# Patient Record
Sex: MALE | Race: WHITE | NOT HISPANIC OR LATINO | Employment: OTHER | URBAN - METROPOLITAN AREA
[De-identification: names, ages, dates, MRNs, and addresses within clinical notes are randomized per-mention and may not be internally consistent; named-entity substitution may affect disease eponyms.]

---

## 2017-01-04 ENCOUNTER — TELEPHONE (OUTPATIENT)
Dept: SLEEP MEDICINE | Facility: HOSPITAL | Age: 77
End: 2017-01-04

## 2017-01-25 ENCOUNTER — LAB VISIT (OUTPATIENT)
Dept: LAB | Facility: HOSPITAL | Age: 77
End: 2017-01-25
Attending: FAMILY MEDICINE
Payer: MEDICARE

## 2017-01-25 DIAGNOSIS — E03.9 HYPOTHYROIDISM, UNSPECIFIED TYPE: ICD-10-CM

## 2017-01-25 LAB
T4 FREE SERPL-MCNC: 1.3 NG/DL
TSH SERPL DL<=0.005 MIU/L-ACNC: 0.25 UIU/ML

## 2017-01-25 PROCEDURE — 84443 ASSAY THYROID STIM HORMONE: CPT

## 2017-01-25 PROCEDURE — 84439 ASSAY OF FREE THYROXINE: CPT

## 2017-01-25 PROCEDURE — 36415 COLL VENOUS BLD VENIPUNCTURE: CPT | Mod: PO

## 2017-01-27 ENCOUNTER — TELEPHONE (OUTPATIENT)
Dept: FAMILY MEDICINE | Facility: CLINIC | Age: 77
End: 2017-01-27

## 2017-01-27 DIAGNOSIS — E03.9 HYPOTHYROIDISM, UNSPECIFIED TYPE: Primary | ICD-10-CM

## 2017-01-27 RX ORDER — LEVOTHYROXINE SODIUM 100 UG/1
100 TABLET ORAL DAILY
Qty: 90 TABLET | Refills: 3 | Status: SHIPPED | OUTPATIENT
Start: 2017-01-27 | End: 2017-09-19 | Stop reason: SDUPTHER

## 2017-01-27 NOTE — TELEPHONE ENCOUNTER
Book with an eye doc if not done yet.     I refilled his meds.    Orders Placed This Encounter   Procedures    TSH     Standing Status:   Future     Standing Expiration Date:   3/28/2018

## 2017-02-08 ENCOUNTER — PATIENT OUTREACH (OUTPATIENT)
Dept: ADMINISTRATIVE | Facility: HOSPITAL | Age: 77
End: 2017-02-08

## 2017-02-08 NOTE — LETTER
February 8, 2017        We are seeing Bebeto Santiago, 1940, at [facility]. Darin Krueger MD is their primary care physician. To help with our [place] maintenance records could you please send the following:     Last Eye Exam     Please fax to [facility] at [phone], attention Alisha Lawson LPN.    Thank-you in advance for your assistance. If you have any questions or concerns please contact me at [phone].     Alisha Lawson LPN  Care Coordination Department  [facility]

## 2017-02-08 NOTE — LETTER
February 8, 2017    Bebeto Santiago  [address]   [place] [address]           [facility]  [address]  [facility] [address]  Phone: [phone] Dear Mr. Santiago:    [facility] is committed to your overall health.  To help you get the most out of each of your visits, we will review your information to make sure you are up to date on all of your recommended tests and/or procedures.      Darin Krueger MD has found that you may be due for   Health Maintenance Due   Topic    Eye Exam         If you have had any of the above done at another facility, please bring the records or information with you so that your record at [facility] will be complete.    If you are currently taking medication, please bring it with you to your appointment for review.    We will be happy to assist you with scheduling any necessary appointments or you may contact the [facility] appointment desk at [phone] to schedule at your convenience.     Thank you for choosing [facility] for your healthcare needs,        If you have any questions or concerns, please don't hesitate to call.    Sincerely,    Alisha DUEÑAS LPN  Care Coordination Department  [facility]

## 2017-02-17 ENCOUNTER — OFFICE VISIT (OUTPATIENT)
Dept: FAMILY MEDICINE | Facility: CLINIC | Age: 77
End: 2017-02-17
Payer: MEDICARE

## 2017-02-17 VITALS
HEIGHT: 72 IN | WEIGHT: 165 LBS | BODY MASS INDEX: 22.35 KG/M2 | HEART RATE: 65 BPM | TEMPERATURE: 98 F | DIASTOLIC BLOOD PRESSURE: 64 MMHG | SYSTOLIC BLOOD PRESSURE: 114 MMHG

## 2017-02-17 DIAGNOSIS — E11.59 HYPERTENSION ASSOCIATED WITH DIABETES: Primary | ICD-10-CM

## 2017-02-17 DIAGNOSIS — I15.2 HYPERTENSION ASSOCIATED WITH DIABETES: Primary | ICD-10-CM

## 2017-02-17 PROCEDURE — 99213 OFFICE O/P EST LOW 20 MIN: CPT | Mod: S$PBB,,, | Performed by: FAMILY MEDICINE

## 2017-02-17 PROCEDURE — 99214 OFFICE O/P EST MOD 30 MIN: CPT | Mod: PBBFAC,PO | Performed by: FAMILY MEDICINE

## 2017-02-17 PROCEDURE — 99999 PR PBB SHADOW E&M-EST. PATIENT-LVL IV: CPT | Mod: PBBFAC,,, | Performed by: FAMILY MEDICINE

## 2017-02-17 RX ORDER — CODEINE PHOSPHATE AND GUAIFENESIN 10; 100 MG/5ML; MG/5ML
10 SOLUTION ORAL 4 TIMES DAILY PRN
Qty: 480 ML | Refills: 0 | Status: SHIPPED | OUTPATIENT
Start: 2017-02-17 | End: 2017-02-27

## 2017-02-17 NOTE — MR AVS SNAPSHOT
[facility]  [address]  [place] [address]  Phone: [phone]  Fax: [phone]                  Bebeto Santiago   2017 9:20 AM   Office Visit    Description:  Male : 1940   Provider:  Darin Krueger MD   Department:  [facility]           Reason for Visit     Follow-up           Diagnoses this Visit        Comments    Hypertension associated with diabetes    -  Primary     Uncontrolled type 2 diabetes mellitus with stage 3 chronic kidney disease, with long-term current use of insulin                To Do List           Future Appointments        Provider Department Dept Phone    3/28/2017 8:25 AM LABORATORY, [place] [facility] [phone]    2017 8:40 AM LABORATORY, [place] [facility] [phone]      Goals (5 Years of Data)     None      Follow-Up and Disposition     Return in about 6 months (around 2017).    Follow-up and Disposition History       These Medications        Disp Refills Start End    guaifenesin-codeine 100-10 mg/5 ml (TUSSI-ORGANIDIN NR)  mg/5 mL syrup 480 mL 0 2017    Take 10 mLs by mouth 4 (four) times daily as needed for Cough. - Oral    Pharmacy: [id number] - [place] - [address] #: [phone]         [facility]     [facility] On [facility] Nurse Care Line -  Assistance  Registered nurses in the [facility] provide clinical advisement, health education, appointment booking, and other advisory services.  Call for this free service at [phone].             Medications           Message regarding Medications     Verify the changes and/or additions to your medication regime listed below are the same as discussed with your clinician today.  If any of these changes or additions are incorrect, please notify your healthcare provider.             Verify that the below list of  "medications is an accurate representation of the medications you are currently taking.  If none reported, the list may be blank. If incorrect, please contact your healthcare provider. Carry this list with you in case of emergency.           Current Medications     aspirin (ECOTRIN) 81 MG EC tablet Take 81 mg by mouth once daily.    BD INSULIN PEN NEEDLE UF MINI 31 gauge x 3/16" Ndle     BD LUER-MANUEL SYRINGE 3 mL 22 x 1 1/2" Syrg     blood sugar diagnostic Strp Use as directed before meals and at bedtime.    blood-glucose meter Misc Use as directed four times daily before meals and at bedtime.    doxazosin (CARDURA) 4 MG tablet Take 1 tablet (4 mg total) by mouth once daily. Every day    doxycycline (VIBRA-TABS) 100 MG tablet Take 100 mg by mouth once daily.    fluticasone (FLONASE) 50 mcg/actuation nasal spray USE 1 SPRAY IN EACH NOSTRIL ONCE DAILY.    insulin aspart (NOVOLOG FLEXPEN) 100 unit/mL InPn pen Check the glucose before every meal.   If the glucose is less than 150, do nothing.   If the glucose is >151-200, give 2 unit SQ   If the glucose is 201-250, give 3 units SQ   If the glucose is 250-300, give 4 units SQ   If the glucose is 301-350, give 5 units SQ   If the glucose is 350-400, give 6 units SQ   If the glucose is >400, give 7 units SQ and recheck in 2 hours and redose according to the scale above    insulin glargine (LANTUS SOLOSTAR) 100 unit/mL (3 mL) InPn pen Inject 15 units daily    lancets (ACCU-CHEK SOFTCLIX LANCETS) Misc Check glucose before meals and at bedtime.    levothyroxine (SYNTHROID) 100 MCG tablet Take 1 tablet (100 mcg total) by mouth once daily.    pravastatin (PRAVACHOL) 40 MG tablet TAKE 1 TABLET (40 MG TOTAL) BY MOUTH ONCE DAILY.    doxycycline (VIBRAMYCIN) 100 MG Cap Take 100 mg by mouth 2 (two) times daily.    DUREZOL 0.05 % Drop ophthalmic solution     dutasteride (AVODART) 0.5 mg capsule     fexofenadine (ALLEGRA ALLERGY) 180 MG tablet Take 1 tablet (180 mg total) by mouth once " "daily.    guaifenesin-codeine 100-10 mg/5 ml (TUSSI-ORGANIDIN NR)  mg/5 mL syrup Take 10 mLs by mouth 4 (four) times daily as needed for Cough.    HYPODERMIC NEEDLES 18 x 1 1/2 " Ndle     prednisoLONE acetate (PRED FORTE) 1 % DrpS PUT 1 DROP IN BOTH EYES 3 X'S A DAY FOR 1 WEEK, 2 TIMES A DAY FOR 1 WEEK, THEN ONCE A DAY FOR 1 WEEK           Clinical Reference Information           Your Vitals Were     BP Pulse Temp Height Weight BMI    114/64 (BP Location: Left arm, Patient Position: Sitting, BP Method: Automatic) 65 98.4 °F (36.9 °C) (Oral) 6' (1.829 m) 74.9 kg (165 lb 0.2 oz) 22.38 kg/m2      Blood Pressure          Most Recent Value    BP  114/64      Allergies as of 2/17/2017     Ace Inhibitors    Metformin      Immunizations Administered on Date of Encounter - 2/17/2017     None      [facility] Sign-Up     Activating your [facility] account is as easy as 1-2-3!     1) Visit [email or web address], select Sign Up Now, enter this activation code and your date of birth, then select Next.  Activation code not generated  Current Patient Portal Status: Account disabled      2) Create a username and password to use when you visit [facility] in the future and select a security question in case you lose your password and select Next.    3) Enter your e-mail address and click Sign Up!    Additional Information  If you have questions, please e-mail [email or web address] or call [phone] to talk to our [facility] staff. Remember, [facility] is NOT to be used for urgent needs. For medical emergencies, dial 911.         Language Assistance Services     ATTENTION: Language assistance services are available, free of charge. Please call [phone].      ATENCIÓN: Si habla español, tiene a cunningham disposición servicios gratuitos de asistencia lingüística. Llame al [phone].     CHÚ Ý: N?u b?n nói Ti?ng Vi?t, có các d?ch v? h? tr? ngôn ng? mi?n phí dành cho b?n. G?i s? [phone].         [facility] complies " with applicable Federal civil rights laws and does not discriminate on the basis of race, color, national origin, age, disability, or sex.

## 2017-02-17 NOTE — PROGRESS NOTES
"Subjective:      Patient ID: Bebeto Santiago is a 76 y.o. male.    Chief Complaint: Follow-up (diabetes, b/p)    HPI  The patient presents with essential hypertension.  The patient is tolerating the medication well and is in excellent compliance.  The patient is experiencing no side effects.  Counseling was offered regarding low salt diets.  The patient has a reduced salt intake.  The patient denies chest pain, palpitations, shortness of breath, dyspnea on exertion, left or murmur neck pain, nausea, vomiting, diaphoresis, paroxysmal nocturnal dyspnea, and orthopnea.     Visit Vitals    /64 (BP Location: Left arm, Patient Position: Sitting, BP Method: Automatic)    Pulse 65    Temp 98.4 °F (36.9 °C) (Oral)    Ht 6' (1.829 m)    Wt 74.9 kg (165 lb 0.2 oz)    BMI 22.38 kg/m2     He also has diabetes and he has not had to use much of the novolog.  He has had glucoses on his log that is in the double digits to the lower 100 range.  He has not had any lows.  I reviewed his last month of meds.   He is avoiding sugar and salt.     There are no preventive care reminders to display for this patient.    Past Medical History:  Past Medical History   Diagnosis Date    Allergy     BPH (benign prostatic hypertrophy)     Diabetes mellitus type II, uncontrolled     GERD (gastroesophageal reflux disease) 2013     grade IV/IV    Hypertension     Hypothyroidism     Male hypogonadism     Non-proliferative diabetic retinopathy     Urinary retention      Past Surgical History   Procedure Laterality Date    Spine surgery       Review of patient's allergies indicates:   Allergen Reactions    Ace inhibitors      Other reaction(s): cough    Metformin      Abdominal pain     Current Outpatient Prescriptions on File Prior to Visit   Medication Sig Dispense Refill    aspirin (ECOTRIN) 81 MG EC tablet Take 81 mg by mouth once daily.      BD INSULIN PEN NEEDLE UF MINI 31 gauge x 3/16" Ndle       BD LUER-MANUEL SYRINGE 3 mL " "22 x 1 1/2" Syrg   3    blood sugar diagnostic Strp Use as directed before meals and at bedtime. 300 each 6    blood-glucose meter Misc Use as directed four times daily before meals and at bedtime. 1 each 0    doxazosin (CARDURA) 4 MG tablet Take 1 tablet (4 mg total) by mouth once daily. Every day 90 tablet 3    doxycycline (VIBRA-TABS) 100 MG tablet Take 100 mg by mouth once daily.  1    fluticasone (FLONASE) 50 mcg/actuation nasal spray USE 1 SPRAY IN EACH NOSTRIL ONCE DAILY. 3 Bottle 3    insulin aspart (NOVOLOG FLEXPEN) 100 unit/mL InPn pen Check the glucose before every meal.   If the glucose is less than 150, do nothing.   If the glucose is >151-200, give 2 unit SQ   If the glucose is 201-250, give 3 units SQ   If the glucose is 250-300, give 4 units SQ   If the glucose is 301-350, give 5 units SQ   If the glucose is 350-400, give 6 units SQ   If the glucose is >400, give 7 units SQ and recheck in 2 hours and redose according to the scale above 3 mL 6    insulin glargine (LANTUS SOLOSTAR) 100 unit/mL (3 mL) InPn pen Inject 15 units daily 15 mL 6    lancets (ACCU-CHEK SOFTCLIX LANCETS) Misc Check glucose before meals and at bedtime. 200 each 6    levothyroxine (SYNTHROID) 100 MCG tablet Take 1 tablet (100 mcg total) by mouth once daily. 90 tablet 3    pravastatin (PRAVACHOL) 40 MG tablet TAKE 1 TABLET (40 MG TOTAL) BY MOUTH ONCE DAILY. 30 tablet 11    doxycycline (VIBRAMYCIN) 100 MG Cap Take 100 mg by mouth 2 (two) times daily.  0    DUREZOL 0.05 % Drop ophthalmic solution       dutasteride (AVODART) 0.5 mg capsule       fexofenadine (ALLEGRA ALLERGY) 180 MG tablet Take 1 tablet (180 mg total) by mouth once daily. 10 tablet 0    HYPODERMIC NEEDLES 18 x 1 1/2 " Ndle   3    prednisoLONE acetate (PRED FORTE) 1 % DrpS PUT 1 DROP IN BOTH EYES 3 X'S A DAY FOR 1 WEEK, 2 TIMES A DAY FOR 1 WEEK, THEN ONCE A DAY FOR 1 WEEK  0     No current facility-administered medications on file prior to visit.  "     Social History     Social History    Marital status:      Spouse name: Babs    Number of children: 2    Years of education: N/A     Occupational History    Retired      Social History Main Topics    Smoking status: Former Smoker     Quit date: 9/14/1986    Smokeless tobacco: Never Used    Alcohol use Yes      Comment: rare    Drug use: No    Sexual activity: Not on file     Other Topics Concern    Not on file     Social History Narrative     Family History   Problem Relation Age of Onset    Heart disease Mother     Stroke Maternal Grandfather     Diabetes Neg Hx     Cancer Neg Hx     Hypertension Neg Hx            Review of Systems    Objective:     Visit Vitals    /64 (BP Location: Left arm, Patient Position: Sitting, BP Method: Automatic)    Pulse 65    Temp 98.4 °F (36.9 °C) (Oral)    Ht 6' (1.829 m)    Wt 74.9 kg (165 lb 0.2 oz)    BMI 22.38 kg/m2       Physical Exam   Constitutional: He appears well-developed and well-nourished.   Cardiovascular: Normal rate, regular rhythm and normal heart sounds.  Exam reveals no gallop and no friction rub.    No murmur heard.  Pulmonary/Chest: Effort normal and breath sounds normal. No respiratory distress. He has no wheezes. He has no rales.   Musculoskeletal: He exhibits no edema.       Assessment:     1. Hypertension associated with diabetes    2. Uncontrolled type 2 diabetes mellitus with stage 3 chronic kidney disease, with long-term current use of insulin        Plan:   Bebeto was seen today for follow-up.    Diagnoses and all orders for this visit:    Hypertension associated with diabetes    Uncontrolled type 2 diabetes mellitus with stage 3 chronic kidney disease, with long-term current use of insulin      Recheck his thyroid in March as we changed him in Jan.   Check his a1c and lipid and cmp in august when he returns from [place] as he is moving there in March.

## 2017-03-09 ENCOUNTER — LAB VISIT (OUTPATIENT)
Dept: LAB | Facility: HOSPITAL | Age: 77
End: 2017-03-09
Attending: FAMILY MEDICINE
Payer: MEDICARE

## 2017-03-09 DIAGNOSIS — E03.9 HYPOTHYROIDISM, UNSPECIFIED TYPE: ICD-10-CM

## 2017-03-09 LAB — TSH SERPL DL<=0.005 MIU/L-ACNC: 0.61 UIU/ML

## 2017-03-09 PROCEDURE — 84443 ASSAY THYROID STIM HORMONE: CPT

## 2017-03-09 PROCEDURE — 36415 COLL VENOUS BLD VENIPUNCTURE: CPT | Mod: PO

## 2017-04-29 RX ORDER — DOXAZOSIN 4 MG/1
TABLET ORAL
Qty: 90 TABLET | Refills: 1 | Status: SHIPPED | OUTPATIENT
Start: 2017-04-29 | End: 2017-06-06

## 2017-06-06 ENCOUNTER — TELEPHONE (OUTPATIENT)
Dept: FAMILY MEDICINE | Facility: CLINIC | Age: 77
End: 2017-06-06

## 2017-06-06 NOTE — TELEPHONE ENCOUNTER
Upon talking to patient again patient clarified that his bp does go up to the 140's-160's/80's  when he is off of the cardura. Do you still want him to track with no bp medication?

## 2017-06-06 NOTE — TELEPHONE ENCOUNTER
Pt states he is in [place]. He says his cardura 4mg is making him dizzy and weak and when he doesn't take it he is fine. He states his blood pressure is fine. Please advise as to what to do.

## 2017-06-06 NOTE — TELEPHONE ENCOUNTER
----- Message from Shyann Lee sent at 6/6/2017  8:21 AM CDT -----  Contact: Pt  Pt is requesting to speak to the nurse regarding changing his medication. Pls call pt back at [phone].

## 2017-06-06 NOTE — TELEPHONE ENCOUNTER
Stay off of the cardura for now.  Track his blood pressure and send me the numbers over time to confirm he is stable.     BP Readings from Last 3 Encounters:   02/17/17 114/64   12/07/16 118/82   11/21/16 126/70

## 2017-06-08 ENCOUNTER — TELEPHONE (OUTPATIENT)
Dept: FAMILY MEDICINE | Facility: CLINIC | Age: 77
End: 2017-06-08

## 2017-06-08 NOTE — TELEPHONE ENCOUNTER
----- Message from Nikky Way sent at 6/8/2017  9:45 AM CDT -----  Brit with [facility] at [phone]//states they had called yesterday and have not gotten a response/they received a script for test strips//pt has Medicare B which requires dr to hand sign the script and include the diagnosis Code//please call//thanks/lh

## 2017-06-09 DIAGNOSIS — E11.9 TYPE 2 DIABETES MELLITUS WITHOUT COMPLICATION: ICD-10-CM

## 2017-06-12 ENCOUNTER — TELEPHONE (OUTPATIENT)
Dept: FAMILY MEDICINE | Facility: CLINIC | Age: 77
End: 2017-06-12

## 2017-06-12 NOTE — TELEPHONE ENCOUNTER
----- Message from Audrey Mariano sent at 6/12/2017 11:47 AM CDT -----  Contact: pt spouse-Babs Brice states she faxed over patient blood pressure reading on last Friday and states need to know if it have been received and what should the patient do. Please adv/call [phone].//thanks. cw

## 2017-06-13 ENCOUNTER — TELEPHONE (OUTPATIENT)
Dept: FAMILY MEDICINE | Facility: CLINIC | Age: 77
End: 2017-06-13

## 2017-06-13 RX ORDER — AMLODIPINE BESYLATE 5 MG/1
5 TABLET ORAL DAILY
Qty: 30 TABLET | Refills: 11 | Status: SHIPPED | OUTPATIENT
Start: 2017-06-13 | End: 2017-09-20

## 2017-06-13 RX ORDER — DUTASTERIDE 0.5 MG/1
0.5 CAPSULE, LIQUID FILLED ORAL DAILY
Qty: 30 CAPSULE | Refills: 11 | Status: SHIPPED | OUTPATIENT
Start: 2017-06-13 | End: 2017-09-19 | Stop reason: SDUPTHER

## 2017-06-13 NOTE — TELEPHONE ENCOUNTER
His bp has been high despite cardura and this makes him feel bad.  I am stoppin gcardura and starting his noraasc and avodart as he was not on avodart now.     He is to get me his bplist in 1 week.      I have signed for the following orders AND/OR meds.  Please call the patient and ask the patient to schedule the testing AND/OR inform about any medications that were sent.      No orders of the defined types were placed in this encounter.        Medications Ordered This Encounter      amlodipine (NORVASC) 5 MG tablet          Sig: Take 1 tablet (5 mg total) by mouth once daily.          Dispense:  30 tablet          Refill:  11      dutasteride (AVODART) 0.5 mg capsule          Sig: Take 1 capsule (0.5 mg total) by mouth once daily.          Dispense:  30 capsule          Refill:  11

## 2017-06-13 NOTE — TELEPHONE ENCOUNTER
----- Message from Yudelka Barrow sent at 6/13/2017  7:56 AM CDT -----  Contact: [phone]  Would like consult on RX medication states BP meds make him weak, Cesar call back at [phone] or [phone]. Thx. LJ

## 2017-06-13 NOTE — TELEPHONE ENCOUNTER
----- Message from Jade Villasenor sent at 6/13/2017  1:59 PM CDT -----  Contact: pt   Pt calling because the office called in the wrong diabetic test strips,,, please call pt back at [phone]

## 2017-09-06 DIAGNOSIS — E11.9 TYPE 2 DIABETES MELLITUS WITHOUT COMPLICATION, WITHOUT LONG-TERM CURRENT USE OF INSULIN: Primary | ICD-10-CM

## 2017-09-06 DIAGNOSIS — E78.5 HYPERLIPIDEMIA, UNSPECIFIED HYPERLIPIDEMIA TYPE: ICD-10-CM

## 2017-09-06 RX ORDER — PEN NEEDLE, DIABETIC 31 GX5/16"
NEEDLE, DISPOSABLE MISCELLANEOUS
Qty: 100 EACH | Refills: 0 | Status: SHIPPED | OUTPATIENT
Start: 2017-09-06 | End: 2017-09-19 | Stop reason: SDUPTHER

## 2017-09-06 RX ORDER — INSULIN GLARGINE 100 [IU]/ML
INJECTION, SOLUTION SUBCUTANEOUS
Qty: 15 SYRINGE | Refills: 0 | Status: SHIPPED | OUTPATIENT
Start: 2017-09-06 | End: 2017-09-19 | Stop reason: SDUPTHER

## 2017-09-07 NOTE — TELEPHONE ENCOUNTER
The patient requested medicine refills and I did refill it once.    Health Maintenance Due   Topic Date Due    Hemoglobin A1c  05/21/2017    Influenza Vaccine  08/01/2017    Foot Exam  08/25/2017    Urine Microalbumin  08/19/2017     BP Readings from Last 3 Encounters:   02/17/17 114/64   12/07/16 118/82   11/21/16 126/70     Check a cmp, lipid, a1c, urine prot/creat ratio

## 2017-09-07 NOTE — TELEPHONE ENCOUNTER
"I have signed for the following orders AND/OR meds.  Please call the patient and ask the patient to schedule the testing AND/OR inform about any medications that were sent.      Orders Placed This Encounter   Procedures    Comprehensive metabolic panel     Standing Status:   Future     Standing Expiration Date:   11/6/2018    Lipid panel     Standing Status:   Future     Standing Expiration Date:   11/6/2018    Hemoglobin A1c     Standing Status:   Future     Standing Expiration Date:   11/6/2018    MICROALBUMIN / CREATININE RATIO URINE     Standing Status:   Future     Standing Expiration Date:   11/6/2018     Order Specific Question:   Specimen Source     Answer:   Urine         Medications Ordered This Encounter      BD INSULIN PEN NEEDLE UF MINI 31 gauge x 3/16" Ndle          Sig: USE DAILY WITH LANTUS AND NOVOLOG          Dispense:  100 each          Refill:  0      LANTUS SOLOSTAR 100 unit/mL (3 mL) InPn pen          Sig: INJECT 15 UNITS DAILY          Dispense:  15 Syringe          Refill:  0  "

## 2017-09-08 ENCOUNTER — LAB VISIT (OUTPATIENT)
Dept: LAB | Facility: HOSPITAL | Age: 77
End: 2017-09-08
Attending: FAMILY MEDICINE
Payer: MEDICARE

## 2017-09-08 DIAGNOSIS — E11.9 TYPE 2 DIABETES MELLITUS WITHOUT COMPLICATION, WITHOUT LONG-TERM CURRENT USE OF INSULIN: ICD-10-CM

## 2017-09-08 LAB
CREAT UR-MCNC: 118 MG/DL
MICROALBUMIN UR DL<=1MG/L-MCNC: 26 UG/ML
MICROALBUMIN/CREATININE RATIO: 22 UG/MG

## 2017-09-08 PROCEDURE — 82570 ASSAY OF URINE CREATININE: CPT

## 2017-09-12 ENCOUNTER — TELEPHONE (OUTPATIENT)
Dept: FAMILY MEDICINE | Facility: CLINIC | Age: 77
End: 2017-09-12

## 2017-09-12 NOTE — TELEPHONE ENCOUNTER
----- Message from Nabila Arrington sent at 9/11/2017  4:54 PM CDT -----  Contact: pt  He's returning a missed call, please advise [phone] (home)

## 2017-09-19 RX ORDER — AMLODIPINE BESYLATE 5 MG/1
5 TABLET ORAL DAILY
Qty: 90 TABLET | Refills: 3 | Status: CANCELLED | OUTPATIENT
Start: 2017-09-19

## 2017-09-19 RX ORDER — SYRINGE WITH NEEDLE, 1 ML 25GX5/8"
SYRINGE, EMPTY DISPOSABLE MISCELLANEOUS
Refills: 3 | Status: CANCELLED | COMMUNITY
Start: 2017-09-19

## 2017-09-20 ENCOUNTER — OFFICE VISIT (OUTPATIENT)
Dept: FAMILY MEDICINE | Facility: CLINIC | Age: 77
End: 2017-09-20
Payer: MEDICARE

## 2017-09-20 ENCOUNTER — LAB VISIT (OUTPATIENT)
Dept: LAB | Facility: HOSPITAL | Age: 77
End: 2017-09-20
Attending: FAMILY MEDICINE
Payer: MEDICARE

## 2017-09-20 VITALS
HEART RATE: 69 BPM | SYSTOLIC BLOOD PRESSURE: 132 MMHG | WEIGHT: 156.75 LBS | TEMPERATURE: 99 F | HEIGHT: 72 IN | BODY MASS INDEX: 21.23 KG/M2 | DIASTOLIC BLOOD PRESSURE: 71 MMHG

## 2017-09-20 DIAGNOSIS — E11.59 HYPERTENSION ASSOCIATED WITH DIABETES: ICD-10-CM

## 2017-09-20 DIAGNOSIS — N40.0 BENIGN PROSTATIC HYPERPLASIA, PRESENCE OF LOWER URINARY TRACT SYMPTOMS UNSPECIFIED: ICD-10-CM

## 2017-09-20 DIAGNOSIS — R97.20 ELEVATED PSA: ICD-10-CM

## 2017-09-20 DIAGNOSIS — I73.9 PAD (PERIPHERAL ARTERY DISEASE): ICD-10-CM

## 2017-09-20 DIAGNOSIS — I15.2 HYPERTENSION ASSOCIATED WITH DIABETES: ICD-10-CM

## 2017-09-20 DIAGNOSIS — R09.89 BILATERAL CAROTID BRUITS: ICD-10-CM

## 2017-09-20 DIAGNOSIS — Z12.5 ENCOUNTER FOR SCREENING FOR MALIGNANT NEOPLASM OF PROSTATE: ICD-10-CM

## 2017-09-20 DIAGNOSIS — E11.22 CKD STAGE 3 SECONDARY TO DIABETES: ICD-10-CM

## 2017-09-20 DIAGNOSIS — E78.2 COMBINED HYPERLIPIDEMIA ASSOCIATED WITH TYPE 2 DIABETES MELLITUS: ICD-10-CM

## 2017-09-20 DIAGNOSIS — E11.3299 NON-PROLIFERATIVE DIABETIC RETINOPATHY: ICD-10-CM

## 2017-09-20 DIAGNOSIS — E11.69 COMBINED HYPERLIPIDEMIA ASSOCIATED WITH TYPE 2 DIABETES MELLITUS: ICD-10-CM

## 2017-09-20 DIAGNOSIS — K21.9 GASTROESOPHAGEAL REFLUX DISEASE, ESOPHAGITIS PRESENCE NOT SPECIFIED: ICD-10-CM

## 2017-09-20 DIAGNOSIS — N18.30 CKD STAGE 3 SECONDARY TO DIABETES: ICD-10-CM

## 2017-09-20 DIAGNOSIS — E03.9 HYPOTHYROIDISM, UNSPECIFIED TYPE: ICD-10-CM

## 2017-09-20 LAB
BILIRUB UR QL STRIP: NEGATIVE
CLARITY UR: CLEAR
COLOR UR: YELLOW
COMPLEXED PSA SERPL-MCNC: 2.1 NG/ML
CREAT UR-MCNC: 124 MG/DL
GLUCOSE UR QL STRIP: ABNORMAL
HGB UR QL STRIP: NEGATIVE
KETONES UR QL STRIP: NEGATIVE
LEUKOCYTE ESTERASE UR QL STRIP: NEGATIVE
MICROALBUMIN UR DL<=1MG/L-MCNC: 33 UG/ML
MICROALBUMIN/CREATININE RATIO: 26.6 UG/MG
NITRITE UR QL STRIP: NEGATIVE
PH UR STRIP: 6 [PH] (ref 5–8)
PROT UR QL STRIP: NEGATIVE
SP GR UR STRIP: 1.01 (ref 1–1.03)
URN SPEC COLLECT METH UR: ABNORMAL

## 2017-09-20 PROCEDURE — 99213 OFFICE O/P EST LOW 20 MIN: CPT | Mod: PBBFAC,PO | Performed by: FAMILY MEDICINE

## 2017-09-20 PROCEDURE — 1159F MED LIST DOCD IN RCRD: CPT | Mod: ,,, | Performed by: FAMILY MEDICINE

## 2017-09-20 PROCEDURE — 3078F DIAST BP <80 MM HG: CPT | Mod: ,,, | Performed by: FAMILY MEDICINE

## 2017-09-20 PROCEDURE — 3075F SYST BP GE 130 - 139MM HG: CPT | Mod: ,,, | Performed by: FAMILY MEDICINE

## 2017-09-20 PROCEDURE — 36415 COLL VENOUS BLD VENIPUNCTURE: CPT | Mod: PO

## 2017-09-20 PROCEDURE — 99215 OFFICE O/P EST HI 40 MIN: CPT | Mod: S$PBB,,, | Performed by: FAMILY MEDICINE

## 2017-09-20 PROCEDURE — 81002 URINALYSIS NONAUTO W/O SCOPE: CPT | Mod: PO

## 2017-09-20 PROCEDURE — 84153 ASSAY OF PSA TOTAL: CPT

## 2017-09-20 PROCEDURE — 82570 ASSAY OF URINE CREATININE: CPT

## 2017-09-20 PROCEDURE — 99999 PR PBB SHADOW E&M-EST. PATIENT-LVL III: CPT | Mod: PBBFAC,,, | Performed by: FAMILY MEDICINE

## 2017-09-20 PROCEDURE — 1126F AMNT PAIN NOTED NONE PRSNT: CPT | Mod: ,,, | Performed by: FAMILY MEDICINE

## 2017-09-20 RX ORDER — INSULIN GLARGINE 100 [IU]/ML
INJECTION, SOLUTION SUBCUTANEOUS
Qty: 15 SYRINGE | Refills: 5 | Status: SHIPPED | OUTPATIENT
Start: 2017-09-20 | End: 2018-02-21 | Stop reason: SDUPTHER

## 2017-09-20 RX ORDER — PRAVASTATIN SODIUM 40 MG/1
40 TABLET ORAL DAILY
Qty: 90 TABLET | Refills: 3 | Status: SHIPPED | OUTPATIENT
Start: 2017-09-20 | End: 2018-02-21 | Stop reason: SDUPTHER

## 2017-09-20 RX ORDER — TAMSULOSIN HYDROCHLORIDE 0.4 MG/1
0.4 CAPSULE ORAL DAILY
Qty: 90 CAPSULE | Refills: 3 | Status: SHIPPED | OUTPATIENT
Start: 2017-09-20 | End: 2019-03-08 | Stop reason: SDUPTHER

## 2017-09-20 RX ORDER — LANCETS
EACH MISCELLANEOUS
Qty: 200 EACH | Refills: 6 | Status: SHIPPED | OUTPATIENT
Start: 2017-09-20 | End: 2019-03-08 | Stop reason: SDUPTHER

## 2017-09-20 RX ORDER — AMLODIPINE BESYLATE 2.5 MG/1
2.5 TABLET ORAL DAILY
Qty: 90 TABLET | Refills: 3 | Status: SHIPPED | OUTPATIENT
Start: 2017-09-20 | End: 2018-02-21 | Stop reason: SDUPTHER

## 2017-09-20 RX ORDER — INSULIN ASPART 100 [IU]/ML
INJECTION, SOLUTION INTRAVENOUS; SUBCUTANEOUS
Qty: 3 ML | Refills: 6 | Status: SHIPPED | OUTPATIENT
Start: 2017-09-20 | End: 2018-02-07 | Stop reason: SDUPTHER

## 2017-09-20 RX ORDER — DUTASTERIDE 0.5 MG/1
0.5 CAPSULE, LIQUID FILLED ORAL DAILY
Qty: 90 CAPSULE | Refills: 3 | Status: SHIPPED | OUTPATIENT
Start: 2017-09-20 | End: 2017-10-03

## 2017-09-20 RX ORDER — LEVOTHYROXINE SODIUM 100 UG/1
100 TABLET ORAL DAILY
Qty: 90 TABLET | Refills: 3 | Status: SHIPPED | OUTPATIENT
Start: 2017-09-20 | End: 2018-02-21 | Stop reason: SDUPTHER

## 2017-09-20 RX ORDER — PEN NEEDLE, DIABETIC 30 GX3/16"
NEEDLE, DISPOSABLE MISCELLANEOUS
Qty: 300 EACH | Refills: 3 | Status: SHIPPED | OUTPATIENT
Start: 2017-09-20 | End: 2019-03-08 | Stop reason: SDUPTHER

## 2017-09-20 NOTE — PROGRESS NOTES
Subjective:      Patient ID: Bebeto Santiago is a 77 y.o. male.    Chief Complaint: Annual Exam    HPI   The patient presents with diabetes.  The patient denies polyuria, polydipsia, polyphagia, hypoglycemia and paresthesias.  The patient's glucose control has been good.  Home glucose averages are routinely checked.  The patient is without retinopathy currently.  The patient has no history of neuropathy.  The patient currently complains of no podiatric problems.  He does have diabetic retinopathy and is seeing an eye doc.  The patient has excellent compliance.  Hemoglobin A1C   Date Value Ref Range Status   09/08/2017 7.6 (H) 4.0 - 5.6 % Final     Comment:     According to ADA guidelines, hemoglobin A1c <7.0% represents  optimal control in non-pregnant diabetic patients. Different  metrics may apply to specific patient populations.   Standards of Medical Care in Diabetes-2016.  For the purpose of screening for the presence of diabetes:  <5.7%     Consistent with the absence of diabetes  5.7-6.4%  Consistent with increasing risk for diabetes   (prediabetes)  >or=6.5%  Consistent with diabetes  Currently, no consensus exists for use of hemoglobin A1c  for diagnosis of diabetes for children.  This Hemoglobin A1c assay has significant interference with fetal   hemoglobin   (HbF). The results are invalid for patients with abnormal amounts of   HbF,   including those with known Hereditary Persistence   of Fetal Hemoglobin. Heterozygous hemoglobin variants (HbAS, HbAC,   HbAD, HbAE, HbA2) do not significantly interfere with this assay;   however, presence of multiple variants in a sample may impact the %   interference.     11/21/2016 7.3 (H) 4.5 - 6.2 % Final     Comment:     According to ADA guidelines, hemoglobin A1C <7.0% represents  optimal control in non-pregnant diabetic patients.  Different  metrics may apply to specific populations.   Standards of Medical Care in Diabetes - 2016.  For the purpose of screening for  the presence of diabetes:  <5.7%     Consistent with the absence of diabetes  5.7-6.4%  Consistent with increasing risk for diabetes   (prediabetes)  >or=6.5%  Consistent with diabetes  Currently no consensus exists for use of hemoglobin A1C  for diagnosis of diabetes for children.     08/19/2016 11.7 (H) 4.5 - 6.2 % Final     Comment:     According to ADA guidelines, hemoglobin A1C <7.0% represents  optimal control in non-pregnant diabetic patients.  Different  metrics may apply to specific populations.   Standards of Medical Care in Diabetes - 2016.  For the purpose of screening for the presence of diabetes:  <5.7%     Consistent with the absence of diabetes  5.7-6.4%  Consistent with increasing risk for diabetes   (prediabetes)  >or=6.5%  Consistent with diabetes  Currently no consensus exists for use of hemoglobin A1C  for diagnosis of diabetes for children.       No results found for: PAULETTE MORE4HUR    The patient presents with hyperlipidemia.  The patient reports tolerating the medication well and is in excellent compliance.  There have been no medication side effects.  The patient denies chest pain, neuropathy, and myalgias.  The patient has reduced fat intake and has been exercising.  Current treatment has included the medications listed in the med card.    Lab Results   Component Value Date    CHOL 166 09/08/2017    CHOL 157 11/09/2016    CHOL 158 08/19/2016       Lab Results   Component Value Date    HDL 58 09/08/2017    HDL 53 11/09/2016    HDL 48 08/19/2016       Lab Results   Component Value Date    LDLCALC 97.4 09/08/2017    LDLCALC 89.2 11/09/2016    LDLCALC 96.8 08/19/2016       Lab Results   Component Value Date    TRIG 53 09/08/2017    TRIG 74 11/09/2016    TRIG 66 08/19/2016       Lab Results   Component Value Date    CHOLHDL 34.9 09/08/2017    CHOLHDL 33.8 11/09/2016    CHOLHDL 30.4 08/19/2016     Lab Results   Component Value Date    ALT 17 09/08/2017    AST 17 09/08/2017    ALKPHOS 96  09/08/2017    BILITOT 0.9 09/08/2017       The patient presents with essential hypertension.  The patient is tolerating the medication well and is in excellent compliance.  The patient is experiencing no side effects.  Counseling was offered regarding low salt diets.  The patient has a reduced salt intake.  The patient denies chest pain, palpitations, shortness of breath, dyspnea on exertion, left or murmur neck pain, nausea, vomiting, diaphoresis, paroxysmal nocturnal dyspnea, and orthopnea.   /71   Pulse 69   Temp 99.1 °F (37.3 °C) (Oral)   Ht 6' (1.829 m)   Wt 71.1 kg (156 lb 12 oz)   BMI 21.26 kg/m²     The patient presents with GERD.  Denies chest pain, nausea & vomiting, belching, cramping, distention, dyspepsia, dysphagia, hematochezia, melena, abdominal pain and weight loss.  Current treatment has included medications that are listed in medications list with significant response.  There has been no medicine intolerance.  The patient cannot identify any exacerbating factors.  Avoidance of NSAID's, ASA, carbonated beverages and spicy food was discussed.    The patient presents with hypothyroidism.  The patient denies agitation, anxiety, blurred vision, chest pain, cold intolerance, constipation, dizziness, dry skin, fatigue, lightheadedness, paresthesias, skin coarsening, tachycardia, tremor, weight gain or weight loss.  The patient's current treatment has included Synthroid with a good response.    Lab Results   Component Value Date    TSH 0.608 03/09/2017     Yonathan had an elevated psa.  He has urgerncy of urination and nocturia and it is worsening.  He was on flomax in the past and he had low bp on this and we stopped it.  He would like to resume it now and at the same time I will decrease his amlodipine.  He has been seeing Dr. Muro for his increased PSA.  Lab Results   Component Value Date    PSA 8.2 (H) 08/19/2016    PSA 4.9 (H) 10/26/2015    PSA 4.70 (H) 03/06/2013     He did have one that  "was 3.1 after all of the above numbers and he was seen by a specialist. It is time to recheck.     He has PAD as he smoked years ago and he had an u/S last year. He never did see the vasc surgeon.  It is time to recheck this.     Health Maintenance Due   Topic Date Due    Foot Exam  08/25/2017       Past Medical History:  Past Medical History:   Diagnosis Date    Allergy     BPH (benign prostatic hypertrophy)     Diabetes mellitus type II, uncontrolled     GERD (gastroesophageal reflux disease) 2013    grade IV/IV    Hypertension     Hypothyroidism     Male hypogonadism     Non-proliferative diabetic retinopathy     Urinary retention      Past Surgical History:   Procedure Laterality Date    SPINE SURGERY       Review of patient's allergies indicates:   Allergen Reactions    Ace inhibitors      Other reaction(s): cough    Metformin      Abdominal pain     Current Outpatient Prescriptions on File Prior to Visit   Medication Sig Dispense Refill    amlodipine (NORVASC) 5 MG tablet Take 1 tablet (5 mg total) by mouth once daily. 30 tablet 11    aspirin (ECOTRIN) 81 MG EC tablet Take 81 mg by mouth once daily.      BD INSULIN PEN NEEDLE UF MINI 31 gauge x 3/16" Ndle USE DAILY WITH LANTUS AND NOVOLOG 100 each 0    BD LUER-MANUEL SYRINGE 3 mL 22 x 1 1/2" Syrg   3    blood sugar diagnostic Strp Use as directed before meals and at bedtime. 300 each 6    blood-glucose meter Misc Use as directed four times daily before meals and at bedtime. 1 each 0    doxycycline (VIBRA-TABS) 100 MG tablet Take 100 mg by mouth once daily.  1    DUREZOL 0.05 % Drop ophthalmic solution       dutasteride (AVODART) 0.5 mg capsule Take 1 capsule (0.5 mg total) by mouth once daily. 30 capsule 11    fexofenadine (ALLEGRA ALLERGY) 180 MG tablet Take 1 tablet (180 mg total) by mouth once daily. 10 tablet 0    fluticasone (FLONASE) 50 mcg/actuation nasal spray USE 1 SPRAY IN EACH NOSTRIL ONCE DAILY. 3 Bottle 3    HYPODERMIC " "NEEDLES 18 x 1 1/2 " Ndle   3    insulin aspart (NOVOLOG FLEXPEN) 100 unit/mL InPn pen Check the glucose before every meal.   If the glucose is less than 150, do nothing.   If the glucose is >151-200, give 2 unit SQ   If the glucose is 201-250, give 3 units SQ   If the glucose is 250-300, give 4 units SQ   If the glucose is 301-350, give 5 units SQ   If the glucose is 350-400, give 6 units SQ   If the glucose is >400, give 7 units SQ and recheck in 2 hours and redose according to the scale above 3 mL 6    lancets (ACCU-CHEK SOFTCLIX LANCETS) Misc Check glucose before meals and at bedtime. 200 each 6    LANTUS SOLOSTAR 100 unit/mL (3 mL) InPn pen INJECT 15 UNITS DAILY 15 Syringe 0    levothyroxine (SYNTHROID) 100 MCG tablet Take 1 tablet (100 mcg total) by mouth once daily. 90 tablet 3    pravastatin (PRAVACHOL) 40 MG tablet TAKE 1 TABLET (40 MG TOTAL) BY MOUTH ONCE DAILY. 30 tablet 11    prednisoLONE acetate (PRED FORTE) 1 % DrpS PUT 1 DROP IN BOTH EYES 3 X'S A DAY FOR 1 WEEK, 2 TIMES A DAY FOR 1 WEEK, THEN ONCE A DAY FOR 1 WEEK  0    [DISCONTINUED] doxycycline (VIBRAMYCIN) 100 MG Cap Take 100 mg by mouth 2 (two) times daily.  0     No current facility-administered medications on file prior to visit.      Social History     Social History    Marital status:      Spouse name: Babs    Number of children: 2    Years of education: N/A     Occupational History    Retired      Social History Main Topics    Smoking status: Former Smoker     Quit date: 9/14/1986    Smokeless tobacco: Never Used    Alcohol use Yes      Comment: rare    Drug use: No    Sexual activity: Not on file     Other Topics Concern    Not on file     Social History Narrative    No narrative on file     Family History   Problem Relation Age of Onset    Heart disease Mother     Stroke Maternal Grandfather     Diabetes Neg Hx     Cancer Neg Hx     Hypertension Neg Hx              Review of Systems   Constitutional: Negative " for chills, fatigue and fever.   Respiratory: Negative for cough, shortness of breath and wheezing.    Cardiovascular: Negative for chest pain and palpitations.   Gastrointestinal: Negative for abdominal pain, blood in stool, nausea and vomiting.   Genitourinary: Positive for difficulty urinating, dysuria, frequency and urgency. Negative for hematuria.       Objective:   /71   Pulse 69   Temp 99.1 °F (37.3 °C) (Oral)   Ht 6' (1.829 m)   Wt 71.1 kg (156 lb 12 oz)   BMI 21.26 kg/m²     Physical Exam   Constitutional: He is oriented to person, place, and time. He appears well-developed and well-nourished.   HENT:   Head: Normocephalic and atraumatic.   Right Ear: External ear normal.   Left Ear: External ear normal.   Nose: Nose normal.   Mouth/Throat: Oropharynx is clear and moist. No oropharyngeal exudate.   Eyes: Conjunctivae and EOM are normal. Pupils are equal, round, and reactive to light. Right eye exhibits no discharge. Left eye exhibits no discharge. No scleral icterus.   Neck: Normal range of motion. Neck supple. No JVD present. Carotid bruit is present. No thyromegaly present.   Cardiovascular: Normal rate, regular rhythm, normal heart sounds and intact distal pulses.  Exam reveals no gallop and no friction rub.    No murmur heard.  Pulses:       Dorsalis pedis pulses are 0 on the right side, and 0 on the left side.        Posterior tibial pulses are 0 on the right side, and 0 on the left side.   Pulmonary/Chest: Effort normal and breath sounds normal. No respiratory distress. He has no wheezes. He has no rales. He exhibits no tenderness.   Abdominal: Soft. Bowel sounds are normal. He exhibits no distension and no mass. There is no tenderness. There is no rebound and no guarding.   Musculoskeletal: Normal range of motion. He exhibits no edema or tenderness.        Right foot: There is normal range of motion and no deformity.        Left foot: There is normal range of motion and no deformity.  "  Feet:   Right Foot:   Protective Sensation: 10 sites tested. 10 sites sensed.   Skin Integrity: Negative for ulcer, blister, skin breakdown, erythema, warmth, callus or dry skin.   Left Foot:   Protective Sensation: 10 sites tested. 10 sites sensed.   Skin Integrity: Negative for ulcer, blister, skin breakdown, erythema, warmth, callus or dry skin.   Lymphadenopathy:     He has no cervical adenopathy.   Neurological: He is alert and oriented to person, place, and time. No cranial nerve deficit. Coordination normal.   Skin: Skin is warm and dry. He is not diaphoretic.   Psychiatric: He has a normal mood and affect.       Assessment:     1. Uncontrolled type 2 diabetes mellitus with stage 3 chronic kidney disease, with long-term current use of insulin    2. Hypothyroidism, unspecified type    3. Hypertension associated with diabetes    4. Gastroesophageal reflux disease, esophagitis presence not specified    5. Elevated PSA    6. Combined hyperlipidemia associated with type 2 diabetes mellitus    7. CKD stage 3 secondary to diabetes    8. Non-proliferative diabetic retinopathy    9. Benign prostatic hyperplasia, presence of lower urinary tract symptoms unspecified    10. Encounter for screening for malignant neoplasm of prostate     11. Bilateral carotid bruits    12. PAD (peripheral artery disease)        Plan:   Bebeto was seen today for annual exam.    Diagnoses and all orders for this visit:    Uncontrolled type 2 diabetes mellitus with stage 3 chronic kidney disease, with long-term current use of insulin  -     Microalbumin/creatinine urine ratio; Future  -     pen needle, diabetic (BD INSULIN PEN NEEDLE UF MINI) 31 gauge x 3/16" Ndle; USE DAILY WITH LANTUS AND NOVOLOG  -     blood sugar diagnostic Strp; Use as directed before meals and at bedtime.  -     insulin aspart (NOVOLOG FLEXPEN) 100 unit/mL InPn pen; Check the glucose before every meal.   If the glucose is less than 150, do nothing.   If the glucose is " ">151-200, give 2 unit SQ   If the glucose is 201-250, give 3 units SQ   If the glucose is 250-300, give 4 units SQ   If the glucose is 301-350, give 5 units SQ   If the glucose is 350-400, give 6 units SQ   If the glucose is >400, give 7 units SQ and recheck in 2 hours and redose according to the scale above  -     lancets (ACCU-CHEK SOFTCLIX LANCETS) Misc; Check glucose before meals and at bedtime.  -     insulin glargine (LANTUS SOLOSTAR) 100 unit/mL (3 mL) InPn pen; INJECT 15 UNITS DAILY    Hypothyroidism, unspecified type  -     levothyroxine (SYNTHROID) 100 MCG tablet; Take 1 tablet (100 mcg total) by mouth once daily.    Hypertension associated with diabetes  -     amlodipine (NORVASC) 2.5 MG tablet; Take 1 tablet (2.5 mg total) by mouth once daily.    Gastroesophageal reflux disease, esophagitis presence not specified    Elevated PSA  -     PSA, Screening; Future    Combined hyperlipidemia associated with type 2 diabetes mellitus  -     pravastatin (PRAVACHOL) 40 MG tablet; Take 1 tablet (40 mg total) by mouth once daily.    CKD stage 3 secondary to diabetes    Non-proliferative diabetic retinopathy    Benign prostatic hyperplasia, presence of lower urinary tract symptoms unspecified  -     dutasteride (AVODART) 0.5 mg capsule; Take 1 capsule (0.5 mg total) by mouth once daily.  -     tamsulosin (FLOMAX) 0.4 mg Cp24; Take 1 capsule (0.4 mg total) by mouth once daily.  -     Urinalysis; Future    Encounter for screening for malignant neoplasm of prostate   -     PSA, Screening; Future    Bilateral carotid bruits  -     US Carotid Bilateral; Future    PAD (peripheral artery disease)  -     US Lower Extremity Arteries Bilateral; Future    Other orders  -     Cancel: amlodipine (NORVASC) 5 MG tablet; Take 1 tablet (5 mg total) by mouth once daily.  -     Cancel: BD LUER-MANUEL SYRINGE 3 mL 22 x 1 1/2" Syrg;       Stop the amlodipine and decrease the dose to 2.5 mg a day and start the flomax for the prostate. F/u " with Dr. Muro. Check his PSA.  Check his kidneys.  Cont f/u with the eye doc.  Check the ultrasound of the neck and legs.

## 2017-09-21 ENCOUNTER — TELEPHONE (OUTPATIENT)
Dept: RADIOLOGY | Facility: HOSPITAL | Age: 77
End: 2017-09-21

## 2017-09-21 NOTE — PROGRESS NOTES
The patient has a normal urine protein creatinine ratio.  Please ask the patient to repeat this annually to ensure that the patient is not progressing on proteinuria which can be a side effect from diabetes and hypertension.  It would also be a sign that the patient is developing diabetic nephropathy if it were positive.    The urinalysis is also normal except for glucose which this was not fasting.   His last a1c was   Lab Results       Component                Value               Date                       HGBA1C                   7.6 (H)             09/08/2017

## 2017-09-22 ENCOUNTER — HOSPITAL ENCOUNTER (OUTPATIENT)
Dept: RADIOLOGY | Facility: HOSPITAL | Age: 77
Discharge: HOME | End: 2017-09-22
Attending: FAMILY MEDICINE
Payer: MEDICARE

## 2017-09-22 ENCOUNTER — TELEPHONE (OUTPATIENT)
Dept: FAMILY MEDICINE | Facility: CLINIC | Age: 77
End: 2017-09-22

## 2017-09-22 DIAGNOSIS — R09.89 BILATERAL CAROTID BRUITS: ICD-10-CM

## 2017-09-22 DIAGNOSIS — I73.9 PAD (PERIPHERAL ARTERY DISEASE): ICD-10-CM

## 2017-09-22 DIAGNOSIS — I65.23 BILATERAL CAROTID ARTERY STENOSIS: ICD-10-CM

## 2017-09-22 PROCEDURE — 93925 LOWER EXTREMITY STUDY: CPT | Mod: TC,PO

## 2017-09-22 PROCEDURE — 93880 EXTRACRANIAL BILAT STUDY: CPT | Mod: 26,,, | Performed by: RADIOLOGY

## 2017-09-22 PROCEDURE — 93880 EXTRACRANIAL BILAT STUDY: CPT | Mod: TC,PO

## 2017-09-22 PROCEDURE — 93925 LOWER EXTREMITY STUDY: CPT | Mod: 26,,, | Performed by: RADIOLOGY

## 2017-09-22 NOTE — PROGRESS NOTES
I have referred him to CV surgery.  I had paged  about this earlier today and have not had a reply yet.  He is booked with them for next week.

## 2017-09-22 NOTE — TELEPHONE ENCOUNTER
He has escobar carotid artery stenosis.   I dicsussed this with he and his wife.    He is to cont ASA.   I have paged  to ask if there is anything else we need to do and he is to see them on WEdnesday.

## 2017-09-27 ENCOUNTER — OFFICE VISIT (OUTPATIENT)
Dept: VASCULAR SURGERY | Facility: CLINIC | Age: 77
End: 2017-09-27
Payer: MEDICARE

## 2017-09-27 VITALS
WEIGHT: 157.81 LBS | HEIGHT: 72 IN | HEART RATE: 77 BPM | BODY MASS INDEX: 21.37 KG/M2 | DIASTOLIC BLOOD PRESSURE: 59 MMHG | SYSTOLIC BLOOD PRESSURE: 101 MMHG

## 2017-09-27 DIAGNOSIS — I65.22 CAROTID STENOSIS, LEFT: Primary | ICD-10-CM

## 2017-09-27 PROCEDURE — 3074F SYST BP LT 130 MM HG: CPT | Mod: ,,, | Performed by: THORACIC SURGERY (CARDIOTHORACIC VASCULAR SURGERY)

## 2017-09-27 PROCEDURE — 99999 PR PBB SHADOW E&M-EST. PATIENT-LVL III: CPT | Mod: PBBFAC,,, | Performed by: THORACIC SURGERY (CARDIOTHORACIC VASCULAR SURGERY)

## 2017-09-27 PROCEDURE — 3078F DIAST BP <80 MM HG: CPT | Mod: ,,, | Performed by: THORACIC SURGERY (CARDIOTHORACIC VASCULAR SURGERY)

## 2017-09-27 PROCEDURE — 99213 OFFICE O/P EST LOW 20 MIN: CPT | Mod: PBBFAC,PO | Performed by: THORACIC SURGERY (CARDIOTHORACIC VASCULAR SURGERY)

## 2017-09-27 PROCEDURE — 1159F MED LIST DOCD IN RCRD: CPT | Mod: ,,, | Performed by: THORACIC SURGERY (CARDIOTHORACIC VASCULAR SURGERY)

## 2017-09-27 PROCEDURE — 1126F AMNT PAIN NOTED NONE PRSNT: CPT | Mod: ,,, | Performed by: THORACIC SURGERY (CARDIOTHORACIC VASCULAR SURGERY)

## 2017-09-27 PROCEDURE — 99213 OFFICE O/P EST LOW 20 MIN: CPT | Mod: S$PBB,,, | Performed by: THORACIC SURGERY (CARDIOTHORACIC VASCULAR SURGERY)

## 2017-09-27 NOTE — PROGRESS NOTES
HISTORY OF PRESENT ILLNESS:  This is a 77-year-old gentleman with carotid   occlusive disease.  He was found to have a right carotid artery occlusion and a   high-grade left carotid stenosis, which is totally asymptomatic.  He has no TIA,   no history of stroke.  He denies coronary disease.  He has diabetes and   hypertension and mild renal insufficiency.  He is not a smoker.    PAST SURGICAL HISTORY:  He has had no other recent surgeries.  He has had back   surgery in the past.    MEDICATIONS:  Are part of the EPIC record.    FAMILY AND SOCIAL HISTORY:  He has no other pertinent family or social history.    REVIEW OF SYSTEMS:   Fairly unremarkable, and he is quite active despite his   age.    PHYSICAL EXAMINATION:  VITAL SIGNS:  Stable.  HEENT:  Pupils equal, round and reactive to light.  Nose and throat are clear.  NECK:  Supple.  CHEST:  Clear to auscultation.  HEART:  Regular rate and rhythm.  ABDOMEN:  Benign.  EXTREMITIES:  Femoral pulses are equal.  Perfusion to the legs and feet is   satisfactory.    The patient has significant carotid occlusive disease despite the fact that he   is totally asymptomatic.  He has occlusion of the right carotid artery and   high-grade stenosis of the left carotid artery.  A left carotid endarterectomy   is recommended.  I have explained this to him and his wife.  They seem to be   understanding and we will try to arrange for this in the near future.      JESSIE  dd: 09/27/2017 13:45:57 (CDT)  td: 09/28/2017 03:51:29 (CDT)  Doc ID   #[id number]  Job ID #[id number]    CC:

## 2017-09-27 NOTE — LETTER
September 27, 2017      Darin Krueger MD  [address] [facility] [address]           [facility]  [address] [address] [facility] [address]  Phone: [phone]          Patient: Bebeto Santiago   MR Number: [id number]   YOB: 1940   Date of Visit: 9/27/2017       Dear Dr. Darin Krueger:    Thank you for referring Bebeto Santiago to me for evaluation. Attached you will find relevant portions of my assessment and plan of care.    If you have questions, please do not hesitate to call me. I look forward to following Bebeto Santiago along with you.    Sincerely,    Garcia Sarabia MD    Enclosure  CC:  No Recipients    If you would like to receive this communication electronically, please contact [email or web address] or [phone] to request more information on [facility] access.    For providers and/or their staff who would like to refer a patient to [facility], please contact us through our one-stop-shop provider referral line, Seun Cooper, at [phone].    If you feel you have received this communication in error or would no longer like to receive these types of communications, please e-mail [email or web address]

## 2017-10-02 DIAGNOSIS — I65.22 OCCLUSION OF LEFT CAROTID ARTERY: Primary | ICD-10-CM

## 2017-10-02 DIAGNOSIS — I65.22 STENOSIS OF LEFT CAROTID ARTERY: ICD-10-CM

## 2017-10-02 RX ORDER — SODIUM CHLORIDE 9 MG/ML
INJECTION, SOLUTION INTRAVENOUS CONTINUOUS
Status: CANCELLED | OUTPATIENT
Start: 2017-10-02

## 2017-10-04 PROBLEM — I65.22 OCCLUSION OF LEFT CAROTID ARTERY: Status: ACTIVE | Noted: 2017-10-04

## 2017-10-19 ENCOUNTER — OFFICE VISIT (OUTPATIENT)
Dept: VASCULAR SURGERY | Facility: CLINIC | Age: 77
End: 2017-10-19
Payer: MEDICARE

## 2017-10-19 VITALS
HEIGHT: 72 IN | HEART RATE: 69 BPM | SYSTOLIC BLOOD PRESSURE: 147 MMHG | WEIGHT: 156.38 LBS | DIASTOLIC BLOOD PRESSURE: 77 MMHG | BODY MASS INDEX: 21.18 KG/M2

## 2017-10-19 DIAGNOSIS — Z98.890 S/P CAROTID ENDARTERECTOMY: Primary | ICD-10-CM

## 2017-10-19 PROCEDURE — 99999 PR PBB SHADOW E&M-EST. PATIENT-LVL III: CPT | Mod: PBBFAC,,, | Performed by: THORACIC SURGERY (CARDIOTHORACIC VASCULAR SURGERY)

## 2017-10-19 PROCEDURE — 99024 POSTOP FOLLOW-UP VISIT: CPT | Mod: ,,, | Performed by: THORACIC SURGERY (CARDIOTHORACIC VASCULAR SURGERY)

## 2017-10-19 PROCEDURE — 99213 OFFICE O/P EST LOW 20 MIN: CPT | Mod: PBBFAC,PO | Performed by: THORACIC SURGERY (CARDIOTHORACIC VASCULAR SURGERY)

## 2017-10-19 NOTE — PROGRESS NOTES
HISTORY OF PRESENT ILLNESS:  This 77-year-old gentleman status post left carotid   endarterectomy on 10/14/2017.  He returns to the office today in followup.  He   has done well.  He has no major complaints.  His surgical wound is clean and dry   on physical exam.  His medicines are noted.  There are no changes.  He is   taking daily aspirin.  Neurologically, he is completely intact.  His problem   list is reviewed as well and I do not see any need to make any major changes in   his medical management of course.  From our standpoint, we should get a carotid   ultrasound in three to four months and then based on this, we will make further   recommendations, but I think the patient should have a good long-term outlook.      JESSIE  dd: 10/19/2017 08:24:03 (CDT)  td: 10/19/2017 10:21:11 (CDT)  Doc ID   #[id number]  Job ID #[id number]    CC:

## 2017-11-02 RX ORDER — PEN NEEDLE, DIABETIC 31 GX5/16"
NEEDLE, DISPOSABLE MISCELLANEOUS
Qty: 100 EACH | Refills: 2 | Status: SHIPPED | OUTPATIENT
Start: 2017-11-02 | End: 2018-02-21 | Stop reason: SDUPTHER

## 2017-11-03 ENCOUNTER — TELEPHONE (OUTPATIENT)
Dept: FAMILY MEDICINE | Facility: CLINIC | Age: 77
End: 2017-11-03

## 2017-11-03 NOTE — TELEPHONE ENCOUNTER
----- Message from Stephani Vasques sent at 11/3/2017  9:43 AM CDT -----  Contact: Babs Santiago/wife  States she's returning a nurse call. Please call Babs Santiago at [phone]. Thank you

## 2018-01-02 ENCOUNTER — TELEPHONE (OUTPATIENT)
Dept: VASCULAR SURGERY | Facility: CLINIC | Age: 78
End: 2018-01-02

## 2018-01-02 DIAGNOSIS — I65.23 BILATERAL CAROTID ARTERY STENOSIS: Primary | ICD-10-CM

## 2018-01-02 RX ORDER — BLOOD-GLUCOSE METER
KIT MISCELLANEOUS
Qty: 300 STRIP | Refills: 3 | Status: SHIPPED | OUTPATIENT
Start: 2018-01-02 | End: 2018-02-21 | Stop reason: SDUPTHER

## 2018-01-02 NOTE — TELEPHONE ENCOUNTER
----- Message from Paul Zelaya sent at 1/2/2018 11:19 AM CST -----  Contact: Wife - Mrs Santiago  States that last week's appointment was cancelled and she is asking for Jessica.    They would like to talk to you about re-scheduling.  And can you please call her back at [phone].  Thank you

## 2018-01-04 ENCOUNTER — HOSPITAL ENCOUNTER (OUTPATIENT)
Dept: RADIOLOGY | Facility: HOSPITAL | Age: 78
Discharge: HOME | End: 2018-01-04
Attending: THORACIC SURGERY (CARDIOTHORACIC VASCULAR SURGERY)
Payer: MEDICARE

## 2018-01-04 DIAGNOSIS — I65.23 BILATERAL CAROTID ARTERY STENOSIS: ICD-10-CM

## 2018-01-04 PROCEDURE — 93880 EXTRACRANIAL BILAT STUDY: CPT | Mod: 26,,, | Performed by: RADIOLOGY

## 2018-01-04 PROCEDURE — 93880 EXTRACRANIAL BILAT STUDY: CPT | Mod: TC,PO

## 2018-01-10 ENCOUNTER — LAB VISIT (OUTPATIENT)
Dept: LAB | Facility: HOSPITAL | Age: 78
End: 2018-01-10
Attending: UROLOGY
Payer: MEDICARE

## 2018-01-10 DIAGNOSIS — R97.20 ELEVATED PROSTATE SPECIFIC ANTIGEN (PSA): Primary | ICD-10-CM

## 2018-01-10 DIAGNOSIS — D75.1 POLYCYTHEMIA, SECONDARY: ICD-10-CM

## 2018-01-10 LAB
BASOPHILS # BLD AUTO: 0.07 K/UL
BASOPHILS NFR BLD: 1 %
COMPLEXED PSA SERPL-MCNC: 1.7 NG/ML
DIFFERENTIAL METHOD: NORMAL
EOSINOPHIL # BLD AUTO: 0.2 K/UL
EOSINOPHIL NFR BLD: 2.5 %
ERYTHROCYTE [DISTWIDTH] IN BLOOD BY AUTOMATED COUNT: 13.5 %
HCT VFR BLD AUTO: 48 %
HGB BLD-MCNC: 16.1 G/DL
IMM GRANULOCYTES # BLD AUTO: 0.02 K/UL
IMM GRANULOCYTES NFR BLD AUTO: 0.3 %
LYMPHOCYTES # BLD AUTO: 1.5 K/UL
LYMPHOCYTES NFR BLD: 21.9 %
MCH RBC QN AUTO: 28.9 PG
MCHC RBC AUTO-ENTMCNC: 33.5 G/DL
MCV RBC AUTO: 86 FL
MONOCYTES # BLD AUTO: 0.5 K/UL
MONOCYTES NFR BLD: 7 %
NEUTROPHILS # BLD AUTO: 4.5 K/UL
NEUTROPHILS NFR BLD: 67.3 %
NRBC BLD-RTO: 0 /100 WBC
PLATELET # BLD AUTO: 245 K/UL
PMV BLD AUTO: 11.2 FL
RBC # BLD AUTO: 5.58 M/UL
TESTOST SERPL-MCNC: 314 NG/DL
WBC # BLD AUTO: 6.7 K/UL

## 2018-01-10 PROCEDURE — 36415 COLL VENOUS BLD VENIPUNCTURE: CPT | Mod: PO

## 2018-01-10 PROCEDURE — 84403 ASSAY OF TOTAL TESTOSTERONE: CPT

## 2018-01-10 PROCEDURE — 85025 COMPLETE CBC W/AUTO DIFF WBC: CPT

## 2018-01-10 PROCEDURE — 84153 ASSAY OF PSA TOTAL: CPT

## 2018-02-06 DIAGNOSIS — I65.23 BILATERAL CAROTID ARTERY STENOSIS: Primary | ICD-10-CM

## 2018-02-07 ENCOUNTER — PATIENT OUTREACH (OUTPATIENT)
Dept: ADMINISTRATIVE | Facility: HOSPITAL | Age: 78
End: 2018-02-07

## 2018-02-07 RX ORDER — INSULIN ASPART 100 [IU]/ML
INJECTION, SOLUTION INTRAVENOUS; SUBCUTANEOUS
Qty: 15 SYRINGE | Refills: 0 | Status: SHIPPED | OUTPATIENT
Start: 2018-02-07 | End: 2018-02-21 | Stop reason: SDUPTHER

## 2018-02-07 NOTE — LETTER
February 7, 2018    Bebeto DEMETRIUS Pamela  [address]  [place] [address]           [facility]  [address]  [facility] [address]  Phone: [phone] Dear Mr. Santiago:    [facility] is committed to your overall health.  To help you get the most out of each of your visits, we will review your information to make sure you are up to date on all of your recommended tests and/or procedures.      Darin Krueger MD has found that you may be due for   Health Maintenance Due   Topic    Eye Exam       If you have had any of the above done at another facility, please bring the records or information with you so that your record at [facility] will be complete.    If you are currently taking medication, please bring it with you to your appointment for review.    We will be happy to assist you with scheduling any necessary appointments or you may contact the [facility] appointment desk at [phone] to schedule at your convenience.     Thank you for choosing [facility] for your healthcare needs,    If you have any questions or concerns, please don't hesitate to call.    Sincerely,    GRZEGORZ Brito  Care Coordination Department  [facility]  [phone]

## 2018-02-21 ENCOUNTER — LAB VISIT (OUTPATIENT)
Dept: LAB | Facility: HOSPITAL | Age: 78
End: 2018-02-21
Attending: FAMILY MEDICINE
Payer: MEDICARE

## 2018-02-21 ENCOUNTER — OFFICE VISIT (OUTPATIENT)
Dept: FAMILY MEDICINE | Facility: CLINIC | Age: 78
End: 2018-02-21
Payer: MEDICARE

## 2018-02-21 ENCOUNTER — PATIENT OUTREACH (OUTPATIENT)
Dept: ADMINISTRATIVE | Facility: HOSPITAL | Age: 78
End: 2018-02-21

## 2018-02-21 VITALS
TEMPERATURE: 98 F | HEIGHT: 72 IN | BODY MASS INDEX: 21.05 KG/M2 | DIASTOLIC BLOOD PRESSURE: 68 MMHG | HEART RATE: 68 BPM | SYSTOLIC BLOOD PRESSURE: 117 MMHG | WEIGHT: 155.44 LBS

## 2018-02-21 DIAGNOSIS — E78.2 COMBINED HYPERLIPIDEMIA ASSOCIATED WITH TYPE 2 DIABETES MELLITUS: ICD-10-CM

## 2018-02-21 DIAGNOSIS — E11.69 COMBINED HYPERLIPIDEMIA ASSOCIATED WITH TYPE 2 DIABETES MELLITUS: ICD-10-CM

## 2018-02-21 DIAGNOSIS — I15.2 HYPERTENSION ASSOCIATED WITH DIABETES: ICD-10-CM

## 2018-02-21 DIAGNOSIS — E03.9 HYPOTHYROIDISM, UNSPECIFIED TYPE: ICD-10-CM

## 2018-02-21 DIAGNOSIS — Z12.5 ENCOUNTER FOR PROSTATE CANCER SCREENING: ICD-10-CM

## 2018-02-21 DIAGNOSIS — I73.9 PAD (PERIPHERAL ARTERY DISEASE): ICD-10-CM

## 2018-02-21 DIAGNOSIS — E11.59 HYPERTENSION ASSOCIATED WITH DIABETES: ICD-10-CM

## 2018-02-21 DIAGNOSIS — Z86.010 HISTORY OF COLON POLYPS: ICD-10-CM

## 2018-02-21 PROBLEM — Z86.0100 HISTORY OF COLON POLYPS: Status: ACTIVE | Noted: 2018-02-21

## 2018-02-21 LAB
ALBUMIN SERPL BCP-MCNC: 3.3 G/DL
ALP SERPL-CCNC: 107 U/L
ALT SERPL W/O P-5'-P-CCNC: 11 U/L
ANION GAP SERPL CALC-SCNC: 7 MMOL/L
AST SERPL-CCNC: 12 U/L
BILIRUB SERPL-MCNC: 0.7 MG/DL
BUN SERPL-MCNC: 21 MG/DL
CALCIUM SERPL-MCNC: 9.4 MG/DL
CHLORIDE SERPL-SCNC: 106 MMOL/L
CHOLEST SERPL-MCNC: 150 MG/DL
CHOLEST/HDLC SERPL: 3 {RATIO}
CO2 SERPL-SCNC: 26 MMOL/L
COMPLEXED PSA SERPL-MCNC: 1.4 NG/ML
CREAT SERPL-MCNC: 1.1 MG/DL
EST. GFR  (AFRICAN AMERICAN): >60 ML/MIN/1.73 M^2
EST. GFR  (NON AFRICAN AMERICAN): >60 ML/MIN/1.73 M^2
ESTIMATED AVG GLUCOSE: 177 MG/DL
GLUCOSE SERPL-MCNC: 144 MG/DL
HBA1C MFR BLD HPLC: 7.8 %
HDLC SERPL-MCNC: 50 MG/DL
HDLC SERPL: 33.3 %
LDLC SERPL CALC-MCNC: 88 MG/DL
NONHDLC SERPL-MCNC: 100 MG/DL
POTASSIUM SERPL-SCNC: 4.5 MMOL/L
PROT SERPL-MCNC: 6.5 G/DL
SODIUM SERPL-SCNC: 139 MMOL/L
TRIGL SERPL-MCNC: 60 MG/DL
TSH SERPL DL<=0.005 MIU/L-ACNC: 0.48 UIU/ML

## 2018-02-21 PROCEDURE — 80053 COMPREHEN METABOLIC PANEL: CPT

## 2018-02-21 PROCEDURE — 83036 HEMOGLOBIN GLYCOSYLATED A1C: CPT

## 2018-02-21 PROCEDURE — 36415 COLL VENOUS BLD VENIPUNCTURE: CPT | Mod: PO

## 2018-02-21 PROCEDURE — 99213 OFFICE O/P EST LOW 20 MIN: CPT | Mod: PBBFAC,PO | Performed by: FAMILY MEDICINE

## 2018-02-21 PROCEDURE — 84443 ASSAY THYROID STIM HORMONE: CPT

## 2018-02-21 PROCEDURE — 99999 PR PBB SHADOW E&M-EST. PATIENT-LVL III: CPT | Mod: PBBFAC,,, | Performed by: FAMILY MEDICINE

## 2018-02-21 PROCEDURE — 99214 OFFICE O/P EST MOD 30 MIN: CPT | Mod: S$PBB,,, | Performed by: FAMILY MEDICINE

## 2018-02-21 PROCEDURE — 80061 LIPID PANEL: CPT

## 2018-02-21 PROCEDURE — 1159F MED LIST DOCD IN RCRD: CPT | Mod: ,,, | Performed by: FAMILY MEDICINE

## 2018-02-21 PROCEDURE — 1126F AMNT PAIN NOTED NONE PRSNT: CPT | Mod: ,,, | Performed by: FAMILY MEDICINE

## 2018-02-21 PROCEDURE — 84153 ASSAY OF PSA TOTAL: CPT

## 2018-02-21 RX ORDER — LEVOTHYROXINE SODIUM 100 UG/1
100 TABLET ORAL DAILY
Qty: 90 TABLET | Refills: 3 | Status: SHIPPED | OUTPATIENT
Start: 2018-02-21 | End: 2018-08-29 | Stop reason: SDUPTHER

## 2018-02-21 RX ORDER — INSULIN ASPART 100 [IU]/ML
INJECTION, SOLUTION INTRAVENOUS; SUBCUTANEOUS
Qty: 15 SYRINGE | Refills: 5 | Status: SHIPPED | OUTPATIENT
Start: 2018-02-21 | End: 2018-08-29 | Stop reason: SDUPTHER

## 2018-02-21 RX ORDER — SODIUM, POTASSIUM,MAG SULFATES 17.5-3.13G
SOLUTION, RECONSTITUTED, ORAL ORAL
Qty: 354 ML | Refills: 0 | Status: SHIPPED | OUTPATIENT
Start: 2018-02-21 | End: 2018-08-29

## 2018-02-21 RX ORDER — PRAVASTATIN SODIUM 40 MG/1
40 TABLET ORAL NIGHTLY
Qty: 90 TABLET | Refills: 3 | Status: SHIPPED | OUTPATIENT
Start: 2018-02-21 | End: 2018-08-29 | Stop reason: SDUPTHER

## 2018-02-21 RX ORDER — DUTASTERIDE 0.5 MG/1
0.5 CAPSULE, LIQUID FILLED ORAL DAILY
COMMUNITY
End: 2018-06-18 | Stop reason: SDUPTHER

## 2018-02-21 RX ORDER — PEN NEEDLE, DIABETIC 30 GX3/16"
NEEDLE, DISPOSABLE MISCELLANEOUS
Qty: 400 EACH | Refills: 3 | Status: SHIPPED | OUTPATIENT
Start: 2018-02-21 | End: 2019-03-08 | Stop reason: SDUPTHER

## 2018-02-21 RX ORDER — PROMETHAZINE HYDROCHLORIDE 25 MG/1
25 TABLET ORAL EVERY 6 HOURS PRN
Qty: 6 TABLET | Refills: 0 | Status: SHIPPED | OUTPATIENT
Start: 2018-02-21 | End: 2018-08-29

## 2018-02-21 RX ORDER — INSULIN GLARGINE 100 [IU]/ML
INJECTION, SOLUTION SUBCUTANEOUS
Qty: 15 SYRINGE | Refills: 5 | Status: SHIPPED | OUTPATIENT
Start: 2018-02-21 | End: 2018-02-22 | Stop reason: SDUPTHER

## 2018-02-21 RX ORDER — AMLODIPINE BESYLATE 2.5 MG/1
2.5 TABLET ORAL NIGHTLY
Qty: 90 TABLET | Refills: 3 | Status: SHIPPED | OUTPATIENT
Start: 2018-02-21 | End: 2018-08-29

## 2018-02-21 NOTE — PROGRESS NOTES
Subjective:      Patient ID: Bebeto Santiago is a 77 y.o. male.    Chief Complaint: Annual Exam    Problem List Items Addressed This Visit     Combined hyperlipidemia associated with type 2 diabetes mellitus    Overview     The patient presents with hyperlipidemia.  The patient reports tolerating the medication well and is in excellent compliance.  There have been no medication side effects.  The patient denies chest pain, neuropathy, and myalgias.  The patient has reduced fat intake and has been exercising.  Current treatment has included the medications listed in the med card.    Lab Results   Component Value Date    CHOL 166 09/08/2017    CHOL 157 11/09/2016    CHOL 158 08/19/2016       Lab Results   Component Value Date    HDL 58 09/08/2017    HDL 53 11/09/2016    HDL 48 08/19/2016       Lab Results   Component Value Date    LDLCALC 97.4 09/08/2017    LDLCALC 89.2 11/09/2016    LDLCALC 96.8 08/19/2016       Lab Results   Component Value Date    TRIG 53 09/08/2017    TRIG 74 11/09/2016    TRIG 66 08/19/2016       Lab Results   Component Value Date    CHOLHDL 34.9 09/08/2017    CHOLHDL 33.8 11/09/2016    CHOLHDL 30.4 08/19/2016     Lab Results   Component Value Date    ALT 17 09/08/2017    AST 17 09/08/2017    ALKPHOS 96 09/08/2017    BILITOT 0.9 09/08/2017              Relevant Medications    pravastatin (PRAVACHOL) 40 MG tablet    Other Relevant Orders    Comprehensive metabolic panel    Lipid panel    History of colon polyps    Overview     He has a history of colon polyps and his last colonoscopy was in 2013. He is not having bleeding or stool changes.             Relevant Medications    sodium,potassium,mag sulfates (SUPREP BOWEL PREP KIT) 17.5-3.13-1.6 gram SolR    promethazine (PHENERGAN) 25 MG tablet    Other Relevant Orders    Case request GI: COLONOSCOPY (Completed)    Hypertension associated with diabetes    Overview     The patient presents with essential hypertension.  The patient is tolerating the  medication well and is in excellent compliance.  The patient is experiencing no side effects.  Counseling was offered regarding low salt diets.  The patient has a reduced salt intake.  The patient denies chest pain, palpitations, shortness of breath, dyspnea on exertion, left or murmur neck pain, nausea, vomiting, diaphoresis, paroxysmal nocturnal dyspnea, and orthopnea.   /68   Pulse 68   Temp 97.9 °F (36.6 °C) (Oral)   Ht 6' (1.829 m)   Wt 70.5 kg (155 lb 6.8 oz)   BMI 21.08 kg/m²            Relevant Medications    amLODIPine (NORVASC) 2.5 MG tablet    Other Relevant Orders    Comprehensive metabolic panel    Hypothyroidism    Overview     The patient presents with hypothyroidism.  The patient denies agitation, anxiety, blurred vision, chest pain, cold intolerance, constipation, dizziness, dry skin, fatigue, lightheadedness, paresthesias, skin coarsening, tachycardia, tremor, weight gain or weight loss.  The patient's current treatment has included Synthroid with a good response.    Lab Results   Component Value Date    TSH 0.608 03/09/2017              Relevant Medications    levothyroxine (SYNTHROID) 100 MCG tablet    Other Relevant Orders    TSH    PAD (peripheral artery disease)    Overview     He has PAD.  He has an ability to be able to walk 1/2 a mile before he hast to stop and rest.  He does take an aspirin.         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  The patient denies polyuria, polydipsia, polyphagia, hypoglycemia and paresthesias.  The patient's glucose control has been good.  Home glucose averages are routinely checked.  The patient is without retinopathy currently.  The patient has no history of neuropathy.  The patient currently complains of no podiatric problems.  The patient has excellent compliance.  Hemoglobin A1C   Date Value Ref Range Status   09/08/2017 7.6 (H) 4.0 - 5.6 % Final     Comment:      According to ADA guidelines, hemoglobin A1c <7.0% represents  optimal control in non-pregnant diabetic patients. Different  metrics may apply to specific patient populations.   Standards of Medical Care in Diabetes-2016.  For the purpose of screening for the presence of diabetes:  <5.7%     Consistent with the absence of diabetes  5.7-6.4%  Consistent with increasing risk for diabetes   (prediabetes)  >or=6.5%  Consistent with diabetes  Currently, no consensus exists for use of hemoglobin A1c  for diagnosis of diabetes for children.  This Hemoglobin A1c assay has significant interference with fetal   hemoglobin   (HbF). The results are invalid for patients with abnormal amounts of   HbF,   including those with known Hereditary Persistence   of Fetal Hemoglobin. Heterozygous hemoglobin variants (HbAS, HbAC,   HbAD, HbAE, HbA2) do not significantly interfere with this assay;   however, presence of multiple variants in a sample may impact the %   interference.     11/21/2016 7.3 (H) 4.5 - 6.2 % Final     Comment:     According to ADA guidelines, hemoglobin A1C <7.0% represents  optimal control in non-pregnant diabetic patients.  Different  metrics may apply to specific populations.   Standards of Medical Care in Diabetes - 2016.  For the purpose of screening for the presence of diabetes:  <5.7%     Consistent with the absence of diabetes  5.7-6.4%  Consistent with increasing risk for diabetes   (prediabetes)  >or=6.5%  Consistent with diabetes  Currently no consensus exists for use of hemoglobin A1C  for diagnosis of diabetes for children.     08/19/2016 11.7 (H) 4.5 - 6.2 % Final     Comment:     According to ADA guidelines, hemoglobin A1C <7.0% represents  optimal control in non-pregnant diabetic patients.  Different  metrics may apply to specific populations.   Standards of Medical Care in Diabetes - 2016.  For the purpose of screening for the presence of diabetes:  <5.7%     Consistent with the absence of  "diabetes  5.7-6.4%  Consistent with increasing risk for diabetes   (prediabetes)  >or=6.5%  Consistent with diabetes  Currently no consensus exists for use of hemoglobin A1C  for diagnosis of diabetes for children.       No results found for: JADON MORE  Diabetes Management Status    Statin: Taking  ACE/ARB: Not taking    Screening or Prevention Patient's value Goal Complete/Controlled?   HgA1C Testing and Control   Lab Results   Component Value Date    HGBA1C 7.6 (H) 09/08/2017      Annually/Less than 8% Yes   Lipid profile : 09/08/2017 Annually Yes   LDL control Lab Results   Component Value Date    LDLCALC 97.4 09/08/2017    Annually/Less than 100 mg/dl  Yes   Nephropathy screening Lab Results   Component Value Date    LABMICR 33.0 09/20/2017     Lab Results   Component Value Date    PROTEINUA Negative 09/20/2017    Annually Yes   Blood pressure BP Readings from Last 1 Encounters:   02/21/18 117/68    Less than 140/90 Yes   Dilated retinal exam : 01/24/2017 Annually No   Foot exam   : 09/20/2017 Annually Yes              Relevant Medications    insulin aspart U-100 (NOVOLOG FLEXPEN U-100 INSULIN) 100 unit/mL InPn pen    insulin glargine (LANTUS SOLOSTAR U-100 INSULIN) 100 unit/mL (3 mL) InPn pen    pen needle, diabetic (BD INSULIN PEN NEEDLE UF MINI) 31 gauge x 3/16" Ndle    Other Relevant Orders    Comprehensive metabolic panel    Hemoglobin A1c      Other Visit Diagnoses     Encounter for prostate cancer screening        Relevant Orders    PSA, Screening    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, with long-term current use of insulin        Relevant Medications    blood sugar diagnostic (FREESTYLE LITE STRIPS) Strp    pen needle, diabetic (BD INSULIN PEN NEEDLE UF MINI) 31 gauge x 3/16" Ndle    Other Relevant Orders    Comprehensive metabolic panel    Hemoglobin A1c          Past Medical History:  Past Medical History: " "  Diagnosis Date    Allergy     BPH (benign prostatic hypertrophy)     Diabetes mellitus type II, uncontrolled     GERD (gastroesophageal reflux disease) 2013    grade IV/IV    Hypertension     Hypothyroidism     Male hypogonadism     Non-proliferative diabetic retinopathy     Urinary retention      Past Surgical History:   Procedure Laterality Date    EYE SURGERY      SPINE SURGERY       Review of patient's allergies indicates:   Allergen Reactions    Ace inhibitors      Other reaction(s): cough    Metformin      Abdominal pain     Current Outpatient Prescriptions on File Prior to Visit   Medication Sig Dispense Refill    aspirin (ECOTRIN) 81 MG EC tablet Take 325 mg by mouth once daily.       BD LUER-MANUEL SYRINGE 3 mL 22 x 1 1/2" Syrg   3    blood-glucose meter Misc Use as directed four times daily before meals and at bedtime. 1 each 0    fluticasone (FLONASE) 50 mcg/actuation nasal spray USE 1 SPRAY IN EACH NOSTRIL ONCE DAILY. (Patient taking differently: 1 spray every evening. USE 1 SPRAY IN EACH NOSTRIL ONCE DAILY.) 3 Bottle 3    FLUZONE HIGH-DOSE 2017-18, PF, 180 mcg/0.5 mL vaccine TO BE ADMINISTERED BY PHARMACIST FOR IMMUNIZATION  0    HYPODERMIC NEEDLES 18 x 1 1/2 " Ndle   3    lancets (ACCU-CHEK SOFTCLIX LANCETS) Misc Check glucose before meals and at bedtime. 200 each 6    pen needle, diabetic (BD INSULIN PEN NEEDLE UF MINI) 31 gauge x 3/16" Ndle USE DAILY WITH LANTUS AND NOVOLOG 300 each 3    tamsulosin (FLOMAX) 0.4 mg Cp24 Take 1 capsule (0.4 mg total) by mouth once daily. (Patient taking differently: Take 0.4 mg by mouth every evening. ) 90 capsule 3    [DISCONTINUED] amlodipine (NORVASC) 2.5 MG tablet Take 1 tablet (2.5 mg total) by mouth once daily. (Patient taking differently: Take 2.5 mg by mouth every evening. ) 90 tablet 3    [DISCONTINUED] BD INSULIN PEN NEEDLE UF MINI 31 gauge x 3/16" Ndle USE DAILY WITH LANTUS AND NOVOLOG 100 each 2    [DISCONTINUED] FREESTYLE LITE " STRIPS Strp USE AS DIRECTED UP TO 3 TIMES DAILY 300 strip 3    [DISCONTINUED] insulin glargine (LANTUS SOLOSTAR) 100 unit/mL (3 mL) InPn pen INJECT 15 UNITS DAILY 15 Syringe 5    [DISCONTINUED] levothyroxine (SYNTHROID) 100 MCG tablet Take 1 tablet (100 mcg total) by mouth once daily. 90 tablet 3    [DISCONTINUED] NOVOLOG FLEXPEN 100 unit/mL InPn pen USE AS DIRECTED PER SLIDING SCALE. MAX OF 14 UNITS DAILY) 15 Syringe 0    [DISCONTINUED] pravastatin (PRAVACHOL) 40 MG tablet Take 1 tablet (40 mg total) by mouth once daily. (Patient taking differently: Take 40 mg by mouth every evening. ) 90 tablet 3    DUREZOL 0.05 % Drop ophthalmic solution       fexofenadine (ALLEGRA ALLERGY) 180 MG tablet Take 1 tablet (180 mg total) by mouth once daily. (Patient taking differently: Take 180 mg by mouth daily as needed. ) 10 tablet 0    hydrocodone-acetaminophen 5-325mg (NORCO) 5-325 mg per tablet Take 1 tablet by mouth every 4 (four) hours as needed. 28 tablet 0     No current facility-administered medications on file prior to visit.      Social History     Social History    Marital status:      Spouse name: Babs    Number of children: 2    Years of education: N/A     Occupational History    Retired      Social History Main Topics    Smoking status: Former Smoker     Quit date: 9/14/1986    Smokeless tobacco: Current User     Types: Chew    Alcohol use Yes      Comment: rare    Drug use: No    Sexual activity: Not on file     Other Topics Concern    Not on file     Social History Narrative    No narrative on file     Family History   Problem Relation Age of Onset    Heart disease Mother     Stroke Maternal Grandfather     Diabetes Neg Hx     Cancer Neg Hx     Hypertension Neg Hx        Review of Systems   Constitutional: Negative.  Negative for chills, diaphoresis and fever.   HENT: Negative for congestion, hearing loss, mouth sores, postnasal drip and sore throat.    Eyes: Negative for pain and  visual disturbance.   Respiratory: Negative for cough, chest tightness, shortness of breath and wheezing.    Cardiovascular: Negative for chest pain.   Gastrointestinal: Negative for abdominal pain, anal bleeding, blood in stool, constipation, diarrhea, nausea and vomiting.   Genitourinary: Negative for dysuria and hematuria.   Musculoskeletal: Negative for back pain, neck pain and neck stiffness.   Skin: Negative for rash.   Neurological: Negative for dizziness and weakness.       Objective:     /68   Pulse 68   Temp 97.9 °F (36.6 °C) (Oral)   Ht 6' (1.829 m)   Wt 70.5 kg (155 lb 6.8 oz)   BMI 21.08 kg/m²     Physical Exam   Constitutional: He is oriented to person, place, and time. He appears well-developed and well-nourished.   HENT:   Head: Normocephalic and atraumatic.   Right Ear: External ear normal.   Left Ear: External ear normal.   Nose: Nose normal.   Mouth/Throat: Oropharynx is clear and moist. No oropharyngeal exudate.   Eyes: Conjunctivae and EOM are normal. Pupils are equal, round, and reactive to light. Right eye exhibits no discharge. Left eye exhibits no discharge. No scleral icterus.   Neck: Normal range of motion. Neck supple. No JVD present. No thyromegaly present.   Cardiovascular: Normal rate, regular rhythm, normal heart sounds and intact distal pulses.  Exam reveals no gallop and no friction rub.    No murmur heard.  Pulses:       Dorsalis pedis pulses are 1+ on the right side, and 1+ on the left side.        Posterior tibial pulses are 1+ on the right side, and 1+ on the left side.   Pulmonary/Chest: Effort normal and breath sounds normal. No respiratory distress. He has no wheezes. He has no rales. He exhibits no tenderness.   Abdominal: Soft. Bowel sounds are normal. He exhibits no distension and no mass. There is no tenderness. There is no rebound and no guarding.   Musculoskeletal: Normal range of motion. He exhibits no edema or tenderness.   Lymphadenopathy:     He has no  cervical adenopathy.   Neurological: He is alert and oriented to person, place, and time. No cranial nerve deficit. Coordination normal.   Skin: Skin is warm and dry. He is not diaphoretic.   Psychiatric: He has a normal mood and affect.     Assessment:     1. Uncontrolled type 2 diabetes mellitus with stage 3 chronic kidney disease, with long-term current use of insulin    2. Combined hyperlipidemia associated with type 2 diabetes mellitus    3. Hypertension associated with diabetes    4. Hypothyroidism, unspecified type    5. PAD (peripheral artery disease)    6. Encounter for prostate cancer screening    7. 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 History of colon polyps        Plan:     Problem List Items Addressed This Visit     Combined hyperlipidemia associated with type 2 diabetes mellitus    Relevant Medications    pravastatin (PRAVACHOL) 40 MG tablet    Other Relevant Orders    Comprehensive metabolic panel    Lipid panel    History of colon polyps    Relevant Medications    sodium,potassium,mag sulfates (SUPREP BOWEL PREP KIT) 17.5-3.13-1.6 gram SolR    promethazine (PHENERGAN) 25 MG tablet    Other Relevant Orders    Case request GI: COLONOSCOPY (Completed)    Hypertension associated with diabetes    Relevant Medications    amLODIPine (NORVASC) 2.5 MG tablet    Other Relevant Orders    Comprehensive metabolic panel    Hypothyroidism    Relevant Medications    levothyroxine (SYNTHROID) 100 MCG tablet    Other Relevant Orders    TSH    PAD (peripheral artery disease)    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 - Primary    Relevant Medications    insulin aspart U-100 (NOVOLOG FLEXPEN U-100 INSULIN) 100 unit/mL InPn pen    insulin glargine (LANTUS SOLOSTAR U-100 INSULIN) 100 unit/mL (3 mL) InPn pen    pen needle, diabetic (BD INSULIN PEN NEEDLE UF MINI) 31  "gauge x 3/16" Ndle    Other Relevant Orders    Comprehensive metabolic panel    Hemoglobin A1c      Other Visit Diagnoses     Encounter for prostate cancer screening        Relevant Orders    PSA, Screening    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     Bebeto was seen today for annual exam.    Diagnoses and all orders for this visit:    Uncontrolled type 2 diabetes mellitus with stage 3 chronic kidney disease, with long-term current use of insulin  -     Comprehensive metabolic panel; Future  -     Hemoglobin A1c; Future  -     insulin aspart U-100 (NOVOLOG FLEXPEN U-100 INSULIN) 100 unit/mL InPn pen; USE AS DIRECTED PER SLIDING SCALE. MAX OF 14 UNITS DAILY)  -     insulin glargine (LANTUS SOLOSTAR U-100 INSULIN) 100 unit/mL (3 mL) InPn pen; INJECT 15 UNITS DAILY  -     pen needle, diabetic (BD INSULIN PEN NEEDLE UF MINI) 31 gauge x 3/16" Ndle; USE DAILY WITH LANTUS AND NOVOLOG    Combined hyperlipidemia associated with type 2 diabetes mellitus  -     Comprehensive metabolic panel; Future  -     Lipid panel; Future  -     pravastatin (PRAVACHOL) 40 MG tablet; Take 1 tablet (40 mg total) by mouth every evening.    Hypertension associated with diabetes  -     Comprehensive metabolic panel; Future  -     amLODIPine (NORVASC) 2.5 MG tablet; Take 1 tablet (2.5 mg total) by mouth every evening.    Hypothyroidism, unspecified type  -     TSH; Future  -     levothyroxine (SYNTHROID) 100 MCG tablet; Take 1 tablet (100 mcg total) by mouth once daily.    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, with long-term current use of insulin  -     Comprehensive metabolic panel; Future  -     Hemoglobin A1c; Future  -     blood sugar diagnostic (FREESTYLE LITE STRIPS) Strp; USE AS DIRECTED UP TO 3 TIMES DAILY  -     pen needle, diabetic (BD INSULIN PEN NEEDLE UF MINI) 31 gauge x 3/16" Ndle; USE DAILY WITH LANTUS AND NOVOLOG    History of colon polyps  -     Case request GI: COLONOSCOPY  -     sodium,potassium,mag sulfates (SUPREP BOWEL PREP KIT) 17.5-3.13-1.6 gram SolR; Take as instructed on prep sheet  -     promethazine (PHENERGAN) 25 MG tablet; Take 1 tablet (25 mg total) by mouth every 6 (six) hours as needed for Nausea.        "

## 2018-02-21 NOTE — PROGRESS NOTES
Eye exam dated 2/19/2018 recieved from Dr. Kim at [facility].  HM updated and report sent to scanning.

## 2018-02-22 RX ORDER — INSULIN GLARGINE 100 [IU]/ML
INJECTION, SOLUTION SUBCUTANEOUS
Qty: 15 SYRINGE | Refills: 5 | Status: SHIPPED | OUTPATIENT
Start: 2018-02-22 | End: 2018-08-29 | Stop reason: SDUPTHER

## 2018-02-22 NOTE — TELEPHONE ENCOUNTER
----- Message from Darin Krueger MD sent at 2/21/2018  9:46 PM CST -----  The thyroid is normal.  Continue treatment.   The patient has a normal PSA so recheck that in 1 year.   -The patient has an abnormal A1c.    -Please refill the medicine that the patient is on for 3 months but change the LANTUS TO 20 U A DAY FROM 15 U A DAY and book an a1c to be done 3 months from now.       THE ELECTROLYTES AND ALL OF THE CHOLESTEROL NUMBERS ARE FINE.  CONTINUE THE MEDS FOR THAT.  RECHECK 1 YEAR.

## 2018-02-22 NOTE — TELEPHONE ENCOUNTER
I have signed for the following orders AND/OR meds.  Please call the patient and ask the patient to schedule the testing AND/OR inform about any medications that were sent.      Orders Placed This Encounter   Procedures    Hemoglobin A1c     Standing Status:   Standing     Number of Occurrences:   99     Standing Expiration Date:   4/23/2037

## 2018-02-22 NOTE — PROGRESS NOTES
The thyroid is normal.  Continue treatment.   The patient has a normal PSA so recheck that in 1 year.   -The patient has an abnormal A1c.    -Please refill the medicine that the patient is on for 3 months but change the LANTUS TO 20 U A DAY FROM 15 U A DAY and book an a1c to be done 3 months from now.       THE ELECTROLYTES AND ALL OF THE CHOLESTEROL NUMBERS ARE FINE.  CONTINUE THE MEDS FOR THAT.  RECHECK 1 YEAR.

## 2018-02-22 NOTE — TELEPHONE ENCOUNTER
I have signed for the following orders AND/OR meds.  Please call the patient and ask the patient to schedule the testing AND/OR inform about any medications that were sent.      Orders Placed This Encounter   Procedures    Hemoglobin A1c     Standing Status:   Standing     Number of Occurrences:   99     Standing Expiration Date:   4/23/2037         Medications Ordered This Encounter      insulin glargine (LANTUS SOLOSTAR U-100 INSULIN) 100 unit/mL (3 mL) InPn pen          Sig: INJECT 20 UNITS DAILY          Dispense:  15 Syringe          Refill:  5

## 2018-02-28 ENCOUNTER — DOCUMENTATION ONLY (OUTPATIENT)
Dept: ENDOSCOPY | Facility: HOSPITAL | Age: 78
End: 2018-02-28

## 2018-05-07 RX ORDER — CODEINE PHOSPHATE AND GUAIFENESIN 10; 100 MG/5ML; MG/5ML
10 SOLUTION ORAL 4 TIMES DAILY PRN
Qty: 240 ML | Refills: 0 | Status: SHIPPED | OUTPATIENT
Start: 2018-05-07 | End: 2018-05-17

## 2018-06-18 RX ORDER — DUTASTERIDE 0.5 MG/1
CAPSULE, LIQUID FILLED ORAL
Qty: 30 CAPSULE | Refills: 3 | Status: SHIPPED | OUTPATIENT
Start: 2018-06-18 | End: 2019-03-08 | Stop reason: SDUPTHER

## 2018-07-13 ENCOUNTER — TELEPHONE (OUTPATIENT)
Dept: FAMILY MEDICINE | Facility: CLINIC | Age: 78
End: 2018-07-13

## 2018-07-13 NOTE — TELEPHONE ENCOUNTER
----- Message from Deepa Mckay sent at 7/13/2018  8:26 AM CDT -----  Contact: Pt wife Babs   Pt wife Babs called to give heads up  she will be faxing over pt blood pressure readings. Babs requested a callback as well please at one of the numbers listed below.    [phone]  [phone]

## 2018-08-29 ENCOUNTER — OFFICE VISIT (OUTPATIENT)
Dept: FAMILY MEDICINE | Facility: CLINIC | Age: 78
End: 2018-08-29
Payer: MEDICARE

## 2018-08-29 VITALS
TEMPERATURE: 98 F | HEIGHT: 72 IN | SYSTOLIC BLOOD PRESSURE: 97 MMHG | WEIGHT: 156 LBS | HEART RATE: 59 BPM | DIASTOLIC BLOOD PRESSURE: 56 MMHG | BODY MASS INDEX: 21.13 KG/M2

## 2018-08-29 DIAGNOSIS — E03.9 HYPOTHYROIDISM, UNSPECIFIED TYPE: ICD-10-CM

## 2018-08-29 DIAGNOSIS — I65.23 BILATERAL CAROTID ARTERY STENOSIS: ICD-10-CM

## 2018-08-29 DIAGNOSIS — I15.2 HYPERTENSION ASSOCIATED WITH DIABETES: ICD-10-CM

## 2018-08-29 DIAGNOSIS — E78.2 COMBINED HYPERLIPIDEMIA ASSOCIATED WITH TYPE 2 DIABETES MELLITUS: ICD-10-CM

## 2018-08-29 DIAGNOSIS — N18.30 CKD STAGE 3 SECONDARY TO DIABETES: ICD-10-CM

## 2018-08-29 DIAGNOSIS — E11.59 HYPERTENSION ASSOCIATED WITH DIABETES: ICD-10-CM

## 2018-08-29 DIAGNOSIS — E11.69 COMBINED HYPERLIPIDEMIA ASSOCIATED WITH TYPE 2 DIABETES MELLITUS: ICD-10-CM

## 2018-08-29 DIAGNOSIS — Z86.010 HISTORY OF COLON POLYPS: Primary | ICD-10-CM

## 2018-08-29 DIAGNOSIS — N40.0 BENIGN PROSTATIC HYPERPLASIA, PRESENCE OF LOWER URINARY TRACT SYMPTOMS UNSPECIFIED: ICD-10-CM

## 2018-08-29 DIAGNOSIS — E11.22 CKD STAGE 3 SECONDARY TO DIABETES: ICD-10-CM

## 2018-08-29 DIAGNOSIS — N40.0 BENIGN PROSTATIC HYPERPLASIA WITHOUT LOWER URINARY TRACT SYMPTOMS: ICD-10-CM

## 2018-08-29 PROCEDURE — 99214 OFFICE O/P EST MOD 30 MIN: CPT | Mod: S$PBB,,, | Performed by: FAMILY MEDICINE

## 2018-08-29 PROCEDURE — 99213 OFFICE O/P EST LOW 20 MIN: CPT | Mod: PBBFAC,PO | Performed by: FAMILY MEDICINE

## 2018-08-29 PROCEDURE — 99999 PR PBB SHADOW E&M-EST. PATIENT-LVL III: CPT | Mod: PBBFAC,,, | Performed by: FAMILY MEDICINE

## 2018-08-29 RX ORDER — AMLODIPINE BESYLATE 5 MG/1
5 TABLET ORAL DAILY
Refills: 0 | COMMUNITY
Start: 2018-07-26 | End: 2018-08-29 | Stop reason: SDUPTHER

## 2018-08-29 RX ORDER — PRAVASTATIN SODIUM 40 MG/1
40 TABLET ORAL NIGHTLY
Qty: 90 TABLET | Refills: 3 | Status: SHIPPED | OUTPATIENT
Start: 2018-08-29 | End: 2019-03-08 | Stop reason: SDUPTHER

## 2018-08-29 RX ORDER — AMLODIPINE BESYLATE 5 MG/1
5 TABLET ORAL DAILY
Qty: 90 TABLET | Refills: 3 | Status: SHIPPED | OUTPATIENT
Start: 2018-08-29 | End: 2019-03-08 | Stop reason: SDUPTHER

## 2018-08-29 RX ORDER — INSULIN ASPART 100 [IU]/ML
INJECTION, SOLUTION INTRAVENOUS; SUBCUTANEOUS
Qty: 15 SYRINGE | Refills: 5 | Status: SHIPPED | OUTPATIENT
Start: 2018-08-29 | End: 2019-03-08 | Stop reason: SDUPTHER

## 2018-08-29 RX ORDER — PROMETHAZINE HYDROCHLORIDE 25 MG/1
25 TABLET ORAL EVERY 6 HOURS PRN
Qty: 6 TABLET | Refills: 0 | Status: SHIPPED | OUTPATIENT
Start: 2018-08-29 | End: 2019-03-05

## 2018-08-29 RX ORDER — SODIUM, POTASSIUM,MAG SULFATES 17.5-3.13G
SOLUTION, RECONSTITUTED, ORAL ORAL
Qty: 354 ML | Refills: 0 | Status: SHIPPED | OUTPATIENT
Start: 2018-08-29 | End: 2019-03-05

## 2018-08-29 RX ORDER — INSULIN GLARGINE 100 [IU]/ML
INJECTION, SOLUTION SUBCUTANEOUS
Qty: 15 SYRINGE | Refills: 5 | Status: SHIPPED | OUTPATIENT
Start: 2018-08-29 | End: 2018-09-04 | Stop reason: SDUPTHER

## 2018-08-29 RX ORDER — LEVOTHYROXINE SODIUM 100 UG/1
100 TABLET ORAL DAILY
Qty: 90 TABLET | Refills: 3 | Status: SHIPPED | OUTPATIENT
Start: 2018-08-29 | End: 2018-10-11 | Stop reason: SDUPTHER

## 2018-08-29 NOTE — PROGRESS NOTES
Subjective:      Patient ID: Bebeto Santiago is a 78 y.o. male.    Chief Complaint: Blood Pressure Check    Problem List Items Addressed This Visit     Benign prostatic hyperplasia    Overview     The patient presents with BPH.  Denies difficulty voiding, diminished urinary stream, a feeling of hesitancy, straining to void, a feeling of incomplete voiding, postvoid dribbling, urinary frequency, urgency, nocturia, dysuria and hematuria.  The last PSA level is below.  Current treatment has included flomax and avodart at the direction of Dr. Muro with improvement.     Lab Results   Component Value Date    PSA 1.4 02/21/2018    PSA 2.1 09/20/2017    PSA 8.2 (H) 08/19/2016              Bilateral carotid artery stenosis    Overview     He has bilateral carotid artery stenosis and he is tracked with ultrasounds.  He is also followed on this by Dr. Sarabia at the beginning of the year.   US Carotid Bilateral   Order: [id number]   Status:  Final result   Visible to patient:  No (Not Released) Next appt:  None Dx:  Bilateral carotid artery stenosis   Details     Reading Physician Reading Date Result Priority   Jaiden Renee MD 1/4/2018       Narrative     Technique: Bilateral duplex carotid ultrasound performed using gray scale, color Doppler, and pulse Doppler analysis.    Comparison: 09/22/2017    FINDINGS:     Right common carotid:   Peak systolic velocity 66 cm/sec.    Right internal carotid artery: Occluded      Left common carotid: Scattered calcified plaque noted.  Peak systolic velocity 104 cm/sec.    Left internal carotid artery: Status post interval carotid endarterectomy.  No visible stenosis.   Peak systolic velocity 98  cm/sec.    Peak diastolic velocity 31 cm/sec  IC/CC ratio 0.9.    Both vertebral arteries demonstrate antegrade flow.      Impression           1. Persistent occlusion of the right ICA    2.  Status post left carotid endarterectomy with no stenosis visualized.    As determined by Society  of Radiologist in Ultrasound consensus.      Electronically signed by: ANTHONY CAMACHO MD  Date: 01/04/18  Time: 12:18                     CKD stage 3 secondary to diabetes    Overview     He has stage 3 CKD.   CMP  Sodium   Date Value Ref Range Status   02/21/2018 139 136 - 145 mmol/L Final     Potassium   Date Value Ref Range Status   02/21/2018 4.5 3.5 - 5.1 mmol/L Final     Chloride   Date Value Ref Range Status   02/21/2018 106 95 - 110 mmol/L Final     CO2   Date Value Ref Range Status   02/21/2018 26 23 - 29 mmol/L Final     Glucose   Date Value Ref Range Status   02/21/2018 144 (H) 70 - 110 mg/dL Final     BUN, Bld   Date Value Ref Range Status   02/21/2018 21 8 - 23 mg/dL Final     Creatinine   Date Value Ref Range Status   02/21/2018 1.1 0.5 - 1.4 mg/dL Final     Calcium   Date Value Ref Range Status   02/21/2018 9.4 8.7 - 10.5 mg/dL Final     Total Protein   Date Value Ref Range Status   02/21/2018 6.5 6.0 - 8.4 g/dL Final     Albumin   Date Value Ref Range Status   02/21/2018 3.3 (L) 3.5 - 5.2 g/dL Final     Total Bilirubin   Date Value Ref Range Status   02/21/2018 0.7 0.1 - 1.0 mg/dL Final     Comment:     For infants and newborns, interpretation of results should be based  on gestational age, weight and in agreement with clinical  observations.  Premature Infant recommended reference ranges:  Up to 24 hours.............<8.0 mg/dL  Up to 48 hours............<12.0 mg/dL  3-5 days..................<15.0 mg/dL  6-29 days.................<15.0 mg/dL       Alkaline Phosphatase   Date Value Ref Range Status   02/21/2018 107 55 - 135 U/L Final     AST   Date Value Ref Range Status   02/21/2018 12 10 - 40 U/L Final     ALT   Date Value Ref Range Status   02/21/2018 11 10 - 44 U/L Final     Anion Gap   Date Value Ref Range Status   02/21/2018 7 (L) 8 - 16 mmol/L Final     eGFR if    Date Value Ref Range Status   02/21/2018 >60.0 >60 mL/min/1.73 m^2 Final     eGFR if non    Date  Value Ref Range Status   02/21/2018 >60.0 >60 mL/min/1.73 m^2 Final     Comment:     Calculation used to obtain the estimated glomerular filtration  rate (eGFR) is the CKD-EPI equation.                 Combined hyperlipidemia associated with type 2 diabetes mellitus    Overview     The patient presents with hyperlipidemia.  The patient reports tolerating the medication well and is in excellent compliance.  There have been no medication side effects.  The patient denies chest pain, neuropathy, and myalgias.  The patient has reduced fat intake and has been exercising.  Current treatment has included the medications listed in the med card.    Lab Results   Component Value Date    CHOL 166 09/08/2017    CHOL 157 11/09/2016    CHOL 158 08/19/2016       Lab Results   Component Value Date    HDL 58 09/08/2017    HDL 53 11/09/2016    HDL 48 08/19/2016       Lab Results   Component Value Date    LDLCALC 97.4 09/08/2017    LDLCALC 89.2 11/09/2016    LDLCALC 96.8 08/19/2016       Lab Results   Component Value Date    TRIG 53 09/08/2017    TRIG 74 11/09/2016    TRIG 66 08/19/2016       Lab Results   Component Value Date    CHOLHDL 34.9 09/08/2017    CHOLHDL 33.8 11/09/2016    CHOLHDL 30.4 08/19/2016     Lab Results   Component Value Date    ALT 17 09/08/2017    AST 17 09/08/2017    ALKPHOS 96 09/08/2017    BILITOT 0.9 09/08/2017              History of colon polyps - Primary    Overview     He has a history of colon polyps and his last colonoscopy was in 2013. He is not having bleeding or stool changes.             Hypertension associated with diabetes    Overview     The patient presents with essential hypertension.  The patient is tolerating the medication well and is in excellent compliance.  The patient is experiencing no side effects.  Counseling was offered regarding low salt diets.  The patient has a reduced salt intake.  The patient denies chest pain, palpitations, shortness of breath, dyspnea on exertion, left or  murmur neck pain, nausea, vomiting, diaphoresis, paroxysmal nocturnal dyspnea, and orthopnea.             Hypothyroidism    Overview     The patient presents with hypothyroidism.  The patient denies agitation, anxiety, blurred vision, chest pain, cold intolerance, constipation, dizziness, dry skin, fatigue, lightheadedness, paresthesias, skin coarsening, tachycardia, tremor, weight gain or weight loss.  The patient's current treatment has included Synthroid with a good response.    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  The patient denies polyuria, polydipsia, polyphagia, hypoglycemia and paresthesias.  The patient's glucose control has been good.  Home glucose averages are routinely checked.  The patient is without retinopathy currently.  The patient has no history of neuropathy.  The patient currently complains of no podiatric problems.  The patient has excellent compliance.  Hemoglobin A1C   Date Value Ref Range Status   02/21/2018 7.8 (H) 4.0 - 5.6 % Final     Comment:     According to ADA guidelines, hemoglobin A1c <7.0% represents  optimal control in non-pregnant diabetic patients. Different  metrics may apply to specific patient populations.   Standards of Medical Care in Diabetes-2016.  For the purpose of screening for the presence of diabetes:  <5.7%     Consistent with the absence of diabetes  5.7-6.4%  Consistent with increasing risk for diabetes   (prediabetes)  >or=6.5%  Consistent with diabetes  Currently, no consensus exists for use of hemoglobin A1c  for diagnosis of diabetes for children.  This Hemoglobin A1c assay has significant interference with fetal   hemoglobin   (HbF). The results are invalid for patients with abnormal amounts of   HbF,   including those with known Hereditary Persistence   of Fetal Hemoglobin. Heterozygous hemoglobin variants  (HbAS, HbAC,   HbAD, HbAE, HbA2) do not significantly interfere with this assay;   however, presence of multiple variants in a sample may impact the %   interference.     09/08/2017 7.6 (H) 4.0 - 5.6 % Final     Comment:     According to ADA guidelines, hemoglobin A1c <7.0% represents  optimal control in non-pregnant diabetic patients. Different  metrics may apply to specific patient populations.   Standards of Medical Care in Diabetes-2016.  For the purpose of screening for the presence of diabetes:  <5.7%     Consistent with the absence of diabetes  5.7-6.4%  Consistent with increasing risk for diabetes   (prediabetes)  >or=6.5%  Consistent with diabetes  Currently, no consensus exists for use of hemoglobin A1c  for diagnosis of diabetes for children.  This Hemoglobin A1c assay has significant interference with fetal   hemoglobin   (HbF). The results are invalid for patients with abnormal amounts of   HbF,   including those with known Hereditary Persistence   of Fetal Hemoglobin. Heterozygous hemoglobin variants (HbAS, HbAC,   HbAD, HbAE, HbA2) do not significantly interfere with this assay;   however, presence of multiple variants in a sample may impact the %   interference.     11/21/2016 7.3 (H) 4.5 - 6.2 % Final     Comment:     According to ADA guidelines, hemoglobin A1C <7.0% represents  optimal control in non-pregnant diabetic patients.  Different  metrics may apply to specific populations.   Standards of Medical Care in Diabetes - 2016.  For the purpose of screening for the presence of diabetes:  <5.7%     Consistent with the absence of diabetes  5.7-6.4%  Consistent with increasing risk for diabetes   (prediabetes)  >or=6.5%  Consistent with diabetes  Currently no consensus exists for use of hemoglobin A1C  for diagnosis of diabetes for children.       No results found for: MICROALBUR, [id number]  Diabetes Management Status    Statin: Taking  ACE/ARB: Not taking    Screening or Prevention Patient's value  "Goal Complete/Controlled?   HgA1C Testing and Control   Lab Results   Component Value Date    HGBA1C 7.6 (H) 09/08/2017      Annually/Less than 8% Yes   Lipid profile : 09/08/2017 Annually Yes   LDL control Lab Results   Component Value Date    LDLCALC 97.4 09/08/2017    Annually/Less than 100 mg/dl  Yes   Nephropathy screening Lab Results   Component Value Date    LABMICR 33.0 09/20/2017     Lab Results   Component Value Date    PROTEINUA Negative 09/20/2017    Annually Yes   Blood pressure BP Readings from Last 1 Encounters:   02/21/18 117/68    Less than 140/90 Yes   Dilated retinal exam : 01/24/2017 Annually No   Foot exam   : 09/20/2017 Annually Yes                Other Visit Diagnoses     Benign prostatic hyperplasia, presence of lower urinary tract symptoms unspecified              Past Medical History:  Past Medical History:   Diagnosis Date    Allergy     BPH (benign prostatic hypertrophy)     Dehydration     Diabetes mellitus type II, uncontrolled     GERD (gastroesophageal reflux disease) 2013    grade IV/IV    Hypertension     Hypothyroidism     Male hypogonadism     Non-proliferative diabetic retinopathy     Urinary retention      Past Surgical History:   Procedure Laterality Date    EYE SURGERY      SPINE SURGERY       Review of patient's allergies indicates:   Allergen Reactions    Ace inhibitors      Other reaction(s): cough    Metformin      Abdominal pain     Current Outpatient Medications on File Prior to Visit   Medication Sig Dispense Refill    amLODIPine (NORVASC) 5 MG tablet Take 5 mg by mouth once daily.  0    aspirin (ECOTRIN) 81 MG EC tablet Take 325 mg by mouth once daily.       BD LUER-MANUEL SYRINGE 3 mL 22 x 1 1/2" Syrg   3    blood sugar diagnostic (FREESTYLE LITE STRIPS) Strp USE AS DIRECTED UP TO 3 TIMES DAILY 300 strip 3    blood-glucose meter Misc Use as directed four times daily before meals and at bedtime. 1 each 0    dutasteride (AVODART) 0.5 mg capsule TAKE 1 " "CAPSULE EVERY DAY 30 capsule 3    fluticasone (FLONASE) 50 mcg/actuation nasal spray USE 1 SPRAY IN EACH NOSTRIL ONCE DAILY. (Patient taking differently: 1 spray every evening. USE 1 SPRAY IN EACH NOSTRIL ONCE DAILY.) 3 Bottle 3    FLUZONE HIGH-DOSE 2017-18, PF, 180 mcg/0.5 mL vaccine TO BE ADMINISTERED BY PHARMACIST FOR IMMUNIZATION  0    hydrocodone-acetaminophen 5-325mg (NORCO) 5-325 mg per tablet Take 1 tablet by mouth every 4 (four) hours as needed. 28 tablet 0    HYPODERMIC NEEDLES 18 x 1 1/2 " Ndle   3    insulin aspart U-100 (NOVOLOG FLEXPEN U-100 INSULIN) 100 unit/mL InPn pen USE AS DIRECTED PER SLIDING SCALE. MAX OF 14 UNITS DAILY) 15 Syringe 5    insulin glargine (LANTUS SOLOSTAR U-100 INSULIN) 100 unit/mL (3 mL) InPn pen INJECT 20 UNITS DAILY 15 Syringe 5    lancets (ACCU-CHEK SOFTCLIX LANCETS) Misc Check glucose before meals and at bedtime. 200 each 6    levothyroxine (SYNTHROID) 100 MCG tablet Take 1 tablet (100 mcg total) by mouth once daily. 90 tablet 3    pen needle, diabetic (BD INSULIN PEN NEEDLE UF MINI) 31 gauge x 3/16" Ndle USE DAILY WITH LANTUS AND NOVOLOG 300 each 3    pen needle, diabetic (BD INSULIN PEN NEEDLE UF MINI) 31 gauge x 3/16" Ndle USE DAILY WITH LANTUS AND NOVOLOG 400 each 3    pravastatin (PRAVACHOL) 40 MG tablet Take 1 tablet (40 mg total) by mouth every evening. 90 tablet 3    tamsulosin (FLOMAX) 0.4 mg Cp24 Take 1 capsule (0.4 mg total) by mouth once daily. (Patient taking differently: Take 0.4 mg by mouth every evening. ) 90 capsule 3    DUREZOL 0.05 % Drop ophthalmic solution       fexofenadine (ALLEGRA ALLERGY) 180 MG tablet Take 1 tablet (180 mg total) by mouth once daily. (Patient taking differently: Take 180 mg by mouth daily as needed. ) 10 tablet 0    sodium,potassium,mag sulfates (SUPREP BOWEL PREP KIT) 17.5-3.13-1.6 gram SolR Take as instructed on prep sheet 354 mL 0    [DISCONTINUED] amLODIPine (NORVASC) 2.5 MG tablet Take 1 tablet (2.5 mg total) by " mouth every evening. 90 tablet 3    [DISCONTINUED] promethazine (PHENERGAN) 25 MG tablet Take 1 tablet (25 mg total) by mouth every 6 (six) hours as needed for Nausea. 6 tablet 0     No current facility-administered medications on file prior to visit.      Social History     Socioeconomic History    Marital status:      Spouse name: Babs    Number of children: 2    Years of education: Not on file    Highest education level: Not on file   Social Needs    Financial resource strain: Not on file    Food insecurity - worry: Not on file    Food insecurity - inability: Not on file    Transportation needs - medical: Not on file    Transportation needs - non-medical: Not on file   Occupational History    Occupation: Retired   Tobacco Use    Smoking status: Former Smoker     Last attempt to quit: 1986     Years since quittin.9    Smokeless tobacco: Current User     Types: Chew   Substance and Sexual Activity    Alcohol use: Yes     Comment: rare    Drug use: No    Sexual activity: Not on file   Other Topics Concern    Not on file   Social History Narrative    Not on file     Family History   Problem Relation Age of Onset    Heart disease Mother     Stroke Maternal Grandfather     Diabetes Neg Hx     Cancer Neg Hx     Hypertension Neg Hx        Review of Systems   Constitutional: Negative.  Negative for chills, diaphoresis and fever.   HENT: Negative for congestion, hearing loss, mouth sores, postnasal drip and sore throat.    Eyes: Negative for pain and visual disturbance.   Respiratory: Negative for cough, chest tightness, shortness of breath and wheezing.    Cardiovascular: Negative for chest pain.   Gastrointestinal: Negative for abdominal pain, anal bleeding, blood in stool, constipation, diarrhea, nausea and vomiting.   Genitourinary: Negative for dysuria and hematuria.   Musculoskeletal: Negative for back pain, neck pain and neck stiffness.   Skin: Negative for rash.    Neurological: Negative for dizziness and weakness.       Objective:     BP (!) 97/56   Pulse (!) 59   Temp 98.2 °F (36.8 °C) (Oral)   Ht 6' (1.829 m)   Wt 70.8 kg (156 lb)   BMI 21.16 kg/m²     Physical Exam   Constitutional: He is oriented to person, place, and time. He appears well-developed and well-nourished.   HENT:   Head: Normocephalic and atraumatic.   Right Ear: External ear normal.   Left Ear: External ear normal.   Nose: Nose normal.   Mouth/Throat: Oropharynx is clear and moist. No oropharyngeal exudate.   Eyes: Conjunctivae and EOM are normal. Pupils are equal, round, and reactive to light. Right eye exhibits no discharge. Left eye exhibits no discharge. No scleral icterus.   Neck: Normal range of motion. Neck supple. No JVD present. No thyromegaly present.   Cardiovascular: Normal rate, regular rhythm, normal heart sounds and intact distal pulses. Exam reveals no gallop and no friction rub.   No murmur heard.  Pulses:       Dorsalis pedis pulses are 1+ on the right side, and 1+ on the left side.        Posterior tibial pulses are 1+ on the right side, and 1+ on the left side.   Pulmonary/Chest: Effort normal and breath sounds normal. No respiratory distress. He has no wheezes. He has no rales. He exhibits no tenderness.   Abdominal: Soft. Bowel sounds are normal. He exhibits no distension and no mass. There is no tenderness. There is no rebound and no guarding.   Musculoskeletal: Normal range of motion. He exhibits no edema or tenderness.        Right foot: There is normal range of motion and no deformity.        Left foot: There is normal range of motion and no deformity.   Feet:   Right Foot:   Protective Sensation: 10 sites tested. 10 sites sensed.   Skin Integrity: Negative for ulcer, blister, skin breakdown, erythema, warmth, callus or dry skin.   Left Foot:   Protective Sensation: 10 sites tested. 10 sites sensed.   Skin Integrity: Negative for ulcer, blister, skin breakdown, erythema,  warmth, callus or dry skin.   Lymphadenopathy:     He has no cervical adenopathy.   Neurological: He is alert and oriented to person, place, and time. No cranial nerve deficit. Coordination normal.   Skin: Skin is warm and dry. He is not diaphoretic.   Psychiatric: He has a normal mood and affect.     Assessment:     1. Uncontrolled type 2 diabetes mellitus with stage 3 chronic kidney disease, with long-term current use of insulin    2. Hypothyroidism, unspecified type    3. Combined hyperlipidemia associated with type 2 diabetes mellitus    4. Benign prostatic hyperplasia, presence of lower urinary tract symptoms unspecified    5. Bilateral carotid artery stenosis    6. Benign prostatic hyperplasia without lower urinary tract symptoms    7. CKD stage 3 secondary to diabetes    8. Hypertension associated with diabetes        Plan:     Problem List Items Addressed This Visit     Benign prostatic hyperplasia    Bilateral carotid artery stenosis    CKD stage 3 secondary to diabetes    Relevant Orders    Comprehensive metabolic panel    Combined hyperlipidemia associated with type 2 diabetes mellitus    Relevant Medications    pravastatin (PRAVACHOL) 40 MG tablet    Other Relevant Orders    Comprehensive metabolic panel    Lipid panel    History of colon polyps - Primary    Relevant Medications    sodium,potassium,mag sulfates (SUPREP BOWEL PREP KIT) 17.5-3.13-1.6 gram SolR    promethazine (PHENERGAN) 25 MG tablet    Other Relevant Orders    Case request GI: COLONOSCOPY (Completed)    Hypertension associated with diabetes    Relevant Medications    amLODIPine (NORVASC) 5 MG tablet    Hypothyroidism    Relevant Medications    levothyroxine (SYNTHROID) 100 MCG tablet    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 Muro.  No Follow-up on file.

## 2018-08-30 ENCOUNTER — LAB VISIT (OUTPATIENT)
Dept: LAB | Facility: HOSPITAL | Age: 78
End: 2018-08-30
Attending: FAMILY MEDICINE
Payer: MEDICARE

## 2018-08-30 DIAGNOSIS — E11.22 CKD STAGE 3 SECONDARY TO DIABETES: ICD-10-CM

## 2018-08-30 DIAGNOSIS — E78.2 COMBINED HYPERLIPIDEMIA ASSOCIATED WITH TYPE 2 DIABETES MELLITUS: ICD-10-CM

## 2018-08-30 DIAGNOSIS — N18.30 CKD STAGE 3 SECONDARY TO DIABETES: ICD-10-CM

## 2018-08-30 DIAGNOSIS — E11.69 COMBINED HYPERLIPIDEMIA ASSOCIATED WITH TYPE 2 DIABETES MELLITUS: ICD-10-CM

## 2018-08-30 LAB
ALBUMIN SERPL BCP-MCNC: 3.5 G/DL
ALP SERPL-CCNC: 96 U/L
ALT SERPL W/O P-5'-P-CCNC: 11 U/L
ANION GAP SERPL CALC-SCNC: 7 MMOL/L
AST SERPL-CCNC: 12 U/L
BILIRUB SERPL-MCNC: 1 MG/DL
BUN SERPL-MCNC: 18 MG/DL
CALCIUM SERPL-MCNC: 9.7 MG/DL
CHLORIDE SERPL-SCNC: 104 MMOL/L
CHOLEST SERPL-MCNC: 145 MG/DL
CHOLEST/HDLC SERPL: 2.8 {RATIO}
CO2 SERPL-SCNC: 28 MMOL/L
CREAT SERPL-MCNC: 1 MG/DL
EST. GFR  (AFRICAN AMERICAN): >60 ML/MIN/1.73 M^2
EST. GFR  (NON AFRICAN AMERICAN): >60 ML/MIN/1.73 M^2
ESTIMATED AVG GLUCOSE: 192 MG/DL
GLUCOSE SERPL-MCNC: 173 MG/DL
HBA1C MFR BLD HPLC: 8.3 %
HDLC SERPL-MCNC: 52 MG/DL
HDLC SERPL: 35.9 %
LDLC SERPL CALC-MCNC: 82.6 MG/DL
NONHDLC SERPL-MCNC: 93 MG/DL
POTASSIUM SERPL-SCNC: 4.1 MMOL/L
PROT SERPL-MCNC: 6.4 G/DL
SODIUM SERPL-SCNC: 139 MMOL/L
TRIGL SERPL-MCNC: 52 MG/DL

## 2018-08-30 PROCEDURE — 80061 LIPID PANEL: CPT

## 2018-08-30 PROCEDURE — 36415 COLL VENOUS BLD VENIPUNCTURE: CPT | Mod: PO

## 2018-08-30 PROCEDURE — 80053 COMPREHEN METABOLIC PANEL: CPT

## 2018-08-30 PROCEDURE — 83036 HEMOGLOBIN GLYCOSYLATED A1C: CPT

## 2018-08-31 ENCOUNTER — TELEPHONE (OUTPATIENT)
Dept: FAMILY MEDICINE | Facility: CLINIC | Age: 78
End: 2018-08-31

## 2018-08-31 NOTE — TELEPHONE ENCOUNTER
----- Message from Pattie Meza sent at 8/31/2018  1:12 PM CDT -----  Contact: self   Patient returning call. Please call back at [phone].    Thanks,  Pattie Meza

## 2018-08-31 NOTE — TELEPHONE ENCOUNTER
----- Message from Pattie Meza sent at 8/31/2018  1:21 PM CDT -----  Contact: self   Patient returning call. Please call back at [phone].    Thanks,  Pattie Meza

## 2018-09-01 NOTE — PROGRESS NOTES
CHANGE THE LANTUS TO 28 UNITS A DAY AND KEEP A LOG OF THE GLUCOSES BEFORE MEALS and bed time and see me in 2 weeks to adjust the medications to get the A1c to goal.  He needs an a1c in 3 months.    Then schedule with a diabetic educator for about 3-4 weeks from now if there is an opening.      The lipids and the other electrolytes are all ok at this time.

## 2018-09-04 ENCOUNTER — TELEPHONE (OUTPATIENT)
Dept: FAMILY MEDICINE | Facility: CLINIC | Age: 78
End: 2018-09-04

## 2018-09-04 RX ORDER — INSULIN GLARGINE 100 [IU]/ML
INJECTION, SOLUTION SUBCUTANEOUS
Qty: 15 SYRINGE | Refills: 5 | Status: SHIPPED | OUTPATIENT
Start: 2018-09-04 | End: 2019-03-05 | Stop reason: SDUPTHER

## 2018-09-04 NOTE — TELEPHONE ENCOUNTER
I have signed for the following orders AND/OR meds.  Please call the patient and ask the patient to schedule the testing AND/OR inform about any medications that were sent.      Orders Placed This Encounter   Procedures    Hemoglobin A1c     Standing Status:   Future     Standing Expiration Date:   9/4/2019

## 2018-09-04 NOTE — TELEPHONE ENCOUNTER
----- Message from Darin Krueger MD sent at 8/31/2018  8:21 PM CDT -----  CHANGE THE LANTUS TO 28 UNITS A DAY AND KEEP A LOG OF THE GLUCOSES BEFORE MEALS and bed time and see me in 2 weeks to adjust the medications to get the A1c to goal.  He needs an a1c in 3 months.    Then schedule with a diabetic educator for about 3-4 weeks from now if there is an opening.      The lipids and the other electrolytes are all ok at this time.

## 2018-09-04 NOTE — TELEPHONE ENCOUNTER
Spoke to patient and advised him to start Lantus 28 units instead of 20 units and to bring his glucose log when he returns. Patient demonstrated understanding. Scheduled follow up with Dr. Krueger and diabetes education. Placed refill request with new dosage and put in request for a1c in 3 months.

## 2018-09-05 ENCOUNTER — DOCUMENTATION ONLY (OUTPATIENT)
Dept: ENDOSCOPY | Facility: HOSPITAL | Age: 78
End: 2018-09-05

## 2018-09-17 ENCOUNTER — PATIENT OUTREACH (OUTPATIENT)
Dept: ADMINISTRATIVE | Facility: HOSPITAL | Age: 78
End: 2018-09-17

## 2018-09-25 ENCOUNTER — PATIENT OUTREACH (OUTPATIENT)
Dept: ADMINISTRATIVE | Facility: HOSPITAL | Age: 78
End: 2018-09-25

## 2018-09-25 NOTE — PROGRESS NOTES
Subjective:         Patient ID: Bebeto Santiago is a 78 y.o. male.  Patient's current PCP is Darin Krueger MD.       Chief Complaint: No chief complaint on file.    HPI  Bebeto Santiago is a 78 y.o. White male presenting  as a new patient for diabetes (he was under the impression he would be seeing a dietician) . Patient has been diagnosed with diabetes since 2013 and has the following complications from diabetes: nephropathy, retinopathy and cardiovascular disease, HTN, Hyperlipidemia. Blood glucose testing is performed regularly. Condition: Uncontrolled He denies recent hospital admissions, denies emergency room visits, denies hypoglycemia.He has been on metformin, Januvia in past but yesterday orals were discontinued and patient started on insulin. Lantus was increased to 28 units on 9/5. He reports having a poor diet ([employer]) but has stopped.                       Height: 6' (182.9 cm)  //  Weight: 71.3 kg (157 lb 3.2 oz), Body mass index is 21.32 kg/m².  Home Blood Glucose reading this AM: 175 mg/dl fasting.  His blood sugar in clinic today is: nonfasting  Lab Results   Component Value Date    POCGLU 224 (A) 09/27/2018       Labs reviewed and are noted below.    His most recent A1C is:  Lab Results   Component Value Date    HGBA1C 8.3 (H) 08/30/2018    HGBA1C 7.8 (H) 02/21/2018    HGBA1C 7.6 (H) 09/08/2017     No results found for: CPEPTIDE  No results found for: GLUTAMICACID  Lab Results   Component Value Date    WBC 6.70 01/10/2018    HGB 16.1 01/10/2018    HCT 48.0 01/10/2018     01/10/2018    CHOL 145 08/30/2018    TRIG 52 08/30/2018    HDL 52 08/30/2018    ALT 11 08/30/2018    AST 12 08/30/2018     08/30/2018    K 4.1 08/30/2018     08/30/2018    CREATININE 1.0 08/30/2018    BUN 18 08/30/2018    CO2 28 08/30/2018    TSH 0.482 02/21/2018    PSA 1.4 02/21/2018    GLUF 94 03/06/2013    HGBA1C 8.3 (H) 08/30/2018     CMP  Sodium   Date Value Ref Range Status   08/30/2018 139  136 - 145 mmol/L Final     Potassium   Date Value Ref Range Status   08/30/2018 4.1 3.5 - 5.1 mmol/L Final     Chloride   Date Value Ref Range Status   08/30/2018 104 95 - 110 mmol/L Final     CO2   Date Value Ref Range Status   08/30/2018 28 23 - 29 mmol/L Final     Glucose   Date Value Ref Range Status   08/30/2018 173 (H) 70 - 110 mg/dL Final     BUN, Bld   Date Value Ref Range Status   08/30/2018 18 8 - 23 mg/dL Final     Creatinine   Date Value Ref Range Status   08/30/2018 1.0 0.5 - 1.4 mg/dL Final     Calcium   Date Value Ref Range Status   08/30/2018 9.7 8.7 - 10.5 mg/dL Final     Total Protein   Date Value Ref Range Status   08/30/2018 6.4 6.0 - 8.4 g/dL Final     Albumin   Date Value Ref Range Status   08/30/2018 3.5 3.5 - 5.2 g/dL Final     Total Bilirubin   Date Value Ref Range Status   08/30/2018 1.0 0.1 - 1.0 mg/dL Final     Comment:     For infants and newborns, interpretation of results should be based  on gestational age, weight and in agreement with clinical  observations.  Premature Infant recommended reference ranges:  Up to 24 hours.............<8.0 mg/dL  Up to 48 hours............<12.0 mg/dL  3-5 days..................<15.0 mg/dL  6-29 days.................<15.0 mg/dL       Alkaline Phosphatase   Date Value Ref Range Status   08/30/2018 96 55 - 135 U/L Final     AST   Date Value Ref Range Status   08/30/2018 12 10 - 40 U/L Final     ALT   Date Value Ref Range Status   08/30/2018 11 10 - 44 U/L Final     Anion Gap   Date Value Ref Range Status   08/30/2018 7 (L) 8 - 16 mmol/L Final     eGFR if    Date Value Ref Range Status   08/30/2018 >60.0 >60 mL/min/1.73 m^2 Final     eGFR if non    Date Value Ref Range Status   08/30/2018 >60.0 >60 mL/min/1.73 m^2 Final     Comment:     Calculation used to obtain the estimated glomerular filtration  rate (eGFR) is the CKD-EPI equation.            CURRENT DM MEDICATIONS:   Diabetes Medications             insulin aspart U-100  (NOVOLOG FLEXPEN U-100 INSULIN) 100 unit/mL InPn pen USE AS DIRECTED PER SLIDING SCALE. MAX OF 14 UNITS DAILY)  If the glucose is < 150, do nothing. // If the glucose is >151-200, give 2 unit SQ   //  If the glucose is 201-250, give 3 units SQ   //  If the glucose is 250-300, give 4 units SQ   // If the glucose is 301-350, give 5 units SQ   //  If the glucose is 350-400, give 6 units SQ  //  If the glucose is >400, give 7 units SQ and recheck in 2 hours, redose according to scale    insulin glargine (LANTUS SOLOSTAR U-100 INSULIN) 100 unit/mL (3 mL) InPn pen INJECT 28 UNITS DAILY              Health Maintenance   Topic Date Due    Colonoscopy  02/08/2018    Eye Exam  02/19/2019    Hemoglobin A1c  02/28/2019    Lipid Panel  08/30/2019    Urine Microalbumin  08/30/2019    Foot Exam  09/27/2019    TETANUS VACCINE  03/06/2023    Zoster Vaccine  Completed    Pneumococcal (65+)  Completed    Influenza Vaccine  Completed       STANDARDS OF CARE:  Current Ophthalmologist/Optometrist: recommended annually    Current Dentist: recommended annually   Current Podiatrist: recommended annually   He  is to be enrolled in diabetes education classes.     LIFESTYLE:  ACTIVITY LEVEL: Rarely Active  EXERCISE: yard work  MEAL PLANNING: Patient reports number of meals per day to be 3 and number of snacks per day to be 3  BLOOD GLUCOSE TESTING: Patient reports testing 4 times per day     Diabetes Management Status    Statin: Taking  ACE/ARB: Not taking    Screening or Prevention Patient's value Goal Complete/Controlled?   HgA1C Testing and Control   Lab Results   Component Value Date    HGBA1C 8.3 (H) 08/30/2018      Annually/Less than 8% No   Lipid profile : 08/30/2018 Annually Yes   LDL control Lab Results   Component Value Date    LDLCALC 82.6 08/30/2018    Annually/Less than 100 mg/dl  Yes   Nephropathy screening Lab Results   Component Value Date    LABMICR 33.0 09/20/2017     Lab Results   Component Value Date    PROTEINUA  Negative 09/20/2017    Annually No   Blood pressure BP Readings from Last 1 Encounters:   08/29/18 (!) 97/56    Less than 140/90 Yes   Dilated retinal exam : 02/19/2018 Annually Yes   Foot exam   : 09/27/2018 Annually Yes       Review of Systems   Constitutional: Negative for activity change and appetite change.   Eyes: Negative for visual disturbance.   Gastrointestinal: Negative for constipation and diarrhea.   Endocrine: Negative for polydipsia, polyphagia and polyuria.   Neurological: Negative for syncope and weakness.   Psychiatric/Behavioral: Negative for confusion.         Objective:      Physical Exam   Constitutional: He is oriented to person, place, and time. He appears well-developed and well-nourished.  Non-toxic appearance. He does not have a sickly appearance. He does not appear ill. No distress.   Eyes: EOM are normal. Pupils are equal, round, and reactive to light.   Neck: Normal range of motion and full passive range of motion without pain. Neck supple. No thyromegaly present.   Cardiovascular: Normal rate, regular rhythm, normal heart sounds and normal pulses.   Pulses:       Radial pulses are 2+ on the right side, and 2+ on the left side.        Dorsalis pedis pulses are 2+ on the right side, and 2+ on the left side.   Pulmonary/Chest: Effort normal and breath sounds normal.   Feet:   Right Foot:   Protective Sensation: 6 sites tested. 6 sites sensed.   Skin Integrity: Negative for ulcer or skin breakdown.   Left Foot:   Protective Sensation: 6 sites tested. 6 sites sensed.   Skin Integrity: Negative for ulcer or skin breakdown.   Neurological: He is alert and oriented to person, place, and time. He has normal strength. No sensory deficit.   Skin: He is not diaphoretic.   Psychiatric: He has a normal mood and affect. His speech is normal and behavior is normal. Cognition and memory are normal.       Assessment:       1. 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 Discussed ADA goal for fasting blood sugar, 80 - 130mg/dL; pp blood sugars below 180 mg/dl. Also, discussed prevention of hypoglycemia and the need to adjust goals to higher levels if persistent hypoglycemia.  Reminded to bring BG records or meter to each visit for review. Patient instructed to send in log weekly for review and potential medication adjustments.  2.) Reviewed pathophysiology of Type 2 diabetes, complications related to the disease, importance of annual dilated eye exam and daily foot examination.  3.) We discussed the ADA recommendations, which are as follows:  Hemoglobin A1c below 7.0 %. All patients with diabetes should be on statins unless contraindicated.  ACE or ARB therapy if not contraindicated.    4.) Continue medications as prescribed.  My [facility] e-mail or phone review in one week with BG records for adjustment of medication.  5.) Meal planning teaching: Reviewed carb counting, portion control, importance of spacing meals throughout the day to prevent post prandial elevations.  6.) Discussed activity with related benefits, methods, and precautions. Recommended patient start or continue some form of exercise and increase as tolerated to 30 minutes per day to aid in control of BGs.  7.) A1C, TSH, Lipid Panel, CMP with eGFR and Micro/Creatinine are utd or were ordered per ADA protocol.  8.) Return to clinic as needed for follow up (he prefers continue management with Dr. Krueger). The patient was explained the above plan and given opportunity to ask  questions.  He understands, chooses and consents to this plan and accepts all the risks, which include but are not limited to the risks mentioned above. He understands the alternative of having no testing, interventions or treatments at this time. He left content and without further questions.     A total of 60 minutes was spent in face to face time, of which over 50% was spent in counseling patient on disease process, complications, treatment, and side effects of medications.

## 2018-09-27 ENCOUNTER — OFFICE VISIT (OUTPATIENT)
Dept: DIABETES | Facility: CLINIC | Age: 78
End: 2018-09-27
Payer: MEDICARE

## 2018-09-27 VITALS — BODY MASS INDEX: 21.29 KG/M2 | WEIGHT: 157.19 LBS | HEIGHT: 72 IN

## 2018-09-27 DIAGNOSIS — E11.22 UNCONTROLLED TYPE 2 DIABETES MELLITUS WITH CHRONIC KIDNEY DISEASE, WITH LONG-TERM CURRENT USE OF INSULIN, UNSPECIFIED CKD STAGE: Primary | ICD-10-CM

## 2018-09-27 DIAGNOSIS — Z79.4 UNCONTROLLED TYPE 2 DIABETES MELLITUS WITH CHRONIC KIDNEY DISEASE, WITH LONG-TERM CURRENT USE OF INSULIN, UNSPECIFIED CKD STAGE: Primary | ICD-10-CM

## 2018-09-27 DIAGNOSIS — E11.65 UNCONTROLLED TYPE 2 DIABETES MELLITUS WITH CHRONIC KIDNEY DISEASE, WITH LONG-TERM CURRENT USE OF INSULIN, UNSPECIFIED CKD STAGE: Primary | ICD-10-CM

## 2018-09-27 LAB — GLUCOSE SERPL-MCNC: 224 MG/DL (ref 70–110)

## 2018-09-27 PROCEDURE — 99213 OFFICE O/P EST LOW 20 MIN: CPT | Mod: PBBFAC,PO | Performed by: NURSE PRACTITIONER

## 2018-09-27 PROCEDURE — 99215 OFFICE O/P EST HI 40 MIN: CPT | Mod: S$PBB,,, | Performed by: NURSE PRACTITIONER

## 2018-09-27 PROCEDURE — 99999 PR PBB SHADOW E&M-EST. PATIENT-LVL III: CPT | Mod: PBBFAC,,, | Performed by: NURSE PRACTITIONER

## 2018-09-27 PROCEDURE — 82948 REAGENT STRIP/BLOOD GLUCOSE: CPT | Mod: PBBFAC,PO | Performed by: NURSE PRACTITIONER

## 2018-09-27 NOTE — LETTER
September 27, 2018      Darin Krueger MD  [address] [address]           [place] [facility]  [address] [address]  Phone: [phone]  Fax: [phone]          Patient: Bebeto Santiago   MR Number: [id number]   YOB: 1940   Date of Visit: 9/27/2018       Dear Dr. Darin Krueger:    Thank you for referring Bebeto Santiago to me for evaluation. Attached you will find relevant portions of my assessment and plan of care.    If you have questions, please do not hesitate to call me. I look forward to following Bebeto Santiago along with you.    Sincerely,    Giselle Omer, MANUEL    Enclosure  CC:  No Recipients    If you would like to receive this communication electronically, please contact [email or web address] or [phone] to request more information on [facility] access.    For providers and/or their staff who would like to refer a patient to [facility], please contact us through our one-stop-shop provider referral line, Se[facility] Cooper, at [phone].    If you feel you have received this communication in error or would no longer like to receive these types of communications, please e-mail [email or web address]

## 2018-09-27 NOTE — PATIENT INSTRUCTIONS
PATIENT INSTRUCTIONS  - Follow up as scheduled.   - Carb Count: 30-45G/meal and 15G/snack  - Exercise: Goal is 150 minutes or more per week  - Bring meter and blood sugar log to each appointment.     - You will take your blood sugar two times daily. Once will always be in the morning before you have any food, (known as your fasting blood sugar), and once should be two hours after EITHER your breakfast, lunch, or dinner, (known as your post-prandial blood sugar). Each day you should check a different meal, (ie. Monday-breakfast; Tuesday- lunch; Wednesday- supper, then repeat).     - Blood Sugar Goals:  1. The goal for fasting blood sugars is 80 -130 mg/dl.   2. The goal for the 2 hour after meal blood sugars is below 180 mg/dl.  3. Blood sugars below 70 are considered LOW and you must eat or drink 15G of carbohydrates immediately, then recheck blood sugar after 15 minutes to ensure blood sugar has returned to normal range, (70 or above)                                                              Diabetes (General Information)  Diabetes is a long-term health problem. It means your body does not make enough insulin. Or it may mean that your body cannot use the insulin it makes. Insulin is a hormone in your body. It lets blood sugar (glucose) reach the cells in your body. All of your cells need glucose for fuel.  When you have diabetes, the glucose in your blood builds up because it cannot get into the cells. This buildup is called high blood sugar (hyperglycemia).  Your blood sugar level depends on several things. It depends on what kind of food you eat and how much of it you eat. It also depends on how much exercise you get, and how much insulin you have in your body. Eating too much of the wrong kinds of food or not taking diabetes medicine on time can cause high blood sugar. Infections can cause high blood sugar even if you are taking medicines correctly.  These things can also cause low blood sugar:  · Missing  meals  · Not eating enough food  · Taking too much diabetes medicine  Diabetes can cause serious problems over time if you do not get treated. These problems include heart disease, stroke, kidney failure, and blindness. They also include nerve pain or loss of feeling in your legs and feet, and gangrene of the feet. By keeping your blood sugar under control you can prevent or delay these problems.  Normal blood sugar levels are 80 to 100 before a meal and less than 180 in the 1 to 2 hours after a meal.  Home care  Follow these guidelines when caring for yourself at home:  · Follow the diet your healthcare provider gives you. Take insulin or other diabetes medicine exactly as told to.  · Watch your blood sugar as you are told to. Keep a log of your results. This will help your provider change your medicines to keep your blood sugar under control.  · Try to reach your ideal weight. You may be able to cut back on or not have to take diabetes medicine if you eat the right foods and get exercise.  · Do not smoke. Smoking worsens the effects of diabetes on your circulation. You are much more likely to have a heart attack if you have diabetes and you smoke.  · Take good care of your feet. If you have lost feeling in your feet, you may not see an injury or infection. Check your feet and between your toes at least once a week.  · Wear a medical alert bracelet or necklace, or carry a card in your wallet that says you have diabetes. This will help healthcare providers give you the right care if you get very ill and cannot tell them that you have diabetes.  Sick day plan  If you get a cold, the flu, or a bacterial or viral infection, take these steps:  · Look at your diabetes sick plan and call your healthcare provider as you were told to. You may need to call your provider right away if:  ¨ Your blood sugar is above 240 while taking your diabetes medicine  ¨ Your urine ketone levels are above normal or high  ¨ You have been  vomiting more than 6 hours  ¨ You have trouble breathing or your breath ha s a fruity smell  ¨ You have a high fever  ¨ You have a fever for several days and you are not getting better  ¨ You get light-headed and are sleepier than usual  · Keep taking your diabetes pills (oral medicine) even if you have been vomiting and are feeling sick. Call your provider right away because you may need insulin to lower your blood sugar until you recover from your illness.  · Keep taking your insulin even if you have been vomiting and are feeling sick. Call your provider right away to ask if you need to change your insulin dose. This will depend on your blood sugar results.  · Check your blood sugar every 2 to 4 hours, or at least 4 times a day.  · Check your ketones often. If you are vomiting and having diarrhea, watch them more often.  · Do not skip meals. Try to eat small meals on a regular schedule. Do this even if you do not feel like eating.  · Drink water or other liquids that do not have caffeine or calories. This will keep you from getting dehydrated. If you are nauseated or vomiting, takes small sips every 5 minutes. To prevent dehydration try to drink a cup (8 ounces) of fluids every hour while you are awake.  General care  Always bring a source of fast-acting sugar with you in case you have symptoms of low blood sugar (below 70). At the first sign of low blood sugar, eat or drink 15 to 20 grams of fast-acting sugar to raise your blood sugar. Examples are:  · 3 to 4 glucose tablets. You can buy these at most drugstores.  · 4 ounces (1/2 cup) of regular (not diet) soft drinks  · 4 ounces (1/2 cup) of any fruit juice  · 8 ounces (1 cup) of milk  · 5 to 6 pieces of hard candy  · 1 tablespoon of honey  Check your blood sugar 15 minutes after treating yourself. If it is still below 70, take 15 to 20 more grams of fast-acting sugar. Test again in 15 minutes. If it returns to normal (70 or above), eat a snack or meal to keep  your blood sugar in a safe range. If it stays low, call your doctor or go to an emergency room.  Follow-up care  Follow-up with your healthcare provider, or as advised. For more information about diabetes, visit the American Diabetes Association website at www.diabetes.org or call 766-489-5965.  When to seek medical advice  Call your healthcare provider right away if you have any of these symptoms of high blood sugar:  · Frequent urination  · Dizziness  · Drowsiness  · Thirst  · Headache  · Nausea or vomiting  · Abdominal pain  · Eyesight changes  · Fast breathing  · Confusion or loss of consciousness  Also call your provider right away if you have any of these signs of low blood sugar:  · Fatigue  · Headache  · Shakes  · Excess sweating  · Hunger  · Feeling anxious or restless  · Eyesight changes  · Drowsiness  · Weakness  · Confusion or loss of consciousness  Call 911  Call for emergency help right away if any of these occur:  · Chest pain or shortness of breath  · Dizziness or fainting  · Weakness of an arm or leg or one side of the face  · Trouble speaking or seeing   Date Last Reviewed: 6/1/2016  © 7344-7683 [employer]. [address] [place] [address]. All rights reserved. This information is not intended as a substitute for professional medical care. Always follow your healthcare professional's instructions.                                                                     Understanding Type 2 Diabetes  When your body is working normally, the food you eat is digested and used as fuel. This fuel supplies energy to the bodys cells. When you have diabetes, the fuel cant enter the cells. Without treatment, diabetes can cause serious long-term health problems.     Your body breaks down the food you eat into glucose.          How the body gets energy  The digestive system breaks down food, resulting in a sugar called glucose. Some of this glucose is stored in the liver. But most of it  enters the bloodstream and travels to the cells to be used as fuel. Glucose needs the help of a hormone called insulin to enter the cells. Insulin is made in the pancreas. It is released into the bloodstream in response to the presence of glucose in the blood. Think of insulin as a key. When insulin reaches a cell, it attaches to the cell wall. This signals the cell to create an opening that allows glucose to enter the cell.  When you have type 2 diabetes  Early in type 2 diabetes, your cells dont respond properly to insulin. Because of this, less glucose than normal moves into cells. This is called insulin resistance. In response, the pancreas makes more insulin. But eventually, the pancreas cant produce enough insulin to overcome insulin resistance. As less and less glucose enters cells, it builds up to a harmful level in the bloodstream. This is known as high blood sugar or hyperglycemia. The result is type 2 diabetes. The cells become starved for energy, which can leave you feeling tired and rundown.  Why high blood sugar is a problem  If high blood sugar is not controlled, blood vessels throughout the body become damaged. Prolonged high blood sugar affects organs, blood vessels, and nerves. As a result, the risks of damage to the heart, kidneys, eyes, and limbs increase. Diabetes also makes other problems, such as high blood pressure and high cholesterol, more dangerous. Over time, people with uncontrolled high blood sugar have an increase in risk of dying of, or being disabled by, heart attack or stroke.  Date Last Reviewed: 7/1/2016  © 1828-7306 [employer]. [address] [place] [address], [place] [address]. All rights reserved. This information is not intended as a substitute for professional medical care. Always follow your healthcare professional's instructions.                                                            Using a Blood Sugar Log    You have diabetes. This means your body has  trouble regulating a sugar called glucose. To help manage your diabetes, youll need to check your blood sugar level as directed by your healthcare provider. Keeping a log of your blood sugar levels will help you track your blood sugar readings. Its a simple and easy way to see how well you are controlling your diabetes.  Checking your blood sugar level  You can check your blood sugar level with a blood glucose meter. Youll first prick the side of your finger with a tiny lancet to draw a tiny drop of blood onto the test strip. Some glucose meters let you use another place on your body to test. But these other places should not be used in some cases as they may be inaccurate. Follow the instructions for your glucose meter. And talk with your healthcare provider before doing the test on other places.  The strip goes into the meter first, then a drop of blood is placed on the tip of the strip. The meter then shows a reading that tells you the level of your blood sugar. Your readings should be in your target range as often as possible. This means not too high or too low. Staying in this range helps lower your risk for complications. Your healthcare provider will help you figure out the target range that is best for you.  Tracking your readings  Every time you check your blood sugar, use your log to keep track of your readings. Your meter will also probably have a memory feature that your healthcare provider can check at your next visit. You may be advised by your healthcare provider to check your blood sugar in the morning, at bedtime, and before and after meals. Be sure to write down all of your numbers. Also use your log to record things that might have affected your blood sugar. Some examples include being sick, certain medicines, being physically active, feeling stressed, or skipping meals.   Lessons learned from your readings  Tracking your blood sugar readings helps you see patterns. These patterns tell you how  your actions affect your blood sugar. For instance, you may have higher numbers after eating certain foods or lower numbers after exercise. They just help you understand how to stay in your target range more often, so that your diabetes remains in good control.  Sharing your log with your healthcare team  Bring your blood sugar log and glucose meter with you to all of your healthcare appointments. This can help your healthcare team make changes to your treatment plan, if needed. This may involve making changes in what you eat, what medicines you take, or how much you exercise.  To learn more  The resources below can help you learn more:  · American Diabetes Association [phone] www.diabetes.org  · [employer] 190-897-4368 www.lighthouse.org  · [employer] 307-415-8557 www.nei.nih.gov  · [employer] 017-661-1903 www.hormone.org  Date Last Reviewed: 5/1/2016 © 2000-2016 [employer]. [address] [place] [address]. All rights reserved. This information is not intended as a substitute for professional medical care. Always follow your healthcare professional's instructions.                                                                                            Diabetes: Exams and Tests    For your diabetes care, you may see your primary care provider or a specialist 2 to 4 times a year, or as directed. This page lists some of the regular exams and tests recommended for people with diabetes. To learn more, contact the American Diabetes Association (910-941-7693, www.diabetes.org).  Tests and immunizations  These should be done at least as often as stated below:  · Blood pressure check: every healthcare provider visit  · A1C: at first, every 3 months; if controlled, then every 3 to 6 months   · Cholesterol and blood lipid tests: at least every 12 months.  · Urine tests for kidney function: every 12 months  · Flu shots: once a year  · Pneumonia shots: talk  with your healthcare provider about which pneumonia vaccines are right for you  · Hepatitis B shots: as soon as possible if youre under 60, or as advised by your healthcare provider if youre older than 60  · Shingles vaccine after age 60, even if you have already had shingles   · Other tests or vaccines: as advised by your healthcare provider  · Individualized medical nutrition therapy: at least once, then as needed  · Stop smoking counseling, if you still smoke, at each visit   Regular exams  The following exams help keep you healthy:  · Foot exams. Nerve and blood vessel problems can affect your feet sooner than other parts of your body. Make sure that your healthcare provider checks your feet at every office visit.  · Eye exams. You can have problems with your eyes even if you dont have trouble seeing. An ophthalmologist (eye healthcare provider) or specially trained optometrist will give you a dilated eye exam at least once a year. If you see dark spots, see poorly in dim light, have eye pain or pressure, or notice any other problems, tell your healthcare provider right away.  · Dental exams. Gum disease (also called periodontal disease) and other mouth problems are common in people with diabetes. To help prevent these problems, see your dentist two or more times a year.  Ask your healthcare provider what other exams youll need on a regular basis.  Date Last Reviewed: 6/1/2016 © 2000-2016 [employer]. [address] [place] [address], [place] [address]. All rights reserved. This information is not intended as a substitute for professional medical care. Always follow your healthcare professional's instructions.

## 2018-10-01 ENCOUNTER — OFFICE VISIT (OUTPATIENT)
Dept: FAMILY MEDICINE | Facility: CLINIC | Age: 78
End: 2018-10-01
Payer: MEDICARE

## 2018-10-01 VITALS
HEIGHT: 72 IN | WEIGHT: 158 LBS | HEART RATE: 65 BPM | DIASTOLIC BLOOD PRESSURE: 65 MMHG | SYSTOLIC BLOOD PRESSURE: 109 MMHG | TEMPERATURE: 98 F | BODY MASS INDEX: 21.4 KG/M2

## 2018-10-01 PROCEDURE — 99999 PR PBB SHADOW E&M-EST. PATIENT-LVL IV: CPT | Mod: PBBFAC,,, | Performed by: FAMILY MEDICINE

## 2018-10-01 PROCEDURE — 99214 OFFICE O/P EST MOD 30 MIN: CPT | Mod: PBBFAC,PO | Performed by: FAMILY MEDICINE

## 2018-10-01 PROCEDURE — 99213 OFFICE O/P EST LOW 20 MIN: CPT | Mod: S$PBB,,, | Performed by: FAMILY MEDICINE

## 2018-10-01 NOTE — PROGRESS NOTES
Subjective:      Patient ID: Bebeto Santiago is a 78 y.o. male.    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  The patient denies polyuria, polydipsia, polyphagia, hypoglycemia and paresthesias.  The patient's glucose control has been good.  Home glucose averages are routinely checked.  The patient is without retinopathy currently.  The patient has no history of neuropathy.  The patient currently complains of no podiatric problems.  The patient has excellent compliance.  Because of him being on lantus and novolog,  the patient checks his blood sugar 4 times daily and administers insulin 3 or more times daily.   Hemoglobin A1C   Date Value Ref Range Status   08/30/2018 8.3 (H) 4.0 - 5.6 % Final     Comment:     ADA Screening Guidelines:  5.7-6.4%  Consistent with prediabetes  >or=6.5%  Consistent with diabetes  High levels of fetal hemoglobin interfere with the HbA1C  assay. Heterozygous hemoglobin variants (HbS, HgC, etc)do  not significantly interfere with this assay.   However, presence of multiple variants may affect accuracy.     02/21/2018 7.8 (H) 4.0 - 5.6 % Final     Comment:     According to ADA guidelines, hemoglobin A1c <7.0% represents  optimal control in non-pregnant diabetic patients. Different  metrics may apply to specific patient populations.   Standards of Medical Care in Diabetes-2016.  For the purpose of screening for the presence of diabetes:  <5.7%     Consistent with the absence of diabetes  5.7-6.4%  Consistent with increasing risk for diabetes   (prediabetes)  >or=6.5%  Consistent with diabetes  Currently, no consensus exists for use of hemoglobin A1c  for diagnosis of diabetes for children.  This Hemoglobin A1c assay has significant interference with fetal   hemoglobin   (HbF). The results are invalid for patients with abnormal  amounts of   HbF,   including those with known Hereditary Persistence   of Fetal Hemoglobin. Heterozygous hemoglobin variants (HbAS, HbAC,   HbAD, HbAE, HbA2) do not significantly interfere with this assay;   however, presence of multiple variants in a sample may impact the %   interference.     09/08/2017 7.6 (H) 4.0 - 5.6 % Final     Comment:     According to ADA guidelines, hemoglobin A1c <7.0% represents  optimal control in non-pregnant diabetic patients. Different  metrics may apply to specific patient populations.   Standards of Medical Care in Diabetes-2016.  For the purpose of screening for the presence of diabetes:  <5.7%     Consistent with the absence of diabetes  5.7-6.4%  Consistent with increasing risk for diabetes   (prediabetes)  >or=6.5%  Consistent with diabetes  Currently, no consensus exists for use of hemoglobin A1c  for diagnosis of diabetes for children.  This Hemoglobin A1c assay has significant interference with fetal   hemoglobin   (HbF). The results are invalid for patients with abnormal amounts of   HbF,   including those with known Hereditary Persistence   of Fetal Hemoglobin. Heterozygous hemoglobin variants (HbAS, HbAC,   HbAD, HbAE, HbA2) do not significantly interfere with this assay;   however, presence of multiple variants in a sample may impact the %   interference.       No results found for: MICROALBUR, DONT05WND  Diabetes Management Status    Statin: Taking  ACE/ARB: Not taking    Screening or Prevention Patient's value Goal Complete/Controlled?   HgA1C Testing and Control   Lab Results   Component Value Date    HGBA1C 8.3 (H) 08/30/2018      Annually/Less than 8% Yes   Lipid profile : 09/08/2017 Annually Yes   LDL control Lab Results   Component Value Date    LDLCALC 97.4 09/08/2017    Annually/Less than 100 mg/dl  Yes   Nephropathy screening Lab Results   Component Value Date    LABMICR 33.0 09/20/2017     Lab Results   Component Value Date    PROTEINUA Negative 09/20/2017     "Annually Yes   Blood pressure BP Readings from Last 1 Encounters:   10/01/18 109/65    Less than 140/90 Yes   Dilated retinal exam : 02/19/2018 Annually No   Foot exam   : 09/27/2018 Annually Yes                  He is only using the humalog if the glucoses are over 150 and he does not use any at bed time.    Past Medical History:  Past Medical History:   Diagnosis Date    Allergy     BPH (benign prostatic hypertrophy)     Dehydration     Diabetes mellitus type II, uncontrolled     GERD (gastroesophageal reflux disease) 2013    grade IV/IV    Hypertension     Hypothyroidism     Male hypogonadism     Non-proliferative diabetic retinopathy     Urinary retention      Past Surgical History:   Procedure Laterality Date    ENDARTERECTOMY-CAROTID Left 10/4/2017    [facility]    EYE SURGERY      SPINE SURGERY       Review of patient's allergies indicates:   Allergen Reactions    Ace inhibitors      Other reaction(s): cough    Metformin      Abdominal pain     Current Outpatient Medications on File Prior to Visit   Medication Sig Dispense Refill    amLODIPine (NORVASC) 5 MG tablet Take 1 tablet (5 mg total) by mouth once daily. 90 tablet 3    aspirin (ECOTRIN) 81 MG EC tablet Take 325 mg by mouth once daily.       BD LUER-MANUEL SYRINGE 3 mL 22 x 1 1/2" Syrg   3    blood sugar diagnostic (FREESTYLE LITE STRIPS) Strp USE AS DIRECTED UP TO 3 TIMES DAILY 300 strip 3    blood-glucose meter Misc Use as directed four times daily before meals and at bedtime. 1 each 0    dutasteride (AVODART) 0.5 mg capsule TAKE 1 CAPSULE EVERY DAY 30 capsule 3    fexofenadine (ALLEGRA ALLERGY) 180 MG tablet Take 1 tablet (180 mg total) by mouth once daily. (Patient taking differently: Take 180 mg by mouth daily as needed. ) 10 tablet 0    fluticasone (FLONASE) 50 mcg/actuation nasal spray USE 1 SPRAY IN EACH NOSTRIL ONCE DAILY. (Patient taking differently: 1 spray every evening. USE 1 SPRAY " "IN EACH NOSTRIL ONCE DAILY.) 3 Bottle 3    HYPODERMIC NEEDLES 18 x 1 1/2 " Ndle   3    insulin aspart U-100 (NOVOLOG FLEXPEN U-100 INSULIN) 100 unit/mL InPn pen USE AS DIRECTED PER SLIDING SCALE. MAX OF 14 UNITS DAILY) (Patient taking differently: once daily. USE AS DIRECTED PER SLIDING SCALE. MAX OF 14 UNITS DAILY)) 15 Syringe 5    insulin glargine (LANTUS SOLOSTAR U-100 INSULIN) 100 unit/mL (3 mL) InPn pen INJECT 28 UNITS DAILY 15 Syringe 5    lancets (ACCU-CHEK SOFTCLIX LANCETS) Misc Check glucose before meals and at bedtime. 200 each 6    levothyroxine (SYNTHROID) 100 MCG tablet Take 1 tablet (100 mcg total) by mouth once daily. 90 tablet 3    pen needle, diabetic (BD INSULIN PEN NEEDLE UF MINI) 31 gauge x 3/16" Ndle USE DAILY WITH LANTUS AND NOVOLOG 300 each 3    pen needle, diabetic (BD INSULIN PEN NEEDLE UF MINI) 31 gauge x 3/16" Ndle USE DAILY WITH LANTUS AND NOVOLOG 400 each 3    pravastatin (PRAVACHOL) 40 MG tablet Take 1 tablet (40 mg total) by mouth every evening. 90 tablet 3    promethazine (PHENERGAN) 25 MG tablet Take 1 tablet (25 mg total) by mouth every 6 (six) hours as needed for Nausea. 6 tablet 0    sodium,potassium,mag sulfates (SUPREP BOWEL PREP KIT) 17.5-3.13-1.6 gram SolR Take as instructed on prep sheet 354 mL 0    tamsulosin (FLOMAX) 0.4 mg Cp24 Take 1 capsule (0.4 mg total) by mouth once daily. (Patient taking differently: Take 0.4 mg by mouth every evening. ) 90 capsule 3    DUREZOL 0.05 % Drop ophthalmic solution       FLUZONE HIGH-DOSE 2017-18, PF, 180 mcg/0.5 mL vaccine TO BE ADMINISTERED BY PHARMACIST FOR IMMUNIZATION  0    [DISCONTINUED] hydrocodone-acetaminophen 5-325mg (NORCO) 5-325 mg per tablet Take 1 tablet by mouth every 4 (four) hours as needed. 28 tablet 0     No current facility-administered medications on file prior to visit.      Social History     Socioeconomic History    Marital status:      Spouse name: Babs    Number of children: 2    Years of " education: Not on file    Highest education level: Not on file   Social Needs    Financial resource strain: Not on file    Food insecurity - worry: Not on file    Food insecurity - inability: Not on file    Transportation needs - medical: Not on file    Transportation needs - non-medical: Not on file   Occupational History    Occupation: Retired   Tobacco Use    Smoking status: Former Smoker     Last attempt to quit: 1986     Years since quittin.0    Smokeless tobacco: Current User     Types: Chew   Substance and Sexual Activity    Alcohol use: Yes     Comment: rare    Drug use: No    Sexual activity: Not on file   Other Topics Concern    Not on file   Social History Narrative    Not on file     Family History   Problem Relation Age of Onset    Heart disease Mother     Stroke Maternal Grandfather     Diabetes Neg Hx     Cancer Neg Hx     Hypertension Neg Hx        Review of Systems    Objective:     /65   Pulse 65   Temp 98 °F (36.7 °C)   Ht 6' (1.829 m)   Wt 71.7 kg (158 lb)   BMI 21.43 kg/m²     Physical Exam   Constitutional: He appears well-developed and well-nourished.   Cardiovascular: Normal rate, regular rhythm and normal heart sounds. Exam reveals no gallop and no friction rub.   No murmur heard.  Pulmonary/Chest: Effort normal and breath sounds normal. No respiratory distress. He has no wheezes. He has no rales.   Musculoskeletal: He exhibits no edema.                     Assessment:     1. 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  I reviewed his log.    No Follow-up on file.

## 2018-10-03 ENCOUNTER — DOCUMENTATION ONLY (OUTPATIENT)
Dept: ENDOSCOPY | Facility: HOSPITAL | Age: 78
End: 2018-10-03

## 2018-10-03 NOTE — PROGRESS NOTES
10/3/2018 pt wants to schedule with Dr. Krueger, currently no openings available.  Pt  States he will call back in a few weeks.

## 2018-10-04 ENCOUNTER — TELEPHONE (OUTPATIENT)
Dept: FAMILY MEDICINE | Facility: CLINIC | Age: 78
End: 2018-10-04

## 2018-10-04 NOTE — TELEPHONE ENCOUNTER
----- Message from Sebastian Alonzo sent at 10/4/2018 12:40 PM CDT -----  Contact: self [phone] or [phone]  Pt would like to know if he needs a referral to get a colonoscopy by Dr Pelletier .Pt said that [place] [facility] scheduling was too difficult to deal with/pls call/thanks.

## 2018-10-05 ENCOUNTER — TELEPHONE (OUTPATIENT)
Dept: FAMILY MEDICINE | Facility: CLINIC | Age: 78
End: 2018-10-05

## 2018-10-05 NOTE — TELEPHONE ENCOUNTER
----- Message from Lorie Reed sent at 10/5/2018  1:05 PM CDT -----  Contact: Pt/Wife  Please give pt wife a call at ..[phone] (home) regarding some questions she has about the pt and wouldn't give details of the call.

## 2018-10-05 NOTE — TELEPHONE ENCOUNTER
Returned pt's call. Pt asked if it was possible to reschedule pt's colonoscopy closer to [place]. Confered with Mariza and gave the pt the number to [facility] scheduling.

## 2018-10-05 NOTE — TELEPHONE ENCOUNTER
Returned pt's call. Pt asked if she could reschedule her 's colonoscopy closer to [place]. Conferred with Mariza and gave the pt the scheduling dept number for [facility] and told her to try scheduling with Dr Mccormack.

## 2018-10-05 NOTE — TELEPHONE ENCOUNTER
I don't believe that a referral is needed for that kind of a visit and insurance as he is on medicare.

## 2018-10-05 NOTE — TELEPHONE ENCOUNTER
----- Message from Lorie Reed sent at 10/5/2018  1:05 PM CDT -----  Contact: Pt/Wife  Please give pt wife a call at ..[phone] (home) regarding some questions she has about the pt and wouldn't give details of the call.

## 2018-10-11 DIAGNOSIS — E03.9 HYPOTHYROIDISM, UNSPECIFIED TYPE: ICD-10-CM

## 2018-10-11 RX ORDER — LEVOTHYROXINE SODIUM 100 UG/1
100 TABLET ORAL DAILY
Qty: 90 TABLET | Refills: 1 | Status: SHIPPED | OUTPATIENT
Start: 2018-10-11 | End: 2019-03-08 | Stop reason: SDUPTHER

## 2018-10-22 ENCOUNTER — PES CALL (OUTPATIENT)
Dept: ADMINISTRATIVE | Facility: CLINIC | Age: 78
End: 2018-10-22

## 2018-10-31 NOTE — TELEPHONE ENCOUNTER
----- Message from Roman Alejandra sent at 10/31/2018  3:45 PM CDT -----  Contact: pt   States he's calling rg needing a refill on his test strips and can be reached at [phone]//altaf/kacey     [id number] [place] - [address] [place]  [address] [address]  Phone: [phone] Fax: [phone]

## 2018-11-01 ENCOUNTER — TELEPHONE (OUTPATIENT)
Dept: FAMILY MEDICINE | Facility: CLINIC | Age: 78
End: 2018-11-01

## 2018-11-01 NOTE — TELEPHONE ENCOUNTER
Spoke w/pt and informed that Rx for strips was sent to pharmacy last night. He will contact pharmacy.

## 2018-11-01 NOTE — TELEPHONE ENCOUNTER
----- Message from Klaudia Villasenor sent at 11/1/2018 10:36 AM CDT -----  Contact: pt   Calling in regards to RX medication test strips for diabetes sent to [facility] in [place] and it didn't say how many times a day on the Rx and another Rx needs to be sent and please advise [phone] (home)

## 2018-11-01 NOTE — TELEPHONE ENCOUNTER
----- Message from Klaudia Villasenor sent at 11/1/2018 10:36 AM CDT -----  Contact: pt   Calling in regards to RX medication test strips for diabetes sent to [facility] in [place] and it didn't say how many times a day on the Rx and another Rx needs to be sent and please advise [phone] (home)

## 2018-11-01 NOTE — TELEPHONE ENCOUNTER
----- Message from Abisainya Barrow sent at 11/1/2018 10:01 AM CDT -----  Contact: [phone]  Pt would like to consult with nurse,abuot free stlye lite, test strip. Please call back at [phone].. Thanks sj          Pt use    .  [id number] - [place] - [address] [place]  [address] [address]  Phone: [phone] Fax: [phone]

## 2018-12-04 ENCOUNTER — LAB VISIT (OUTPATIENT)
Dept: LAB | Facility: HOSPITAL | Age: 78
End: 2018-12-04
Attending: FAMILY MEDICINE
Payer: MEDICARE

## 2018-12-04 LAB
ESTIMATED AVG GLUCOSE: 151 MG/DL
HBA1C MFR BLD HPLC: 6.9 %

## 2018-12-04 PROCEDURE — 36415 COLL VENOUS BLD VENIPUNCTURE: CPT | Mod: PO

## 2018-12-04 PROCEDURE — 83036 HEMOGLOBIN GLYCOSYLATED A1C: CPT

## 2018-12-06 ENCOUNTER — TELEPHONE (OUTPATIENT)
Dept: FAMILY MEDICINE | Facility: CLINIC | Age: 78
End: 2018-12-06

## 2018-12-06 NOTE — TELEPHONE ENCOUNTER
----- Message from Darin Krueger MD sent at 12/5/2018  9:33 PM CST -----  Book a1c in 6 months.  Notify of the appointment via MyChart or letter.   Refill Diabetes Meds x 6 months.    The patient's Health Maintenance that is due is below:  Colonoscopy due on 02/08/2018

## 2018-12-31 ENCOUNTER — EXTERNAL CHRONIC CARE MANAGEMENT (OUTPATIENT)
Dept: PRIMARY CARE CLINIC | Facility: CLINIC | Age: 78
End: 2018-12-31
Payer: MEDICARE

## 2018-12-31 PROCEDURE — 99490 PR CHRONIC CARE MGMT, 1ST 20 MIN: ICD-10-PCS | Mod: S$PBB,,, | Performed by: FAMILY MEDICINE

## 2018-12-31 PROCEDURE — 99490 CHRNC CARE MGMT STAFF 1ST 20: CPT | Mod: S$PBB,,, | Performed by: FAMILY MEDICINE

## 2018-12-31 PROCEDURE — 99490 CHRNC CARE MGMT STAFF 1ST 20: CPT | Mod: PBBFAC,PO | Performed by: FAMILY MEDICINE

## 2019-01-16 DIAGNOSIS — I65.23 BILATERAL CAROTID ARTERY STENOSIS: Primary | ICD-10-CM

## 2019-01-21 ENCOUNTER — TELEPHONE (OUTPATIENT)
Dept: FAMILY MEDICINE | Facility: CLINIC | Age: 79
End: 2019-01-21

## 2019-01-21 NOTE — TELEPHONE ENCOUNTER
----- Message from Elba Mandujano sent at 1/21/2019  3:17 PM CST -----  Contact: Olman duran/Scotty echevarria called back from [facility] regarding pts paperwork call back ..... Ph: [phone]

## 2019-01-21 NOTE — TELEPHONE ENCOUNTER
----- Message from Nikky Way sent at 1/21/2019  2:49 PM CST -----  Olman with [employer] at [phone]//states he is returning your call//// in the chart notes it needs to state pt is checking his glucose 4 time a day and injecting Insulin 3 times a day for Medicare to cover//please call//altaf/jak

## 2019-01-21 NOTE — TELEPHONE ENCOUNTER
Returned call to Olman at [facility]. He states that for patient to receive the continuous glucose monitoring system  Medicare requires that the office note from 10/2018 states that the patient checks his blood sugar 4 times daily and administers insulin 3 or more times daily. Amended note must be faxed to [facility] at [phone] attention Olman Campbell.

## 2019-01-31 ENCOUNTER — EXTERNAL CHRONIC CARE MANAGEMENT (OUTPATIENT)
Dept: PRIMARY CARE CLINIC | Facility: CLINIC | Age: 79
End: 2019-01-31
Payer: MEDICARE

## 2019-01-31 PROCEDURE — 99490 CHRNC CARE MGMT STAFF 1ST 20: CPT | Mod: PBBFAC,PO | Performed by: FAMILY MEDICINE

## 2019-01-31 PROCEDURE — 99490 CHRNC CARE MGMT STAFF 1ST 20: CPT | Mod: S$PBB,,, | Performed by: FAMILY MEDICINE

## 2019-01-31 PROCEDURE — 99490 PR CHRONIC CARE MGMT, 1ST 20 MIN: ICD-10-PCS | Mod: S$PBB,,, | Performed by: FAMILY MEDICINE

## 2019-02-01 ENCOUNTER — HOSPITAL ENCOUNTER (OUTPATIENT)
Dept: RADIOLOGY | Facility: HOSPITAL | Age: 79
Discharge: HOME | End: 2019-02-01
Attending: THORACIC SURGERY (CARDIOTHORACIC VASCULAR SURGERY)
Payer: MEDICARE

## 2019-02-01 DIAGNOSIS — I65.23 BILATERAL CAROTID ARTERY STENOSIS: ICD-10-CM

## 2019-02-01 PROCEDURE — 93880 US CAROTID BILATERAL: ICD-10-PCS | Mod: 26,,, | Performed by: RADIOLOGY

## 2019-02-01 PROCEDURE — 93880 EXTRACRANIAL BILAT STUDY: CPT | Mod: TC,PO

## 2019-02-01 PROCEDURE — 93880 EXTRACRANIAL BILAT STUDY: CPT | Mod: 26,,, | Performed by: RADIOLOGY

## 2019-02-22 ENCOUNTER — PATIENT OUTREACH (OUTPATIENT)
Dept: ADMINISTRATIVE | Facility: HOSPITAL | Age: 79
End: 2019-02-22

## 2019-02-22 NOTE — LETTER
February 22, 2019        Bebeto Santiago  [address]  [place] [address]      Dear Mr. Santiago,    You have an upcoming appointment with Darin Krueger MD on 3/8/19 @ 2:20 pm.      Your chart is indicating you may be due for the following and I will be happy to assist you in scheduling any needed appointments:  Health Maintenance Due   Topic    Colonoscopy     Eye Exam      If you have had any of the above done at another facility, please bring the records or information with you so that your record at [facility] will be complete.    We will be happy to assist you with scheduling any necessary appointments or you may contact the [facility] appointment desk at [phone] to schedule at your convenience.     Thank you for choosing [facility] for your healthcare needs,      GRZEGORZ Brito  Care Coordination Department  [facility]  [phone]

## 2019-02-28 ENCOUNTER — EXTERNAL CHRONIC CARE MANAGEMENT (OUTPATIENT)
Dept: PRIMARY CARE CLINIC | Facility: CLINIC | Age: 79
End: 2019-02-28
Payer: MEDICARE

## 2019-02-28 PROCEDURE — 99490 CHRNC CARE MGMT STAFF 1ST 20: CPT | Mod: PBBFAC,PO | Performed by: FAMILY MEDICINE

## 2019-02-28 PROCEDURE — 99490 CHRNC CARE MGMT STAFF 1ST 20: CPT | Mod: S$PBB,,, | Performed by: FAMILY MEDICINE

## 2019-02-28 PROCEDURE — 99490 PR CHRONIC CARE MGMT, 1ST 20 MIN: ICD-10-PCS | Mod: S$PBB,,, | Performed by: FAMILY MEDICINE

## 2019-03-01 ENCOUNTER — PATIENT OUTREACH (OUTPATIENT)
Dept: ADMINISTRATIVE | Facility: HOSPITAL | Age: 79
End: 2019-03-01

## 2019-03-01 ENCOUNTER — LAB VISIT (OUTPATIENT)
Dept: LAB | Facility: HOSPITAL | Age: 79
End: 2019-03-01
Attending: FAMILY MEDICINE
Payer: MEDICARE

## 2019-03-01 DIAGNOSIS — E78.2 COMBINED HYPERLIPIDEMIA ASSOCIATED WITH TYPE 2 DIABETES MELLITUS: ICD-10-CM

## 2019-03-01 DIAGNOSIS — N18.30 CKD STAGE 3 SECONDARY TO DIABETES: ICD-10-CM

## 2019-03-01 DIAGNOSIS — E11.22 CKD STAGE 3 SECONDARY TO DIABETES: ICD-10-CM

## 2019-03-01 DIAGNOSIS — E11.69 COMBINED HYPERLIPIDEMIA ASSOCIATED WITH TYPE 2 DIABETES MELLITUS: ICD-10-CM

## 2019-03-01 LAB
ALBUMIN SERPL BCP-MCNC: 3.4 G/DL
ALP SERPL-CCNC: 93 U/L
ALT SERPL W/O P-5'-P-CCNC: 13 U/L
ANION GAP SERPL CALC-SCNC: 6 MMOL/L
AST SERPL-CCNC: 15 U/L
BILIRUB SERPL-MCNC: 0.7 MG/DL
BUN SERPL-MCNC: 20 MG/DL
CALCIUM SERPL-MCNC: 9.6 MG/DL
CHLORIDE SERPL-SCNC: 104 MMOL/L
CHOLEST SERPL-MCNC: 152 MG/DL
CHOLEST/HDLC SERPL: 2.6 {RATIO}
CO2 SERPL-SCNC: 29 MMOL/L
CREAT SERPL-MCNC: 1 MG/DL
EST. GFR  (AFRICAN AMERICAN): >60 ML/MIN/1.73 M^2
EST. GFR  (NON AFRICAN AMERICAN): >60 ML/MIN/1.73 M^2
ESTIMATED AVG GLUCOSE: 160 MG/DL
ESTIMATED AVG GLUCOSE: 160 MG/DL
GLUCOSE SERPL-MCNC: 122 MG/DL
HBA1C MFR BLD HPLC: 7.2 %
HBA1C MFR BLD HPLC: 7.2 %
HDLC SERPL-MCNC: 59 MG/DL
HDLC SERPL: 38.8 %
LDLC SERPL CALC-MCNC: 82.6 MG/DL
NONHDLC SERPL-MCNC: 93 MG/DL
POTASSIUM SERPL-SCNC: 4.1 MMOL/L
PROT SERPL-MCNC: 6.4 G/DL
SODIUM SERPL-SCNC: 139 MMOL/L
TRIGL SERPL-MCNC: 52 MG/DL

## 2019-03-01 PROCEDURE — 80061 LIPID PANEL: CPT

## 2019-03-01 PROCEDURE — 36415 COLL VENOUS BLD VENIPUNCTURE: CPT | Mod: PO

## 2019-03-01 PROCEDURE — 83036 HEMOGLOBIN GLYCOSYLATED A1C: CPT

## 2019-03-01 PROCEDURE — 80053 COMPREHEN METABOLIC PANEL: CPT

## 2019-03-05 ENCOUNTER — OFFICE VISIT (OUTPATIENT)
Dept: FAMILY MEDICINE | Facility: CLINIC | Age: 79
End: 2019-03-05
Payer: MEDICARE

## 2019-03-05 VITALS
DIASTOLIC BLOOD PRESSURE: 69 MMHG | TEMPERATURE: 98 F | SYSTOLIC BLOOD PRESSURE: 167 MMHG | HEART RATE: 69 BPM | WEIGHT: 161 LBS | BODY MASS INDEX: 21.84 KG/M2

## 2019-03-05 DIAGNOSIS — J06.9 VIRAL UPPER RESPIRATORY TRACT INFECTION WITH COUGH: Primary | ICD-10-CM

## 2019-03-05 PROCEDURE — 99999 PR PBB SHADOW E&M-EST. PATIENT-LVL III: CPT | Mod: PBBFAC,,, | Performed by: NURSE PRACTITIONER

## 2019-03-05 PROCEDURE — 99213 PR OFFICE/OUTPT VISIT, EST, LEVL III, 20-29 MIN: ICD-10-PCS | Mod: S$PBB,,, | Performed by: NURSE PRACTITIONER

## 2019-03-05 PROCEDURE — 99213 OFFICE O/P EST LOW 20 MIN: CPT | Mod: S$PBB,,, | Performed by: NURSE PRACTITIONER

## 2019-03-05 PROCEDURE — 99999 PR PBB SHADOW E&M-EST. PATIENT-LVL III: ICD-10-PCS | Mod: PBBFAC,,, | Performed by: NURSE PRACTITIONER

## 2019-03-05 PROCEDURE — 99213 OFFICE O/P EST LOW 20 MIN: CPT | Mod: PBBFAC,PO | Performed by: NURSE PRACTITIONER

## 2019-03-05 RX ORDER — BENZONATATE 200 MG/1
200 CAPSULE ORAL 3 TIMES DAILY PRN
Qty: 30 CAPSULE | Refills: 0 | Status: SHIPPED | OUTPATIENT
Start: 2019-03-05 | End: 2019-03-08 | Stop reason: SDUPTHER

## 2019-03-05 RX ORDER — MONTELUKAST SODIUM 10 MG/1
10 TABLET ORAL NIGHTLY
Qty: 30 TABLET | Refills: 5 | Status: SHIPPED | OUTPATIENT
Start: 2019-03-05 | End: 2019-03-08 | Stop reason: SDUPTHER

## 2019-03-05 RX ORDER — MONTELUKAST SODIUM 10 MG/1
10 TABLET ORAL NIGHTLY
Qty: 30 TABLET | Refills: 0 | Status: SHIPPED | OUTPATIENT
Start: 2019-03-05 | End: 2019-03-05 | Stop reason: SDUPTHER

## 2019-03-05 RX ORDER — INSULIN GLARGINE 100 [IU]/ML
INJECTION, SOLUTION SUBCUTANEOUS
Qty: 15 SYRINGE | Refills: 5 | Status: SHIPPED | OUTPATIENT
Start: 2019-03-05 | End: 2019-03-08 | Stop reason: SDUPTHER

## 2019-03-05 NOTE — PROGRESS NOTES
"Subjective:       Patient ID: Bebeto Santiago is a 79 y.o. male.    Chief Complaint: Cough; Nasal Congestion; and Fatigue    URI    This is a new problem. The current episode started in the past 7 days. The problem has been unchanged. There has been no fever. Associated symptoms include congestion and coughing. Pertinent negatives include no abdominal pain, chest pain, diarrhea, dysuria, ear pain, headaches, nausea, rash, sore throat or vomiting.       Review of Systems   Constitutional: Negative for fatigue, fever and unexpected weight change.   HENT: Positive for congestion. Negative for ear pain and sore throat.    Eyes: Negative.  Negative for pain and visual disturbance.   Respiratory: Positive for cough. Negative for shortness of breath.    Cardiovascular: Negative for chest pain and palpitations.   Gastrointestinal: Negative for abdominal pain, diarrhea, nausea and vomiting.   Genitourinary: Negative for dysuria and frequency.   Musculoskeletal: Negative for arthralgias and myalgias.   Skin: Negative for color change and rash.   Neurological: Negative for dizziness and headaches.   Psychiatric/Behavioral: Negative for sleep disturbance. The patient is not nervous/anxious.        Vitals:    03/05/19 0738   BP: (!) 167/69   Pulse: 69   Temp: 98 °F (36.7 °C)       Objective:     Current Outpatient Medications   Medication Sig Dispense Refill    amLODIPine (NORVASC) 5 MG tablet Take 1 tablet (5 mg total) by mouth once daily. 90 tablet 3    aspirin (ECOTRIN) 81 MG EC tablet Take 325 mg by mouth once daily.       BD LUER-MANUEL SYRINGE 3 mL 22 x 1 1/2" Syrg   3    blood sugar diagnostic (FREESTYLE LITE STRIPS) Strp 4 times a day (BEFORE MEALS AND AT BEDTIME) 400 strip 3    blood-glucose meter Misc Use as directed four times daily before meals and at bedtime. 1 each 0    dutasteride (AVODART) 0.5 mg capsule TAKE 1 CAPSULE EVERY DAY 30 capsule 3    fluticasone (FLONASE) 50 mcg/actuation nasal spray USE 1 SPRAY IN " "EACH NOSTRIL ONCE DAILY. (Patient taking differently: 1 spray every evening. USE 1 SPRAY IN EACH NOSTRIL ONCE DAILY.) 3 Bottle 3    HYPODERMIC NEEDLES 18 x 1 1/2 " Ndle   3    insulin aspart U-100 (NOVOLOG FLEXPEN U-100 INSULIN) 100 unit/mL InPn pen USE AS DIRECTED PER SLIDING SCALE. MAX OF 14 UNITS DAILY) (Patient taking differently: once daily. USE AS DIRECTED PER SLIDING SCALE. MAX OF 14 UNITS DAILY)) 15 Syringe 5    insulin glargine (LANTUS SOLOSTAR U-100 INSULIN) 100 unit/mL (3 mL) InPn pen INJECT 28 UNITS DAILY 15 Syringe 5    lancets (ACCU-CHEK SOFTCLIX LANCETS) Misc Check glucose before meals and at bedtime. 200 each 6    levothyroxine (SYNTHROID) 100 MCG tablet TAKE 1 TABLET (100 MCG TOTAL) BY MOUTH ONCE DAILY. 90 tablet 1    pen needle, diabetic (BD INSULIN PEN NEEDLE UF MINI) 31 gauge x 3/16" Ndle USE DAILY WITH LANTUS AND NOVOLOG 300 each 3    pen needle, diabetic (BD INSULIN PEN NEEDLE UF MINI) 31 gauge x 3/16" Ndle USE DAILY WITH LANTUS AND NOVOLOG 400 each 3    pravastatin (PRAVACHOL) 40 MG tablet Take 1 tablet (40 mg total) by mouth every evening. 90 tablet 3    tamsulosin (FLOMAX) 0.4 mg Cp24 Take 1 capsule (0.4 mg total) by mouth once daily. (Patient taking differently: Take 0.4 mg by mouth every evening. ) 90 capsule 3    benzonatate (TESSALON) 200 MG capsule Take 1 capsule (200 mg total) by mouth 3 (three) times daily as needed. 30 capsule 0    fexofenadine (ALLEGRA ALLERGY) 180 MG tablet Take 1 tablet (180 mg total) by mouth once daily. (Patient taking differently: Take 180 mg by mouth daily as needed. ) 10 tablet 0    montelukast (SINGULAIR) 10 mg tablet Take 1 tablet (10 mg total) by mouth every evening. 30 tablet 5     No current facility-administered medications for this visit.        Physical Exam   Constitutional: He is oriented to person, place, and time. He appears well-developed. No distress.   HENT:   Head: Normocephalic and atraumatic.   Right Ear: Tympanic membrane " normal.   Left Ear: Tympanic membrane normal.   Nose: Rhinorrhea present.   Mouth/Throat: Posterior oropharyngeal edema present.   Eyes: EOM are normal. Pupils are equal, round, and reactive to light.   Neck: Normal range of motion. Neck supple.   Cardiovascular: Normal rate and regular rhythm.   Pulmonary/Chest: Effort normal and breath sounds normal.   Dry cough   Musculoskeletal: Normal range of motion.   Neurological: He is alert and oriented to person, place, and time.   Skin: Skin is warm and dry. No rash noted.   Psychiatric: He has a normal mood and affect. Thought content normal.   Nursing note and vitals reviewed.      Assessment:       1. Viral upper respiratory tract infection with cough        Plan:   Viral upper respiratory tract infection with cough    Other orders  -     Discontinue: montelukast (SINGULAIR) 10 mg tablet; Take 1 tablet (10 mg total) by mouth every evening.  Dispense: 30 tablet; Refill: 0  -     benzonatate (TESSALON) 200 MG capsule; Take 1 capsule (200 mg total) by mouth 3 (three) times daily as needed.  Dispense: 30 capsule; Refill: 0  -     montelukast (SINGULAIR) 10 mg tablet; Take 1 tablet (10 mg total) by mouth every evening.  Dispense: 30 tablet; Refill: 5        Follow-up if symptoms worsen or fail to improve.    Patient Instructions   Continue OTC Allegra

## 2019-03-08 ENCOUNTER — OFFICE VISIT (OUTPATIENT)
Dept: FAMILY MEDICINE | Facility: CLINIC | Age: 79
End: 2019-03-08
Payer: MEDICARE

## 2019-03-08 ENCOUNTER — LAB VISIT (OUTPATIENT)
Dept: LAB | Facility: HOSPITAL | Age: 79
End: 2019-03-08
Attending: FAMILY MEDICINE
Payer: MEDICARE

## 2019-03-08 VITALS
SYSTOLIC BLOOD PRESSURE: 124 MMHG | BODY MASS INDEX: 21.78 KG/M2 | DIASTOLIC BLOOD PRESSURE: 66 MMHG | HEIGHT: 72 IN | HEART RATE: 65 BPM | WEIGHT: 160.81 LBS | TEMPERATURE: 98 F

## 2019-03-08 DIAGNOSIS — R97.20 ELEVATED PSA: ICD-10-CM

## 2019-03-08 DIAGNOSIS — N40.0 BENIGN PROSTATIC HYPERPLASIA, PRESENCE OF LOWER URINARY TRACT SYMPTOMS UNSPECIFIED: ICD-10-CM

## 2019-03-08 DIAGNOSIS — E11.59 HYPERTENSION ASSOCIATED WITH DIABETES: ICD-10-CM

## 2019-03-08 DIAGNOSIS — Z12.5 ENCOUNTER FOR SCREENING FOR MALIGNANT NEOPLASM OF PROSTATE: ICD-10-CM

## 2019-03-08 DIAGNOSIS — E11.22 CKD STAGE 3 SECONDARY TO DIABETES: ICD-10-CM

## 2019-03-08 DIAGNOSIS — E78.2 COMBINED HYPERLIPIDEMIA ASSOCIATED WITH TYPE 2 DIABETES MELLITUS: ICD-10-CM

## 2019-03-08 DIAGNOSIS — I15.2 HYPERTENSION ASSOCIATED WITH DIABETES: ICD-10-CM

## 2019-03-08 DIAGNOSIS — R33.9 URINARY RETENTION: ICD-10-CM

## 2019-03-08 DIAGNOSIS — E03.9 HYPOTHYROIDISM, UNSPECIFIED TYPE: ICD-10-CM

## 2019-03-08 DIAGNOSIS — E11.69 COMBINED HYPERLIPIDEMIA ASSOCIATED WITH TYPE 2 DIABETES MELLITUS: ICD-10-CM

## 2019-03-08 DIAGNOSIS — I73.9 PAD (PERIPHERAL ARTERY DISEASE): Primary | ICD-10-CM

## 2019-03-08 DIAGNOSIS — N18.30 CKD STAGE 3 SECONDARY TO DIABETES: ICD-10-CM

## 2019-03-08 LAB
COMPLEXED PSA SERPL-MCNC: 1.5 NG/ML
TSH SERPL DL<=0.005 MIU/L-ACNC: 1.09 UIU/ML

## 2019-03-08 PROCEDURE — 99999 PR PBB SHADOW E&M-EST. PATIENT-LVL III: ICD-10-PCS | Mod: PBBFAC,,, | Performed by: FAMILY MEDICINE

## 2019-03-08 PROCEDURE — 99214 OFFICE O/P EST MOD 30 MIN: CPT | Mod: S$PBB,,, | Performed by: FAMILY MEDICINE

## 2019-03-08 PROCEDURE — 99214 PR OFFICE/OUTPT VISIT, EST, LEVL IV, 30-39 MIN: ICD-10-PCS | Mod: S$PBB,,, | Performed by: FAMILY MEDICINE

## 2019-03-08 PROCEDURE — 99999 PR PBB SHADOW E&M-EST. PATIENT-LVL III: CPT | Mod: PBBFAC,,, | Performed by: FAMILY MEDICINE

## 2019-03-08 PROCEDURE — 84153 ASSAY OF PSA TOTAL: CPT

## 2019-03-08 PROCEDURE — 99213 OFFICE O/P EST LOW 20 MIN: CPT | Mod: PBBFAC,PO | Performed by: FAMILY MEDICINE

## 2019-03-08 PROCEDURE — 84443 ASSAY THYROID STIM HORMONE: CPT

## 2019-03-08 PROCEDURE — 36415 COLL VENOUS BLD VENIPUNCTURE: CPT | Mod: PO

## 2019-03-08 RX ORDER — INSULIN ASPART 100 [IU]/ML
INJECTION, SOLUTION INTRAVENOUS; SUBCUTANEOUS
Qty: 1 BOX | Refills: 3 | Status: SHIPPED | OUTPATIENT
Start: 2019-03-08 | End: 2019-09-06 | Stop reason: SDUPTHER

## 2019-03-08 RX ORDER — INSULIN GLARGINE 100 [IU]/ML
INJECTION, SOLUTION SUBCUTANEOUS
Qty: 15 SYRINGE | Refills: 5 | Status: SHIPPED | OUTPATIENT
Start: 2019-03-08 | End: 2019-09-06 | Stop reason: SDUPTHER

## 2019-03-08 RX ORDER — LEVOTHYROXINE SODIUM 100 UG/1
100 TABLET ORAL DAILY
Qty: 90 TABLET | Refills: 3 | Status: SHIPPED | OUTPATIENT
Start: 2019-03-08 | End: 2019-09-06 | Stop reason: SDUPTHER

## 2019-03-08 RX ORDER — AMLODIPINE BESYLATE 5 MG/1
5 TABLET ORAL DAILY
Qty: 90 TABLET | Refills: 3 | Status: SHIPPED | OUTPATIENT
Start: 2019-03-08 | End: 2019-09-06 | Stop reason: SDUPTHER

## 2019-03-08 RX ORDER — PRAVASTATIN SODIUM 40 MG/1
40 TABLET ORAL NIGHTLY
Qty: 90 TABLET | Refills: 3 | Status: SHIPPED | OUTPATIENT
Start: 2019-03-08 | End: 2019-09-06 | Stop reason: SDUPTHER

## 2019-03-08 RX ORDER — BENZONATATE 200 MG/1
200 CAPSULE ORAL 3 TIMES DAILY PRN
Qty: 90 CAPSULE | Refills: 11 | Status: SHIPPED | OUTPATIENT
Start: 2019-03-08 | End: 2019-03-18

## 2019-03-08 RX ORDER — MONTELUKAST SODIUM 10 MG/1
10 TABLET ORAL NIGHTLY
Qty: 90 TABLET | Refills: 3 | Status: SHIPPED | OUTPATIENT
Start: 2019-03-08 | End: 2019-04-07

## 2019-03-08 RX ORDER — ASPIRIN 325 MG
325 TABLET ORAL DAILY
COMMUNITY

## 2019-03-08 RX ORDER — PEN NEEDLE, DIABETIC 30 GX3/16"
NEEDLE, DISPOSABLE MISCELLANEOUS
Qty: 300 EACH | Refills: 3 | Status: SHIPPED | OUTPATIENT
Start: 2019-03-08 | End: 2019-09-06 | Stop reason: SDUPTHER

## 2019-03-08 RX ORDER — DUTASTERIDE 0.5 MG/1
0.5 CAPSULE, LIQUID FILLED ORAL DAILY
Qty: 90 CAPSULE | Refills: 3 | Status: SHIPPED | OUTPATIENT
Start: 2019-03-08 | End: 2019-09-06 | Stop reason: SDUPTHER

## 2019-03-08 RX ORDER — TAMSULOSIN HYDROCHLORIDE 0.4 MG/1
0.4 CAPSULE ORAL NIGHTLY
Qty: 90 CAPSULE | Refills: 3 | Status: SHIPPED | OUTPATIENT
Start: 2019-03-08 | End: 2019-09-06 | Stop reason: SDUPTHER

## 2019-03-08 RX ORDER — LANCETS
EACH MISCELLANEOUS
Qty: 200 EACH | Refills: 11 | Status: SHIPPED | OUTPATIENT
Start: 2019-03-08 | End: 2019-09-06 | Stop reason: SDUPTHER

## 2019-03-08 NOTE — PROGRESS NOTES
Subjective:      Patient ID: Bebeto Santiago is a 79 y.o. male.    Chief Complaint: Diabetes    Problem List Items Addressed This Visit     CKD stage 3 secondary to diabetes    Overview     He has stage 3 CKD.   Bebeto Santiago has an Estimated Glumerular Filtration Rate (EGFR) between 30 and 59 consistent with the definition of chronic kidney disease stage 3.    eGFR if non    Date Value Ref Range Status   03/01/2019 >60.0 >60 mL/min/1.73 m^2 Final     Comment:     Calculation used to obtain the estimated glomerular filtration  rate (eGFR) is the CKD-EPI equation.            Lab Results   Component Value Date    CREATININE 1.0 03/01/2019    BUN 20 03/01/2019     03/01/2019    K 4.1 03/01/2019     03/01/2019    CO2 29 03/01/2019       The patient's chronic kidney disease stage 3 was monitored, evaluated, addressed and/or treated.             Combined hyperlipidemia associated with type 2 diabetes mellitus    Overview     The patient presents with hyperlipidemia.  The patient reports tolerating the medication well and is in excellent compliance.  There have been no medication side effects.  The patient denies chest pain, neuropathy, and myalgias.  The patient has reduced fat intake and has been exercising.  Current treatment has included the medications listed in the med card.    Lab Results   Component Value Date    CHOL 166 09/08/2017    CHOL 157 11/09/2016    CHOL 158 08/19/2016       Lab Results   Component Value Date    HDL 58 09/08/2017    HDL 53 11/09/2016    HDL 48 08/19/2016       Lab Results   Component Value Date    LDLCALC 97.4 09/08/2017    LDLCALC 89.2 11/09/2016    LDLCALC 96.8 08/19/2016       Lab Results   Component Value Date    TRIG 53 09/08/2017    TRIG 74 11/09/2016    TRIG 66 08/19/2016       Lab Results   Component Value Date    CHOLHDL 34.9 09/08/2017    CHOLHDL 33.8 11/09/2016    CHOLHDL 30.4 08/19/2016     Lab Results   Component Value Date    ALT 17 09/08/2017     AST 17 09/08/2017    ALKPHOS 96 09/08/2017    BILITOT 0.9 09/08/2017              Elevated PSA    Overview     He has had an increased psa in the past but it has come down.   Lab Results   Component Value Date    PSA 1.4 02/21/2018    PSA 2.1 09/20/2017    PSA 8.2 (H) 08/19/2016              Hypertension associated with diabetes    Overview     The patient presents with essential hypertension.  The patient is tolerating the medication well and is in excellent compliance.  The patient is experiencing no side effects.  Counseling was offered regarding low salt diets.  The patient has a reduced salt intake.  The patient denies chest pain, palpitations, shortness of breath, dyspnea on exertion, left or murmur neck pain, nausea, vomiting, diaphoresis, paroxysmal nocturnal dyspnea, and orthopnea.             Hypothyroidism    Overview     The patient presents with hypothyroidism.  The patient denies agitation, anxiety, blurred vision, chest pain, cold intolerance, constipation, dizziness, dry skin, fatigue, lightheadedness, paresthesias, skin coarsening, tachycardia, tremor, weight gain or weight loss.  The patient's current treatment has included Synthroid with a good response.    Lab Results   Component Value Date    TSH 0.482 02/21/2018              PAD (peripheral artery disease) - Primary    Overview     He has PAD.  He has an ability to be able to walk 1/2 a mile before he hast to stop and rest.  He does take an aspirin.         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  The patient denies polyuria, polydipsia, polyphagia, hypoglycemia and paresthesias.  The patient's glucose control has been good.  Home glucose averages are routinely checked.  The patient is without retinopathy currently.  The patient has no history of neuropathy.  The patient currently complains of no podiatric problems.  The patient has excellent compliance.  Because of  him being on lantus and novolog,  the patient checks his blood sugar 4 times daily and administers insulin 3 or more times daily.   Hemoglobin A1C   Date Value Ref Range Status   03/01/2019 7.2 (H) 4.0 - 5.6 % Final     Comment:     ADA Screening Guidelines:  5.7-6.4%  Consistent with prediabetes  >or=6.5%  Consistent with diabetes  High levels of fetal hemoglobin interfere with the HbA1C  assay. Heterozygous hemoglobin variants (HbS, HgC, etc)do  not significantly interfere with this assay.   However, presence of multiple variants may affect accuracy.     03/01/2019 7.2 (H) 4.0 - 5.6 % Final     Comment:     ADA Screening Guidelines:  5.7-6.4%  Consistent with prediabetes  >or=6.5%  Consistent with diabetes  High levels of fetal hemoglobin interfere with the HbA1C  assay. Heterozygous hemoglobin variants (HbS, HgC, etc)do  not significantly interfere with this assay.   However, presence of multiple variants may affect accuracy.     12/04/2018 6.9 (H) 4.0 - 5.6 % Final     Comment:     ADA Screening Guidelines:  5.7-6.4%  Consistent with prediabetes  >or=6.5%  Consistent with diabetes  High levels of fetal hemoglobin interfere with the HbA1C  assay. Heterozygous hemoglobin variants (HbS, HgC, etc)do  not significantly interfere with this assay.   However, presence of multiple variants may affect accuracy.       No results found for: MICROALBUR, JADON  Diabetes Management Status    Statin: Taking  ACE/ARB: Not taking  He has changed to 33 u of the lantus from 28 u since he had a high a1c below.  Screening or Prevention Patient's value Goal Complete/Controlled?   HgA1C Testing and Control   Lab Results   Component Value Date    HGBA1C 7.2 (H) 03/01/2019    HGBA1C 7.2 (H) 03/01/2019      Annually/Less than 8% Yes   Lipid profile : 03/01/2019 Annually Yes   LDL control Lab Results   Component Value Date    LDLCALC 82.6 03/01/2019    Annually/Less than 100 mg/dl  Yes   Nephropathy screening Lab Results   Component  Value Date    LABMICR 33.0 09/20/2017     Lab Results   Component Value Date    PROTEINUA Negative 09/20/2017    Annually Yes   Blood pressure BP Readings from Last 1 Encounters:   03/08/19 124/66    Less than 140/90 Yes   Dilated retinal exam : 02/26/2019 Annually No   Foot exam   : 09/27/2018 Annually Yes              Urinary retention    Overview     He has had urinary retention but has not had to be cathed.  He has had a decreased stream and the flomax and avodart have helped.    Lab Results   Component Value Date    PSA 1.4 02/21/2018    PSA 2.1 09/20/2017    PSA 8.2 (H) 08/19/2016     a           Other Visit Diagnoses     Uncontrolled type 2 diabetes mellitus with stage 3 chronic kidney disease, with long-term current use of insulin        Benign prostatic hyperplasia, presence of lower urinary tract symptoms unspecified              Past Medical History:  Past Medical History:   Diagnosis Date    Allergy     BPH (benign prostatic hypertrophy)     Dehydration     Diabetes mellitus type II, uncontrolled     GERD (gastroesophageal reflux disease) 2013    grade IV/IV    Hypertension     Hypothyroidism     Male hypogonadism     Non-proliferative diabetic retinopathy     Urinary retention      Past Surgical History:   Procedure Laterality Date    ENDARTERECTOMY-CAROTID Left 10/4/2017    [facility]    EYE SURGERY      SPINE SURGERY       Review of patient's allergies indicates:   Allergen Reactions    Ace inhibitors      Other reaction(s): cough    Metformin      Abdominal pain     Current Outpatient Medications on File Prior to Visit   Medication Sig Dispense Refill    amLODIPine (NORVASC) 5 MG tablet Take 1 tablet (5 mg total) by mouth once daily. 90 tablet 3    aspirin 325 MG tablet Take 325 mg by mouth once daily.      benzonatate (TESSALON) 200 MG capsule Take 1 capsule (200 mg total) by mouth 3 (three) times daily as needed. 30 capsule 0    blood sugar  "diagnostic (FREESTYLE LITE STRIPS) Strp 4 times a day (BEFORE MEALS AND AT BEDTIME) 400 strip 3    blood-glucose meter Misc Use as directed four times daily before meals and at bedtime. 1 each 0    dutasteride (AVODART) 0.5 mg capsule TAKE 1 CAPSULE EVERY DAY 30 capsule 3    fluticasone (FLONASE) 50 mcg/actuation nasal spray USE 1 SPRAY IN EACH NOSTRIL ONCE DAILY. (Patient taking differently: 1 spray every evening. USE 1 SPRAY IN EACH NOSTRIL ONCE DAILY.) 3 Bottle 3    insulin (LANTUS SOLOSTAR U-100 INSULIN) glargine 100 units/mL (3mL) SubQ pen INJECT 33 UNITS DAILY 15 Syringe 5    insulin aspart U-100 (NOVOLOG FLEXPEN U-100 INSULIN) 100 unit/mL InPn pen USE AS DIRECTED PER SLIDING SCALE. MAX OF 14 UNITS DAILY) (Patient taking differently: once daily. USE AS DIRECTED PER SLIDING SCALE. MAX OF 14 UNITS DAILY)) 15 Syringe 5    lancets (ACCU-CHEK SOFTCLIX LANCETS) Misc Check glucose before meals and at bedtime. 200 each 6    levothyroxine (SYNTHROID) 100 MCG tablet TAKE 1 TABLET (100 MCG TOTAL) BY MOUTH ONCE DAILY. 90 tablet 1    montelukast (SINGULAIR) 10 mg tablet Take 1 tablet (10 mg total) by mouth every evening. 30 tablet 5    pen needle, diabetic (BD INSULIN PEN NEEDLE UF MINI) 31 gauge x 3/16" Ndle USE DAILY WITH LANTUS AND NOVOLOG 300 each 3    pravastatin (PRAVACHOL) 40 MG tablet Take 1 tablet (40 mg total) by mouth every evening. 90 tablet 3    tamsulosin (FLOMAX) 0.4 mg Cp24 Take 1 capsule (0.4 mg total) by mouth once daily. (Patient taking differently: Take 0.4 mg by mouth every evening. ) 90 capsule 3    BD LUER-MANUEL SYRINGE 3 mL 22 x 1 1/2" Syrg   3    fexofenadine (ALLEGRA ALLERGY) 180 MG tablet Take 1 tablet (180 mg total) by mouth once daily. (Patient taking differently: Take 180 mg by mouth daily as needed. ) 10 tablet 0    [DISCONTINUED] aspirin (ECOTRIN) 81 MG EC tablet Take 325 mg by mouth once daily.       [DISCONTINUED] HYPODERMIC NEEDLES 18 x 1 1/2 " Ndle   3    [DISCONTINUED] pen " "needle, diabetic (BD INSULIN PEN NEEDLE UF MINI) 31 gauge x 3/16" Ndle USE DAILY WITH LANTUS AND NOVOLOG 400 each 3     No current facility-administered medications on file prior to visit.      Social History     Socioeconomic History    Marital status:      Spouse name: Babs    Number of children: 2    Years of education: Not on file    Highest education level: Not on file   Social Needs    Financial resource strain: Not on file    Food insecurity - worry: Not on file    Food insecurity - inability: Not on file    Transportation needs - medical: Not on file    Transportation needs - non-medical: Not on file   Occupational History    Occupation: Retired   Tobacco Use    Smoking status: Former Smoker     Last attempt to quit: 1986     Years since quittin.5    Smokeless tobacco: Current User     Types: Chew   Substance and Sexual Activity    Alcohol use: Yes     Comment: rare    Drug use: No    Sexual activity: Not on file   Other Topics Concern    Not on file   Social History Narrative    Not on file     Family History   Problem Relation Age of Onset    Heart disease Mother     Stroke Maternal Grandfather     Diabetes Neg Hx     Cancer Neg Hx     Hypertension Neg Hx        Review of Systems   Constitutional: Negative.  Negative for chills, diaphoresis and fever.   HENT: Negative for congestion, hearing loss, mouth sores, postnasal drip and sore throat.    Eyes: Negative for pain and visual disturbance.   Respiratory: Negative for cough, chest tightness, shortness of breath and wheezing.    Cardiovascular: Negative for chest pain.   Gastrointestinal: Negative for abdominal pain, anal bleeding, blood in stool, constipation, diarrhea, nausea and vomiting.   Genitourinary: Negative for dysuria and hematuria.   Musculoskeletal: Negative for back pain, neck pain and neck stiffness.   Skin: Negative for rash.   Neurological: Negative for dizziness and weakness.       Objective: "     /66   Pulse 65   Temp 98 °F (36.7 °C) (Oral)   Ht 6' (1.829 m)   Wt 72.9 kg (160 lb 12.8 oz)   BMI 21.81 kg/m²     Physical Exam   Constitutional: He is oriented to person, place, and time. He appears well-developed and well-nourished.   HENT:   Head: Normocephalic and atraumatic.   Right Ear: External ear normal.   Left Ear: External ear normal.   Nose: Nose normal.   Mouth/Throat: Oropharynx is clear and moist. No oropharyngeal exudate.   Eyes: Conjunctivae and EOM are normal. Pupils are equal, round, and reactive to light. Right eye exhibits no discharge. Left eye exhibits no discharge. No scleral icterus.   Neck: Normal range of motion. Neck supple. No JVD present. No thyromegaly present.   Cardiovascular: Normal rate, regular rhythm, normal heart sounds and intact distal pulses. Exam reveals no gallop and no friction rub.   No murmur heard.  Pulmonary/Chest: Effort normal and breath sounds normal. No respiratory distress. He has no wheezes. He has no rales. He exhibits no tenderness.   Abdominal: Soft. Bowel sounds are normal. He exhibits no distension and no mass. There is no tenderness. There is no rebound and no guarding.   Musculoskeletal: Normal range of motion. He exhibits no edema or tenderness.   Lymphadenopathy:     He has no cervical adenopathy.   Neurological: He is alert and oriented to person, place, and time. No cranial nerve deficit. Coordination normal.   Skin: Skin is warm and dry. He is not diaphoretic.   Psychiatric: He has a normal mood and affect.     Assessment:     1. PAD (peripheral artery disease)    2. Urinary retention    3. 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    4. Hypothyroidism, unspecified type    5. Uncontrolled type 2 diabetes mellitus with stage 3 chronic kidney disease, with long-term current use of insulin    6. Benign prostatic hyperplasia, presence of lower urinary tract symptoms unspecified    7. Combined hyperlipidemia  "associated with type 2 diabetes mellitus    8. Hypertension associated with diabetes    9. Elevated PSA    10. CKD stage 3 secondary to diabetes        Plan:     Problem List Items Addressed This Visit     CKD stage 3 secondary to diabetes    Relevant Medications    insulin aspart U-100 (NOVOLOG FLEXPEN U-100 INSULIN) 100 unit/mL InPn pen    insulin (LANTUS SOLOSTAR U-100 INSULIN) glargine 100 units/mL (3mL) SubQ pen    Combined hyperlipidemia associated with type 2 diabetes mellitus    Relevant Medications    pravastatin (PRAVACHOL) 40 MG tablet    insulin aspart U-100 (NOVOLOG FLEXPEN U-100 INSULIN) 100 unit/mL InPn pen    insulin (LANTUS SOLOSTAR U-100 INSULIN) glargine 100 units/mL (3mL) SubQ pen    Elevated PSA    Relevant Orders    PSA, Screening    Hypertension associated with diabetes    Relevant Medications    insulin aspart U-100 (NOVOLOG FLEXPEN U-100 INSULIN) 100 unit/mL InPn pen    insulin (LANTUS SOLOSTAR U-100 INSULIN) glargine 100 units/mL (3mL) SubQ pen    amLODIPine (NORVASC) 5 MG tablet    Hypothyroidism    Relevant Medications    levothyroxine (SYNTHROID) 100 MCG tablet    Other Relevant Orders    TSH    PAD (peripheral artery disease) - Primary    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    Relevant Medications    blood sugar diagnostic (FREESTYLE LITE STRIPS) Strp    insulin aspart U-100 (NOVOLOG FLEXPEN U-100 INSULIN) 100 unit/mL InPn pen    insulin (LANTUS SOLOSTAR U-100 INSULIN) glargine 100 units/mL (3mL) SubQ pen    lancets (ACCU-CHEK SOFTCLIX LANCETS) Misc    pen needle, diabetic (BD ULTRA-FINE MINI PEN NEEDLE) 31 gauge x 3/16" Ndle    Urinary retention      Other Visit Diagnoses     Uncontrolled type 2 diabetes mellitus with stage 3 chronic kidney disease, with long-term current use of insulin        Relevant Medications    blood sugar diagnostic (FREESTYLE LITE STRIPS) Strp    insulin aspart U-100 (NOVOLOG FLEXPEN U-100 INSULIN) 100 unit/mL InPn pen    insulin " "(LANTUS SOLOSTAR U-100 INSULIN) glargine 100 units/mL (3mL) SubQ pen    lancets (ACCU-CHEK SOFTCLIX LANCETS) Misc    pen needle, diabetic (BD ULTRA-FINE MINI PEN NEEDLE) 31 gauge x 3/16" Ndle    Benign prostatic hyperplasia, presence of lower urinary tract symptoms unspecified        Relevant Medications    tamsulosin (FLOMAX) 0.4 mg Cap    Other Relevant Orders    PSA, Screening    Encounter for screening for malignant neoplasm of prostate         Relevant Orders    PSA, Screening        No Follow-up on file.  "

## 2019-03-09 NOTE — PROGRESS NOTES
The patient has a normal PSA so recheck that in 1 year.     The patient has a normal TSH.  If the patient is on thyroid medicine, refill it for a year and recheck the TSH in 1 year.  If not, just let the patient know that this is a normal level.     Calm

## 2019-03-31 ENCOUNTER — EXTERNAL CHRONIC CARE MANAGEMENT (OUTPATIENT)
Dept: PRIMARY CARE CLINIC | Facility: CLINIC | Age: 79
End: 2019-03-31
Payer: MEDICARE

## 2019-03-31 PROCEDURE — 99490 CHRNC CARE MGMT STAFF 1ST 20: CPT | Mod: S$PBB,,, | Performed by: FAMILY MEDICINE

## 2019-03-31 PROCEDURE — 99490 CHRNC CARE MGMT STAFF 1ST 20: CPT | Mod: PBBFAC,PO | Performed by: FAMILY MEDICINE

## 2019-03-31 PROCEDURE — 99490 PR CHRONIC CARE MGMT, 1ST 20 MIN: ICD-10-PCS | Mod: S$PBB,,, | Performed by: FAMILY MEDICINE

## 2019-04-30 ENCOUNTER — EXTERNAL CHRONIC CARE MANAGEMENT (OUTPATIENT)
Dept: PRIMARY CARE CLINIC | Facility: CLINIC | Age: 79
End: 2019-04-30
Payer: MEDICARE

## 2019-04-30 PROCEDURE — 99490 CHRNC CARE MGMT STAFF 1ST 20: CPT | Mod: S$PBB,,, | Performed by: FAMILY MEDICINE

## 2019-04-30 PROCEDURE — 99490 CHRNC CARE MGMT STAFF 1ST 20: CPT | Mod: PBBFAC,PO | Performed by: FAMILY MEDICINE

## 2019-04-30 PROCEDURE — 99490 PR CHRONIC CARE MGMT, 1ST 20 MIN: ICD-10-PCS | Mod: S$PBB,,, | Performed by: FAMILY MEDICINE

## 2019-05-31 ENCOUNTER — EXTERNAL CHRONIC CARE MANAGEMENT (OUTPATIENT)
Dept: PRIMARY CARE CLINIC | Facility: CLINIC | Age: 79
End: 2019-05-31
Payer: MEDICARE

## 2019-05-31 PROCEDURE — 99490 CHRNC CARE MGMT STAFF 1ST 20: CPT | Mod: S$PBB,,, | Performed by: FAMILY MEDICINE

## 2019-05-31 PROCEDURE — 99490 CHRNC CARE MGMT STAFF 1ST 20: CPT | Mod: PBBFAC,PO | Performed by: FAMILY MEDICINE

## 2019-05-31 PROCEDURE — 99490 PR CHRONIC CARE MGMT, 1ST 20 MIN: ICD-10-PCS | Mod: S$PBB,,, | Performed by: FAMILY MEDICINE

## 2019-06-30 ENCOUNTER — EXTERNAL CHRONIC CARE MANAGEMENT (OUTPATIENT)
Dept: PRIMARY CARE CLINIC | Facility: CLINIC | Age: 79
End: 2019-06-30
Payer: MEDICARE

## 2019-06-30 PROCEDURE — 99490 PR CHRONIC CARE MGMT, 1ST 20 MIN: ICD-10-PCS | Mod: S$PBB,,, | Performed by: FAMILY MEDICINE

## 2019-06-30 PROCEDURE — 99490 CHRNC CARE MGMT STAFF 1ST 20: CPT | Mod: S$PBB,,, | Performed by: FAMILY MEDICINE

## 2019-06-30 PROCEDURE — 99490 CHRNC CARE MGMT STAFF 1ST 20: CPT | Mod: PBBFAC,PO | Performed by: FAMILY MEDICINE

## 2019-07-31 ENCOUNTER — EXTERNAL CHRONIC CARE MANAGEMENT (OUTPATIENT)
Dept: PRIMARY CARE CLINIC | Facility: CLINIC | Age: 79
End: 2019-07-31
Payer: MEDICARE

## 2019-07-31 PROCEDURE — 99490 CHRNC CARE MGMT STAFF 1ST 20: CPT | Mod: PBBFAC,PO | Performed by: FAMILY MEDICINE

## 2019-07-31 PROCEDURE — 99490 CHRNC CARE MGMT STAFF 1ST 20: CPT | Mod: S$PBB,,, | Performed by: FAMILY MEDICINE

## 2019-07-31 PROCEDURE — 99490 PR CHRONIC CARE MGMT, 1ST 20 MIN: ICD-10-PCS | Mod: S$PBB,,, | Performed by: FAMILY MEDICINE

## 2019-08-13 DIAGNOSIS — M79.672 BILATERAL FOOT PAIN: Primary | ICD-10-CM

## 2019-08-13 DIAGNOSIS — M79.671 BILATERAL FOOT PAIN: Primary | ICD-10-CM

## 2019-08-31 ENCOUNTER — EXTERNAL CHRONIC CARE MANAGEMENT (OUTPATIENT)
Dept: PRIMARY CARE CLINIC | Facility: CLINIC | Age: 79
End: 2019-08-31
Payer: MEDICARE

## 2019-08-31 PROCEDURE — 99490 CHRNC CARE MGMT STAFF 1ST 20: CPT | Mod: PBBFAC,PO | Performed by: FAMILY MEDICINE

## 2019-08-31 PROCEDURE — 99490 PR CHRONIC CARE MGMT, 1ST 20 MIN: ICD-10-PCS | Mod: S$PBB,,, | Performed by: FAMILY MEDICINE

## 2019-08-31 PROCEDURE — 99490 CHRNC CARE MGMT STAFF 1ST 20: CPT | Mod: S$PBB,,, | Performed by: FAMILY MEDICINE

## 2019-09-06 ENCOUNTER — OFFICE VISIT (OUTPATIENT)
Dept: FAMILY MEDICINE | Facility: CLINIC | Age: 79
End: 2019-09-06
Payer: MEDICARE

## 2019-09-06 VITALS
HEIGHT: 72 IN | BODY MASS INDEX: 22.51 KG/M2 | WEIGHT: 166.19 LBS | SYSTOLIC BLOOD PRESSURE: 136 MMHG | HEART RATE: 58 BPM | DIASTOLIC BLOOD PRESSURE: 84 MMHG

## 2019-09-06 DIAGNOSIS — E03.9 HYPOTHYROIDISM, UNSPECIFIED TYPE: ICD-10-CM

## 2019-09-06 DIAGNOSIS — N40.0 BENIGN PROSTATIC HYPERPLASIA WITHOUT LOWER URINARY TRACT SYMPTOMS: ICD-10-CM

## 2019-09-06 DIAGNOSIS — I15.2 HYPERTENSION ASSOCIATED WITH DIABETES: ICD-10-CM

## 2019-09-06 DIAGNOSIS — E11.22 CKD STAGE 3 SECONDARY TO DIABETES: ICD-10-CM

## 2019-09-06 DIAGNOSIS — R97.20 ELEVATED PSA: ICD-10-CM

## 2019-09-06 DIAGNOSIS — I65.23 BILATERAL CAROTID ARTERY STENOSIS: ICD-10-CM

## 2019-09-06 DIAGNOSIS — E11.69 COMBINED HYPERLIPIDEMIA ASSOCIATED WITH TYPE 2 DIABETES MELLITUS: ICD-10-CM

## 2019-09-06 DIAGNOSIS — Z86.010 HISTORY OF COLON POLYPS: ICD-10-CM

## 2019-09-06 DIAGNOSIS — R53.1 WEAKNESS: ICD-10-CM

## 2019-09-06 DIAGNOSIS — T78.40XA ALLERGIC STATE, INITIAL ENCOUNTER: ICD-10-CM

## 2019-09-06 DIAGNOSIS — E11.59 HYPERTENSION ASSOCIATED WITH DIABETES: ICD-10-CM

## 2019-09-06 DIAGNOSIS — E78.2 COMBINED HYPERLIPIDEMIA ASSOCIATED WITH TYPE 2 DIABETES MELLITUS: ICD-10-CM

## 2019-09-06 DIAGNOSIS — I73.9 PAD (PERIPHERAL ARTERY DISEASE): Primary | ICD-10-CM

## 2019-09-06 DIAGNOSIS — N18.30 CKD STAGE 3 SECONDARY TO DIABETES: ICD-10-CM

## 2019-09-06 DIAGNOSIS — N40.0 BENIGN PROSTATIC HYPERPLASIA, PRESENCE OF LOWER URINARY TRACT SYMPTOMS UNSPECIFIED: ICD-10-CM

## 2019-09-06 PROCEDURE — 99214 OFFICE O/P EST MOD 30 MIN: CPT | Mod: S$PBB,,, | Performed by: FAMILY MEDICINE

## 2019-09-06 PROCEDURE — 99999 PR PBB SHADOW E&M-EST. PATIENT-LVL IV: CPT | Mod: PBBFAC,,, | Performed by: FAMILY MEDICINE

## 2019-09-06 PROCEDURE — 99999 PR PBB SHADOW E&M-EST. PATIENT-LVL IV: ICD-10-PCS | Mod: PBBFAC,,, | Performed by: FAMILY MEDICINE

## 2019-09-06 PROCEDURE — 99214 PR OFFICE/OUTPT VISIT, EST, LEVL IV, 30-39 MIN: ICD-10-PCS | Mod: S$PBB,,, | Performed by: FAMILY MEDICINE

## 2019-09-06 PROCEDURE — 99214 OFFICE O/P EST MOD 30 MIN: CPT | Mod: PBBFAC,PO | Performed by: FAMILY MEDICINE

## 2019-09-06 RX ORDER — TAMSULOSIN HYDROCHLORIDE 0.4 MG/1
0.4 CAPSULE ORAL NIGHTLY
Qty: 90 CAPSULE | Refills: 3 | Status: SHIPPED | OUTPATIENT
Start: 2019-09-06 | End: 2020-03-06 | Stop reason: SDUPTHER

## 2019-09-06 RX ORDER — PEN NEEDLE, DIABETIC 30 GX3/16"
NEEDLE, DISPOSABLE MISCELLANEOUS
Qty: 300 EACH | Refills: 3 | Status: SHIPPED | OUTPATIENT
Start: 2019-09-06 | End: 2020-03-06 | Stop reason: SDUPTHER

## 2019-09-06 RX ORDER — MONTELUKAST SODIUM 10 MG/1
10 TABLET ORAL NIGHTLY
Qty: 90 TABLET | Refills: 3 | Status: SHIPPED | OUTPATIENT
Start: 2019-09-06 | End: 2020-03-06 | Stop reason: SDUPTHER

## 2019-09-06 RX ORDER — DUTASTERIDE 0.5 MG/1
0.5 CAPSULE, LIQUID FILLED ORAL DAILY
Qty: 90 CAPSULE | Refills: 3 | Status: SHIPPED | OUTPATIENT
Start: 2019-09-06 | End: 2020-03-06 | Stop reason: SDUPTHER

## 2019-09-06 RX ORDER — AMLODIPINE BESYLATE 5 MG/1
5 TABLET ORAL DAILY
Qty: 90 TABLET | Refills: 3 | Status: SHIPPED | OUTPATIENT
Start: 2019-09-06 | End: 2020-10-26

## 2019-09-06 RX ORDER — LANCETS
EACH MISCELLANEOUS
Qty: 200 EACH | Refills: 11 | Status: SHIPPED | OUTPATIENT
Start: 2019-09-06 | End: 2020-01-28 | Stop reason: SDUPTHER

## 2019-09-06 RX ORDER — LEVOTHYROXINE SODIUM 100 UG/1
100 TABLET ORAL DAILY
Qty: 90 TABLET | Refills: 3 | Status: SHIPPED | OUTPATIENT
Start: 2019-09-06 | End: 2020-03-06 | Stop reason: SDUPTHER

## 2019-09-06 RX ORDER — PRAVASTATIN SODIUM 40 MG/1
40 TABLET ORAL NIGHTLY
Qty: 90 TABLET | Refills: 3 | Status: SHIPPED | OUTPATIENT
Start: 2019-09-06 | End: 2020-03-06 | Stop reason: SDUPTHER

## 2019-09-06 RX ORDER — INSULIN GLARGINE 100 [IU]/ML
INJECTION, SOLUTION SUBCUTANEOUS
Qty: 15 SYRINGE | Refills: 5 | Status: SHIPPED | OUTPATIENT
Start: 2019-09-06 | End: 2019-11-21 | Stop reason: SDUPTHER

## 2019-09-06 RX ORDER — MONTELUKAST SODIUM 10 MG/1
10 TABLET ORAL NIGHTLY
Refills: 2 | COMMUNITY
Start: 2019-08-15 | End: 2019-09-06 | Stop reason: SDUPTHER

## 2019-09-06 RX ORDER — INSULIN ASPART 100 [IU]/ML
INJECTION, SOLUTION INTRAVENOUS; SUBCUTANEOUS
Qty: 1 BOX | Refills: 3 | Status: SHIPPED | OUTPATIENT
Start: 2019-09-06 | End: 2020-03-06 | Stop reason: SDUPTHER

## 2019-09-06 NOTE — PROGRESS NOTES
Subjective:      Patient ID: Bebeto Santiago is a 79 y.o. male.    Chief Complaint: Follow-up    Problem List Items Addressed This Visit     Allergy    Overview     He has allergies and he uses OTC flonase but he also uses the singlair to help with this and it does help him with his symptoms. eh also uses allegra.           Benign prostatic hyperplasia    Overview     The patient presents with BPH.  Denies difficulty voiding, diminished urinary stream, a feeling of hesitancy, straining to void, a feeling of incomplete voiding, postvoid dribbling, urinary frequency, urgency, nocturia, dysuria and hematuria.  The last PSA level is below.  Current treatment has included flomax and avodart at the direction of Dr. Muro with improvement.     Lab Results   Component Value Date    PSA 1.4 02/21/2018    PSA 2.1 09/20/2017    PSA 8.2 (H) 08/19/2016              Bilateral carotid artery stenosis    Overview     He has bilateral carotid artery stenosis and he is tracked with ultrasounds.  He is also followed on this by Dr. Sarabia on this.  He is due for this in Feb.  He had a left sided CEA.    US Carotid Bilateral   Order: [id number]   Status:  Final result   Visible to patient:  No (Not Released) Next appt:  None Dx:  Bilateral carotid artery stenosis   Details     Reading Physician Reading Date Result Priority   Jaiden Renee MD 1/4/2018       Narrative     Technique: Bilateral duplex carotid ultrasound performed using gray scale, color Doppler, and pulse Doppler analysis.    Comparison: 09/22/2017    FINDINGS:     Right common carotid:   Peak systolic velocity 66 cm/sec.    Right internal carotid artery: Occluded      Left common carotid: Scattered calcified plaque noted.  Peak systolic velocity 104 cm/sec.    Left internal carotid artery: Status post interval carotid endarterectomy.  No visible stenosis.   Peak systolic velocity 98  cm/sec.    Peak diastolic velocity 31 cm/sec  IC/CC ratio 0.9.    Both vertebral  arteries demonstrate antegrade flow.      Impression           1. Persistent occlusion of the right ICA    2.  Status post left carotid endarterectomy with no stenosis visualized.    As determined by Society of Radiologist in Ultrasound consensus.      Electronically signed by: ANTHONY CAMACHO MD  Date: 01/04/18  Time: 12:18                     CKD stage 3 secondary to diabetes    Overview     He has stage 3 CKD.   Bebeto Santiago has an Estimated Glumerular Filtration Rate (EGFR) between 30 and 59 consistent with the definition of chronic kidney disease stage 3.    eGFR if non    Date Value Ref Range Status   03/01/2019 >60.0 >60 mL/min/1.73 m^2 Final     Comment:     Calculation used to obtain the estimated glomerular filtration  rate (eGFR) is the CKD-EPI equation.            Lab Results   Component Value Date    CREATININE 1.0 03/01/2019    BUN 20 03/01/2019     03/01/2019    K 4.1 03/01/2019     03/01/2019    CO2 29 03/01/2019       The patient's chronic kidney disease stage 3 was monitored, evaluated, addressed and/or treated.             Combined hyperlipidemia associated with type 2 diabetes mellitus    Overview     The patient presents with hyperlipidemia.  The patient reports tolerating the medication well and is in excellent compliance.  There have been no medication side effects.  The patient denies chest pain, neuropathy, and myalgias.  The patient has reduced fat intake and has been exercising.  Current treatment has included the medications listed in the med card.    Lab Results   Component Value Date    CHOL 152 03/01/2019    CHOL 145 08/30/2018    CHOL 150 02/21/2018       Lab Results   Component Value Date    HDL 59 03/01/2019    HDL 52 08/30/2018    HDL 50 02/21/2018       Lab Results   Component Value Date    LDLCALC 82.6 03/01/2019    LDLCALC 82.6 08/30/2018    LDLCALC 88.0 02/21/2018       Lab Results   Component Value Date    TRIG 52 03/01/2019    TRIG 52 08/30/2018     TRIG 60 02/21/2018       Lab Results   Component Value Date    CHOLHDL 38.8 03/01/2019    CHOLHDL 35.9 08/30/2018    CHOLHDL 33.3 02/21/2018     Lab Results   Component Value Date    ALT 13 03/01/2019    AST 15 03/01/2019    ALKPHOS 93 03/01/2019    BILITOT 0.7 03/01/2019              Elevated PSA    Overview     He has had an increased psa in the past but it has come down.   Lab Results   Component Value Date    PSA 1.5 03/08/2019    PSA 1.4 02/21/2018    PSA 2.1 09/20/2017              History of colon polyps    Overview     He has a history of colon polyps and his last colonoscopy was in 2013. He is not having bleeding or stool changes.    He is due for this.         Hypertension associated with diabetes    Overview     The patient presents with essential hypertension.  The patient is tolerating the medication well and is in excellent compliance.  The patient is experiencing no side effects.  Counseling was offered regarding low salt diets.  The patient has a reduced salt intake.  The patient denies chest pain, palpitations, shortness of breath, dyspnea on exertion, left or murmur neck pain, nausea, vomiting, diaphoresis, paroxysmal nocturnal dyspnea, and orthopnea.   Hypertension Medications             amLODIPine (NORVASC) 5 MG tablet Take 1 tablet (5 mg total) by mouth once daily.        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         Hypothyroidism    Overview     The patient presents with hypothyroidism.  The patient denies agitation, anxiety, blurred vision, chest pain, cold intolerance, constipation, dizziness, dry skin, fatigue, lightheadedness, paresthesias, skin coarsening, tachycardia, tremor, weight gain or weight loss.  The patient's current treatment has included Synthroid with a good response.    Lab Results   Component Value Date    TSH 1.093 03/08/2019              PAD (peripheral artery disease) - Primary    Overview     He has PAD.  He has an ability to be able to  walk 1/2 a mile before he hast to stop and rest.  He does take an aspirin.         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  The patient denies polyuria, polydipsia, polyphagia, hypoglycemia and paresthesias.  The patient's glucose control has been good.  Home glucose averages are routinely checked.  The patient is without retinopathy currently.  The patient has no history of neuropathy.  The patient currently complains of no podiatric problems.  The patient has excellent compliance.  Because of him being on lantus and novolog,  the patient checks his blood sugar 4 times daily and administers insulin 3 or more times daily.   Hemoglobin A1C   Date Value Ref Range Status   03/01/2019 7.2 (H) 4.0 - 5.6 % Final     Comment:     ADA Screening Guidelines:  5.7-6.4%  Consistent with prediabetes  >or=6.5%  Consistent with diabetes  High levels of fetal hemoglobin interfere with the HbA1C  assay. Heterozygous hemoglobin variants (HbS, HgC, etc)do  not significantly interfere with this assay.   However, presence of multiple variants may affect accuracy.     03/01/2019 7.2 (H) 4.0 - 5.6 % Final     Comment:     ADA Screening Guidelines:  5.7-6.4%  Consistent with prediabetes  >or=6.5%  Consistent with diabetes  High levels of fetal hemoglobin interfere with the HbA1C  assay. Heterozygous hemoglobin variants (HbS, HgC, etc)do  not significantly interfere with this assay.   However, presence of multiple variants may affect accuracy.     12/04/2018 6.9 (H) 4.0 - 5.6 % Final     Comment:     ADA Screening Guidelines:  5.7-6.4%  Consistent with prediabetes  >or=6.5%  Consistent with diabetes  High levels of fetal hemoglobin interfere with the HbA1C  assay. Heterozygous hemoglobin variants (HbS, HgC, etc)do  not significantly interfere with this assay.   However, presence of multiple variants may affect accuracy.       No results found for: MICROALBUR,  [id number]  Diabetes Management Status    Statin: Taking  ACE/ARB: Not taking  He has changed to 33 u of the lantus from 28 u since he had a high a1c below.  Screening or Prevention Patient's value Goal Complete/Controlled?   HgA1C Testing and Control   Lab Results   Component Value Date    HGBA1C 7.2 (H) 03/01/2019    HGBA1C 7.2 (H) 03/01/2019      Annually/Less than 8% Yes   Lipid profile : 03/01/2019 Annually Yes   LDL control Lab Results   Component Value Date    LDLCALC 82.6 03/01/2019    Annually/Less than 100 mg/dl  Yes   Nephropathy screening Lab Results   Component Value Date    LABMICR 33.0 09/20/2017     Lab Results   Component Value Date    PROTEINUA Negative 09/20/2017    Annually Yes   Blood pressure BP Readings from Last 1 Encounters:   09/06/19 136/84    Less than 140/90 Yes   Dilated retinal exam : 02/26/2019 Annually No   Foot exam   : 09/06/2019 Annually Yes              Weakness    Overview     He complains of a new problem with weakness with exertion. He states that this has been for months but it is getting a little worse.  He is not SOB with this but he states that he gets weak with exertion and he plays out quickly.  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  He is concerned about heart troubles.  He has had a left sided CEA.             Other Visit Diagnoses     Uncontrolled type 2 diabetes mellitus with stage 3 chronic kidney disease, with long-term current use of insulin        Benign prostatic hyperplasia, presence of lower urinary tract symptoms unspecified              Past Medical History:  Past Medical History:   Diagnosis Date    Allergy     BPH (benign prostatic hypertrophy)     Dehydration     Diabetes mellitus type II, uncontrolled     GERD (gastroesophageal reflux disease) 2013    grade IV/IV    Hypertension     Hypothyroidism     Male hypogonadism     Non-proliferative diabetic retinopathy     Urinary  "retention      Past Surgical History:   Procedure Laterality Date    ENDARTERECTOMY-CAROTID Left 10/4/2017    [facility]    EYE SURGERY      SPINE SURGERY       Review of patient's allergies indicates:   Allergen Reactions    Ace inhibitors      Other reaction(s): cough    Metformin      Abdominal pain     Current Outpatient Medications on File Prior to Visit   Medication Sig Dispense Refill    amLODIPine (NORVASC) 5 MG tablet Take 1 tablet (5 mg total) by mouth once daily. 90 tablet 3    aspirin 325 MG tablet Take 325 mg by mouth once daily.      BD LUER-MANUEL SYRINGE 3 mL 22 x 1 1/2" Syrg   3    blood sugar diagnostic (FREESTYLE LITE STRIPS) Strp 4 times a day (BEFORE MEALS AND AT BEDTIME) 400 strip 3    blood-glucose meter Misc Use as directed four times daily before meals and at bedtime. 1 each 0    dutasteride (AVODART) 0.5 mg capsule Take 1 capsule (0.5 mg total) by mouth once daily. 90 capsule 3    fluticasone (FLONASE) 50 mcg/actuation nasal spray USE 1 SPRAY IN EACH NOSTRIL ONCE DAILY. (Patient taking differently: 1 spray every evening. USE 1 SPRAY IN EACH NOSTRIL ONCE DAILY.) 3 Bottle 3    insulin (LANTUS SOLOSTAR U-100 INSULIN) glargine 100 units/mL (3mL) SubQ pen INJECT 33 UNITS DAILY 15 Syringe 5    insulin aspart U-100 (NOVOLOG FLEXPEN U-100 INSULIN) 100 unit/mL InPn pen USE AS DIRECTED PER SLIDING SCALE. MAX OF 14 UNITS DAILY) 1 Box 3    lancets (ACCU-CHEK SOFTCLIX LANCETS) Misc Check glucose before meals and at bedtime. 200 each 11    levothyroxine (SYNTHROID) 100 MCG tablet Take 1 tablet (100 mcg total) by mouth once daily. 90 tablet 3    montelukast (SINGULAIR) 10 mg tablet Take 10 mg by mouth every evening.  2    pen needle, diabetic (BD ULTRA-FINE MINI PEN NEEDLE) 31 gauge x 3/16" Ndle USE DAILY WITH LANTUS AND NOVOLOG 300 each 3    pravastatin (PRAVACHOL) 40 MG tablet Take 1 tablet (40 mg total) by mouth every evening. 90 tablet 3    " tamsulosin (FLOMAX) 0.4 mg Cap Take 1 capsule (0.4 mg total) by mouth every evening. 90 capsule 3    fexofenadine (ALLEGRA ALLERGY) 180 MG tablet Take 1 tablet (180 mg total) by mouth once daily. (Patient taking differently: Take 180 mg by mouth daily as needed. ) 10 tablet 0     No current facility-administered medications on file prior to visit.      Social History     Socioeconomic History    Marital status:      Spouse name: Babs    Number of children: 2    Years of education: Not on file    Highest education level: Not on file   Occupational History    Occupation: Retired   Social Needs    Financial resource strain: Not on file    Food insecurity:     Worry: Not on file     Inability: Not on file    Transportation needs:     Medical: Not on file     Non-medical: Not on file   Tobacco Use    Smoking status: Former Smoker     Last attempt to quit: 1986     Years since quittin.0    Smokeless tobacco: Current User     Types: Chew   Substance and Sexual Activity    Alcohol use: Yes     Comment: rare    Drug use: No    Sexual activity: Not on file   Lifestyle    Physical activity:     Days per week: Not on file     Minutes per session: Not on file    Stress: Not on file   Relationships    Social connections:     Talks on phone: Not on file     Gets together: Not on file     Attends Buddhism service: Not on file     Active member of club or organization: Not on file     Attends meetings of clubs or organizations: Not on file     Relationship status: Not on file   Other Topics Concern    Not on file   Social History Narrative    Not on file     Family History   Problem Relation Age of Onset    Heart disease Mother     Stroke Maternal Grandfather     Diabetes Neg Hx     Cancer Neg Hx     Hypertension Neg Hx        Review of Systems    Objective:     /84   Pulse (!) 58   Ht 6' (1.829 m)   Wt 75.4 kg (166 lb 3.2 oz)   BMI 22.54 kg/m²     Physical Exam   Constitutional:  He is oriented to person, place, and time. He appears well-developed and well-nourished.   HENT:   Head: Normocephalic and atraumatic.   Right Ear: External ear normal.   Left Ear: External ear normal.   Nose: Nose normal.   Mouth/Throat: Oropharynx is clear and moist. No oropharyngeal exudate.   Eyes: Pupils are equal, round, and reactive to light. Conjunctivae and EOM are normal. Right eye exhibits no discharge. Left eye exhibits no discharge. No scleral icterus.   Neck: Normal range of motion. Neck supple. No JVD present. No thyromegaly present.   Cardiovascular: Normal rate, regular rhythm and intact distal pulses. Exam reveals no gallop and no friction rub.   Murmur heard.  Pulses:       Dorsalis pedis pulses are 1+ on the right side, and 1+ on the left side.        Posterior tibial pulses are 1+ on the right side, and 1+ on the left side.   Pulmonary/Chest: Effort normal and breath sounds normal. No respiratory distress. He has no wheezes. He has no rales. He exhibits no tenderness.   Abdominal: Soft. Bowel sounds are normal. He exhibits no distension and no mass. There is no tenderness. There is no rebound and no guarding.   Musculoskeletal: Normal range of motion. He exhibits no edema or tenderness.        Right foot: There is deformity (he has some nail chnages noted.). There is normal range of motion.        Left foot: There is deformity (he has some nail chnages noted.). There is normal range of motion.   He has pain in the joints of the feet.    Feet:   Right Foot:   Protective Sensation: 10 sites tested. 10 sites sensed.   Skin Integrity: Negative for ulcer, blister, skin breakdown, erythema, warmth, callus or dry skin.   Left Foot:   Protective Sensation: 10 sites tested. 10 sites sensed.   Skin Integrity: Negative for ulcer, blister, skin breakdown, erythema, warmth, callus or dry skin.   Lymphadenopathy:     He has no cervical adenopathy.   Neurological: He is alert and oriented to person, place, and  time. No cranial nerve deficit. Coordination normal.   Skin: Skin is warm and dry. He is not diaphoretic.   Psychiatric: He has a normal mood and affect.     Assessment:     1. PAD (peripheral artery disease)    2. 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 Hypothyroidism, unspecified type    4. Hypertension associated with diabetes    5. Elevated PSA    6. Combined hyperlipidemia associated with type 2 diabetes mellitus    7. CKD stage 3 secondary to diabetes    8. Bilateral carotid artery stenosis    9. Benign prostatic hyperplasia without lower urinary tract symptoms    10. Allergic state, initial encounter    11. Uncontrolled type 2 diabetes mellitus with stage 3 chronic kidney disease, with long-term current use of insulin    12. Benign prostatic hyperplasia, presence of lower urinary tract symptoms unspecified    13. History of colon polyps    14. Weakness        Plan:     Problem List Items Addressed This Visit     Allergy    Relevant Medications    montelukast (SINGULAIR) 10 mg tablet    Benign prostatic hyperplasia    Bilateral carotid artery stenosis    CKD stage 3 secondary to diabetes    Relevant Medications    insulin (LANTUS SOLOSTAR U-100 INSULIN) glargine 100 units/mL (3mL) SubQ pen    insulin aspart U-100 (NOVOLOG FLEXPEN U-100 INSULIN) 100 unit/mL (3 mL) InPn pen    Combined hyperlipidemia associated with type 2 diabetes mellitus    Relevant Medications    insulin (LANTUS SOLOSTAR U-100 INSULIN) glargine 100 units/mL (3mL) SubQ pen    insulin aspart U-100 (NOVOLOG FLEXPEN U-100 INSULIN) 100 unit/mL (3 mL) InPn pen    pravastatin (PRAVACHOL) 40 MG tablet    Elevated PSA    History of colon polyps    Relevant Orders    Case request GI: COLONOSCOPY (Completed)    Hypertension associated with diabetes    Relevant Medications    insulin (LANTUS SOLOSTAR U-100 INSULIN) glargine 100 units/mL (3mL) SubQ pen    insulin aspart U-100 (NOVOLOG FLEXPEN U-100 INSULIN) 100 unit/mL (3  "mL) InPn pen    amLODIPine (NORVASC) 5 MG tablet    Hypothyroidism    Relevant Medications    levothyroxine (SYNTHROID) 100 MCG tablet    PAD (peripheral artery disease) - Primary    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    Relevant Medications    insulin (LANTUS SOLOSTAR U-100 INSULIN) glargine 100 units/mL (3mL) SubQ pen    insulin aspart U-100 (NOVOLOG FLEXPEN U-100 INSULIN) 100 unit/mL (3 mL) InPn pen    blood sugar diagnostic (FREESTYLE LITE STRIPS) Strp    pen needle, diabetic (BD ULTRA-FINE MINI PEN NEEDLE) 31 gauge x 3/16" Ndle    lancets (ACCU-CHEK SOFTCLIX LANCETS) Misc    Other Relevant Orders    Hemoglobin A1c    Protein / creatinine ratio, urine    Ambulatory referral to Podiatry    Weakness    Relevant Orders    Ambulatory referral to Cardiology      Other Visit Diagnoses     Uncontrolled type 2 diabetes mellitus with stage 3 chronic kidney disease, with long-term current use of insulin        Relevant Medications    insulin (LANTUS SOLOSTAR U-100 INSULIN) glargine 100 units/mL (3mL) SubQ pen    insulin aspart U-100 (NOVOLOG FLEXPEN U-100 INSULIN) 100 unit/mL (3 mL) InPn pen    blood sugar diagnostic (FREESTYLE LITE STRIPS) Strp    pen needle, diabetic (BD ULTRA-FINE MINI PEN NEEDLE) 31 gauge x 3/16" Ndle    lancets (ACCU-CHEK SOFTCLIX LANCETS) Misc    Benign prostatic hyperplasia, presence of lower urinary tract symptoms unspecified        Relevant Medications    tamsulosin (FLOMAX) 0.4 mg Cap    dutasteride (AVODART) 0.5 mg capsule        No follow-ups on file.  "

## 2019-09-09 ENCOUNTER — TELEPHONE (OUTPATIENT)
Dept: ENDOSCOPY | Facility: HOSPITAL | Age: 79
End: 2019-09-09

## 2019-09-09 NOTE — TELEPHONE ENCOUNTER
Endoscopy Scheduling Questionnaire:    1. Have you been admitted overnight to the hospital in the past 3 months? no  2. Have you had a cardiac stent placed in the past 12 months? no  3. Have you had a stroke or heart attack in the past 6 months? no  4. Have you had any chest pain in the past 3 months? no      If so, have you been evaluated by your PCP or Cardiologist? no  5. Do you take prescription weight loss medication? no  6. Have you been diagnosed with Diverticulitis within the past 3 months? no  7. Are you on dialysis? no  8. Are you diabetic? Yes. Advised to hold diabetes medication the morning of procedure.    Pts phone lost signal. Attempted to call him back with no answer. Left message to return call.

## 2019-09-10 ENCOUNTER — LAB VISIT (OUTPATIENT)
Dept: LAB | Facility: HOSPITAL | Age: 79
End: 2019-09-10
Attending: FAMILY MEDICINE
Payer: MEDICARE

## 2019-09-10 DIAGNOSIS — E11.59 HYPERTENSION ASSOCIATED WITH DIABETES: Primary | ICD-10-CM

## 2019-09-10 DIAGNOSIS — I15.2 HYPERTENSION ASSOCIATED WITH DIABETES: Primary | ICD-10-CM

## 2019-09-10 LAB
ESTIMATED AVG GLUCOSE: 151 MG/DL (ref 68–131)
HBA1C MFR BLD HPLC: 6.9 % (ref 4–5.6)

## 2019-09-10 PROCEDURE — 83036 HEMOGLOBIN GLYCOSYLATED A1C: CPT

## 2019-09-10 PROCEDURE — 36415 COLL VENOUS BLD VENIPUNCTURE: CPT | Mod: PO

## 2019-09-11 ENCOUNTER — CLINICAL SUPPORT (OUTPATIENT)
Dept: CARDIOLOGY | Facility: CLINIC | Age: 79
End: 2019-09-11
Payer: MEDICARE

## 2019-09-11 ENCOUNTER — OFFICE VISIT (OUTPATIENT)
Dept: CARDIOLOGY | Facility: CLINIC | Age: 79
End: 2019-09-11
Payer: MEDICARE

## 2019-09-11 VITALS
HEART RATE: 58 BPM | DIASTOLIC BLOOD PRESSURE: 70 MMHG | SYSTOLIC BLOOD PRESSURE: 128 MMHG | WEIGHT: 165.69 LBS | BODY MASS INDEX: 22.44 KG/M2 | HEIGHT: 72 IN

## 2019-09-11 DIAGNOSIS — I73.9 PAD (PERIPHERAL ARTERY DISEASE): ICD-10-CM

## 2019-09-11 DIAGNOSIS — G47.33 OSA (OBSTRUCTIVE SLEEP APNEA): ICD-10-CM

## 2019-09-11 DIAGNOSIS — E11.59 HYPERTENSION ASSOCIATED WITH DIABETES: ICD-10-CM

## 2019-09-11 DIAGNOSIS — R53.83 FATIGUE, UNSPECIFIED TYPE: ICD-10-CM

## 2019-09-11 DIAGNOSIS — I15.2 HYPERTENSION ASSOCIATED WITH DIABETES: ICD-10-CM

## 2019-09-11 DIAGNOSIS — E11.59 HYPERTENSION ASSOCIATED WITH DIABETES: Primary | ICD-10-CM

## 2019-09-11 DIAGNOSIS — E78.2 COMBINED HYPERLIPIDEMIA ASSOCIATED WITH TYPE 2 DIABETES MELLITUS: ICD-10-CM

## 2019-09-11 DIAGNOSIS — I15.2 HYPERTENSION ASSOCIATED WITH DIABETES: Primary | ICD-10-CM

## 2019-09-11 DIAGNOSIS — I65.23 BILATERAL CAROTID ARTERY STENOSIS: ICD-10-CM

## 2019-09-11 DIAGNOSIS — E11.69 COMBINED HYPERLIPIDEMIA ASSOCIATED WITH TYPE 2 DIABETES MELLITUS: ICD-10-CM

## 2019-09-11 PROCEDURE — 93010 ELECTROCARDIOGRAM REPORT: CPT | Mod: S$PBB,,, | Performed by: INTERNAL MEDICINE

## 2019-09-11 PROCEDURE — 99999 PR PBB SHADOW E&M-EST. PATIENT-LVL III: CPT | Mod: PBBFAC,,, | Performed by: INTERNAL MEDICINE

## 2019-09-11 PROCEDURE — 99205 PR OFFICE/OUTPT VISIT, NEW, LEVL V, 60-74 MIN: ICD-10-PCS | Mod: S$PBB,,, | Performed by: INTERNAL MEDICINE

## 2019-09-11 PROCEDURE — 93005 ELECTROCARDIOGRAM TRACING: CPT | Mod: PBBFAC,PO | Performed by: INTERNAL MEDICINE

## 2019-09-11 PROCEDURE — 99999 PR PBB SHADOW E&M-EST. PATIENT-LVL III: ICD-10-PCS | Mod: PBBFAC,,, | Performed by: INTERNAL MEDICINE

## 2019-09-11 PROCEDURE — 93010 EKG 12-LEAD: ICD-10-PCS | Mod: S$PBB,,, | Performed by: INTERNAL MEDICINE

## 2019-09-11 PROCEDURE — 99213 OFFICE O/P EST LOW 20 MIN: CPT | Mod: PBBFAC,25,PO | Performed by: INTERNAL MEDICINE

## 2019-09-11 PROCEDURE — 99205 OFFICE O/P NEW HI 60 MIN: CPT | Mod: S$PBB,,, | Performed by: INTERNAL MEDICINE

## 2019-09-11 NOTE — PROGRESS NOTES
Subjective:   Patient ID:  Bebeto Santiago is a 79 y.o. male who presents for follow-up of Consult and Extremity Weakness  Pt sx is fatigue-chronic.Patient denies CP, angina or anginal equivalent.    CRF- male, HTN, HLP, DM  Hypertension   This is a chronic problem. The current episode started more than 1 year ago. The problem has been gradually improving since onset. The problem is controlled. Pertinent negatives include no chest pain, palpitations or shortness of breath. Past treatments include calcium channel blockers. The current treatment provides moderate improvement. There are no compliance problems.    Hyperlipidemia   This is a chronic problem. The current episode started more than 1 year ago. The problem is controlled. Recent lipid tests were reviewed and are variable. Pertinent negatives include no chest pain or shortness of breath. Current antihyperlipidemic treatment includes statins. The current treatment provides moderate improvement of lipids. There are no compliance problems.    Fatigue   This is a chronic problem. The current episode started more than 1 year ago. The problem occurs intermittently. The problem has been waxing and waning. Associated symptoms include fatigue. Pertinent negatives include no chest pain. Nothing aggravates the symptoms. He has tried rest for the symptoms. The treatment provided no relief.       Review of Systems   Constitution: Positive for fatigue. Negative for weight gain.   HENT: Negative.    Eyes: Negative.    Cardiovascular: Negative.  Negative for chest pain, leg swelling and palpitations.   Respiratory: Negative.  Negative for shortness of breath.    Endocrine: Negative.    Hematologic/Lymphatic: Negative.    Skin: Negative.    Musculoskeletal: Negative for muscle weakness.   Gastrointestinal: Negative.    Genitourinary: Negative.    Neurological: Negative.  Negative for dizziness.   Psychiatric/Behavioral: Negative.    Allergic/Immunologic: Negative.      Family  History   Problem Relation Age of Onset    Heart disease Mother     Stroke Maternal Grandfather     Diabetes Neg Hx     Cancer Neg Hx     Hypertension Neg Hx      Past Medical History:   Diagnosis Date    Allergy     BPH (benign prostatic hypertrophy)     Dehydration     Diabetes mellitus type II, uncontrolled     GERD (gastroesophageal reflux disease)     grade IV/IV    Hypertension     Hypothyroidism     Male hypogonadism     Non-proliferative diabetic retinopathy     Urinary retention      Social History     Socioeconomic History    Marital status:      Spouse name: Babs    Number of children: 2    Years of education: Not on file    Highest education level: Not on file   Occupational History    Occupation: Retired   Social Needs    Financial resource strain: Not on file    Food insecurity:     Worry: Not on file     Inability: Not on file    Transportation needs:     Medical: Not on file     Non-medical: Not on file   Tobacco Use    Smoking status: Former Smoker     Last attempt to quit: 1986     Years since quittin.0    Smokeless tobacco: Current User     Types: Chew   Substance and Sexual Activity    Alcohol use: Yes     Comment: rare    Drug use: No    Sexual activity: Not on file   Lifestyle    Physical activity:     Days per week: Not on file     Minutes per session: Not on file    Stress: Not on file   Relationships    Social connections:     Talks on phone: Not on file     Gets together: Not on file     Attends [employer] service: Not on file     Active member of club or organization: Not on file     Attends meetings of clubs or organizations: Not on file     Relationship status: Not on file   Other Topics Concern    Not on file   Social History Narrative    Not on file     Current Outpatient Medications on File Prior to Visit   Medication Sig Dispense Refill    amLODIPine (NORVASC) 5 MG tablet Take 1 tablet (5 mg total) by mouth once daily. 90 tablet  "3    aspirin 325 MG tablet Take 325 mg by mouth once daily.      BD LUER-MANUEL SYRINGE 3 mL 22 x 1 1/2" Syrg   3    blood sugar diagnostic (FREESTYLE LITE STRIPS) Strp 4 times a day (BEFORE MEALS AND AT BEDTIME) 400 strip 3    blood-glucose meter Misc Use as directed four times daily before meals and at bedtime. 1 each 0    dutasteride (AVODART) 0.5 mg capsule Take 1 capsule (0.5 mg total) by mouth once daily. 90 capsule 3    fluticasone (FLONASE) 50 mcg/actuation nasal spray USE 1 SPRAY IN EACH NOSTRIL ONCE DAILY. (Patient taking differently: 1 spray every evening. USE 1 SPRAY IN EACH NOSTRIL ONCE DAILY.) 3 Bottle 3    insulin (LANTUS SOLOSTAR U-100 INSULIN) glargine 100 units/mL (3mL) SubQ pen INJECT 33 UNITS DAILY 15 Syringe 5    fexofenadine (ALLEGRA ALLERGY) 180 MG tablet Take 1 tablet (180 mg total) by mouth once daily. (Patient taking differently: Take 180 mg by mouth daily as needed. ) 10 tablet 0    insulin aspart U-100 (NOVOLOG FLEXPEN U-100 INSULIN) 100 unit/mL (3 mL) InPn pen USE AS DIRECTED PER SLIDING SCALE. MAX OF 14 UNITS DAILY) 1 Box 3    lancets (ACCU-CHEK SOFTCLIX LANCETS) Misc Check glucose before meals and at bedtime. 200 each 11    levothyroxine (SYNTHROID) 100 MCG tablet Take 1 tablet (100 mcg total) by mouth once daily. 90 tablet 3    montelukast (SINGULAIR) 10 mg tablet Take 1 tablet (10 mg total) by mouth every evening. 90 tablet 3    pen needle, diabetic (BD ULTRA-FINE MINI PEN NEEDLE) 31 gauge x 3/16" Ndle USE DAILY WITH LANTUS AND NOVOLOG 300 each 3    pravastatin (PRAVACHOL) 40 MG tablet Take 1 tablet (40 mg total) by mouth every evening. 90 tablet 3    tamsulosin (FLOMAX) 0.4 mg Cap Take 1 capsule (0.4 mg total) by mouth every evening. 90 capsule 3     No current facility-administered medications on file prior to visit.      Review of patient's allergies indicates:   Allergen Reactions    Ace inhibitors      Other reaction(s): cough    Metformin      Abdominal pain "       Objective:     Physical Exam   Constitutional: He is oriented to person, place, and time. He appears well-developed and well-nourished.   HENT:   Head: Normocephalic and atraumatic.   Eyes: Pupils are equal, round, and reactive to light. Conjunctivae are normal.   Neck: Normal range of motion. Neck supple.   Cardiovascular: Normal rate, regular rhythm, normal heart sounds and intact distal pulses.   Pulmonary/Chest: Effort normal and breath sounds normal.   Abdominal: Soft. Bowel sounds are normal.   Neurological: He is alert and oriented to person, place, and time.   Skin: Skin is warm and dry.   Psychiatric: He has a normal mood and affect.   Nursing note and vitals reviewed.      Assessment:     1. Hypertension associated with diabetes    2. Combined hyperlipidemia associated with type 2 diabetes mellitus    3. PAD (peripheral artery disease)    4. Bilateral carotid artery stenosis    5. ERNESTINE (obstructive sleep apnea)        Plan:     Hypertension associated with diabetes    Combined hyperlipidemia associated with type 2 diabetes mellitus    PAD (peripheral artery disease)    Bilateral carotid artery stenosis    ERNESTINE (obstructive sleep apnea)      Continue norvasc-htn  Continue statin-hlp  Continue asa- primary prevention

## 2019-09-11 NOTE — LETTER
September 15, 2019      Darin Krueger MD  [address]  [place] [address]           [facility]  [address]  [place] [address]  Phone: [phone]  Fax: [phone]          Patient: Bebeto Santiago   MR Number: [id number]   YOB: 1940   Date of Visit: 9/11/2019       Dear Dr. Darin Krueger:    Thank you for referring Bebeto Santiago to me for evaluation. Attached you will find relevant portions of my assessment and plan of care.    If you have questions, please do not hesitate to call me. I look forward to following Bebeto Santiago along with you.    Sincerely,    Abad Villagran MD    Enclosure  CC:  No Recipients    If you would like to receive this communication electronically, please contact [email or web address] or [phone] to request more information on [employer] Link access.    For providers and/or their staff who would like to refer a patient to [facility], please contact us through our one-stop-shop provider referral line, [facility] , at [phone].    If you feel you have received this communication in error or would no longer like to receive these types of communications, please e-mail [email or web address]

## 2019-09-16 ENCOUNTER — CLINICAL SUPPORT (OUTPATIENT)
Dept: CARDIOLOGY | Facility: CLINIC | Age: 79
End: 2019-09-16
Attending: INTERNAL MEDICINE
Payer: MEDICARE

## 2019-09-16 LAB
DIASTOLIC DYSFUNCTION: NO
MITRAL VALVE MOBILITY: NORMAL
RETIRED EF AND QEF - SEE NOTES: 60 (ref 55–65)

## 2019-09-16 PROCEDURE — 93306 TTE W/DOPPLER COMPLETE: CPT | Mod: PBBFAC,PO | Performed by: NUCLEAR MEDICINE

## 2019-09-16 PROCEDURE — 93306 2D ECHO WITH COLOR FLOW DOPPLER: ICD-10-PCS | Mod: 26,S$PBB,, | Performed by: NUCLEAR MEDICINE

## 2019-09-18 ENCOUNTER — TELEPHONE (OUTPATIENT)
Dept: CARDIOLOGY | Facility: CLINIC | Age: 79
End: 2019-09-18

## 2019-09-18 ENCOUNTER — CLINICAL SUPPORT (OUTPATIENT)
Dept: CARDIOLOGY | Facility: CLINIC | Age: 79
End: 2019-09-18
Attending: INTERNAL MEDICINE
Payer: MEDICARE

## 2019-09-18 DIAGNOSIS — R53.83 FATIGUE, UNSPECIFIED TYPE: ICD-10-CM

## 2019-09-18 PROCEDURE — 93227 XTRNL ECG REC<48 HR R&I: CPT | Mod: S$PBB,,, | Performed by: INTERNAL MEDICINE

## 2019-09-18 PROCEDURE — 93226 XTRNL ECG REC<48 HR SCAN A/R: CPT | Mod: PBBFAC,PO | Performed by: INTERNAL MEDICINE

## 2019-09-18 PROCEDURE — 93227 HOLTER MONITOR - 24 HOUR: ICD-10-PCS | Mod: S$PBB,,, | Performed by: INTERNAL MEDICINE

## 2019-09-20 ENCOUNTER — TELEPHONE (OUTPATIENT)
Dept: CARDIOLOGY | Facility: CLINIC | Age: 79
End: 2019-09-20

## 2019-09-20 NOTE — TELEPHONE ENCOUNTER
Called and left v/m to rtc to me. Cm  ----- Message from Abad Villagran MD sent at 9/20/2019 12:25 PM CDT -----  holter reviewed, unremarkable, continue current meds

## 2019-09-20 NOTE — TELEPHONE ENCOUNTER
Rtc and wife notified.cm  ----- Message from Abad Villagran MD sent at 9/20/2019 12:25 PM CDT -----  holter reviewed, unremarkable, continue current meds

## 2019-09-25 ENCOUNTER — TELEPHONE (OUTPATIENT)
Dept: CARDIOLOGY | Facility: CLINIC | Age: 79
End: 2019-09-25

## 2019-09-26 ENCOUNTER — CLINICAL SUPPORT (OUTPATIENT)
Dept: CARDIOLOGY | Facility: CLINIC | Age: 79
End: 2019-09-26
Attending: INTERNAL MEDICINE
Payer: MEDICARE

## 2019-09-26 DIAGNOSIS — E11.59 HYPERTENSION ASSOCIATED WITH DIABETES: ICD-10-CM

## 2019-09-26 DIAGNOSIS — I15.2 HYPERTENSION ASSOCIATED WITH DIABETES: ICD-10-CM

## 2019-09-26 PROCEDURE — 93018 CV STRESS TEST I&R ONLY: CPT | Mod: S$PBB,,, | Performed by: INTERNAL MEDICINE

## 2019-09-26 PROCEDURE — 93016 NM MULTI TREAD STRESS CARDIAC COMPONENT: ICD-10-PCS | Mod: S$PBB,,, | Performed by: INTERNAL MEDICINE

## 2019-09-26 PROCEDURE — 93016 CV STRESS TEST SUPVJ ONLY: CPT | Mod: S$PBB,,, | Performed by: INTERNAL MEDICINE

## 2019-09-26 PROCEDURE — 78452 HT MUSCLE IMAGE SPECT MULT: CPT | Mod: PBBFAC,PO | Performed by: INTERNAL MEDICINE

## 2019-09-26 PROCEDURE — 93018 NM MULTI TREAD STRESS CARDIAC COMPONENT: ICD-10-PCS | Mod: S$PBB,,, | Performed by: INTERNAL MEDICINE

## 2019-09-26 PROCEDURE — 78452 NM MULTI TREAD STRESS CARDIAC COMPONENT: ICD-10-PCS | Mod: 26,S$PBB,, | Performed by: INTERNAL MEDICINE

## 2019-09-29 LAB — DIASTOLIC DYSFUNCTION: NO

## 2019-09-30 ENCOUNTER — EXTERNAL CHRONIC CARE MANAGEMENT (OUTPATIENT)
Dept: PRIMARY CARE CLINIC | Facility: CLINIC | Age: 79
End: 2019-09-30
Payer: MEDICARE

## 2019-09-30 PROCEDURE — 99490 PR CHRONIC CARE MGMT, 1ST 20 MIN: ICD-10-PCS | Mod: S$PBB,,, | Performed by: FAMILY MEDICINE

## 2019-09-30 PROCEDURE — 99490 CHRNC CARE MGMT STAFF 1ST 20: CPT | Mod: PBBFAC,PO | Performed by: FAMILY MEDICINE

## 2019-09-30 PROCEDURE — 99490 CHRNC CARE MGMT STAFF 1ST 20: CPT | Mod: S$PBB,,, | Performed by: FAMILY MEDICINE

## 2019-10-01 ENCOUNTER — TELEPHONE (OUTPATIENT)
Dept: CARDIOLOGY | Facility: CLINIC | Age: 79
End: 2019-10-01

## 2019-10-31 ENCOUNTER — EXTERNAL CHRONIC CARE MANAGEMENT (OUTPATIENT)
Dept: PRIMARY CARE CLINIC | Facility: CLINIC | Age: 79
End: 2019-10-31
Payer: MEDICARE

## 2019-10-31 PROCEDURE — 99490 CHRNC CARE MGMT STAFF 1ST 20: CPT | Mod: PBBFAC,PO | Performed by: FAMILY MEDICINE

## 2019-10-31 PROCEDURE — 99490 PR CHRONIC CARE MGMT, 1ST 20 MIN: ICD-10-PCS | Mod: S$PBB,,, | Performed by: FAMILY MEDICINE

## 2019-10-31 PROCEDURE — 99490 CHRNC CARE MGMT STAFF 1ST 20: CPT | Mod: S$PBB,,, | Performed by: FAMILY MEDICINE

## 2019-11-06 ENCOUNTER — OFFICE VISIT (OUTPATIENT)
Dept: CARDIOLOGY | Facility: CLINIC | Age: 79
End: 2019-11-06
Payer: MEDICARE

## 2019-11-06 VITALS
OXYGEN SATURATION: 97 % | WEIGHT: 165 LBS | DIASTOLIC BLOOD PRESSURE: 70 MMHG | HEART RATE: 66 BPM | SYSTOLIC BLOOD PRESSURE: 122 MMHG | BODY MASS INDEX: 22.35 KG/M2 | HEIGHT: 72 IN

## 2019-11-06 DIAGNOSIS — E11.59 HYPERTENSION ASSOCIATED WITH DIABETES: Primary | ICD-10-CM

## 2019-11-06 DIAGNOSIS — Z98.890 HISTORY OF CAROTID ENDARTERECTOMY: ICD-10-CM

## 2019-11-06 DIAGNOSIS — I15.2 HYPERTENSION ASSOCIATED WITH DIABETES: Primary | ICD-10-CM

## 2019-11-06 DIAGNOSIS — G47.33 OSA (OBSTRUCTIVE SLEEP APNEA): ICD-10-CM

## 2019-11-06 DIAGNOSIS — E78.2 COMBINED HYPERLIPIDEMIA ASSOCIATED WITH TYPE 2 DIABETES MELLITUS: ICD-10-CM

## 2019-11-06 DIAGNOSIS — I73.9 PAD (PERIPHERAL ARTERY DISEASE): ICD-10-CM

## 2019-11-06 DIAGNOSIS — E11.69 COMBINED HYPERLIPIDEMIA ASSOCIATED WITH TYPE 2 DIABETES MELLITUS: ICD-10-CM

## 2019-11-06 PROCEDURE — 99999 PR PBB SHADOW E&M-EST. PATIENT-LVL III: ICD-10-PCS | Mod: PBBFAC,,, | Performed by: INTERNAL MEDICINE

## 2019-11-06 PROCEDURE — 99214 OFFICE O/P EST MOD 30 MIN: CPT | Mod: S$PBB,,, | Performed by: INTERNAL MEDICINE

## 2019-11-06 PROCEDURE — 99213 OFFICE O/P EST LOW 20 MIN: CPT | Mod: PBBFAC,PO | Performed by: INTERNAL MEDICINE

## 2019-11-06 PROCEDURE — 99999 PR PBB SHADOW E&M-EST. PATIENT-LVL III: CPT | Mod: PBBFAC,,, | Performed by: INTERNAL MEDICINE

## 2019-11-06 PROCEDURE — 99214 PR OFFICE/OUTPT VISIT, EST, LEVL IV, 30-39 MIN: ICD-10-PCS | Mod: S$PBB,,, | Performed by: INTERNAL MEDICINE

## 2019-11-06 NOTE — PROGRESS NOTES
Subjective:   Patient ID:  Bebeto Santiago is a 79 y.o. male who presents for follow-up of Follow-up (rev test 2 month f/u )  Echo, NMT and holter is normal.Patient denies CP, angina or anginal equivalent.  Hx of ELENA occlussion, hx of LCEA 2016    Hypertension   This is a chronic problem. The current episode started more than 1 year ago. The problem has been gradually improving since onset. The problem is controlled. Pertinent negatives include no chest pain, palpitations or shortness of breath. Past treatments include calcium channel blockers. The current treatment provides moderate improvement. There are no compliance problems.    Hyperlipidemia   This is a chronic problem. The current episode started more than 1 year ago. The problem is controlled. Pertinent negatives include no chest pain or shortness of breath. Current antihyperlipidemic treatment includes statins. The current treatment provides moderate improvement of lipids. There are no compliance problems.  Risk factors for coronary artery disease include hypertension, diabetes mellitus and male sex.       Review of Systems   Constitution: Negative. Negative for weight gain.   HENT: Negative.    Eyes: Negative.    Cardiovascular: Negative.  Negative for chest pain, leg swelling and palpitations.   Respiratory: Negative.  Negative for shortness of breath.    Endocrine: Negative.    Hematologic/Lymphatic: Negative.    Skin: Negative.    Musculoskeletal: Negative for muscle weakness.   Gastrointestinal: Negative.    Genitourinary: Negative.    Neurological: Negative.  Negative for dizziness.   Psychiatric/Behavioral: Negative.    Allergic/Immunologic: Negative.      Family History   Problem Relation Age of Onset    Heart disease Mother     Stroke Maternal Grandfather     Diabetes Neg Hx     Cancer Neg Hx     Hypertension Neg Hx      Past Medical History:   Diagnosis Date    Allergy     BPH (benign prostatic hypertrophy)     Dehydration     Diabetes  "mellitus type II, uncontrolled     GERD (gastroesophageal reflux disease)     grade IV/IV    Hypertension     Hypothyroidism     Male hypogonadism     Non-proliferative diabetic retinopathy     Urinary retention      Social History     Socioeconomic History    Marital status:      Spouse name: Babs    Number of children: 2    Years of education: Not on file    Highest education level: Not on file   Occupational History    Occupation: Retired   Social Needs    Financial resource strain: Not on file    Food insecurity:     Worry: Not on file     Inability: Not on file    Transportation needs:     Medical: Not on file     Non-medical: Not on file   Tobacco Use    Smoking status: Former Smoker     Last attempt to quit: 1986     Years since quittin.1    Smokeless tobacco: Current User     Types: Chew   Substance and Sexual Activity    Alcohol use: Yes     Comment: rare    Drug use: No    Sexual activity: Not on file   Lifestyle    Physical activity:     Days per week: Not on file     Minutes per session: Not on file    Stress: Not on file   Relationships    Social connections:     Talks on phone: Not on file     Gets together: Not on file     Attends [employer] service: Not on file     Active member of club or organization: Not on file     Attends meetings of clubs or organizations: Not on file     Relationship status: Not on file   Other Topics Concern    Not on file   Social History Narrative    Not on file     Current Outpatient Medications on File Prior to Visit   Medication Sig Dispense Refill    amLODIPine (NORVASC) 5 MG tablet Take 1 tablet (5 mg total) by mouth once daily. 90 tablet 3    aspirin 325 MG tablet Take 325 mg by mouth once daily.      BD LUER-MANUEL SYRINGE 3 mL 22 x 1 1/2" Syrg   3    blood sugar diagnostic (FREESTYLE LITE STRIPS) Strp 4 times a day (BEFORE MEALS AND AT BEDTIME) 400 strip 3    blood-glucose meter Misc Use as directed four times daily " "before meals and at bedtime. 1 each 0    dutasteride (AVODART) 0.5 mg capsule Take 1 capsule (0.5 mg total) by mouth once daily. 90 capsule 3    fluticasone (FLONASE) 50 mcg/actuation nasal spray USE 1 SPRAY IN EACH NOSTRIL ONCE DAILY. (Patient taking differently: 1 spray every evening. USE 1 SPRAY IN EACH NOSTRIL ONCE DAILY.) 3 Bottle 3    insulin (LANTUS SOLOSTAR U-100 INSULIN) glargine 100 units/mL (3mL) SubQ pen INJECT 33 UNITS DAILY 15 Syringe 5    insulin aspart U-100 (NOVOLOG FLEXPEN U-100 INSULIN) 100 unit/mL (3 mL) InPn pen USE AS DIRECTED PER SLIDING SCALE. MAX OF 14 UNITS DAILY) 1 Box 3    lancets (ACCU-CHEK SOFTCLIX LANCETS) Misc Check glucose before meals and at bedtime. 200 each 11    levothyroxine (SYNTHROID) 100 MCG tablet Take 1 tablet (100 mcg total) by mouth once daily. 90 tablet 3    montelukast (SINGULAIR) 10 mg tablet Take 1 tablet (10 mg total) by mouth every evening. 90 tablet 3    pen needle, diabetic (BD ULTRA-FINE MINI PEN NEEDLE) 31 gauge x 3/16" Ndle USE DAILY WITH LANTUS AND NOVOLOG 300 each 3    pravastatin (PRAVACHOL) 40 MG tablet Take 1 tablet (40 mg total) by mouth every evening. 90 tablet 3    tamsulosin (FLOMAX) 0.4 mg Cap Take 1 capsule (0.4 mg total) by mouth every evening. 90 capsule 3    fexofenadine (ALLEGRA ALLERGY) 180 MG tablet Take 1 tablet (180 mg total) by mouth once daily. (Patient taking differently: Take 180 mg by mouth daily as needed. ) 10 tablet 0     No current facility-administered medications on file prior to visit.      Review of patient's allergies indicates:   Allergen Reactions    Ace inhibitors      Other reaction(s): cough    Metformin      Abdominal pain       Objective:     Physical Exam   Constitutional: He is oriented to person, place, and time. He appears well-developed and well-nourished.   HENT:   Head: Normocephalic and atraumatic.   Eyes: Pupils are equal, round, and reactive to light. Conjunctivae are normal.   Neck: Normal range of " motion. Neck supple.   Cardiovascular: Normal rate, regular rhythm, normal heart sounds and intact distal pulses.   Pulmonary/Chest: Effort normal and breath sounds normal.   Abdominal: Soft. Bowel sounds are normal.   Neurological: He is alert and oriented to person, place, and time.   Skin: Skin is warm and dry.   Psychiatric: He has a normal mood and affect.   Nursing note and vitals reviewed.      Assessment:     1. Hypertension associated with diabetes    2. Combined hyperlipidemia associated with type 2 diabetes mellitus    3. PAD (peripheral artery disease)    4. ERNESTINE (obstructive sleep apnea)        Plan:     Hypertension associated with diabetes    Combined hyperlipidemia associated with type 2 diabetes mellitus    PAD (peripheral artery disease)    ERNESTINE (obstructive sleep apnea)      Continue norvasc-htn  Continue statin-hlp  Continue asa- primary prevention

## 2019-11-11 ENCOUNTER — TELEPHONE (OUTPATIENT)
Dept: FAMILY MEDICINE | Facility: CLINIC | Age: 79
End: 2019-11-11

## 2019-11-11 ENCOUNTER — TELEPHONE (OUTPATIENT)
Dept: ENDOSCOPY | Facility: HOSPITAL | Age: 79
End: 2019-11-11

## 2019-11-11 NOTE — TELEPHONE ENCOUNTER
----- Message from Ghada Mariano sent at 11/11/2019 11:40 AM CST -----  Contact: Constance  Kamini Mosqueda  Request a call to verify the pt colonoscopy appt, no additional info given and can be reached at [phone]///[id number]

## 2019-11-11 NOTE — TELEPHONE ENCOUNTER

## 2019-11-12 RX ORDER — SODIUM, POTASSIUM,MAG SULFATES 17.5-3.13G
1 SOLUTION, RECONSTITUTED, ORAL ORAL DAILY
Qty: 1 KIT | Refills: 0 | Status: SHIPPED | OUTPATIENT
Start: 2019-11-12 | End: 2019-11-14

## 2019-11-21 RX ORDER — INSULIN GLARGINE 100 [IU]/ML
INJECTION, SOLUTION SUBCUTANEOUS
Qty: 30 SYRINGE | Refills: 1 | Status: SHIPPED | OUTPATIENT
Start: 2019-11-21 | End: 2020-03-06 | Stop reason: SDUPTHER

## 2019-11-30 ENCOUNTER — EXTERNAL CHRONIC CARE MANAGEMENT (OUTPATIENT)
Dept: PRIMARY CARE CLINIC | Facility: CLINIC | Age: 79
End: 2019-11-30
Payer: MEDICARE

## 2019-11-30 PROCEDURE — 99490 CHRNC CARE MGMT STAFF 1ST 20: CPT | Mod: S$PBB,,, | Performed by: FAMILY MEDICINE

## 2019-11-30 PROCEDURE — 99490 PR CHRONIC CARE MGMT, 1ST 20 MIN: ICD-10-PCS | Mod: S$PBB,,, | Performed by: FAMILY MEDICINE

## 2019-11-30 PROCEDURE — 99490 CHRNC CARE MGMT STAFF 1ST 20: CPT | Mod: PBBFAC,PO | Performed by: FAMILY MEDICINE

## 2019-12-03 ENCOUNTER — LAB VISIT (OUTPATIENT)
Dept: LAB | Facility: HOSPITAL | Age: 79
End: 2019-12-03
Attending: FAMILY MEDICINE
Payer: MEDICARE

## 2019-12-03 ENCOUNTER — OFFICE VISIT (OUTPATIENT)
Dept: FAMILY MEDICINE | Facility: CLINIC | Age: 79
End: 2019-12-03
Payer: MEDICARE

## 2019-12-03 VITALS
TEMPERATURE: 99 F | HEART RATE: 66 BPM | WEIGHT: 166 LBS | SYSTOLIC BLOOD PRESSURE: 152 MMHG | BODY MASS INDEX: 22.48 KG/M2 | RESPIRATION RATE: 18 BRPM | DIASTOLIC BLOOD PRESSURE: 73 MMHG | HEIGHT: 72 IN

## 2019-12-03 DIAGNOSIS — Z01.818 PREOPERATIVE CLEARANCE: ICD-10-CM

## 2019-12-03 DIAGNOSIS — Z01.818 PREOPERATIVE CLEARANCE: Primary | ICD-10-CM

## 2019-12-03 LAB
ANION GAP SERPL CALC-SCNC: 9 MMOL/L (ref 8–16)
BASOPHILS # BLD AUTO: 0.06 K/UL (ref 0–0.2)
BASOPHILS NFR BLD: 0.7 % (ref 0–1.9)
BUN SERPL-MCNC: 26 MG/DL (ref 8–23)
CALCIUM SERPL-MCNC: 10.1 MG/DL (ref 8.7–10.5)
CHLORIDE SERPL-SCNC: 104 MMOL/L (ref 95–110)
CO2 SERPL-SCNC: 27 MMOL/L (ref 23–29)
CREAT SERPL-MCNC: 1.1 MG/DL (ref 0.5–1.4)
DIFFERENTIAL METHOD: ABNORMAL
EOSINOPHIL # BLD AUTO: 0.1 K/UL (ref 0–0.5)
EOSINOPHIL NFR BLD: 1.3 % (ref 0–8)
ERYTHROCYTE [DISTWIDTH] IN BLOOD BY AUTOMATED COUNT: 13.8 % (ref 11.5–14.5)
EST. GFR  (AFRICAN AMERICAN): >60 ML/MIN/1.73 M^2
EST. GFR  (NON AFRICAN AMERICAN): >60 ML/MIN/1.73 M^2
GLUCOSE SERPL-MCNC: 146 MG/DL (ref 70–110)
HCT VFR BLD AUTO: 48.1 % (ref 40–54)
HGB BLD-MCNC: 15.6 G/DL (ref 14–18)
IMM GRANULOCYTES # BLD AUTO: 0.04 K/UL (ref 0–0.04)
IMM GRANULOCYTES NFR BLD AUTO: 0.5 % (ref 0–0.5)
LYMPHOCYTES # BLD AUTO: 1.3 K/UL (ref 1–4.8)
LYMPHOCYTES NFR BLD: 14.8 % (ref 18–48)
MCH RBC QN AUTO: 29.6 PG (ref 27–31)
MCHC RBC AUTO-ENTMCNC: 32.4 G/DL (ref 32–36)
MCV RBC AUTO: 91 FL (ref 82–98)
MONOCYTES # BLD AUTO: 0.5 K/UL (ref 0.3–1)
MONOCYTES NFR BLD: 6.3 % (ref 4–15)
NEUTROPHILS # BLD AUTO: 6.6 K/UL (ref 1.8–7.7)
NEUTROPHILS NFR BLD: 76.4 % (ref 38–73)
NRBC BLD-RTO: 0 /100 WBC
PLATELET # BLD AUTO: 235 K/UL (ref 150–350)
PMV BLD AUTO: 11.1 FL (ref 9.2–12.9)
POTASSIUM SERPL-SCNC: 4.5 MMOL/L (ref 3.5–5.1)
RBC # BLD AUTO: 5.27 M/UL (ref 4.6–6.2)
SODIUM SERPL-SCNC: 140 MMOL/L (ref 136–145)
WBC # BLD AUTO: 8.61 K/UL (ref 3.9–12.7)

## 2019-12-03 PROCEDURE — 1126F PR PAIN SEVERITY QUANTIFIED, NO PAIN PRESENT: ICD-10-PCS | Mod: ,,, | Performed by: NURSE PRACTITIONER

## 2019-12-03 PROCEDURE — 85025 COMPLETE CBC W/AUTO DIFF WBC: CPT

## 2019-12-03 PROCEDURE — 99999 PR PBB SHADOW E&M-EST. PATIENT-LVL III: ICD-10-PCS | Mod: PBBFAC,,, | Performed by: NURSE PRACTITIONER

## 2019-12-03 PROCEDURE — 99214 PR OFFICE/OUTPT VISIT, EST, LEVL IV, 30-39 MIN: ICD-10-PCS | Mod: S$PBB,,, | Performed by: NURSE PRACTITIONER

## 2019-12-03 PROCEDURE — 1159F MED LIST DOCD IN RCRD: CPT | Mod: ,,, | Performed by: NURSE PRACTITIONER

## 2019-12-03 PROCEDURE — 36415 COLL VENOUS BLD VENIPUNCTURE: CPT | Mod: PO

## 2019-12-03 PROCEDURE — 1159F PR MEDICATION LIST DOCUMENTED IN MEDICAL RECORD: ICD-10-PCS | Mod: ,,, | Performed by: NURSE PRACTITIONER

## 2019-12-03 PROCEDURE — 1126F AMNT PAIN NOTED NONE PRSNT: CPT | Mod: ,,, | Performed by: NURSE PRACTITIONER

## 2019-12-03 PROCEDURE — 80048 BASIC METABOLIC PNL TOTAL CA: CPT

## 2019-12-03 PROCEDURE — 99214 OFFICE O/P EST MOD 30 MIN: CPT | Mod: S$PBB,,, | Performed by: NURSE PRACTITIONER

## 2019-12-03 PROCEDURE — 99213 OFFICE O/P EST LOW 20 MIN: CPT | Mod: PBBFAC,PO | Performed by: NURSE PRACTITIONER

## 2019-12-03 PROCEDURE — 99999 PR PBB SHADOW E&M-EST. PATIENT-LVL III: CPT | Mod: PBBFAC,,, | Performed by: NURSE PRACTITIONER

## 2019-12-03 RX ORDER — TOBRAMYCIN AND DEXAMETHASONE 3; 1 MG/ML; MG/ML
SUSPENSION/ DROPS OPHTHALMIC
COMMUNITY
Start: 2019-11-25 | End: 2020-03-06

## 2019-12-03 RX ORDER — NEOMYCIN SULFATE, POLYMYXIN B SULFATE, AND DEXAMETHASONE 3.5; 10000; 1 MG/G; [USP'U]/G; MG/G
OINTMENT OPHTHALMIC
COMMUNITY
Start: 2019-11-25 | End: 2022-07-13

## 2019-12-03 RX ORDER — OFLOXACIN 3 MG/ML
SOLUTION/ DROPS OPHTHALMIC
COMMUNITY
Start: 2019-11-25 | End: 2020-03-06

## 2019-12-03 NOTE — PROGRESS NOTES
"Subjective:       Patient ID: Bebeto Santiago is a 79 y.o. male.    Chief Complaint: Surgical Clearance for Cataract surgery    HPI     he comes in for preop clearance. he is scheduled to have left cataract removal done by Dr. Kim on 12/10/19.  Medical history includes hypothyroidism, diabetes, hyperlipidemia, BPH, and HTN.  Preop testing includes EKG has already been done, he needs labs. he denies shortness of breath or chest pain, no dizziness or syncope, no headaches or blurry vision. Denies fever. heis not currently on blood thinners.         Review of Systems   Constitutional: Negative for fatigue, fever and unexpected weight change.   HENT: Negative for ear pain and sore throat.    Eyes: Negative.  Negative for pain and visual disturbance.   Respiratory: Negative for cough and shortness of breath.    Cardiovascular: Negative for chest pain and palpitations.   Gastrointestinal: Negative for abdominal pain, diarrhea, nausea and vomiting.   Genitourinary: Negative for dysuria and frequency.   Musculoskeletal: Negative for arthralgias and myalgias.   Skin: Negative for color change and rash.   Neurological: Negative for dizziness and headaches.   Psychiatric/Behavioral: Negative for sleep disturbance. The patient is not nervous/anxious.        Vitals:    12/03/19 0855   BP: (!) 152/73   Pulse: 66   Resp: 18   Temp: 98.6 °F (37 °C)       Objective:     Current Outpatient Medications   Medication Sig Dispense Refill    amLODIPine (NORVASC) 5 MG tablet Take 1 tablet (5 mg total) by mouth once daily. 90 tablet 3    aspirin 325 MG tablet Take 325 mg by mouth once daily.      BD LUER-MANUEL SYRINGE 3 mL 22 x 1 1/2" Syrg   3    blood sugar diagnostic (FREESTYLE LITE STRIPS) Strp 4 times a day (BEFORE MEALS AND AT BEDTIME) 400 strip 3    blood-glucose meter Misc Use as directed four times daily before meals and at bedtime. 1 each 0    dutasteride (AVODART) 0.5 mg capsule Take 1 capsule (0.5 mg total) by mouth once " "daily. 90 capsule 3    fluticasone (FLONASE) 50 mcg/actuation nasal spray USE 1 SPRAY IN EACH NOSTRIL ONCE DAILY. (Patient taking differently: 1 spray every evening. USE 1 SPRAY IN EACH NOSTRIL ONCE DAILY.) 3 Bottle 3    insulin (LANTUS SOLOSTAR U-100 INSULIN) glargine 100 units/mL (3mL) SubQ pen INJECT 33 UNITS UNDER THE SKIN ONCE A DAY 30 Syringe 1    insulin aspart U-100 (NOVOLOG FLEXPEN U-100 INSULIN) 100 unit/mL (3 mL) InPn pen USE AS DIRECTED PER SLIDING SCALE. MAX OF 14 UNITS DAILY) 1 Box 3    lancets (ACCU-CHEK SOFTCLIX LANCETS) Misc Check glucose before meals and at bedtime. 200 each 11    levothyroxine (SYNTHROID) 100 MCG tablet Take 1 tablet (100 mcg total) by mouth once daily. 90 tablet 3    montelukast (SINGULAIR) 10 mg tablet Take 1 tablet (10 mg total) by mouth every evening. 90 tablet 3    pen needle, diabetic (BD ULTRA-FINE MINI PEN NEEDLE) 31 gauge x 3/16" Ndle USE DAILY WITH LANTUS AND NOVOLOG 300 each 3    pravastatin (PRAVACHOL) 40 MG tablet Take 1 tablet (40 mg total) by mouth every evening. 90 tablet 3    tamsulosin (FLOMAX) 0.4 mg Cap Take 1 capsule (0.4 mg total) by mouth every evening. 90 capsule 3    fexofenadine (ALLEGRA ALLERGY) 180 MG tablet Take 1 tablet (180 mg total) by mouth once daily. (Patient taking differently: Take 180 mg by mouth daily as needed. ) 10 tablet 0    neomycin-polymyxin-dexamethasone (DEXACINE) 3.5 mg/g-10,000 unit/g-0.1 % Oint       ofloxacin (OCUFLOX) 0.3 % ophthalmic solution       tobramycin-dexamethasone 0.3-0.1% (TOBRADEX) 0.3-0.1 % DrpS        No current facility-administered medications for this visit.        Physical Exam   Constitutional: He is oriented to person, place, and time. He appears well-developed. No distress.   HENT:   Head: Normocephalic and atraumatic.   Eyes: Pupils are equal, round, and reactive to light. EOM are normal.   Neck: Normal range of motion. Neck supple.   Cardiovascular: Normal rate and regular rhythm. "   Pulmonary/Chest: Effort normal and breath sounds normal.   Musculoskeletal: Normal range of motion.   Neurological: He is alert and oriented to person, place, and time.   Skin: Skin is warm and dry. No rash noted.   Psychiatric: He has a normal mood and affect. Thought content normal.   Nursing note and vitals reviewed.      EKG shows SB with 1st degree AV block, HR 58    Assessment:       1. Preoperative clearance        Plan:   Preoperative clearance  -     CBC auto differential; Future; Expected date: 12/03/2019  -     Basic metabolic panel; Future; Expected date: 12/03/2019    I have reviewed EKG, CBC, and CMP. Pt is cleared for cataract surgery    No follow-ups on file.    There are no Patient Instructions on file for this visit.

## 2019-12-31 ENCOUNTER — EXTERNAL CHRONIC CARE MANAGEMENT (OUTPATIENT)
Dept: PRIMARY CARE CLINIC | Facility: CLINIC | Age: 79
End: 2019-12-31
Payer: MEDICARE

## 2019-12-31 PROCEDURE — 99490 PR CHRONIC CARE MGMT, 1ST 20 MIN: ICD-10-PCS | Mod: S$PBB,,, | Performed by: FAMILY MEDICINE

## 2019-12-31 PROCEDURE — 99490 CHRNC CARE MGMT STAFF 1ST 20: CPT | Mod: PBBFAC,PO | Performed by: FAMILY MEDICINE

## 2019-12-31 PROCEDURE — 99490 CHRNC CARE MGMT STAFF 1ST 20: CPT | Mod: S$PBB,,, | Performed by: FAMILY MEDICINE

## 2020-01-15 PROBLEM — Z12.11 COLON CANCER SCREENING: Status: ACTIVE | Noted: 2020-01-15

## 2020-01-16 ENCOUNTER — HOSPITAL ENCOUNTER (OUTPATIENT)
Facility: HOSPITAL | Age: 80
Discharge: HOME | End: 2020-01-16
Attending: FAMILY MEDICINE | Admitting: FAMILY MEDICINE
Payer: MEDICARE

## 2020-01-16 ENCOUNTER — ANESTHESIA EVENT (OUTPATIENT)
Dept: ENDOSCOPY | Facility: HOSPITAL | Age: 80
End: 2020-01-16
Payer: MEDICARE

## 2020-01-16 ENCOUNTER — ANESTHESIA (OUTPATIENT)
Dept: ENDOSCOPY | Facility: HOSPITAL | Age: 80
End: 2020-01-16
Payer: MEDICARE

## 2020-01-16 VITALS
SYSTOLIC BLOOD PRESSURE: 125 MMHG | WEIGHT: 163.13 LBS | OXYGEN SATURATION: 97 % | TEMPERATURE: 98 F | HEIGHT: 72 IN | HEART RATE: 60 BPM | BODY MASS INDEX: 22.09 KG/M2 | DIASTOLIC BLOOD PRESSURE: 59 MMHG | RESPIRATION RATE: 18 BRPM

## 2020-01-16 DIAGNOSIS — K63.5 POLYP OF COLON, UNSPECIFIED PART OF COLON, UNSPECIFIED TYPE: ICD-10-CM

## 2020-01-16 DIAGNOSIS — K57.30 DIVERTICULOSIS OF COLON: ICD-10-CM

## 2020-01-16 DIAGNOSIS — Z86.010 HISTORY OF COLON POLYPS: ICD-10-CM

## 2020-01-16 DIAGNOSIS — Z12.11 SPECIAL SCREENING FOR MALIGNANT NEOPLASMS, COLON: ICD-10-CM

## 2020-01-16 DIAGNOSIS — Z12.11 COLON CANCER SCREENING: Primary | ICD-10-CM

## 2020-01-16 LAB — POCT GLUCOSE: 210 MG/DL (ref 70–110)

## 2020-01-16 PROCEDURE — 37000009 HC ANESTHESIA EA ADD 15 MINS: Performed by: FAMILY MEDICINE

## 2020-01-16 PROCEDURE — 63600175 PHARM REV CODE 636 W HCPCS: Performed by: FAMILY MEDICINE

## 2020-01-16 PROCEDURE — 88305 TISSUE EXAM BY PATHOLOGIST: CPT | Mod: 26,,, | Performed by: PATHOLOGY

## 2020-01-16 PROCEDURE — 27201012 HC FORCEPS, HOT/COLD, DISP: Performed by: FAMILY MEDICINE

## 2020-01-16 PROCEDURE — 25000003 PHARM REV CODE 250: Performed by: NURSE ANESTHETIST, CERTIFIED REGISTERED

## 2020-01-16 PROCEDURE — 63600175 PHARM REV CODE 636 W HCPCS: Performed by: NURSE ANESTHETIST, CERTIFIED REGISTERED

## 2020-01-16 PROCEDURE — 45380 COLONOSCOPY AND BIOPSY: CPT | Performed by: FAMILY MEDICINE

## 2020-01-16 PROCEDURE — 88305 TISSUE EXAM BY PATHOLOGIST: ICD-10-PCS | Mod: 26,,, | Performed by: PATHOLOGY

## 2020-01-16 PROCEDURE — 45380 COLONOSCOPY AND BIOPSY: CPT | Mod: PT,,, | Performed by: FAMILY MEDICINE

## 2020-01-16 PROCEDURE — 45380 PR COLONOSCOPY,BIOPSY: ICD-10-PCS | Mod: PT,,, | Performed by: FAMILY MEDICINE

## 2020-01-16 PROCEDURE — 88305 TISSUE EXAM BY PATHOLOGIST: CPT | Performed by: PATHOLOGY

## 2020-01-16 PROCEDURE — 37000008 HC ANESTHESIA 1ST 15 MINUTES: Performed by: FAMILY MEDICINE

## 2020-01-16 RX ORDER — LIDOCAINE HYDROCHLORIDE 10 MG/ML
INJECTION, SOLUTION EPIDURAL; INFILTRATION; INTRACAUDAL; PERINEURAL
Status: DISCONTINUED | OUTPATIENT
Start: 2020-01-16 | End: 2020-01-16

## 2020-01-16 RX ORDER — PROPOFOL 10 MG/ML
VIAL (ML) INTRAVENOUS
Status: DISCONTINUED | OUTPATIENT
Start: 2020-01-16 | End: 2020-01-16

## 2020-01-16 RX ORDER — SODIUM CHLORIDE 0.9 % (FLUSH) 0.9 %
10 SYRINGE (ML) INJECTION
Status: DISCONTINUED | OUTPATIENT
Start: 2020-01-16 | End: 2020-01-16 | Stop reason: HOSPADM

## 2020-01-16 RX ORDER — SODIUM CHLORIDE, SODIUM LACTATE, POTASSIUM CHLORIDE, CALCIUM CHLORIDE 600; 310; 30; 20 MG/100ML; MG/100ML; MG/100ML; MG/100ML
INJECTION, SOLUTION INTRAVENOUS CONTINUOUS
Status: DISCONTINUED | OUTPATIENT
Start: 2020-01-16 | End: 2020-01-16 | Stop reason: HOSPADM

## 2020-01-16 RX ADMIN — SODIUM CHLORIDE, SODIUM LACTATE, POTASSIUM CHLORIDE, AND CALCIUM CHLORIDE: 600; 310; 30; 20 INJECTION, SOLUTION INTRAVENOUS at 10:01

## 2020-01-16 RX ADMIN — PROPOFOL 50 MG: 10 INJECTION, EMULSION INTRAVENOUS at 10:01

## 2020-01-16 RX ADMIN — LIDOCAINE HYDROCHLORIDE 50 MG: 10 INJECTION, SOLUTION EPIDURAL; INFILTRATION; INTRACAUDAL; PERINEURAL at 10:01

## 2020-01-16 RX ADMIN — PROPOFOL 100 MG: 10 INJECTION, EMULSION INTRAVENOUS at 10:01

## 2020-01-16 NOTE — H&P
Short Stay Endoscopy History and Physical    PCP - Darin Krueger MD    Procedure - Colonoscopy  ASA - 2  Mallampati - per anesthesia  History of Anesthesia problems - no  Family history Anesthesia problems -  no     HPI:  This is a 79 y.o. male here for evaluation of :   Active Hospital Problems    Diagnosis  POA    *Colon cancer screening [Z12.11]  Not Applicable    Special screening for malignant neoplasms, colon [Z12.11]  Not Applicable    History of colon polyps [Z86.010]  Not Applicable     He has a history of colon polyps and his last colonoscopy was in 2013. He is not having bleeding or stool changes.    He is due for this.        Resolved Hospital Problems   No resolved problems to display.         Health Maintenance       Date Due Completion Date    Shingles Vaccine (2 of 3) 12/29/2011 11/3/2011    Colonoscopy 02/08/2018 2/8/2013    Override on 2/1/2013: Done (Done by Dr. Pelletier-date is approximate)    Lipid Panel 03/01/2020 3/1/2019    Hemoglobin A1c 03/10/2020 9/10/2019    Override on 10/26/2015: Done    Foot Exam 09/06/2020 9/6/2019    Override on 9/6/2019: Done    Override on 8/29/2018: Done    Override on 9/20/2017: Done    Override on 8/25/2016: Done    Override on 10/26/2015: Done    Urine Microalbumin 09/10/2020 9/10/2019    Override on 10/26/2015: Done    Eye Exam 11/08/2020 11/8/2019    Override on 12/9/2015: Done    Override on 12/15/2014: Done (per Dr. Kim, report sent to scanning.)    Override on 12/16/2013: Done (Per [facility] Dr. Kim. Sent to scanning)    Aspirin/Antiplatelet Therapy 01/16/2021 1/16/2020    TETANUS VACCINE 03/06/2023 3/6/2013          Screening - yes  History of polyps - yes  Diarrhea - no  Anemia - no  Blood in stools - no  Abdominal pain - no  Other - no    ROS:  CONSTITUTIONAL: Denies weight change,  fatigue, fevers, chills, night sweats.  CARDIOVASCULAR: Denies chest pain, shortness of breath, orthopnea and edema.  RESPIRATORY: Denies cough,  "hemoptysis, dyspnea, and wheezing.  GI: See HPI.    Medical History:   Past Medical History:   Diagnosis Date    Allergy     BPH (benign prostatic hypertrophy)     Dehydration     Diabetes mellitus type II, uncontrolled     GERD (gastroesophageal reflux disease) 2013    grade IV/IV    Hypertension     Hypothyroidism     Male hypogonadism     Non-proliferative diabetic retinopathy     Urinary retention        Surgical History:   Past Surgical History:   Procedure Laterality Date    EYE SURGERY      SPINE SURGERY         Family History:   Family History   Problem Relation Age of Onset    Heart disease Mother     Stroke Maternal Grandfather     Diabetes Neg Hx     Cancer Neg Hx     Hypertension Neg Hx        Social History:   Social History     Tobacco Use    Smoking status: Former Smoker     Last attempt to quit: 1986     Years since quittin.3    Smokeless tobacco: Current User     Types: Chew   Substance Use Topics    Alcohol use: Yes     Comment: rare    Drug use: No       Allergies:   Review of patient's allergies indicates:   Allergen Reactions    Ace inhibitors      Other reaction(s): cough    Metformin      Abdominal pain       Medications:   No current facility-administered medications on file prior to encounter.      Current Outpatient Medications on File Prior to Encounter   Medication Sig Dispense Refill    amLODIPine (NORVASC) 5 MG tablet Take 1 tablet (5 mg total) by mouth once daily. 90 tablet 3    aspirin 325 MG tablet Take 325 mg by mouth once daily.      BD LUER-MANUEL SYRINGE 3 mL 22 x 1 1/2" Syrg   3    blood sugar diagnostic (FREESTYLE LITE STRIPS) Strp 4 times a day (BEFORE MEALS AND AT BEDTIME) 400 strip 3    blood-glucose meter Misc Use as directed four times daily before meals and at bedtime. 1 each 0    dutasteride (AVODART) 0.5 mg capsule Take 1 capsule (0.5 mg total) by mouth once daily. 90 capsule 3    fluticasone (FLONASE) 50 mcg/actuation nasal spray " "USE 1 SPRAY IN EACH NOSTRIL ONCE DAILY. (Patient taking differently: 1 spray every evening. USE 1 SPRAY IN EACH NOSTRIL ONCE DAILY.) 3 Bottle 3    insulin aspart U-100 (NOVOLOG FLEXPEN U-100 INSULIN) 100 unit/mL (3 mL) InPn pen USE AS DIRECTED PER SLIDING SCALE. MAX OF 14 UNITS DAILY) 1 Box 3    lancets (ACCU-CHEK SOFTCLIX LANCETS) Misc Check glucose before meals and at bedtime. 200 each 11    levothyroxine (SYNTHROID) 100 MCG tablet Take 1 tablet (100 mcg total) by mouth once daily. 90 tablet 3    montelukast (SINGULAIR) 10 mg tablet Take 1 tablet (10 mg total) by mouth every evening. 90 tablet 3    pen needle, diabetic (BD ULTRA-FINE MINI PEN NEEDLE) 31 gauge x 3/16" Ndle USE DAILY WITH LANTUS AND NOVOLOG 300 each 3    pravastatin (PRAVACHOL) 40 MG tablet Take 1 tablet (40 mg total) by mouth every evening. 90 tablet 3    tamsulosin (FLOMAX) 0.4 mg Cap Take 1 capsule (0.4 mg total) by mouth every evening. 90 capsule 3    fexofenadine (ALLEGRA ALLERGY) 180 MG tablet Take 1 tablet (180 mg total) by mouth once daily. (Patient taking differently: Take 180 mg by mouth daily as needed. ) 10 tablet 0       Physical Exam:  Vital Signs:   Vitals:    01/16/20 1006   BP: (!) 145/85   Pulse: 64   Resp: 17   Temp: 97.9 °F (36.6 °C)     General Appearance: Well appearing in no acute distress  ENT: OP clear  Chest: CTA B  CV: RRR, no m/r/g  Abd: s/nt/nd/nabs  Ext: no edema    Labs:Reviewed    IMP:  Active Hospital Problems    Diagnosis  POA    *Colon cancer screening [Z12.11]  Not Applicable    Special screening for malignant neoplasms, colon [Z12.11]  Not Applicable    History of colon polyps [Z86.010]  Not Applicable     He has a history of colon polyps and his last colonoscopy was in 2013. He is not having bleeding or stool changes.    He is due for this.        Resolved Hospital Problems   No resolved problems to display.         Plan:   I have explained the risks and benefits of colonoscopy to the patient " including but not limited to bleeding, perforation, infection, and death. The patient wishes to proceed.

## 2020-01-16 NOTE — [employer] PATIENT INSTRUCTIONS
Discharge Summary/Instructions after an Endoscopic Procedure  Patient Name: Bebeto Santiago  Patient MRN: [id number]  Patient YOB: 1940  Thursday, January 16, 2020 Darin Krueger MD  RESTRICTIONS:  During your procedure today, you received medications for sedation.  These   medications may affect your judgment, balance and coordination.  Therefore,   for 24 hours, you have the following restrictions:   - DO NOT drive a car, operate machinery, make legal/financial decisions,   sign important papers or drink alcohol.    ACTIVITY:  Today: no heavy lifting, straining or running due to procedural   sedation/anesthesia.  The following day: return to full activity including work.  DIET:  Eat and drink normally unless instructed otherwise.     TREATMENT FOR COMMON SIDE EFFECTS:  - Mild abdominal pain, nausea, belching, bloating or excessive gas:  rest,   eat lightly and use a heating pad.  - Sore Throat: treat with throat lozenges and/or gargle with warm salt   water.  - Because air was used during the procedure, expelling large amounts of air   from your rectum or belching is normal.  - If a bowel prep was taken, you may not have a bowel movement for 1-3 days.    This is normal.  SYMPTOMS TO WATCH FOR AND REPORT TO YOUR PHYSICIAN:  1. Abdominal pain or bloating, other than gas cramps.  2. Chest pain.  3. Back pain.  4. Signs of infection such as: chills or fever occurring within 24 hours   after the procedure.  5. Rectal bleeding, which would show as bright red, maroon, or black stools.   (A tablespoon of blood from the rectum is not serious, especially if   hemorrhoids are present.)  6. Vomiting.  7. Weakness or dizziness.  GO DIRECTLY TO THE NEAREST EMERGENCY ROOM IF YOU HAVE ANY OF THE FOLLOWING:      Difficulty breathing              Chills and/or fever over 101 F   Persistent vomiting and/or vomiting blood   Severe abdominal pain   Severe chest pain   Black, tarry stools   Bleeding- more than one  tablespoon   Any other symptom or condition that you feel may need urgent attention  Your doctor recommends these additional instructions:  If any biopsies were taken, your doctors clinic will contact you in 1 to 2   weeks with any results.  - Patient has a contact number available for emergencies.  The signs and   symptoms of potential delayed complications were discussed with the   patient.  Return to normal activities tomorrow.  Written discharge   instructions were provided to the patient.   - Resume previous diet.   - Continue present medications.   - Await pathology results.   - No repeat colonoscopy due to age.   - Telephone my office for pathology results in 2 weeks.   - Discharge patient to home (via wheelchair).  For questions, problems or results please call your physician Darin Krueger MD at Work:  [phone]  If you have any questions about the above instructions, call the GI   department at [phone] or call the endoscopy unit at [phone]   from 7am until 3 pm.  [facility] [place], EMERGENCY ROOM PHONE NUMBER:   [phone]  IF A COMPLICATION OR EMERGENCY SITUATION ARISES AND YOU ARE UNABLE TO REACH   YOUR PHYSICIAN - GO DIRECTLY TO THE EMERGENCY ROOM.  I have read or have had read to me these discharge instructions for my   procedure and have received a written copy.  I understand these   instructions and will follow-up with my physician if I have any questions.     __________________________________       _____________________________________  Nurse Signature                                          Patient/Designated   Responsible Party Signature  Darin Krueger MD  1/16/2020 11:04:03 AM  This report has been verified and signed electronically.  [employer]

## 2020-01-16 NOTE — ANESTHESIA RELEASE NOTE
Anesthesia Release from PACU Note    Patient: Bebeto Santiago    Procedure(s) Performed: Procedure(s) (LRB):  COLONOSCOPY (N/A)    Anesthesia type: MAC    Post pain: Adequate analgesia    Post assessment: no apparent anesthetic complications, tolerated procedure well and no evidence of recall    Last Vitals:   Visit Vitals  BP (!) 145/85 (BP Location: Left arm, Patient Position: Sitting)   Pulse 64   Temp 36.6 °C (97.9 °F) (Temporal)   Resp 17   Ht 6' (1.829 m)   Wt 74 kg (163 lb 2.3 oz)   SpO2 95%   BMI 22.13 kg/m²       Post vital signs: stable    Level of consciousness: awake, alert  and oriented    Nausea/Vomiting: no nausea/no vomiting    Complications: none    Airway Patency: patent    Respiratory: unassisted, spontaneous ventilation, room air    Cardiovascular: stable and blood pressure at baseline    Hydration: euvolemic

## 2020-01-16 NOTE — DISCHARGE SUMMARY
" Endoscopy Discharge Summary      Admit Date: 1/16/2020    Discharge Date and Time:  1/16/2020 11:05 AM    Attending Physician: Darin Krueger MD     Discharge Physician: Darin Krueger MD     Principal Admitting Diagnoses: Colon cancer screening         Discharge Diagnosis: The primary encounter diagnosis was Colon cancer screening. Diagnoses of History of colon polyps, Special screening for malignant neoplasms, colon, Polyp of colon, unspecified part of colon, unspecified type, and Diverticulosis of colon were also pertinent to this visit.     Discharged Condition: Good    Indication for Admission: Colon cancer screening     Hospital Course: Patient was admitted for an inpatient procedure and tolerated the procedure well with no complications.    Significant Diagnostic Studies: Colonoscopy with cold biopsy polypectomy    Pathology (if any):  Specimen (12h ago, onward)    None          Estimated Blood Loss: 1 ml.    Discussed with: patient and family.    Disposition: Home.    Follow Up/Patient Instructions:   Current Discharge Medication List      CONTINUE these medications which have NOT CHANGED    Details   amLODIPine (NORVASC) 5 MG tablet Take 1 tablet (5 mg total) by mouth once daily.  Qty: 90 tablet, Refills: 3    Associated Diagnoses: Hypertension associated with diabetes      aspirin 325 MG tablet Take 325 mg by mouth once daily.      BD LUER-MANUEL SYRINGE 3 mL 22 x 1 1/2" Syrg Refills: 3      blood sugar diagnostic (FREESTYLE LITE STRIPS) Strp 4 times a day (BEFORE MEALS AND AT BEDTIME)  Qty: 400 strip, Refills: 3    Associated Diagnoses: Uncontrolled type 2 diabetes mellitus with stage 3 chronic kidney disease, with long-term current use of insulin      blood-glucose meter Misc Use as directed four times daily before meals and at bedtime.  Qty: 1 each, Refills: 0      dutasteride (AVODART) 0.5 mg capsule Take 1 capsule (0.5 mg total) by mouth once daily.  Qty: 90 capsule, Refills: 3    Associated " "Diagnoses: Benign prostatic hyperplasia, presence of lower urinary tract symptoms unspecified      fluticasone (FLONASE) 50 mcg/actuation nasal spray USE 1 SPRAY IN EACH NOSTRIL ONCE DAILY.  Qty: 3 Bottle, Refills: 3      insulin (LANTUS SOLOSTAR U-100 INSULIN) glargine 100 units/mL (3mL) SubQ pen INJECT 33 UNITS UNDER THE SKIN ONCE A DAY  Qty: 30 Syringe, Refills: 1    Associated Diagnoses: Uncontrolled type 2 diabetes mellitus with stage 3 chronic kidney disease, with long-term current use of insulin      insulin aspart U-100 (NOVOLOG FLEXPEN U-100 INSULIN) 100 unit/mL (3 mL) InPn pen USE AS DIRECTED PER SLIDING SCALE. MAX OF 14 UNITS DAILY)  Qty: 1 Box, Refills: 3    Associated Diagnoses: Uncontrolled type 2 diabetes mellitus with stage 3 chronic kidney disease, with long-term current use of insulin      lancets (ACCU-CHEK SOFTCLIX LANCETS) Misc Check glucose before meals and at bedtime.  Qty: 200 each, Refills: 11    Associated Diagnoses: Uncontrolled type 2 diabetes mellitus with stage 3 chronic kidney disease, with long-term current use of insulin      levothyroxine (SYNTHROID) 100 MCG tablet Take 1 tablet (100 mcg total) by mouth once daily.  Qty: 90 tablet, Refills: 3    Associated Diagnoses: Hypothyroidism, unspecified type      montelukast (SINGULAIR) 10 mg tablet Take 1 tablet (10 mg total) by mouth every evening.  Qty: 90 tablet, Refills: 3    Associated Diagnoses: Allergic state, initial encounter      pen needle, diabetic (BD ULTRA-FINE MINI PEN NEEDLE) 31 gauge x 3/16" Ndle USE DAILY WITH LANTUS AND NOVOLOG  Qty: 300 each, Refills: 3    Associated Diagnoses: Uncontrolled type 2 diabetes mellitus with stage 3 chronic kidney disease, with long-term current use of insulin      pravastatin (PRAVACHOL) 40 MG tablet Take 1 tablet (40 mg total) by mouth every evening.  Qty: 90 tablet, Refills: 3    Associated Diagnoses: Combined hyperlipidemia associated with type 2 diabetes mellitus      tamsulosin (FLOMAX) " 0.4 mg Cap Take 1 capsule (0.4 mg total) by mouth every evening.  Qty: 90 capsule, Refills: 3    Associated Diagnoses: Benign prostatic hyperplasia, presence of lower urinary tract symptoms unspecified      fexofenadine (ALLEGRA ALLERGY) 180 MG tablet Take 1 tablet (180 mg total) by mouth once daily.  Qty: 10 tablet, Refills: 0      neomycin-polymyxin-dexamethasone (DEXACINE) 3.5 mg/g-10,000 unit/g-0.1 % Oint       ofloxacin (OCUFLOX) 0.3 % ophthalmic solution       tobramycin-dexamethasone 0.3-0.1% (TOBRADEX) 0.3-0.1 % DrpS              Discharge Procedure Orders   Diet general     Call MD for:  temperature >100.4     Call MD for:  persistent nausea and vomiting     Call MD for:  severe uncontrolled pain     Call MD for:  difficulty breathing, headache or visual disturbances     Call MD for:  redness, tenderness, or signs of infection (pain, swelling, redness, odor or green/yellow discharge around incision site)     Call MD for:  hives     Call MD for:  persistent dizziness or light-headedness     No dressing needed       Follow-up Information     Darin Krueger MD. Call in 2 weeks.    Specialty:  Family Medicine  Why:  To receive pathology results.  Contact information:  [address] [facility] [place] [address] [phone]

## 2020-01-16 NOTE — ANESTHESIA POSTPROCEDURE EVALUATION
Anesthesia Post Evaluation    Patient: Bebeto Santiago    Procedure(s) Performed: Procedure(s) (LRB):  COLONOSCOPY (N/A)    Final Anesthesia Type: MAC    Patient location during evaluation: GI PACU  Patient participation: Yes- Able to Participate  Level of consciousness: awake and alert and oriented  Post-procedure vital signs: reviewed and stable  Pain management: adequate  Airway patency: patent    PONV status at discharge: No PONV  Anesthetic complications: no      Cardiovascular status: blood pressure returned to baseline and hemodynamically stable  Respiratory status: unassisted, spontaneous ventilation and room air  Hydration status: euvolemic  Follow-up not needed.          Vitals Value Taken Time   /85 1/16/2020 10:06 AM   Temp 36.6 °C (97.9 °F) 1/16/2020 10:06 AM   Pulse 64 1/16/2020 10:06 AM   Resp 17 1/16/2020 10:06 AM   SpO2 95 % 1/16/2020 10:06 AM         No case tracking events are documented in the log.      Pain/Gilda Score: No data recorded

## 2020-01-16 NOTE — ANESTHESIA PREPROCEDURE EVALUATION
01/16/2020  Bebeto Santiago is a 79 y.o., male.    Anesthesia Evaluation    I have reviewed the Patient Summary Reports.     I have reviewed the Medications.     Review of Systems  Social:  Former Smoker, Alcohol Use Current user of smokeless tobacoo   Cardiovascular:   Hypertension, poorly controlled PVD hyperlipidemia    Pulmonary:   Sleep Apnea    Education provided regarding risk of obstructive sleep apnea  Medical reason for not educating patient about risks of obstructive sleep apnea   Renal/:   Chronic Renal Disease, CRI    Hepatic/GI:   GERD, poorly controlled    Neurological:   Peripheral Neuropathy    Endocrine:   Diabetes, poorly controlled, type 2 Hypothyroidism        Physical Exam  General:  Well nourished    Airway/Jaw/Neck:  Airway Findings: Mouth Opening: Normal Tongue: Normal  General Airway Assessment: Adult  Mallampati: II  Improves to II with phonation.  Jaw/Neck Findings:  Neck ROM: Normal ROM     Eyes/Ears/Nose:  Eyes/Ears/Nose Findings: Diabetic retinopathy    Dental:  Dental Findings: In tact        Mental Status:  Mental Status Findings:  Cooperative, Alert and Oriented         Anesthesia Plan  Type of Anesthesia, risks & benefits discussed:  Anesthesia Type:  MAC  Patient's Preference:   Intra-op Monitoring Plan:   Intra-op Monitoring Plan Comments:   Post Op Pain Control Plan: multimodal analgesia  Post Op Pain Control Plan Comments:   Induction:   IV  Beta Blocker:  Patient is not currently on a Beta-Blocker (No further documentation required).       Informed Consent: Patient understands risks and agrees with Anesthesia plan.  Questions answered. Anesthesia consent signed with patient.  ASA Score: 4     Day of Surgery Review of History & Physical: I have interviewed and examined the patient. I have reviewed the patient's H&P dated:            Ready For Surgery From Anesthesia  Perspective.

## 2020-01-16 NOTE — TRANSFER OF CARE
Anesthesia Transfer of Care Note    Patient: Bebeto Santiago    Procedure(s) Performed: Procedure(s) (LRB):  COLONOSCOPY (N/A)    Patient location: PACU    Anesthesia Type: MAC    Transport from OR: Transported from OR on room air with adequate spontaneous ventilation    Post pain: adequate analgesia    Post assessment: no apparent anesthetic complications and tolerated procedure well    Post vital signs: stable    Level of consciousness: sedated    Nausea/Vomiting: no nausea/vomiting    Complications: none    Transfer of care protocol was followed      Last vitals:   Visit Vitals  BP (!) 145/85 (BP Location: Left arm, Patient Position: Sitting)   Pulse 64   Temp 36.6 °C (97.9 °F) (Temporal)   Resp 17   Ht 6' (1.829 m)   Wt 74 kg (163 lb 2.3 oz)   SpO2 95%   BMI 22.13 kg/m²

## 2020-01-16 NOTE — DISCHARGE INSTRUCTIONS
Understanding Colon and Rectal Polyps    The colon (also called the large intestine) is a muscular tube that forms the last part of the digestive tract. It absorbs water and stores food waste. The colon is about 4 to 6 feet long. The rectum is the last 6 inches of the colon. The colon and rectum have a smooth lining composed of millions of cells. Changes in these cells can lead to growths in the colon that can become cancerous and should be removed. Multiple tests are available to screen for colon cancer, but the colonoscopy is the most recommended test. During colonoscopy, these polyps can be removed. How often you need this test depends on many things including your condition, your family history, symptoms, and what the findings were at the previous colonoscopy.   When the colon lining changes  Changes that happen in the cells that line the colon or rectum can lead to growths called polyps. Over a period of years, polyps can turn cancerous. Removing polyps early may prevent cancer from ever forming.  Polyps  Polyps are fleshy clumps of tissue that form on the lining of the colon or rectum. Small polyps are usually benign (not cancerous). However, over time, cells in a polyp can change and become cancerous. Certain types of polyps known as adenomatous polyps are premalignant. The risk for invasive cancer increases with the size of the polyp and certain cell and gene features. This means that they can become cancerous if they're not removed. Hyperplastic polyps are benign. They can grow quite large and not turn cancerous.   Cancer  Almost all colorectal cancers start when polyp cells begin growing abnormally. As a cancerous tumor grows, it may involve more and more of the colon or rectum. In time, cancer can also grow beyond the colon or rectum and spread to nearby organs or to glands called lymph nodes. The cells can also travel to other parts of the body. This is known as metastasis. The earlier a cancerous  tumor is removed, the better the chance of preventing its spread.    Date Last Reviewed: 8/1/2016  © 4317-7466 [employer]. [address] [place] [address], [place] [address]. All rights reserved. This information is not intended as a substitute for professional medical care. Always follow your healthcare professional's instructions.        Hemorrhoids    Hemorrhoids are swollen and inflamed veins inside the rectum and near the anus. The rectum is the last several inches of the colon. The anus is the passage between the rectum and the outside of the body.  Causes  The veins can become swollen due to increased pressure in them. This is most often caused by:  · Chronic constipation or diarrhea  · Straining when having a bowel movement  · Sitting too long on the toilet  · A low-fiber diet  · Pregnancy  Symptoms  · Bleeding from the rectum (this may be noticeable after bowel movements)  · Lump near the anus  · Itching around the anus  · Pain around the anus  There are different types of hemorrhoids. Depending on the type you have and the severity, you may be able to treat yourself at home. In some cases, a procedure may be the best treatment option. Your healthcare provider can tell you more about this, if needed.  Home care  General care  · To get relief from pain or itching, try:  ¨ Topical products. Your healthcare provider may prescribe or recommend creams, ointments, or pads that can be applied to the hemorrhoid. Use these exactly as directed.  ¨ Medicines. Your healthcare provider may recommend stool softeners, suppositories, or laxatives to help manage constipation. Use these exactly as directed.  ¨ Sitz baths. A sitz bath involves sitting in a few inches of warm bath water. Be careful not to make the water so hot that you burn yourself--test it before sitting in it. Soak for about 10 to 15 minutes a few times a day. This may help relieve pain.  Tips to help prevent hemorrhoids  · Eat more fiber. Fiber adds  bulk to stool and absorbs water as it moves through your colon. This makes stool softer and easier to pass.  ¨ Increase the fiber in your diet with more fiber-rich foods. These include fresh fruit, vegetables, and whole grains.  ¨ Take a fiber supplement or bulking agent, if advised to by your provider. These include products such as psyllium or methylcellulose.  · Drink plenty of water, if directed to by your provider. This can help keep stool soft.  · Be more active. Frequent exercise aids digestion and helps prevent constipation. It may also help make bowel movements more regular.  · Dont strain during bowel movements. This can make hemorrhoids more likely. Also, dont sit on the toilet for long periods of time.  Follow-up care  Follow up with your healthcare provider, or as advised. If a culture or imaging tests were done, you will be notified of the results when they are ready. This may take a few days or longer.  When to seek medical advice  Call your healthcare provider right away if any of these occur:  · Increased bleeding from the rectum  · Increased pain around the rectum or anus  · Weakness or dizziness  Call 911  Call 911 or return to the emergency department right away if any of these occur:  · Trouble breathing or swallowing  · Fainting or loss of consciousness  · Unusually fast heart rate  · Vomiting blood  · Large amounts of blood in stool  Date Last Reviewed: 6/22/2015 © 2000-2017 [employer]. [address] [place] [address]. All rights reserved. This information is not intended as a substitute for professional medical care. Always follow your healthcare professional's instructions.        Understanding Diverticulosis and Diverticulitis     Pouches or diverticula usually occur in the lower part of the colon called the sigmoid.     The colon (large intestine) is the last part of the digestive tract. It absorbs water from stool and changes it from a liquid to a solid. In  certain cases, small pouches called diverticula can form in the colon wall. This condition is called diverticulosis. The pouches can become infected. If this happens, it becomes a more serious problem called diverticulitis. These problems can be painful. But they can be managed.  Managing your condition  Diet changes or medicines may be prescribed.   If you have diverticulosis  Recommendations include:  · Diet changes are often enough to control symptoms. The main changes are adding fiber (roughage) and drinking more water. Fiber absorbs water as it travels through your colon. This helps your stool stay soft and move smoothly. Water helps this process.  · If needed, you may be told to take over-the-counter stool softeners.  · To help relieve pain, antispasmodic medicines may be prescribed.  · Watch for changes in your bowel movements. Tell the healthcare provider if you notice any changes.  · Begin an exercise program. Ask your healthcare provider how to get started.  · Get plenty of rest and sleep.   If you have diverticulitis  Treatment depends on how bad your symptoms are.  · For mild symptoms. You may be put on a liquid diet for a short time. Antibiotics are usually prescribed. If these two steps relieve your symptoms, you may then be prescribed a high-fiber diet. If you still have symptoms, your healthcare provider will discuss more treatment choices with you.  · For severe symptoms. You may need to be admitted to the hospital. There, you can be given IV antibiotics and fluids. You will also be put on a low-fiber or liquid diet. Although not common, surgery is needed in some people with severe symptoms.  [place] to colon health     Diverticulitis occurs when the pouches become infected or inflamed.     Help keep your colon healthy with a diet that includes plenty of high-fiber fruits, vegetables, and whole grains. Drink plenty of liquids like water and juice. Maintain a healthy lifestyle including regular  exercise, stress management, and adequate rest and sleep.   Date Last Reviewed: 7/1/2016  © 7406-4351 [employer]. [address] [place] [address], [place] [address]. All rights reserved. This information is not intended as a substitute for professional medical care. Always follow your healthcare professional's instructions.

## 2020-01-23 LAB
FINAL PATHOLOGIC DIAGNOSIS: NORMAL
GROSS: NORMAL

## 2020-01-23 NOTE — PROGRESS NOTES
You had a polyp that was a tubular adenoma but based on age, I do not feel that a repeat colonoscopy is indicated.    Dr. Webster

## 2020-01-27 ENCOUNTER — PATIENT OUTREACH (OUTPATIENT)
Dept: ADMINISTRATIVE | Facility: HOSPITAL | Age: 80
End: 2020-01-27

## 2020-01-27 NOTE — PROGRESS NOTES
Per [facility] message route to Dr. Krueger staff:    Hi,   Pt is requesting blood sugar diagnostic (FREESTYLE LITE STRIPS) Strp   be sent to the [facility] pharmacy in [place], [place]. I called pharmacy and they stated they did not receive the script for it. If you could please resend that to the [facility] in [place] as listed in chart.   Please call with any questions [phone].   Thank you   Belinda Nguyen LVN

## 2020-01-28 ENCOUNTER — PATIENT OUTREACH (OUTPATIENT)
Dept: ADMINISTRATIVE | Facility: HOSPITAL | Age: 80
End: 2020-01-28

## 2020-01-28 RX ORDER — LANCETS
EACH MISCELLANEOUS
Qty: 200 EACH | Refills: 11 | Status: SHIPPED | OUTPATIENT
Start: 2020-01-28 | End: 2020-03-06 | Stop reason: SDUPTHER

## 2020-01-29 NOTE — TELEPHONE ENCOUNTER
----- Message from Pattie Meza sent at 1/29/2020  1:41 PM CST -----  [facility]  Requesting a call back regarding pt in need of test strips. Pt is completley out,Please call [facility] pharmacy at [phone] so they will be able to assist with the rewriting of rx. Please call back at [phone] ext [phone].    Thanks,  Pattie Meza

## 2020-01-29 NOTE — TELEPHONE ENCOUNTER
He is insulin dependent.  There is a paper in my bin.  sned to the pharmacy on that paper that he is insulin dependent.        I have signed for the following orders AND/OR meds.  Please call the patient and ask the patient to schedule the testing AND/OR inform about any medications that were sent.      No orders of the defined types were placed in this encounter.      Medications Ordered This Encounter   Medications    blood sugar diagnostic (FREESTYLE LITE STRIPS) Strp     Sig: 3 times a day (BEFORE MEALS AND AT BEDTIME)     Dispense:  400 strip     Refill:  3

## 2020-01-31 ENCOUNTER — EXTERNAL CHRONIC CARE MANAGEMENT (OUTPATIENT)
Dept: PRIMARY CARE CLINIC | Facility: CLINIC | Age: 80
End: 2020-01-31
Payer: MEDICARE

## 2020-01-31 PROCEDURE — 99489 CPLX CHRNC CARE EA ADDL 30: CPT | Mod: PBBFAC,PO,25,27 | Performed by: FAMILY MEDICINE

## 2020-01-31 PROCEDURE — 99489 CPLX CHRNC CARE EA ADDL 30: CPT | Mod: S$PBB,,, | Performed by: FAMILY MEDICINE

## 2020-01-31 PROCEDURE — 99489 PR COMPLX CHRON CARE MGMT, EA ADDTL 30 MIN, PER MONTH: ICD-10-PCS | Mod: S$PBB,,, | Performed by: FAMILY MEDICINE

## 2020-01-31 PROCEDURE — 99487 CPLX CHRNC CARE 1ST 60 MIN: CPT | Mod: S$PBB,,, | Performed by: FAMILY MEDICINE

## 2020-01-31 PROCEDURE — 99487 PR COMPLX CHRON CARE MGMT, 1ST HR, PER MONTH: ICD-10-PCS | Mod: S$PBB,,, | Performed by: FAMILY MEDICINE

## 2020-01-31 PROCEDURE — 99487 CPLX CHRNC CARE 1ST 60 MIN: CPT | Mod: PBBFAC,PO | Performed by: FAMILY MEDICINE

## 2020-02-18 DIAGNOSIS — I65.23 BILATERAL CAROTID ARTERY STENOSIS: Primary | ICD-10-CM

## 2020-02-24 ENCOUNTER — TELEPHONE (OUTPATIENT)
Dept: VASCULAR SURGERY | Facility: CLINIC | Age: 80
End: 2020-02-24

## 2020-02-24 NOTE — TELEPHONE ENCOUNTER
----- Message from Celina Sweet sent at 2/24/2020  9:46 AM CST -----  Contact: pt wife Babs  Type:  Sooner Apoointment Request    Caller is requesting a sooner appointment.  Caller declined first available appointment listed below.  Caller will not accept being placed on the waitlist and is requesting a message be sent to doctor.    Name of Caller: Babs  When is the first available appointment?  3/26/2020    Best Call Back Number: [phone]  Additional Information:  Pt is leaving out of [place] for a few months wife is requesting Pt has an appointment to see Dr. Sarabia before they leave. Please call to advise for sooner appointment.

## 2020-02-29 ENCOUNTER — EXTERNAL CHRONIC CARE MANAGEMENT (OUTPATIENT)
Dept: PRIMARY CARE CLINIC | Facility: CLINIC | Age: 80
End: 2020-02-29
Payer: MEDICARE

## 2020-02-29 PROCEDURE — 99490 CHRNC CARE MGMT STAFF 1ST 20: CPT | Mod: S$PBB,,, | Performed by: FAMILY MEDICINE

## 2020-02-29 PROCEDURE — 99490 PR CHRONIC CARE MGMT, 1ST 20 MIN: ICD-10-PCS | Mod: S$PBB,,, | Performed by: FAMILY MEDICINE

## 2020-02-29 PROCEDURE — 99490 CHRNC CARE MGMT STAFF 1ST 20: CPT | Mod: PBBFAC,PO | Performed by: FAMILY MEDICINE

## 2020-03-06 ENCOUNTER — OFFICE VISIT (OUTPATIENT)
Dept: FAMILY MEDICINE | Facility: CLINIC | Age: 80
End: 2020-03-06
Payer: MEDICARE

## 2020-03-06 ENCOUNTER — HOSPITAL ENCOUNTER (OUTPATIENT)
Dept: RADIOLOGY | Facility: HOSPITAL | Age: 80
Discharge: HOME | End: 2020-03-06
Attending: FAMILY MEDICINE
Payer: MEDICARE

## 2020-03-06 VITALS
HEART RATE: 62 BPM | TEMPERATURE: 98 F | BODY MASS INDEX: 22.03 KG/M2 | HEIGHT: 72 IN | DIASTOLIC BLOOD PRESSURE: 82 MMHG | WEIGHT: 162.69 LBS | SYSTOLIC BLOOD PRESSURE: 128 MMHG

## 2020-03-06 DIAGNOSIS — E78.2 COMBINED HYPERLIPIDEMIA ASSOCIATED WITH TYPE 2 DIABETES MELLITUS: ICD-10-CM

## 2020-03-06 DIAGNOSIS — N18.30 CONTROLLED TYPE 2 DIABETES MELLITUS WITH STAGE 3 CHRONIC KIDNEY DISEASE, WITH LONG-TERM CURRENT USE OF INSULIN: Primary | ICD-10-CM

## 2020-03-06 DIAGNOSIS — Z12.5 ENCOUNTER FOR SCREENING FOR MALIGNANT NEOPLASM OF PROSTATE: ICD-10-CM

## 2020-03-06 DIAGNOSIS — N18.30 CKD STAGE 3 SECONDARY TO DIABETES: ICD-10-CM

## 2020-03-06 DIAGNOSIS — N40.0 BENIGN PROSTATIC HYPERPLASIA WITHOUT LOWER URINARY TRACT SYMPTOMS: ICD-10-CM

## 2020-03-06 DIAGNOSIS — E03.9 HYPOTHYROIDISM, UNSPECIFIED TYPE: ICD-10-CM

## 2020-03-06 DIAGNOSIS — Z86.010 HISTORY OF COLON POLYPS: ICD-10-CM

## 2020-03-06 DIAGNOSIS — E11.69 COMBINED HYPERLIPIDEMIA ASSOCIATED WITH TYPE 2 DIABETES MELLITUS: ICD-10-CM

## 2020-03-06 DIAGNOSIS — N40.0 BENIGN PROSTATIC HYPERPLASIA, PRESENCE OF LOWER URINARY TRACT SYMPTOMS UNSPECIFIED: ICD-10-CM

## 2020-03-06 DIAGNOSIS — I65.23 BILATERAL CAROTID ARTERY STENOSIS: ICD-10-CM

## 2020-03-06 DIAGNOSIS — I73.9 PAD (PERIPHERAL ARTERY DISEASE): ICD-10-CM

## 2020-03-06 DIAGNOSIS — Z79.4 CONTROLLED TYPE 2 DIABETES MELLITUS WITH STAGE 3 CHRONIC KIDNEY DISEASE, WITH LONG-TERM CURRENT USE OF INSULIN: Primary | ICD-10-CM

## 2020-03-06 DIAGNOSIS — F32.1 CURRENT MODERATE EPISODE OF MAJOR DEPRESSIVE DISORDER WITHOUT PRIOR EPISODE: ICD-10-CM

## 2020-03-06 DIAGNOSIS — E11.22 CKD STAGE 3 SECONDARY TO DIABETES: ICD-10-CM

## 2020-03-06 DIAGNOSIS — R97.20 ELEVATED PSA: ICD-10-CM

## 2020-03-06 DIAGNOSIS — E11.22 CONTROLLED TYPE 2 DIABETES MELLITUS WITH STAGE 3 CHRONIC KIDNEY DISEASE, WITH LONG-TERM CURRENT USE OF INSULIN: Primary | ICD-10-CM

## 2020-03-06 DIAGNOSIS — T78.40XA ALLERGIC STATE, INITIAL ENCOUNTER: ICD-10-CM

## 2020-03-06 PROCEDURE — 93880 EXTRACRANIAL BILAT STUDY: CPT | Mod: TC,PO

## 2020-03-06 PROCEDURE — 99214 OFFICE O/P EST MOD 30 MIN: CPT | Mod: PBBFAC,PO,25 | Performed by: FAMILY MEDICINE

## 2020-03-06 PROCEDURE — 93880 EXTRACRANIAL BILAT STUDY: CPT | Mod: 26,,, | Performed by: RADIOLOGY

## 2020-03-06 PROCEDURE — 99214 OFFICE O/P EST MOD 30 MIN: CPT | Mod: S$PBB,,, | Performed by: FAMILY MEDICINE

## 2020-03-06 PROCEDURE — 99214 PR OFFICE/OUTPT VISIT, EST, LEVL IV, 30-39 MIN: ICD-10-PCS | Mod: S$PBB,,, | Performed by: FAMILY MEDICINE

## 2020-03-06 PROCEDURE — 93880 US CAROTID BILATERAL: ICD-10-PCS | Mod: 26,,, | Performed by: RADIOLOGY

## 2020-03-06 PROCEDURE — 99999 PR PBB SHADOW E&M-EST. PATIENT-LVL IV: ICD-10-PCS | Mod: PBBFAC,,, | Performed by: FAMILY MEDICINE

## 2020-03-06 PROCEDURE — 99999 PR PBB SHADOW E&M-EST. PATIENT-LVL IV: CPT | Mod: PBBFAC,,, | Performed by: FAMILY MEDICINE

## 2020-03-06 RX ORDER — PEN NEEDLE, DIABETIC 30 GX3/16"
NEEDLE, DISPOSABLE MISCELLANEOUS
Qty: 300 EACH | Refills: 3 | Status: SHIPPED | OUTPATIENT
Start: 2020-03-06

## 2020-03-06 RX ORDER — MONTELUKAST SODIUM 10 MG/1
10 TABLET ORAL NIGHTLY
Qty: 90 TABLET | Refills: 3 | Status: SHIPPED | OUTPATIENT
Start: 2020-03-06 | End: 2020-11-04 | Stop reason: SDUPTHER

## 2020-03-06 RX ORDER — DUTASTERIDE 0.5 MG/1
0.5 CAPSULE, LIQUID FILLED ORAL DAILY
Qty: 90 CAPSULE | Refills: 3 | Status: SHIPPED | OUTPATIENT
Start: 2020-03-06 | End: 2020-11-04 | Stop reason: SDUPTHER

## 2020-03-06 RX ORDER — LANCETS
EACH MISCELLANEOUS
Qty: 200 EACH | Refills: 11 | Status: SHIPPED | OUTPATIENT
Start: 2020-03-06

## 2020-03-06 RX ORDER — INSULIN ASPART 100 [IU]/ML
INJECTION, SOLUTION INTRAVENOUS; SUBCUTANEOUS
Qty: 1 BOX | Refills: 3 | Status: SHIPPED | OUTPATIENT
Start: 2020-03-06 | End: 2020-12-27

## 2020-03-06 RX ORDER — CITALOPRAM 10 MG/1
10 TABLET ORAL DAILY
Qty: 90 TABLET | Refills: 3 | Status: SHIPPED | OUTPATIENT
Start: 2020-03-06 | End: 2020-11-04 | Stop reason: SDUPTHER

## 2020-03-06 RX ORDER — LEVOTHYROXINE SODIUM 100 UG/1
100 TABLET ORAL DAILY
Qty: 90 TABLET | Refills: 3 | Status: SHIPPED | OUTPATIENT
Start: 2020-03-06 | End: 2020-11-04 | Stop reason: SDUPTHER

## 2020-03-06 RX ORDER — TAMSULOSIN HYDROCHLORIDE 0.4 MG/1
0.4 CAPSULE ORAL NIGHTLY
Qty: 90 CAPSULE | Refills: 3 | Status: SHIPPED | OUTPATIENT
Start: 2020-03-06 | End: 2020-11-04 | Stop reason: SDUPTHER

## 2020-03-06 RX ORDER — INSULIN GLARGINE 100 [IU]/ML
INJECTION, SOLUTION SUBCUTANEOUS
Qty: 30 SYRINGE | Refills: 1 | Status: SHIPPED | OUTPATIENT
Start: 2020-03-06 | End: 2020-11-04 | Stop reason: SDUPTHER

## 2020-03-06 RX ORDER — CODEINE PHOSPHATE AND GUAIFENESIN 10; 100 MG/5ML; MG/5ML
10 SOLUTION ORAL 4 TIMES DAILY PRN
Qty: 240 ML | Refills: 0 | Status: SHIPPED | OUTPATIENT
Start: 2020-03-06 | End: 2020-03-16

## 2020-03-06 RX ORDER — PRAVASTATIN SODIUM 40 MG/1
40 TABLET ORAL NIGHTLY
Qty: 90 TABLET | Refills: 3 | Status: SHIPPED | OUTPATIENT
Start: 2020-03-06 | End: 2020-11-04 | Stop reason: SDUPTHER

## 2020-03-06 NOTE — PROGRESS NOTES
Subjective:      Patient ID: Bebeto Santiago is a 80 y.o. male.    Chief Complaint: Follow-up (dm)    Problem List Items Addressed This Visit     Benign prostatic hyperplasia    Overview     The patient presents with BPH.  Denies difficulty voiding, diminished urinary stream, a feeling of hesitancy, straining to void, a feeling of incomplete voiding, postvoid dribbling, urinary frequency, urgency, nocturia, dysuria and hematuria.  The last PSA level is below.  Current treatment has included flomax and avodart at the direction of Dr. Muro with improvement.     Lab Results   Component Value Date    PSA 1.4 02/21/2018    PSA 2.1 09/20/2017    PSA 8.2 (H) 08/19/2016              Bilateral carotid artery stenosis    Overview     He has bilateral carotid artery stenosis and he is tracked with ultrasounds.  He is also followed on this by Dr. Sarabia on this.  He is due for this in Feb.  He had a left sided CEA.    US Carotid Bilateral   Order: [id number]   Status:  Final result   Visible to patient:  No (Not Released) Next appt:  None Dx:  Bilateral carotid artery stenosis   Details     Reading Physician Reading Date Result Priority   Jaiden Renee MD 1/4/2018       Narrative     Technique: Bilateral duplex carotid ultrasound performed using gray scale, color Doppler, and pulse Doppler analysis.    Comparison: 09/22/2017    FINDINGS:     Right common carotid:   Peak systolic velocity 66 cm/sec.    Right internal carotid artery: Occluded      Left common carotid: Scattered calcified plaque noted.  Peak systolic velocity 104 cm/sec.    Left internal carotid artery: Status post interval carotid endarterectomy.  No visible stenosis.   Peak systolic velocity 98  cm/sec.    Peak diastolic velocity 31 cm/sec  IC/CC ratio 0.9.    Both vertebral arteries demonstrate antegrade flow.      Impression           1. Persistent occlusion of the right ICA    2.  Status post left carotid endarterectomy with no stenosis  visualized.    As determined by Society of [employer] in Ultras[employer] consensus.      Electronically signed by: ANTHONY CAMACHO MD  Date: 01/04/18  Time: 12:18                     CKD stage 3 secondary to diabetes    Overview     He has stage 3 CKD.   Bebeto Santiago has an Estimated Glumerular Filtration Rate (EGFR) between 30 and 59 consistent with the definition of chronic kidney disease stage 3.    eGFR if non    Date Value Ref Range Status   03/01/2019 >60.0 >60 mL/min/1.73 m^2 Final     Comment:     Calculation used to obtain the estimated glomerular filtration  rate (eGFR) is the CKD-EPI equation.            Lab Results   Component Value Date    CREATININE 1.0 03/01/2019    BUN 20 03/01/2019     03/01/2019    K 4.1 03/01/2019     03/01/2019    CO2 29 03/01/2019       The patient's chronic kidney disease stage 3 was monitored, evaluated, addressed and/or treated.             Combined hyperlipidemia associated with type 2 diabetes mellitus    Overview     The patient presents with hyperlipidemia.  The patient reports tolerating the medication well and is in excellent compliance.  There have been no medication side effects.  The patient denies chest pain, neuropathy, and myalgias.  The patient has reduced fat intake and has been exercising.  Current treatment has included the medications listed in the med card.    Lab Results   Component Value Date    CHOL 152 03/01/2019    CHOL 145 08/30/2018    CHOL 150 02/21/2018       Lab Results   Component Value Date    HDL 59 03/01/2019    HDL 52 08/30/2018    HDL 50 02/21/2018       Lab Results   Component Value Date    LDLCALC 82.6 03/01/2019    LDLCALC 82.6 08/30/2018    LDLCALC 88.0 02/21/2018       Lab Results   Component Value Date    TRIG 52 03/01/2019    TRIG 52 08/30/2018    TRIG 60 02/21/2018       Lab Results   Component Value Date    CHOLHDL 38.8 03/01/2019    CHOLHDL 35.9 08/30/2018    CHOLHDL 33.3 02/21/2018     Lab Results    Component Value Date    ALT 13 03/01/2019    AST 15 03/01/2019    ALKPHOS 93 03/01/2019    BILITOT 0.7 03/01/2019              Controlled type 2 diabetes mellitus with stage 3 chronic kidney disease, with long-term current use of insulin - Primary    Overview     The patient presents with diabetes.  The patient denies polyuria, polydipsia, polyphagia, hypoglycemia and paresthesias.  The patient's glucose control has been good.  Home glucose averages are routinely checked.  The patient is without retinopathy currently.  The patient has no history of neuropathy.  The patient currently complains of no podiatric problems.  The patient has excellent compliance.  Because of him being on lantus and novolog,  the patient checks his blood sugar 4 times daily and administers insulin 3 or more times daily.   Hemoglobin A1C   Date Value Ref Range Status   09/10/2019 6.9 (H) 4.0 - 5.6 % Final     Comment:     ADA Screening Guidelines:  5.7-6.4%  Consistent with prediabetes  >or=6.5%  Consistent with diabetes  High levels of fetal hemoglobin interfere with the HbA1C  assay. Heterozygous hemoglobin variants (HbS, HgC, etc)do  not significantly interfere with this assay.   However, presence of multiple variants may affect accuracy.     03/01/2019 7.2 (H) 4.0 - 5.6 % Final     Comment:     ADA Screening Guidelines:  5.7-6.4%  Consistent with prediabetes  >or=6.5%  Consistent with diabetes  High levels of fetal hemoglobin interfere with the HbA1C  assay. Heterozygous hemoglobin variants (HbS, HgC, etc)do  not significantly interfere with this assay.   However, presence of multiple variants may affect accuracy.     03/01/2019 7.2 (H) 4.0 - 5.6 % Final     Comment:     ADA Screening Guidelines:  5.7-6.4%  Consistent with prediabetes  >or=6.5%  Consistent with diabetes  High levels of fetal hemoglobin interfere with the HbA1C  assay. Heterozygous hemoglobin variants (HbS, HgC, etc)do  not significantly interfere with this assay.    However, presence of multiple variants may affect accuracy.       No results found for: DERIK, [id number]  Diabetes Management Status    Statin: Taking  ACE/ARB: Not taking  He has changed to 33 u of the lantus from 28 u since he had a high a1c below.  Screening or Prevention Patient's value Goal Complete/Controlled?   HgA1C Testing and Control   Lab Results   Component Value Date    HGBA1C 6.9 (H) 09/10/2019      Annually/Less than 8% Yes   Lipid profile : 03/01/2019 Annually Yes   LDL control Lab Results   Component Value Date    LDLCALC 82.6 03/01/2019    Annually/Less than 100 mg/dl  Yes   Nephropathy screening Lab Results   Component Value Date    LABMICR 33.0 09/20/2017     Lab Results   Component Value Date    PROTEINUA Negative 09/20/2017    Annually Yes   Blood pressure BP Readings from Last 1 Encounters:   01/16/20 (!) 125/59    Less than 140/90 Yes   Dilated retinal exam : 11/08/2019 Annually No   Foot exam   : 09/06/2019 Annually Yes              Elevated PSA    Overview     He has had an increased psa in the past but it has come down.   Lab Results   Component Value Date    PSA 1.5 03/08/2019    PSA 1.4 02/21/2018    PSA 2.1 09/20/2017              History of colon polyps    Overview     He has a history of colon polyps and his last colonoscopy was in 2013. He is not having bleeding or stool changes.    He is due for a repeat in 2025.         Hypothyroidism    Overview     The patient presents with hypothyroidism.  The patient denies agitation, anxiety, blurred vision, chest pain, cold intolerance, constipation, dizziness, dry skin, fatigue, lightheadedness, paresthesias, skin coarsening, tachycardia, tremor, weight gain or weight loss.  The patient's current treatment has included Synthroid with a good response.    Lab Results   Component Value Date    TSH 1.093 03/08/2019              PAD (peripheral artery disease)    Overview     He has PAD.  He has an ability to be able to walk 1/2 a mile  before he hast to stop and rest.  He does take an aspirin.             Major Depressive Disorder (MDD)  The patient currently complains of a depressed mood or a loss of interest or pleasure in daily activities for more than two weeks.  Specifically this has been for 2 months.  This mood represents a change from the patient's baseline and it has impaired function in the patient's social, occupational, and educational life.  A review of other significant questions include:  1. Depressed mood or irritable most of the day, nearly every day, as indicated by either subjective report (e.g., feels sad or empty) or observation made by others (e.g., appears tearful). Yes   2. Decreased interest or pleasure in most activities, most of each day Yes   3. Significant weight change (5%) or change in appetite No   4. Change in sleep: Insomnia or hypersomnia No   5. Change in activity: Psychomotor agitation or retardation Yes   6. Fatigue or loss of energy Yes   7. Guilt/worthlessness: Feelings of worthlessness or excessive or inappropriate guilt No   8. Concentration: diminished ability to think or concentrate, or more indecisiveness No   9. Suicidality: Thoughts of death or suicide, or has suicide plan No   Total Additional Symptoms 4     Depressive Episode Criteria (may be part of Major Depressive Disorder OR an isolated episode)   A    Depressed Mood Yes   Loss of interest and enjoyment in usual activities Yes   Reduced energy and decreased activity Yes   B    Reduced self esteem and confidence No   Ideas of guilt and unworthiness No   Pessimistic thoughts No   Disturbed sleep No   Diminished appetite No   Ideas of self harm No       Past Medical History:  Past Medical History:   Diagnosis Date    Allergy     BPH (benign prostatic hypertrophy)     Controlled type 2 diabetes mellitus with stage 3 chronic kidney disease, with long-term current use of insulin     The patient presents with diabetes.  The patient denies polyuria,  "polydipsia, polyphagia, hypoglycemia and paresthesias.  The patient's glucose control has been good.  Home glucose averages are routinely checked.  The patient is without retinopathy currently.  The patient has no history of neuropathy.  The patient currently complains of no podiatric problems.  The patient has excellent compliance.    Dehydration     Diabetes mellitus type II, uncontrolled     GERD (gastroesophageal reflux disease) 2013    grade IV/IV    Hypertension     Hypothyroidism     Male hypogonadism     Non-proliferative diabetic retinopathy     Urinary retention      Past Surgical History:   Procedure Laterality Date    COLONOSCOPY N/A 1/16/2020    Procedure: COLONOSCOPY;  Surgeon: Darin Krueger MD;  Location: [facility];  Service: Endoscopy;  Laterality: N/A;    EYE SURGERY      SPINE SURGERY       Review of patient's allergies indicates:   Allergen Reactions    Ace inhibitors      Other reaction(s): cough    Metformin      Abdominal pain     Current Outpatient Medications on File Prior to Visit   Medication Sig Dispense Refill    amLODIPine (NORVASC) 5 MG tablet Take 1 tablet (5 mg total) by mouth once daily. 90 tablet 3    aspirin 325 MG tablet Take 325 mg by mouth once daily.      BD LUER-MANUEL SYRINGE 3 mL 22 x 1 1/2" Syrg   3    blood sugar diagnostic (FREESTYLE LITE STRIPS) Strp 3 times a day (BEFORE MEALS AND AT BEDTIME) 400 strip 3    blood-glucose meter Misc Use as directed four times daily before meals and at bedtime. 1 each 0    dutasteride (AVODART) 0.5 mg capsule Take 1 capsule (0.5 mg total) by mouth once daily. 90 capsule 3    insulin (LANTUS SOLOSTAR U-100 INSULIN) glargine 100 units/mL (3mL) SubQ pen INJECT 33 UNITS UNDER THE SKIN ONCE A DAY 30 Syringe 1    insulin aspart U-100 (NOVOLOG FLEXPEN U-100 INSULIN) 100 unit/mL (3 mL) InPn pen USE AS DIRECTED PER SLIDING SCALE. MAX OF 14 UNITS DAILY) 1 Box 3    lancets (ACCU-CHEK SOFTCLIX LANCETS) Misc Check glucose three " "times daily. 200 each 11    levothyroxine (SYNTHROID) 100 MCG tablet Take 1 tablet (100 mcg total) by mouth once daily. 90 tablet 3    montelukast (SINGULAIR) 10 mg tablet Take 1 tablet (10 mg total) by mouth every evening. 90 tablet 3    pen needle, diabetic (BD ULTRA-FINE MINI PEN NEEDLE) 31 gauge x 3/16" Ndle USE DAILY WITH LANTUS AND NOVOLOG 300 each 3    pravastatin (PRAVACHOL) 40 MG tablet Take 1 tablet (40 mg total) by mouth every evening. 90 tablet 3    tamsulosin (FLOMAX) 0.4 mg Cap Take 1 capsule (0.4 mg total) by mouth every evening. 90 capsule 3    neomycin-polymyxin-dexamethasone (DEXACINE) 3.5 mg/g-10,000 unit/g-0.1 % Oint       ofloxacin (OCUFLOX) 0.3 % ophthalmic solution       tobramycin-dexamethasone 0.3-0.1% (TOBRADEX) 0.3-0.1 % DrpS       [DISCONTINUED] fexofenadine (ALLEGRA ALLERGY) 180 MG tablet Take 1 tablet (180 mg total) by mouth once daily. (Patient taking differently: Take 180 mg by mouth daily as needed. ) 10 tablet 0    [DISCONTINUED] fluticasone (FLONASE) 50 mcg/actuation nasal spray USE 1 SPRAY IN EACH NOSTRIL ONCE DAILY. (Patient taking differently: 1 spray every evening. USE 1 SPRAY IN EACH NOSTRIL ONCE DAILY.) 3 Bottle 3     No current facility-administered medications on file prior to visit.      Social History     Socioeconomic History    Marital status:      Spouse name: Babs    Number of children: 2    Years of education: Not on file    Highest education level: Not on file   Occupational History    Occupation: Retired   Social Needs    Financial resource strain: Not on file    Food insecurity:     Worry: Not on file     Inability: Not on file    Transportation needs:     Medical: Not on file     Non-medical: Not on file   Tobacco Use    Smoking status: Former Smoker     Last attempt to quit: 1986     Years since quittin.4    Smokeless tobacco: Current User     Types: Chew   Substance and Sexual Activity    Alcohol use: Yes     Comment: " rare    Drug use: No    Sexual activity: Not on file   Lifestyle    Physical activity:     Days per week: Not on file     Minutes per session: Not on file    Stress: Not on file   Relationships    Social connections:     Talks on phone: Not on file     Gets together: Not on file     Attends Muslim service: Not on file     Active member of club or organization: Not on file     Attends meetings of clubs or organizations: Not on file     Relationship status: Not on file   Other Topics Concern    Not on file   Social History Narrative    Not on file     Family History   Problem Relation Age of Onset    Heart disease Mother     Stroke Maternal Grandfather     Diabetes Neg Hx     Cancer Neg Hx     Hypertension Neg Hx        Review of Systems   Constitutional: Negative.  Negative for chills, diaphoresis and fever.   HENT: Negative for congestion, hearing loss, mouth sores, postnasal drip and sore throat.    Eyes: Negative for pain and visual disturbance.   Respiratory: Negative for cough, chest tightness, shortness of breath and wheezing.    Cardiovascular: Negative for chest pain.   Gastrointestinal: Negative for abdominal pain, anal bleeding, blood in stool, constipation, diarrhea, nausea and vomiting.   Genitourinary: Negative for dysuria and hematuria.   Musculoskeletal: Negative for back pain, neck pain and neck stiffness.   Skin: Negative for rash.   Neurological: Negative for dizziness and weakness.   Psychiatric/Behavioral: Positive for dysphoric mood.       Objective:     BP (!) 146/82   Pulse 62   Temp 97.9 °F (36.6 °C) (Oral)   Ht 6' (1.829 m)   Wt 73.8 kg (162 lb 11.2 oz)   BMI 22.07 kg/m²     Physical Exam   Constitutional: He is oriented to person, place, and time. He appears well-developed and well-nourished.   HENT:   Head: Normocephalic and atraumatic.   Right Ear: External ear normal.   Left Ear: External ear normal.   Nose: Nose normal.   Mouth/Throat: Oropharynx is clear and moist.  No oropharyngeal exudate.   Eyes: Pupils are equal, round, and reactive to light. Conjunctivae and EOM are normal. Right eye exhibits no discharge. Left eye exhibits no discharge. No scleral icterus.   Neck: Normal range of motion. Neck supple. No JVD present. No thyromegaly present.   Cardiovascular: Normal rate, regular rhythm, normal heart sounds and intact distal pulses. Exam reveals no gallop and no friction rub.   No murmur heard.  Pulmonary/Chest: Effort normal and breath sounds normal. No respiratory distress. He has no wheezes. He has no rales. He exhibits no tenderness.   Abdominal: Soft. Bowel sounds are normal. He exhibits no distension and no mass. There is no tenderness. There is no rebound and no guarding.   Musculoskeletal: Normal range of motion. He exhibits no edema or tenderness.   Lymphadenopathy:     He has no cervical adenopathy.   Neurological: He is alert and oriented to person, place, and time. No cranial nerve deficit. Coordination normal.   Skin: Skin is warm and dry. He is not diaphoretic.   Psychiatric: He has a normal mood and affect. His mood appears not anxious. His affect is not angry, not blunt, not labile and not inappropriate. His speech is not rapid and/or pressured, not delayed, not tangential and not slurred. He is not agitated, not aggressive, not slowed, not actively hallucinating and not combative. Thought content is not paranoid and not delusional. Cognition and memory are not impaired. He does not express impulsivity or inappropriate judgment. He expresses no homicidal and no suicidal ideation. He expresses no suicidal plans and no homicidal plans. He is communicative. He exhibits normal recent memory and normal remote memory. He is attentive.     Assessment:     1. Controlled type 2 diabetes mellitus with stage 3 chronic kidney disease, with long-term current use of insulin    2. Elevated PSA    3. History of colon polyps    4. Hypothyroidism, unspecified type    5. PAD  (peripheral artery disease)    6. Bilateral carotid artery stenosis    7. Combined hyperlipidemia associated with type 2 diabetes mellitus    8. CKD stage 3 secondary to diabetes    9. Benign prostatic hyperplasia without lower urinary tract symptoms    10. Encounter for screening for malignant neoplasm of prostate     11. Current moderate episode of major depressive disorder without prior episode    12. Allergic state, initial encounter    13. Benign prostatic hyperplasia, presence of lower urinary tract symptoms unspecified        Plan:     Problem List Items Addressed This Visit     Benign prostatic hyperplasia    Bilateral carotid artery stenosis    CKD stage 3 secondary to diabetes    Combined hyperlipidemia associated with type 2 diabetes mellitus    Controlled type 2 diabetes mellitus with stage 3 chronic kidney disease, with long-term current use of insulin - Primary    Elevated PSA    History of colon polyps    Hypothyroidism    PAD (peripheral artery disease)        No follow-ups on file.      I am having Bebeto Santiago start on citalopram. I am also having him maintain his BD Luer-Dileep Syringe, blood-glucose meter, aspirin, amLODIPine, neomycin-polymyxin-dexamethasone, montelukast, dutasteride, tamsulosin, pravastatin, levothyroxine, insulin, insulin aspart U-100, lancets, (pen needle, diabetic), and blood sugar diagnostic.    Bebeto was seen today for follow-up.    Diagnoses and all orders for this visit:    Controlled type 2 diabetes mellitus with stage 3 chronic kidney disease, with long-term current use of insulin  -     Comprehensive metabolic panel; Standing  -     Hemoglobin A1c; Standing  -     Protein / creatinine ratio, urine; Standing  -     Discontinue: blood sugar diagnostic (FREESTYLE LITE STRIPS) Strp; 3 times a day (BEFORE MEALS AND AT BEDTIME)  -     insulin (LANTUS SOLOSTAR U-100 INSULIN) glargine 100 units/mL (3mL) SubQ pen; INJECT 33 UNITS UNDER THE SKIN ONCE A DAY  -     insulin aspart  "U-100 (NOVOLOG FLEXPEN U-100 INSULIN) 100 unit/mL (3 mL) InPn pen; USE AS DIRECTED PER SLIDING SCALE. MAX OF 14 UNITS DAILY)  -     lancets (ACCU-CHEK SOFTCLIX LANCETS) Misc; Check glucose three times daily.  -     pen needle, diabetic (BD ULTRA-FINE MINI PEN NEEDLE) 31 gauge x 3/16" Ndle; USE DAILY WITH LANTUS AND NOVOLOG  -     blood sugar diagnostic (FREESTYLE LITE STRIPS) Strp; 3 times a day (BEFORE MEALS AND AT BEDTIME)    Elevated PSA  -     PSA, Screening; Future    History of colon polyps    Hypothyroidism, unspecified type  -     TSH; Future  -     levothyroxine (SYNTHROID) 100 MCG tablet; Take 1 tablet (100 mcg total) by mouth once daily.    PAD (peripheral artery disease)    Bilateral carotid artery stenosis  -     US Carotid Bilateral; Future    Combined hyperlipidemia associated with type 2 diabetes mellitus  -     Lipid panel; Standing  -     pravastatin (PRAVACHOL) 40 MG tablet; Take 1 tablet (40 mg total) by mouth every evening.    CKD stage 3 secondary to diabetes  -     Comprehensive metabolic panel; Standing    Benign prostatic hyperplasia without lower urinary tract symptoms  -     PSA, Screening; Future    Encounter for screening for malignant neoplasm of prostate   -     PSA, Screening; Future    Current moderate episode of major depressive disorder without prior episode  -     citalopram (CELEXA) 10 MG tablet; Take 1 tablet (10 mg total) by mouth once daily.    Allergic state, initial encounter  -     montelukast (SINGULAIR) 10 mg tablet; Take 1 tablet (10 mg total) by mouth every evening.    Benign prostatic hyperplasia, presence of lower urinary tract symptoms unspecified  -     dutasteride (AVODART) 0.5 mg capsule; Take 1 capsule (0.5 mg total) by mouth once daily.  -     tamsulosin (FLOMAX) 0.4 mg Cap; Take 1 capsule (0.4 mg total) by mouth every evening.    Other orders  -     Cancel: Ambulatory referral/consult to Optometry; Future         The patient was instructed to stop the following " "meds:  Medications Discontinued During This Encounter   Medication Reason    fexofenadine (ALLEGRA ALLERGY) 180 MG tablet Patient no longer taking    fluticasone (FLONASE) 50 mcg/actuation nasal spray Patient no longer taking    montelukast (SINGULAIR) 10 mg tablet Reorder    dutasteride (AVODART) 0.5 mg capsule Reorder    tamsulosin (FLOMAX) 0.4 mg Cap Reorder    pravastatin (PRAVACHOL) 40 MG tablet Reorder    levothyroxine (SYNTHROID) 100 MCG tablet Reorder    blood sugar diagnostic (FREESTYLE LITE STRIPS) Strp Reorder    insulin (LANTUS SOLOSTAR U-100 INSULIN) glargine 100 units/mL (3mL) SubQ pen Reorder    insulin aspart U-100 (NOVOLOG FLEXPEN U-100 INSULIN) 100 unit/mL (3 mL) InPn pen Reorder    lancets (ACCU-CHEK SOFTCLIX LANCETS) Misc Reorder    pen needle, diabetic (BD ULTRA-FINE MINI PEN NEEDLE) 31 gauge x 3/16" Ndle Reorder    blood sugar diagnostic (FREESTYLE LITE STRIPS) Strp Reorder    ofloxacin (OCUFLOX) 0.3 % ophthalmic solution Patient no longer taking    tobramycin-dexamethasone 0.3-0.1% (TOBRADEX) 0.3-0.1 % DrpS Patient no longer taking     Orders Placed This Encounter   Procedures    US Carotid Bilateral     Standing Status:   Future     Standing Expiration Date:   3/6/2021    PSA, Screening     Standing Status:   Future     Standing Expiration Date:   3/7/2021    Comprehensive metabolic panel     Standing Status:   Standing     Number of Occurrences:   99     Standing Expiration Date:   3/3/2035    Lipid panel     Standing Status:   Standing     Number of Occurrences:   99     Standing Expiration Date:   3/3/2035    Hemoglobin A1c     Standing Status:   Standing     Number of Occurrences:   99     Standing Expiration Date:   3/3/2035    Protein / creatinine ratio, urine     Standing Status:   Standing     Number of Occurrences:   99     Standing Expiration Date:   3/3/2035     Order Specific Question:   Specimen Source     Answer:   Urine    TSH     Standing Status:   Future "     Standing Expiration Date:   3/6/2021

## 2020-03-12 ENCOUNTER — TELEPHONE (OUTPATIENT)
Dept: FAMILY MEDICINE | Facility: CLINIC | Age: 80
End: 2020-03-12

## 2020-03-12 DIAGNOSIS — I65.29 CAROTID ARTERY CALCIFICATION, UNSPECIFIED LATERALITY: Primary | ICD-10-CM

## 2020-03-12 NOTE — TELEPHONE ENCOUNTER
Appt scheduled for April 16th. Letter mailed. Message sent to Dr. Sarabia's staff to try and get a sooner appt.

## 2020-03-12 NOTE — TELEPHONE ENCOUNTER
----- Message from Darin Krueger MD sent at 3/6/2020  4:18 PM CST -----  The right internal carotid is occluded.  Please get him seen by Dr. Sarabia.

## 2020-03-12 NOTE — TELEPHONE ENCOUNTER
I have signed for the following orders AND/OR meds.  Please call the patient and ask the patient to schedule the testing AND/OR inform about any medications that were sent.      Orders Placed This Encounter   Procedures    Ambulatory referral/consult to Cardiovascular Surgery     Standing Status:   Future     Standing Expiration Date:   4/12/2021     Referral Priority:   Routine     Referral Type:   Surgical     Referral Reason:   Specialty Services Required     Requested Specialty:   Cardiothoracic Surgery     Number of Visits Requested:   1

## 2020-03-31 ENCOUNTER — EXTERNAL CHRONIC CARE MANAGEMENT (OUTPATIENT)
Dept: PRIMARY CARE CLINIC | Facility: CLINIC | Age: 80
End: 2020-03-31
Payer: MEDICARE

## 2020-03-31 PROCEDURE — 99490 CHRNC CARE MGMT STAFF 1ST 20: CPT | Mod: S$PBB,,, | Performed by: FAMILY MEDICINE

## 2020-03-31 PROCEDURE — 99490 CHRNC CARE MGMT STAFF 1ST 20: CPT | Mod: PBBFAC,PO | Performed by: FAMILY MEDICINE

## 2020-03-31 PROCEDURE — 99490 PR CHRONIC CARE MGMT, 1ST 20 MIN: ICD-10-PCS | Mod: S$PBB,,, | Performed by: FAMILY MEDICINE

## 2020-04-30 ENCOUNTER — EXTERNAL CHRONIC CARE MANAGEMENT (OUTPATIENT)
Dept: PRIMARY CARE CLINIC | Facility: CLINIC | Age: 80
End: 2020-04-30
Payer: MEDICARE

## 2020-04-30 PROCEDURE — 99490 CHRNC CARE MGMT STAFF 1ST 20: CPT | Mod: PBBFAC,PO | Performed by: FAMILY MEDICINE

## 2020-04-30 PROCEDURE — 99490 PR CHRONIC CARE MGMT, 1ST 20 MIN: ICD-10-PCS | Mod: S$PBB,,, | Performed by: FAMILY MEDICINE

## 2020-04-30 PROCEDURE — 99490 CHRNC CARE MGMT STAFF 1ST 20: CPT | Mod: S$PBB,,, | Performed by: FAMILY MEDICINE

## 2020-05-28 ENCOUNTER — OFFICE VISIT (OUTPATIENT)
Dept: CARDIAC SURGERY | Facility: CLINIC | Age: 80
End: 2020-05-28
Payer: MEDICARE

## 2020-05-28 VITALS
BODY MASS INDEX: 21.97 KG/M2 | WEIGHT: 162.19 LBS | HEART RATE: 64 BPM | HEIGHT: 72 IN | SYSTOLIC BLOOD PRESSURE: 138 MMHG | DIASTOLIC BLOOD PRESSURE: 65 MMHG

## 2020-05-28 DIAGNOSIS — I65.22 OCCLUSION OF LEFT CAROTID ARTERY: Primary | ICD-10-CM

## 2020-05-28 PROCEDURE — 99213 PR OFFICE/OUTPT VISIT, EST, LEVL III, 20-29 MIN: ICD-10-PCS | Mod: S$PBB,,, | Performed by: THORACIC SURGERY (CARDIOTHORACIC VASCULAR SURGERY)

## 2020-05-28 PROCEDURE — 99214 OFFICE O/P EST MOD 30 MIN: CPT | Mod: PBBFAC,PO | Performed by: THORACIC SURGERY (CARDIOTHORACIC VASCULAR SURGERY)

## 2020-05-28 PROCEDURE — 99213 OFFICE O/P EST LOW 20 MIN: CPT | Mod: S$PBB,,, | Performed by: THORACIC SURGERY (CARDIOTHORACIC VASCULAR SURGERY)

## 2020-05-28 PROCEDURE — 99999 PR PBB SHADOW E&M-EST. PATIENT-LVL IV: CPT | Mod: PBBFAC,,, | Performed by: THORACIC SURGERY (CARDIOTHORACIC VASCULAR SURGERY)

## 2020-05-28 PROCEDURE — 99999 PR PBB SHADOW E&M-EST. PATIENT-LVL IV: ICD-10-PCS | Mod: PBBFAC,,, | Performed by: THORACIC SURGERY (CARDIOTHORACIC VASCULAR SURGERY)

## 2020-05-28 NOTE — PROGRESS NOTES
This patient has history of carotid occlusive disease.  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  The left carotid artery is widely patent.  He had a left carotid endarterectomy in 2017.  His medicines are noted.  His problem list was reviewed.  His and a smoker.  But he does have diabetes.  He has no other pertinent family social history at this time.  On exam vital signs are stable.  Pupils are equal and round reactive to light.  Neck is supple.  Chest is equal breath sounds.  Heart is in a regular rate and rhythm.  Abdomen is benign.  Perfusion to the legs and feet is satisfactory.  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  The left carotid artery is patent.  I would recommend a repeat carotid ultrasound in 1 year.  Medical management is otherwise recommended.

## 2020-05-31 ENCOUNTER — EXTERNAL CHRONIC CARE MANAGEMENT (OUTPATIENT)
Dept: PRIMARY CARE CLINIC | Facility: CLINIC | Age: 80
End: 2020-05-31
Payer: MEDICARE

## 2020-05-31 PROCEDURE — 99490 CHRNC CARE MGMT STAFF 1ST 20: CPT | Mod: S$PBB,,, | Performed by: FAMILY MEDICINE

## 2020-05-31 PROCEDURE — 99490 PR CHRONIC CARE MGMT, 1ST 20 MIN: ICD-10-PCS | Mod: S$PBB,,, | Performed by: FAMILY MEDICINE

## 2020-05-31 PROCEDURE — 99490 CHRNC CARE MGMT STAFF 1ST 20: CPT | Mod: PBBFAC,PO | Performed by: FAMILY MEDICINE

## 2020-06-30 ENCOUNTER — EXTERNAL CHRONIC CARE MANAGEMENT (OUTPATIENT)
Dept: PRIMARY CARE CLINIC | Facility: CLINIC | Age: 80
End: 2020-06-30
Payer: MEDICARE

## 2020-06-30 PROCEDURE — 99490 CHRNC CARE MGMT STAFF 1ST 20: CPT | Mod: PBBFAC,PO | Performed by: FAMILY MEDICINE

## 2020-06-30 PROCEDURE — 99490 CHRNC CARE MGMT STAFF 1ST 20: CPT | Mod: S$PBB,,, | Performed by: FAMILY MEDICINE

## 2020-06-30 PROCEDURE — 99490 PR CHRONIC CARE MGMT, 1ST 20 MIN: ICD-10-PCS | Mod: S$PBB,,, | Performed by: FAMILY MEDICINE

## 2020-07-31 ENCOUNTER — EXTERNAL CHRONIC CARE MANAGEMENT (OUTPATIENT)
Dept: PRIMARY CARE CLINIC | Facility: CLINIC | Age: 80
End: 2020-07-31
Payer: MEDICARE

## 2020-07-31 PROCEDURE — 99490 PR CHRONIC CARE MGMT, 1ST 20 MIN: ICD-10-PCS | Mod: S$PBB,,, | Performed by: FAMILY MEDICINE

## 2020-07-31 PROCEDURE — 99490 CHRNC CARE MGMT STAFF 1ST 20: CPT | Mod: S$PBB,,, | Performed by: FAMILY MEDICINE

## 2020-07-31 PROCEDURE — 99490 CHRNC CARE MGMT STAFF 1ST 20: CPT | Mod: PBBFAC,PO | Performed by: FAMILY MEDICINE

## 2020-08-31 ENCOUNTER — EXTERNAL CHRONIC CARE MANAGEMENT (OUTPATIENT)
Dept: PRIMARY CARE CLINIC | Facility: CLINIC | Age: 80
End: 2020-08-31
Payer: MEDICARE

## 2020-08-31 PROCEDURE — 99490 CHRNC CARE MGMT STAFF 1ST 20: CPT | Mod: PBBFAC,PO | Performed by: FAMILY MEDICINE

## 2020-08-31 PROCEDURE — 99490 CHRNC CARE MGMT STAFF 1ST 20: CPT | Mod: S$PBB,,, | Performed by: FAMILY MEDICINE

## 2020-08-31 PROCEDURE — 99490 PR CHRONIC CARE MGMT, 1ST 20 MIN: ICD-10-PCS | Mod: S$PBB,,, | Performed by: FAMILY MEDICINE

## 2020-09-30 ENCOUNTER — EXTERNAL CHRONIC CARE MANAGEMENT (OUTPATIENT)
Dept: PRIMARY CARE CLINIC | Facility: CLINIC | Age: 80
End: 2020-09-30
Payer: MEDICARE

## 2020-09-30 PROCEDURE — 99490 CHRNC CARE MGMT STAFF 1ST 20: CPT | Mod: S$PBB,,, | Performed by: FAMILY MEDICINE

## 2020-09-30 PROCEDURE — 99490 CHRNC CARE MGMT STAFF 1ST 20: CPT | Mod: PBBFAC,PO | Performed by: FAMILY MEDICINE

## 2020-09-30 PROCEDURE — 99490 PR CHRONIC CARE MGMT, 1ST 20 MIN: ICD-10-PCS | Mod: S$PBB,,, | Performed by: FAMILY MEDICINE

## 2020-10-14 ENCOUNTER — OFFICE VISIT (OUTPATIENT)
Dept: CARDIOLOGY | Facility: CLINIC | Age: 80
End: 2020-10-14
Payer: MEDICARE

## 2020-10-14 VITALS
WEIGHT: 161.5 LBS | HEIGHT: 72 IN | DIASTOLIC BLOOD PRESSURE: 78 MMHG | BODY MASS INDEX: 21.88 KG/M2 | OXYGEN SATURATION: 96 % | SYSTOLIC BLOOD PRESSURE: 124 MMHG | HEART RATE: 66 BPM

## 2020-10-14 DIAGNOSIS — I15.2 HYPERTENSION ASSOCIATED WITH DIABETES: Primary | ICD-10-CM

## 2020-10-14 DIAGNOSIS — E11.59 HYPERTENSION ASSOCIATED WITH DIABETES: Primary | ICD-10-CM

## 2020-10-14 DIAGNOSIS — G47.33 OSA (OBSTRUCTIVE SLEEP APNEA): ICD-10-CM

## 2020-10-14 DIAGNOSIS — I73.9 PAD (PERIPHERAL ARTERY DISEASE): ICD-10-CM

## 2020-10-14 DIAGNOSIS — I65.23 BILATERAL CAROTID ARTERY STENOSIS: ICD-10-CM

## 2020-10-14 DIAGNOSIS — E78.2 COMBINED HYPERLIPIDEMIA ASSOCIATED WITH TYPE 2 DIABETES MELLITUS: ICD-10-CM

## 2020-10-14 DIAGNOSIS — E11.69 COMBINED HYPERLIPIDEMIA ASSOCIATED WITH TYPE 2 DIABETES MELLITUS: ICD-10-CM

## 2020-10-14 PROCEDURE — 99214 OFFICE O/P EST MOD 30 MIN: CPT | Mod: PBBFAC,PO | Performed by: INTERNAL MEDICINE

## 2020-10-14 PROCEDURE — 99999 PR PBB SHADOW E&M-EST. PATIENT-LVL IV: CPT | Mod: PBBFAC,,, | Performed by: INTERNAL MEDICINE

## 2020-10-14 PROCEDURE — 99999 PR PBB SHADOW E&M-EST. PATIENT-LVL IV: ICD-10-PCS | Mod: PBBFAC,,, | Performed by: INTERNAL MEDICINE

## 2020-10-14 PROCEDURE — 99214 OFFICE O/P EST MOD 30 MIN: CPT | Mod: S$PBB,,, | Performed by: INTERNAL MEDICINE

## 2020-10-14 PROCEDURE — 99214 PR OFFICE/OUTPT VISIT, EST, LEVL IV, 30-39 MIN: ICD-10-PCS | Mod: S$PBB,,, | Performed by: INTERNAL MEDICINE

## 2020-10-14 NOTE — PROGRESS NOTES
Subjective:   Patient ID:  Bebteo Santiago is a 80 y.o. male who presents for follow-up of No chief complaint on file.  Echo, NMT and holter is normal.Patient denies CP, angina or anginal equivalent.  Hx of ELENA occlussion, hx of LCEA 2016   Pt with weakness in late morning. Pt denies dizziness.Pt states does not affect any activity.    Hypertension  This is a chronic problem. The current episode started more than 1 year ago. The problem has been gradually improving since onset. The problem is controlled. Pertinent negatives include no chest pain, palpitations or shortness of breath. Past treatments include calcium channel blockers. The current treatment provides moderate improvement. There are no compliance problems.    Hyperlipidemia  This is a chronic problem. The current episode started more than 1 year ago. The problem is controlled. Recent lipid tests were reviewed and are variable. Pertinent negatives include no chest pain or shortness of breath. Current antihyperlipidemic treatment includes statins. The current treatment provides moderate improvement of lipids. There are no compliance problems.        Review of Systems   Constitution: Negative. Negative for weight gain.   HENT: Negative.    Eyes: Negative.    Cardiovascular: Negative.  Negative for chest pain, leg swelling and palpitations.   Respiratory: Negative.  Negative for shortness of breath.    Endocrine: Negative.    Hematologic/Lymphatic: Negative.    Skin: Negative.    Musculoskeletal: Negative for muscle weakness.   Gastrointestinal: Negative.    Genitourinary: Negative.    Neurological: Negative.  Negative for dizziness.   Psychiatric/Behavioral: Negative.    Allergic/Immunologic: Negative.      Family History   Problem Relation Age of Onset    Heart disease Mother     Stroke Maternal Grandfather     Diabetes Neg Hx     Cancer Neg Hx     Hypertension Neg Hx      Past Medical History:   Diagnosis Date    Allergy     BPH (benign prostatic  hypertrophy)     Controlled type 2 diabetes mellitus with stage 3 chronic kidney disease, with long-term current use of insulin     The patient presents with diabetes.  The patient denies polyuria, polydipsia, polyphagia, hypoglycemia and paresthesias.  The patient's glucose control has been good.  Home glucose averages are routinely checked.  The patient is without retinopathy currently.  The patient has no history of neuropathy.  The patient currently complains of no podiatric problems.  The patient has excellent compliance.    Dehydration     Diabetes mellitus type II, uncontrolled     GERD (gastroesophageal reflux disease)     grade IV/IV    Hypertension     Hypothyroidism     Male hypogonadism     Non-proliferative diabetic retinopathy     Urinary retention      Social History     Socioeconomic History    Marital status:      Spouse name: Babs    Number of children: 2    Years of education: Not on file    Highest education level: Not on file   Occupational History    Occupation: Retired   Social Needs    Financial resource strain: Not on file    Food insecurity     Worry: Not on file     Inability: Not on file    Transportation needs     Medical: Not on file     Non-medical: Not on file   Tobacco Use    Smoking status: Former Smoker     Quit date: 1986     Years since quittin.1    Smokeless tobacco: Current User     Types: Chew   Substance and Sexual Activity    Alcohol use: Yes     Comment: rare    Drug use: No    Sexual activity: Not on file   Lifestyle    Physical activity     Days per week: Not on file     Minutes per session: Not on file    Stress: Not on file   Relationships    Social connections     Talks on phone: Not on file     Gets together: Not on file     Attends [employer] service: Not on file     Active member of club or organization: Not on file     Attends meetings of clubs or organizations: Not on file     Relationship status: Not on file   Other  "Topics Concern    Not on file   Social History Narrative    Not on file     Current Outpatient Medications on File Prior to Visit   Medication Sig Dispense Refill    amLODIPine (NORVASC) 5 MG tablet Take 1 tablet (5 mg total) by mouth once daily. 90 tablet 3    aspirin 325 MG tablet Take 325 mg by mouth once daily.      BD LUER-MANUEL SYRINGE 3 mL 22 x 1 1/2" Syrg   3    blood sugar diagnostic (FREESTYLE LITE STRIPS) Strp 3 times a day (BEFORE MEALS AND AT BEDTIME) 400 strip 3    blood-glucose meter Misc Use as directed four times daily before meals and at bedtime. 1 each 0    citalopram (CELEXA) 10 MG tablet Take 1 tablet (10 mg total) by mouth once daily. 90 tablet 3    dutasteride (AVODART) 0.5 mg capsule Take 1 capsule (0.5 mg total) by mouth once daily. 90 capsule 3    insulin (LANTUS SOLOSTAR U-100 INSULIN) glargine 100 units/mL (3mL) SubQ pen INJECT 33 UNITS UNDER THE SKIN ONCE A DAY 30 Syringe 1    insulin aspart U-100 (NOVOLOG FLEXPEN U-100 INSULIN) 100 unit/mL (3 mL) InPn pen USE AS DIRECTED PER SLIDING SCALE. MAX OF 14 UNITS DAILY) 1 Box 3    lancets (ACCU-CHEK SOFTCLIX LANCETS) Misc Check glucose three times daily. 200 each 11    levothyroxine (SYNTHROID) 100 MCG tablet Take 1 tablet (100 mcg total) by mouth once daily. 90 tablet 3    montelukast (SINGULAIR) 10 mg tablet Take 1 tablet (10 mg total) by mouth every evening. 90 tablet 3    neomycin-polymyxin-dexamethasone (DEXACINE) 3.5 mg/g-10,000 unit/g-0.1 % Oint       pen needle, diabetic (BD ULTRA-FINE MINI PEN NEEDLE) 31 gauge x 3/16" Ndle USE DAILY WITH LANTUS AND NOVOLOG 300 each 3    pravastatin (PRAVACHOL) 40 MG tablet Take 1 tablet (40 mg total) by mouth every evening. 90 tablet 3    tamsulosin (FLOMAX) 0.4 mg Cap Take 1 capsule (0.4 mg total) by mouth every evening. 90 capsule 3     No current facility-administered medications on file prior to visit.      Review of patient's allergies indicates:   Allergen Reactions    Ace " inhibitors      Other reaction(s): cough    Metformin      Abdominal pain       Objective:     Physical Exam   Constitutional: He is oriented to person, place, and time. He appears well-developed and well-nourished.   HENT:   Head: Normocephalic and atraumatic.   Eyes: Pupils are equal, round, and reactive to light. Conjunctivae are normal.   Neck: Normal range of motion. Neck supple.   Cardiovascular: Normal rate, regular rhythm, normal heart sounds and intact distal pulses.   Pulmonary/Chest: Effort normal and breath sounds normal.   Abdominal: Soft. Bowel sounds are normal.   Neurological: He is alert and oriented to person, place, and time.   Skin: Skin is warm and dry.   Psychiatric: He has a normal mood and affect.   Nursing note and vitals reviewed.      Assessment:     1. Hypertension associated with diabetes    2. Combined hyperlipidemia associated with type 2 diabetes mellitus    3. PAD (peripheral artery disease)    4. Bilateral carotid artery stenosis    5. ERNESTINE (obstructive sleep apnea)        Plan:     Hypertension associated with diabetes    Combined hyperlipidemia associated with type 2 diabetes mellitus    PAD (peripheral artery disease)    Bilateral carotid artery stenosis    ERNESTINE (obstructive sleep apnea)      Continue norvasc-htn  Continue statin-hlp  Continue asa- primary prevention

## 2020-10-31 ENCOUNTER — EXTERNAL CHRONIC CARE MANAGEMENT (OUTPATIENT)
Dept: PRIMARY CARE CLINIC | Facility: CLINIC | Age: 80
End: 2020-10-31
Payer: MEDICARE

## 2020-10-31 PROCEDURE — 99490 PR CHRONIC CARE MGMT, 1ST 20 MIN: ICD-10-PCS | Mod: S$PBB,,, | Performed by: FAMILY MEDICINE

## 2020-10-31 PROCEDURE — 99490 CHRNC CARE MGMT STAFF 1ST 20: CPT | Mod: PBBFAC,PO | Performed by: FAMILY MEDICINE

## 2020-10-31 PROCEDURE — 99490 CHRNC CARE MGMT STAFF 1ST 20: CPT | Mod: S$PBB,,, | Performed by: FAMILY MEDICINE

## 2020-11-04 ENCOUNTER — LAB VISIT (OUTPATIENT)
Dept: LAB | Facility: HOSPITAL | Age: 80
End: 2020-11-04
Attending: FAMILY MEDICINE
Payer: MEDICARE

## 2020-11-04 ENCOUNTER — OFFICE VISIT (OUTPATIENT)
Dept: FAMILY MEDICINE | Facility: CLINIC | Age: 80
End: 2020-11-04
Payer: MEDICARE

## 2020-11-04 VITALS
SYSTOLIC BLOOD PRESSURE: 135 MMHG | HEART RATE: 72 BPM | HEIGHT: 72 IN | TEMPERATURE: 98 F | WEIGHT: 161.81 LBS | DIASTOLIC BLOOD PRESSURE: 75 MMHG | BODY MASS INDEX: 21.92 KG/M2

## 2020-11-04 DIAGNOSIS — Z12.5 ENCOUNTER FOR PROSTATE CANCER SCREENING: ICD-10-CM

## 2020-11-04 DIAGNOSIS — Z79.4 CONTROLLED TYPE 2 DIABETES MELLITUS WITH STAGE 3 CHRONIC KIDNEY DISEASE, WITH LONG-TERM CURRENT USE OF INSULIN: ICD-10-CM

## 2020-11-04 DIAGNOSIS — J30.1 SEASONAL ALLERGIC RHINITIS DUE TO POLLEN: ICD-10-CM

## 2020-11-04 DIAGNOSIS — R00.2 PALPITATIONS: Primary | ICD-10-CM

## 2020-11-04 DIAGNOSIS — I15.2 HYPERTENSION ASSOCIATED WITH DIABETES: ICD-10-CM

## 2020-11-04 DIAGNOSIS — E11.69 COMBINED HYPERLIPIDEMIA ASSOCIATED WITH TYPE 2 DIABETES MELLITUS: ICD-10-CM

## 2020-11-04 DIAGNOSIS — E11.22 CONTROLLED TYPE 2 DIABETES MELLITUS WITH STAGE 3 CHRONIC KIDNEY DISEASE, WITH LONG-TERM CURRENT USE OF INSULIN: ICD-10-CM

## 2020-11-04 DIAGNOSIS — N18.30 CONTROLLED TYPE 2 DIABETES MELLITUS WITH STAGE 3 CHRONIC KIDNEY DISEASE, WITH LONG-TERM CURRENT USE OF INSULIN: ICD-10-CM

## 2020-11-04 DIAGNOSIS — E78.2 COMBINED HYPERLIPIDEMIA ASSOCIATED WITH TYPE 2 DIABETES MELLITUS: ICD-10-CM

## 2020-11-04 DIAGNOSIS — F32.1 CURRENT MODERATE EPISODE OF MAJOR DEPRESSIVE DISORDER WITHOUT PRIOR EPISODE: ICD-10-CM

## 2020-11-04 DIAGNOSIS — E03.9 HYPOTHYROIDISM, UNSPECIFIED TYPE: ICD-10-CM

## 2020-11-04 DIAGNOSIS — E11.59 HYPERTENSION ASSOCIATED WITH DIABETES: ICD-10-CM

## 2020-11-04 DIAGNOSIS — R00.2 PALPITATIONS: ICD-10-CM

## 2020-11-04 DIAGNOSIS — N40.0 BENIGN PROSTATIC HYPERPLASIA, PRESENCE OF LOWER URINARY TRACT SYMPTOMS UNSPECIFIED: ICD-10-CM

## 2020-11-04 LAB
ALBUMIN SERPL BCP-MCNC: 3.8 G/DL (ref 3.5–5.2)
ALP SERPL-CCNC: 89 U/L (ref 55–135)
ALT SERPL W/O P-5'-P-CCNC: 11 U/L (ref 10–44)
ANION GAP SERPL CALC-SCNC: 11 MMOL/L (ref 8–16)
AST SERPL-CCNC: 14 U/L (ref 10–40)
BASOPHILS # BLD AUTO: 0.07 K/UL (ref 0–0.2)
BASOPHILS NFR BLD: 0.8 % (ref 0–1.9)
BILIRUB SERPL-MCNC: 0.9 MG/DL (ref 0.1–1)
BUN SERPL-MCNC: 22 MG/DL (ref 8–23)
CALCIUM SERPL-MCNC: 10 MG/DL (ref 8.7–10.5)
CHLORIDE SERPL-SCNC: 102 MMOL/L (ref 95–110)
CHOLEST SERPL-MCNC: 151 MG/DL (ref 120–199)
CHOLEST/HDLC SERPL: 2.3 {RATIO} (ref 2–5)
CO2 SERPL-SCNC: 27 MMOL/L (ref 23–29)
COMPLEXED PSA SERPL-MCNC: 2 NG/ML (ref 0–4)
CREAT SERPL-MCNC: 1.3 MG/DL (ref 0.5–1.4)
DIFFERENTIAL METHOD: ABNORMAL
EOSINOPHIL # BLD AUTO: 0.1 K/UL (ref 0–0.5)
EOSINOPHIL NFR BLD: 1.5 % (ref 0–8)
ERYTHROCYTE [DISTWIDTH] IN BLOOD BY AUTOMATED COUNT: 13.9 % (ref 11.5–14.5)
EST. GFR  (AFRICAN AMERICAN): 59.2 ML/MIN/1.73 M^2
EST. GFR  (NON AFRICAN AMERICAN): 51.2 ML/MIN/1.73 M^2
ESTIMATED AVG GLUCOSE: 146 MG/DL (ref 68–131)
GLUCOSE SERPL-MCNC: 147 MG/DL (ref 70–110)
HBA1C MFR BLD HPLC: 6.7 % (ref 4–5.6)
HCT VFR BLD AUTO: 49.8 % (ref 40–54)
HDLC SERPL-MCNC: 66 MG/DL (ref 40–75)
HDLC SERPL: 43.7 % (ref 20–50)
HGB BLD-MCNC: 15.8 G/DL (ref 14–18)
IMM GRANULOCYTES # BLD AUTO: 0.02 K/UL (ref 0–0.04)
IMM GRANULOCYTES NFR BLD AUTO: 0.2 % (ref 0–0.5)
LDLC SERPL CALC-MCNC: 74.4 MG/DL (ref 63–159)
LYMPHOCYTES # BLD AUTO: 1.4 K/UL (ref 1–4.8)
LYMPHOCYTES NFR BLD: 16.2 % (ref 18–48)
MCH RBC QN AUTO: 29.3 PG (ref 27–31)
MCHC RBC AUTO-ENTMCNC: 31.7 G/DL (ref 32–36)
MCV RBC AUTO: 92 FL (ref 82–98)
MONOCYTES # BLD AUTO: 0.6 K/UL (ref 0.3–1)
MONOCYTES NFR BLD: 6.5 % (ref 4–15)
NEUTROPHILS # BLD AUTO: 6.3 K/UL (ref 1.8–7.7)
NEUTROPHILS NFR BLD: 74.8 % (ref 38–73)
NONHDLC SERPL-MCNC: 85 MG/DL
NRBC BLD-RTO: 0 /100 WBC
PLATELET # BLD AUTO: 246 K/UL (ref 150–350)
PMV BLD AUTO: 11.3 FL (ref 9.2–12.9)
POTASSIUM SERPL-SCNC: 4.3 MMOL/L (ref 3.5–5.1)
PROT SERPL-MCNC: 6.9 G/DL (ref 6–8.4)
RBC # BLD AUTO: 5.39 M/UL (ref 4.6–6.2)
SODIUM SERPL-SCNC: 140 MMOL/L (ref 136–145)
TRIGL SERPL-MCNC: 53 MG/DL (ref 30–150)
TSH SERPL DL<=0.005 MIU/L-ACNC: 0.81 UIU/ML (ref 0.4–4)
WBC # BLD AUTO: 8.41 K/UL (ref 3.9–12.7)

## 2020-11-04 PROCEDURE — 36415 COLL VENOUS BLD VENIPUNCTURE: CPT | Mod: PO

## 2020-11-04 PROCEDURE — 99214 PR OFFICE/OUTPT VISIT, EST, LEVL IV, 30-39 MIN: ICD-10-PCS | Mod: S$PBB,,, | Performed by: FAMILY MEDICINE

## 2020-11-04 PROCEDURE — 99213 OFFICE O/P EST LOW 20 MIN: CPT | Mod: PBBFAC,PO | Performed by: FAMILY MEDICINE

## 2020-11-04 PROCEDURE — 83036 HEMOGLOBIN GLYCOSYLATED A1C: CPT

## 2020-11-04 PROCEDURE — 80061 LIPID PANEL: CPT

## 2020-11-04 PROCEDURE — 80053 COMPREHEN METABOLIC PANEL: CPT

## 2020-11-04 PROCEDURE — 99999 PR PBB SHADOW E&M-EST. PATIENT-LVL III: CPT | Mod: PBBFAC,,, | Performed by: FAMILY MEDICINE

## 2020-11-04 PROCEDURE — 99214 OFFICE O/P EST MOD 30 MIN: CPT | Mod: S$PBB,,, | Performed by: FAMILY MEDICINE

## 2020-11-04 PROCEDURE — 85025 COMPLETE CBC W/AUTO DIFF WBC: CPT

## 2020-11-04 PROCEDURE — 99999 PR PBB SHADOW E&M-EST. PATIENT-LVL III: ICD-10-PCS | Mod: PBBFAC,,, | Performed by: FAMILY MEDICINE

## 2020-11-04 PROCEDURE — 84153 ASSAY OF PSA TOTAL: CPT

## 2020-11-04 PROCEDURE — 84443 ASSAY THYROID STIM HORMONE: CPT

## 2020-11-04 RX ORDER — TAMSULOSIN HYDROCHLORIDE 0.4 MG/1
0.4 CAPSULE ORAL NIGHTLY
Qty: 90 CAPSULE | Refills: 3 | Status: SHIPPED | OUTPATIENT
Start: 2020-11-04 | End: 2021-06-23

## 2020-11-04 RX ORDER — AMLODIPINE BESYLATE 5 MG/1
5 TABLET ORAL DAILY
Qty: 90 TABLET | Refills: 3 | Status: SHIPPED | OUTPATIENT
Start: 2020-11-04 | End: 2021-07-07 | Stop reason: DRUGHIGH

## 2020-11-04 RX ORDER — DUTASTERIDE 0.5 MG/1
0.5 CAPSULE, LIQUID FILLED ORAL DAILY
Qty: 90 CAPSULE | Refills: 3 | Status: SHIPPED | OUTPATIENT
Start: 2020-11-04 | End: 2021-06-23

## 2020-11-04 RX ORDER — CITALOPRAM 10 MG/1
10 TABLET ORAL DAILY
Qty: 90 TABLET | Refills: 3 | Status: SHIPPED | OUTPATIENT
Start: 2020-11-04 | End: 2021-11-03

## 2020-11-04 RX ORDER — INSULIN GLARGINE 100 [IU]/ML
INJECTION, SOLUTION SUBCUTANEOUS
Qty: 30 SYRINGE | Refills: 1 | Status: SHIPPED | OUTPATIENT
Start: 2020-11-04 | End: 2021-04-26 | Stop reason: SDUPTHER

## 2020-11-04 RX ORDER — MONTELUKAST SODIUM 10 MG/1
10 TABLET ORAL NIGHTLY
Qty: 90 TABLET | Refills: 3 | Status: SHIPPED | OUTPATIENT
Start: 2020-11-04 | End: 2021-11-29

## 2020-11-04 RX ORDER — LEVOTHYROXINE SODIUM 100 UG/1
100 TABLET ORAL DAILY
Qty: 90 TABLET | Refills: 3 | Status: SHIPPED | OUTPATIENT
Start: 2020-11-04 | End: 2021-12-14

## 2020-11-04 RX ORDER — BLOOD-GLUCOSE METER
KIT MISCELLANEOUS
Qty: 400 STRIP | Refills: 3 | Status: SHIPPED | OUTPATIENT
Start: 2020-11-04 | End: 2021-04-08

## 2020-11-04 RX ORDER — PRAVASTATIN SODIUM 40 MG/1
40 TABLET ORAL NIGHTLY
Qty: 90 TABLET | Refills: 3 | Status: SHIPPED | OUTPATIENT
Start: 2020-11-04 | End: 2021-11-29

## 2020-11-04 NOTE — PROGRESS NOTES
Subjective:      Patient ID: Bebeto Santiago is a 80 y.o. male.    Chief Complaint: Annual Exam    Problem List Items Addressed This Visit     Hypertension associated with diabetes    Overview     The patient presents with essential hypertension.  The patient is tolerating the medication well and is in excellent compliance.  The patient is experiencing no side effects.  Counseling was offered regarding low salt diets.  The patient has a reduced salt intake.  The patient denies chest pain, palpitations, shortness of breath, dyspnea on exertion, left or murmur neck pain, nausea, vomiting, diaphoresis, paroxysmal nocturnal dyspnea, and orthopnea.   Hypertension Medications             amLODIPine (NORVASC) 5 MG tablet Take 1 tablet (5 mg total) by mouth once daily.        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         Hypothyroidism    Overview     The patient presents with hypothyroidism.  The patient denies agitation, anxiety, blurred vision, chest pain, cold intolerance, constipation, dizziness, dry skin, fatigue, lightheadedness, paresthesias, skin coarsening, tachycardia, tremor, weight gain or weight loss.  The patient's current treatment has included Synthroid with a good response.    Lab Results   Component Value Date    TSH 1.093 03/08/2019              Controlled type 2 diabetes mellitus with stage 3 chronic kidney disease, with long-term current use of insulin    Overview     The patient presents with diabetes.  The patient denies polyuria, polydipsia, polyphagia, hypoglycemia and paresthesias.  The patient's glucose control has been good.  Home glucose averages are routinely checked.  The patient is without retinopathy currently.  The patient has no history of neuropathy.  The patient currently complains of no podiatric problems.  The patient has excellent compliance.  Because of him being on lantus and novolog,  the patient checks his blood sugar 4 times daily and administers  insulin 3 or more times daily.   Hemoglobin A1C   Date Value Ref Range Status   09/10/2019 6.9 (H) 4.0 - 5.6 % Final     Comment:     ADA Screening Guidelines:  5.7-6.4%  Consistent with prediabetes  >or=6.5%  Consistent with diabetes  High levels of fetal hemoglobin interfere with the HbA1C  assay. Heterozygous hemoglobin variants (HbS, HgC, etc)do  not significantly interfere with this assay.   However, presence of multiple variants may affect accuracy.     03/01/2019 7.2 (H) 4.0 - 5.6 % Final     Comment:     ADA Screening Guidelines:  5.7-6.4%  Consistent with prediabetes  >or=6.5%  Consistent with diabetes  High levels of fetal hemoglobin interfere with the HbA1C  assay. Heterozygous hemoglobin variants (HbS, HgC, etc)do  not significantly interfere with this assay.   However, presence of multiple variants may affect accuracy.     03/01/2019 7.2 (H) 4.0 - 5.6 % Final     Comment:     ADA Screening Guidelines:  5.7-6.4%  Consistent with prediabetes  >or=6.5%  Consistent with diabetes  High levels of fetal hemoglobin interfere with the HbA1C  assay. Heterozygous hemoglobin variants (HbS, HgC, etc)do  not significantly interfere with this assay.   However, presence of multiple variants may affect accuracy.       No results found for: MARKALNORMA, JADON  Diabetes Management Status    Statin: Taking  ACE/ARB: Not taking  He has changed to 33 u of the lantus from 28 u since he had a high a1c below.  Screening or Prevention Patient's value Goal Complete/Controlled?   HgA1C Testing and Control   Lab Results   Component Value Date    HGBA1C 6.9 (H) 09/10/2019      Annually/Less than 8% Yes   Lipid profile : 03/01/2019 Annually Yes   LDL control Lab Results   Component Value Date    LDLCALC 82.6 03/01/2019    Annually/Less than 100 mg/dl  Yes   Nephropathy screening Lab Results   Component Value Date    LABMICR 33.0 09/20/2017     Lab Results   Component Value Date    PROTEINUA Negative 09/20/2017    Annually Yes    Blood pressure BP Readings from Last 1 Encounters:   01/16/20 (!) 125/59    Less than 140/90 Yes   Dilated retinal exam : 11/08/2019 Annually No   Foot exam   : 09/06/2019 Annually Yes              Combined hyperlipidemia associated with type 2 diabetes mellitus    Overview     The patient presents with hyperlipidemia.  The patient reports tolerating the medication well and is in excellent compliance.  There have been no medication side effects.  The patient denies chest pain, neuropathy, and myalgias.  The patient has reduced fat intake and has been exercising.  Current treatment has included the medications listed in the med card.    Lab Results   Component Value Date    CHOL 152 03/01/2019    CHOL 145 08/30/2018    CHOL 150 02/21/2018       Lab Results   Component Value Date    HDL 59 03/01/2019    HDL 52 08/30/2018    HDL 50 02/21/2018       Lab Results   Component Value Date    LDLCALC 82.6 03/01/2019    LDLCALC 82.6 08/30/2018    LDLCALC 88.0 02/21/2018       Lab Results   Component Value Date    TRIG 52 03/01/2019    TRIG 52 08/30/2018    TRIG 60 02/21/2018       Lab Results   Component Value Date    CHOLHDL 38.8 03/01/2019    CHOLHDL 35.9 08/30/2018    CHOLHDL 33.3 02/21/2018     Lab Results   Component Value Date    ALT 13 03/01/2019    AST 15 03/01/2019    ALKPHOS 93 03/01/2019    BILITOT 0.7 03/01/2019              Palpitations    Overview     Palpitations: Patient complains of palpitations.  The symptoms are of mild severity, occuring intermittently and lasting 5 seconds per episode. Cardiac risk factors include: advanced age (older than 55 for men, 65 for women), diabetes mellitus, dyslipidemia, hypertension and male gender. Aggravating factors: none. Relieving factors: none. Associated signs and symptoms: has complaint(s) of dyspnea.  Lab Results   Component Value Date    TSH 0.772 03/06/2020                Other Visit Diagnoses     Encounter for prostate cancer screening    -  Primary    Seasonal  "allergic rhinitis due to pollen        Benign prostatic hyperplasia, presence of lower urinary tract symptoms unspecified        Current moderate episode of major depressive disorder without prior episode              Past Medical History:  Past Medical History:   Diagnosis Date    Allergy     BPH (benign prostatic hypertrophy)     Controlled type 2 diabetes mellitus with stage 3 chronic kidney disease, with long-term current use of insulin     The patient presents with diabetes.  The patient denies polyuria, polydipsia, polyphagia, hypoglycemia and paresthesias.  The patient's glucose control has been good.  Home glucose averages are routinely checked.  The patient is without retinopathy currently.  The patient has no history of neuropathy.  The patient currently complains of no podiatric problems.  The patient has excellent compliance.    Dehydration     Diabetes mellitus type II, uncontrolled     GERD (gastroesophageal reflux disease) 2013    grade IV/IV    Hypertension     Hypothyroidism     Male hypogonadism     Non-proliferative diabetic retinopathy     Urinary retention      Past Surgical History:   Procedure Laterality Date    COLONOSCOPY N/A 1/16/2020    Procedure: COLONOSCOPY;  Surgeon: Darin Krueger MD;  Location: [facility];  Service: Endoscopy;  Laterality: N/A;    EYE SURGERY      SPINE SURGERY       Review of patient's allergies indicates:   Allergen Reactions    Ace inhibitors      Other reaction(s): cough    Metformin      Abdominal pain     Current Outpatient Medications on File Prior to Visit   Medication Sig Dispense Refill    amLODIPine (NORVASC) 5 MG tablet TAKE 1 TABLET BY MOUTH EVERY DAY 90 tablet 3    aspirin 325 MG tablet Take 325 mg by mouth once daily.      BD LUER-MANUEL SYRINGE 3 mL 22 x 1 1/2" Syrg   3    blood sugar diagnostic (FREESTYLE LITE STRIPS) Strp 3 times a day (BEFORE MEALS AND AT BEDTIME) 400 strip 3    blood-glucose meter Misc Use as directed four times " "daily before meals and at bedtime. 1 each 0    citalopram (CELEXA) 10 MG tablet Take 1 tablet (10 mg total) by mouth once daily. 90 tablet 3    dutasteride (AVODART) 0.5 mg capsule Take 1 capsule (0.5 mg total) by mouth once daily. 90 capsule 3    insulin (LANTUS SOLOSTAR U-100 INSULIN) glargine 100 units/mL (3mL) SubQ pen INJECT 33 UNITS UNDER THE SKIN ONCE A DAY 30 Syringe 1    insulin aspart U-100 (NOVOLOG FLEXPEN U-100 INSULIN) 100 unit/mL (3 mL) InPn pen USE AS DIRECTED PER SLIDING SCALE. MAX OF 14 UNITS DAILY) 1 Box 3    lancets (ACCU-CHEK SOFTCLIX LANCETS) Misc Check glucose three times daily. 200 each 11    levothyroxine (SYNTHROID) 100 MCG tablet Take 1 tablet (100 mcg total) by mouth once daily. 90 tablet 3    montelukast (SINGULAIR) 10 mg tablet Take 1 tablet (10 mg total) by mouth every evening. 90 tablet 3    pen needle, diabetic (BD ULTRA-FINE MINI PEN NEEDLE) 31 gauge x 3/16" Ndle USE DAILY WITH LANTUS AND NOVOLOG 300 each 3    pravastatin (PRAVACHOL) 40 MG tablet Take 1 tablet (40 mg total) by mouth every evening. 90 tablet 3    tamsulosin (FLOMAX) 0.4 mg Cap Take 1 capsule (0.4 mg total) by mouth every evening. 90 capsule 3    neomycin-polymyxin-dexamethasone (DEXACINE) 3.5 mg/g-10,000 unit/g-0.1 % Oint        No current facility-administered medications on file prior to visit.      Social History     Socioeconomic History    Marital status:      Spouse name: Babs    Number of children: 2    Years of education: Not on file    Highest education level: Not on file   Occupational History    Occupation: Retired   Social Needs    Financial resource strain: Not on file    Food insecurity     Worry: Not on file     Inability: Not on file    Transportation needs     Medical: Not on file     Non-medical: Not on file   Tobacco Use    Smoking status: Former Smoker     Quit date: 1986     Years since quittin.1    Smokeless tobacco: Current User     Types: Chew   Substance " and Sexual Activity    Alcohol use: Yes     Comment: rare    Drug use: No    Sexual activity: Not on file   Lifestyle    Physical activity     Days per week: Not on file     Minutes per session: Not on file    Stress: Not on file   Relationships    Social connections     Talks on phone: Not on file     Gets together: Not on file     Attends Mosque service: Not on file     Active member of club or organization: Not on file     Attends meetings of clubs or organizations: Not on file     Relationship status: Not on file   Other Topics Concern    Not on file   Social History Narrative    Not on file     Family History   Problem Relation Age of Onset    Heart disease Mother     Stroke Maternal Grandfather     Diabetes Neg Hx     Cancer Neg Hx     Hypertension Neg Hx        Review of Systems   Constitutional: Negative.  Negative for chills, diaphoresis and fever.   HENT: Negative for congestion, hearing loss, mouth sores, postnasal drip and sore throat.    Eyes: Negative for pain and visual disturbance.   Respiratory: Negative for cough, chest tightness, shortness of breath and wheezing.    Cardiovascular: Positive for palpitations. Negative for chest pain.   Gastrointestinal: Negative for abdominal pain, anal bleeding, blood in stool, constipation, diarrhea, nausea and vomiting.   Genitourinary: Negative for dysuria and hematuria.   Musculoskeletal: Negative for back pain, neck pain and neck stiffness.   Skin: Negative for rash.   Neurological: Negative for dizziness and weakness.       Objective:     /75   Pulse 72   Temp 98.1 °F (36.7 °C)   Ht 6' (1.829 m)   Wt 73.4 kg (161 lb 12.8 oz)   BMI 21.94 kg/m²     Physical Exam  Constitutional:       Appearance: He is well-developed. He is not diaphoretic.   HENT:      Head: Normocephalic and atraumatic.      Right Ear: External ear normal.      Left Ear: External ear normal.      Nose: Nose normal.      Mouth/Throat:      Pharynx: No oropharyngeal  exudate.   Eyes:      General: No scleral icterus.        Right eye: No discharge.         Left eye: No discharge.      Conjunctiva/sclera: Conjunctivae normal.      Pupils: Pupils are equal, round, and reactive to light.   Neck:      Musculoskeletal: Normal range of motion and neck supple.      Thyroid: No thyromegaly.      Vascular: No JVD.   Cardiovascular:      Rate and Rhythm: Normal rate and regular rhythm.      Heart sounds: Normal heart sounds. No murmur. No friction rub. No gallop.    Pulmonary:      Effort: Pulmonary effort is normal. No respiratory distress.      Breath sounds: Normal breath sounds. No wheezing or rales.   Chest:      Chest wall: No tenderness.   Abdominal:      General: Bowel sounds are normal. There is no distension.      Palpations: Abdomen is soft. There is no mass.      Tenderness: There is no abdominal tenderness. There is no guarding or rebound.   Musculoskeletal: Normal range of motion.         General: No tenderness.   Lymphadenopathy:      Cervical: No cervical adenopathy.   Skin:     General: Skin is warm and dry.   Neurological:      Mental Status: He is alert and oriented to person, place, and time.      Cranial Nerves: No cranial nerve deficit.      Coordination: Coordination normal.       Assessment:     1. Palpitations    2. Seasonal allergic rhinitis due to pollen    3. Benign prostatic hyperplasia, presence of lower urinary tract symptoms unspecified    4. Combined hyperlipidemia associated with type 2 diabetes mellitus    5. Controlled type 2 diabetes mellitus with stage 3 chronic kidney disease, with long-term current use of insulin    6. Current moderate episode of major depressive disorder without prior episode    7. Hypertension associated with diabetes    8. Hypothyroidism, unspecified type    9. Encounter for prostate cancer screening        Plan:     Problem List Items Addressed This Visit     Hypertension associated with diabetes    Relevant Medications     insulin (LANTUS SOLOSTAR U-100 INSULIN) glargine 100 units/mL (3mL) SubQ pen    amLODIPine (NORVASC) 5 MG tablet    Other Relevant Orders    Comprehensive Metabolic Panel    Hypothyroidism    Relevant Medications    levothyroxine (SYNTHROID) 100 MCG tablet    Other Relevant Orders    TSH    Controlled type 2 diabetes mellitus with stage 3 chronic kidney disease, with long-term current use of insulin    Relevant Medications    insulin (LANTUS SOLOSTAR U-100 INSULIN) glargine 100 units/mL (3mL) SubQ pen    Other Relevant Orders    Comprehensive Metabolic Panel    Hemoglobin A1C    Protein/Creatinine Ratio, Urine    Combined hyperlipidemia associated with type 2 diabetes mellitus    Relevant Medications    pravastatin (PRAVACHOL) 40 MG tablet    insulin (LANTUS SOLOSTAR U-100 INSULIN) glargine 100 units/mL (3mL) SubQ pen    Other Relevant Orders    Comprehensive Metabolic Panel    Lipid Panel    Palpitations    Relevant Orders    TSH    Holter monitor - 24 hour    CBC Auto Differential      Other Visit Diagnoses     Encounter for prostate cancer screening    -  Primary    Relevant Orders    PSA, Screening    Seasonal allergic rhinitis due to pollen        Relevant Medications    montelukast (SINGULAIR) 10 mg tablet    Benign prostatic hyperplasia, presence of lower urinary tract symptoms unspecified        Relevant Medications    dutasteride (AVODART) 0.5 mg capsule    tamsulosin (FLOMAX) 0.4 mg Cap    Current moderate episode of major depressive disorder without prior episode        Relevant Medications    citalopram (CELEXA) 10 MG tablet        No follow-ups on file.      I am having Bebeto ROMORudy Pamela maintain his BD LUER-MANUEL SYRINGE, blood-glucose meter, aspirin, neomycin-polymyxin-dexamethasone, citalopram, montelukast, dutasteride, tamsulosin, pravastatin, levothyroxine, insulin, insulin aspart U-100, lancets, (pen needle, diabetic), blood sugar diagnostic, and amLODIPine.    Bebeto was seen today for annual  exam.    Diagnoses and all orders for this visit:    Encounter for prostate cancer screening  -     PSA, Screening; Future    Seasonal allergic rhinitis due to pollen  -     montelukast (SINGULAIR) 10 mg tablet; Take 1 tablet (10 mg total) by mouth every evening.    Benign prostatic hyperplasia, presence of lower urinary tract symptoms unspecified  -     dutasteride (AVODART) 0.5 mg capsule; Take 1 capsule (0.5 mg total) by mouth once daily.  -     tamsulosin (FLOMAX) 0.4 mg Cap; Take 1 capsule (0.4 mg total) by mouth every evening.    Combined hyperlipidemia associated with type 2 diabetes mellitus  -     Comprehensive Metabolic Panel; Future  -     Lipid Panel; Future  -     pravastatin (PRAVACHOL) 40 MG tablet; Take 1 tablet (40 mg total) by mouth every evening.    Controlled type 2 diabetes mellitus with stage 3 chronic kidney disease, with long-term current use of insulin  -     Comprehensive Metabolic Panel; Future  -     Hemoglobin A1C; Future  -     Protein/Creatinine Ratio, Urine; Future  -     insulin (LANTUS SOLOSTAR U-100 INSULIN) glargine 100 units/mL (3mL) SubQ pen; INJECT 33 UNITS UNDER THE SKIN ONCE A DAY    Current moderate episode of major depressive disorder without prior episode  -     citalopram (CELEXA) 10 MG tablet; Take 1 tablet (10 mg total) by mouth once daily.    Hypertension associated with diabetes  -     Comprehensive Metabolic Panel; Future  -     amLODIPine (NORVASC) 5 MG tablet; Take 1 tablet (5 mg total) by mouth once daily.    Hypothyroidism, unspecified type  -     TSH; Future  -     levothyroxine (SYNTHROID) 100 MCG tablet; Take 1 tablet (100 mcg total) by mouth once daily.    Palpitations  -     TSH; Future  -     Holter monitor - 24 hour; Future  -     CBC Auto Differential; Future         The patient was instructed to stop the following meds:  Medications Discontinued During This Encounter   Medication Reason    citalopram (CELEXA) 10 MG tablet Reorder    montelukast  (SINGULAIR) 10 mg tablet Reorder    dutasteride (AVODART) 0.5 mg capsule Reorder    tamsulosin (FLOMAX) 0.4 mg Cap Reorder    pravastatin (PRAVACHOL) 40 MG tablet Reorder    levothyroxine (SYNTHROID) 100 MCG tablet Reorder    insulin (LANTUS SOLOSTAR U-100 INSULIN) glargine 100 units/mL (3mL) SubQ pen Reorder    amLODIPine (NORVASC) 5 MG tablet Reorder     Orders Placed This Encounter   Procedures    Comprehensive Metabolic Panel     Standing Status:   Future     Standing Expiration Date:   11/4/2021    Lipid Panel     Standing Status:   Future     Standing Expiration Date:   11/4/2021    Hemoglobin A1C     Standing Status:   Future     Standing Expiration Date:   11/4/2021    PSA, Screening     Standing Status:   Future     Standing Expiration Date:   11/5/2021    TSH     Standing Status:   Future     Standing Expiration Date:   11/4/2021    Protein/Creatinine Ratio, Urine     Standing Status:   Future     Standing Expiration Date:   11/4/2021     Order Specific Question:   Specimen Source     Answer:   Urine    CBC Auto Differential     Standing Status:   Future     Standing Expiration Date:   1/3/2022    Holter monitor - 24 hour     Standing Status:   Future     Standing Expiration Date:   11/4/2021

## 2020-11-05 NOTE — PROGRESS NOTES
The thyroid and PSA are both normal at this time.  No changes are needed based on this.  The electrolytes all look great except for a mild decrease in the kidney function which can be because of being a little dehydrated in preparation for the labs.  No changes are recommended in the medications.  The cholesterol is very well controlled.  Please continue current medications and repeat this in a year.

## 2020-11-30 ENCOUNTER — EXTERNAL CHRONIC CARE MANAGEMENT (OUTPATIENT)
Dept: PRIMARY CARE CLINIC | Facility: CLINIC | Age: 80
End: 2020-11-30
Payer: MEDICARE

## 2020-11-30 PROCEDURE — 99490 PR CHRONIC CARE MGMT, 1ST 20 MIN: ICD-10-PCS | Mod: S$PBB,,, | Performed by: FAMILY MEDICINE

## 2020-11-30 PROCEDURE — 99490 CHRNC CARE MGMT STAFF 1ST 20: CPT | Mod: PBBFAC,PO | Performed by: FAMILY MEDICINE

## 2020-11-30 PROCEDURE — 99490 CHRNC CARE MGMT STAFF 1ST 20: CPT | Mod: S$PBB,,, | Performed by: FAMILY MEDICINE

## 2020-12-02 ENCOUNTER — HOSPITAL ENCOUNTER (OUTPATIENT)
Dept: CARDIOLOGY | Facility: HOSPITAL | Age: 80
Discharge: HOME | End: 2020-12-02
Attending: FAMILY MEDICINE
Payer: MEDICARE

## 2020-12-02 VITALS — TEMPERATURE: 97 F

## 2020-12-02 DIAGNOSIS — R00.2 PALPITATIONS: ICD-10-CM

## 2020-12-02 PROCEDURE — 93225 XTRNL ECG REC<48 HRS REC: CPT | Mod: PO

## 2020-12-02 PROCEDURE — 93227 XTRNL ECG REC<48 HR R&I: CPT | Mod: ,,, | Performed by: INTERNAL MEDICINE

## 2020-12-02 PROCEDURE — 93227 HOLTER MONITOR - 24 HOUR (CUPID ONLY): ICD-10-PCS | Mod: ,,, | Performed by: INTERNAL MEDICINE

## 2020-12-03 LAB
OHS CV EVENT MONITOR DAY: 0
OHS CV HOLTER LENGTH DECIMAL HOURS: 24
OHS CV HOLTER LENGTH HOURS: 24
OHS CV HOLTER LENGTH MINUTES: 0

## 2020-12-04 DIAGNOSIS — R00.2 PALPITATIONS: Primary | ICD-10-CM

## 2020-12-04 DIAGNOSIS — I49.9 CARDIAC ARRHYTHMIA, UNSPECIFIED CARDIAC ARRHYTHMIA TYPE: ICD-10-CM

## 2020-12-04 NOTE — PROGRESS NOTES
There were frequent PVCs and ventricular arrhythmias along with a heart block.  Because these abnormalities in the heart, I would like for a cardiology appointment to be scheduled.  Orders will be placed

## 2020-12-08 ENCOUNTER — OFFICE VISIT (OUTPATIENT)
Dept: CARDIOLOGY | Facility: CLINIC | Age: 80
End: 2020-12-08
Payer: MEDICARE

## 2020-12-08 VITALS
DIASTOLIC BLOOD PRESSURE: 68 MMHG | WEIGHT: 158 LBS | SYSTOLIC BLOOD PRESSURE: 126 MMHG | HEIGHT: 72 IN | BODY MASS INDEX: 21.4 KG/M2 | TEMPERATURE: 98 F | HEART RATE: 76 BPM

## 2020-12-08 DIAGNOSIS — E78.2 COMBINED HYPERLIPIDEMIA ASSOCIATED WITH TYPE 2 DIABETES MELLITUS: ICD-10-CM

## 2020-12-08 DIAGNOSIS — I15.2 HYPERTENSION ASSOCIATED WITH DIABETES: Primary | ICD-10-CM

## 2020-12-08 DIAGNOSIS — E11.69 COMBINED HYPERLIPIDEMIA ASSOCIATED WITH TYPE 2 DIABETES MELLITUS: ICD-10-CM

## 2020-12-08 DIAGNOSIS — Z98.890 HISTORY OF CAROTID ENDARTERECTOMY: ICD-10-CM

## 2020-12-08 DIAGNOSIS — R53.82 CHRONIC FATIGUE: ICD-10-CM

## 2020-12-08 DIAGNOSIS — G47.33 OSA (OBSTRUCTIVE SLEEP APNEA): ICD-10-CM

## 2020-12-08 DIAGNOSIS — R00.2 PALPITATIONS: ICD-10-CM

## 2020-12-08 DIAGNOSIS — E11.59 HYPERTENSION ASSOCIATED WITH DIABETES: Primary | ICD-10-CM

## 2020-12-08 DIAGNOSIS — I73.9 PAD (PERIPHERAL ARTERY DISEASE): ICD-10-CM

## 2020-12-08 PROCEDURE — 99999 PR PBB SHADOW E&M-EST. PATIENT-LVL III: ICD-10-PCS | Mod: PBBFAC,,, | Performed by: INTERNAL MEDICINE

## 2020-12-08 PROCEDURE — 99213 OFFICE O/P EST LOW 20 MIN: CPT | Mod: PBBFAC,PO | Performed by: INTERNAL MEDICINE

## 2020-12-08 PROCEDURE — 99214 OFFICE O/P EST MOD 30 MIN: CPT | Mod: S$PBB,,, | Performed by: INTERNAL MEDICINE

## 2020-12-08 PROCEDURE — 99214 PR OFFICE/OUTPT VISIT, EST, LEVL IV, 30-39 MIN: ICD-10-PCS | Mod: S$PBB,,, | Performed by: INTERNAL MEDICINE

## 2020-12-08 PROCEDURE — 99999 PR PBB SHADOW E&M-EST. PATIENT-LVL III: CPT | Mod: PBBFAC,,, | Performed by: INTERNAL MEDICINE

## 2020-12-08 RX ORDER — METOPROLOL SUCCINATE 25 MG/1
25 TABLET, EXTENDED RELEASE ORAL DAILY
Qty: 30 TABLET | Refills: 11 | Status: SHIPPED | OUTPATIENT
Start: 2020-12-08 | End: 2021-06-23

## 2020-12-08 NOTE — PROGRESS NOTES
Subjective:   Patient ID:  Bebeto Santiago is a 80 y.o. male who presents for follow-up of No chief complaint on file.  Echo, NMT and holter is normal.Patient denies CP, angina or anginal equivalent.  Hx of ELENA occlussion, hx of LCEA 2016     Holter - NSR, frequent PACs, PVCs. Pt with occasional palpitations.  Pt      Hypertension  This is a chronic problem. The current episode started more than 1 year ago. The problem has been gradually improving since onset. The problem is controlled. Pertinent negatives include no chest pain, palpitations or shortness of breath. Past treatments include calcium channel blockers. The current treatment provides moderate improvement. There are no compliance problems.    Hyperlipidemia  This is a chronic problem. The current episode started more than 1 year ago. The problem is controlled. Pertinent negatives include no chest pain or shortness of breath. Current antihyperlipidemic treatment includes statins. The current treatment provides moderate improvement of lipids. There are no compliance problems.        Review of Systems   Constitution: Negative. Negative for weight gain.   HENT: Negative.    Eyes: Negative.    Cardiovascular: Negative.  Negative for chest pain, leg swelling and palpitations.   Respiratory: Negative.  Negative for shortness of breath.    Endocrine: Negative.    Hematologic/Lymphatic: Negative.    Skin: Negative.    Musculoskeletal: Negative for muscle weakness.   Gastrointestinal: Negative.    Genitourinary: Negative.    Neurological: Negative.  Negative for dizziness.   Psychiatric/Behavioral: Negative.    Allergic/Immunologic: Negative.      Family History   Problem Relation Age of Onset    Heart disease Mother     Stroke Maternal Grandfather     Diabetes Neg Hx     Cancer Neg Hx     Hypertension Neg Hx      Past Medical History:   Diagnosis Date    Allergy     BPH (benign prostatic hypertrophy)     Controlled type 2 diabetes mellitus with stage 3  chronic kidney disease, with long-term current use of insulin     The patient presents with diabetes.  The patient denies polyuria, polydipsia, polyphagia, hypoglycemia and paresthesias.  The patient's glucose control has been good.  Home glucose averages are routinely checked.  The patient is without retinopathy currently.  The patient has no history of neuropathy.  The patient currently complains of no podiatric problems.  The patient has excellent compliance.    Dehydration     Diabetes mellitus type II, uncontrolled     GERD (gastroesophageal reflux disease)     grade IV/IV    Hypertension     Hypothyroidism     Male hypogonadism     Non-proliferative diabetic retinopathy     Urinary retention      Social History     Socioeconomic History    Marital status:      Spouse name: Babs    Number of children: 2    Years of education: Not on file    Highest education level: Not on file   Occupational History    Occupation: Retired   Social Needs    Financial resource strain: Not on file    Food insecurity     Worry: Not on file     Inability: Not on file    Transportation needs     Medical: Not on file     Non-medical: Not on file   Tobacco Use    Smoking status: Former Smoker     Quit date: 1986     Years since quittin.2    Smokeless tobacco: Current User     Types: Chew   Substance and Sexual Activity    Alcohol use: Yes     Comment: rare    Drug use: No    Sexual activity: Not on file   Lifestyle    Physical activity     Days per week: Not on file     Minutes per session: Not on file    Stress: Not on file   Relationships    Social connections     Talks on phone: Not on file     Gets together: Not on file     Attends [employer] service: Not on file     Active member of club or organization: Not on file     Attends meetings of clubs or organizations: Not on file     Relationship status: Not on file   Other Topics Concern    Not on file   Social History Narrative    Not  "on file     Current Outpatient Medications on File Prior to Visit   Medication Sig Dispense Refill    amLODIPine (NORVASC) 5 MG tablet Take 1 tablet (5 mg total) by mouth once daily. 90 tablet 3    aspirin 325 MG tablet Take 325 mg by mouth once daily.      BD LUER-MANUEL SYRINGE 3 mL 22 x 1 1/2" Syrg   3    blood sugar diagnostic (FREESTYLE LITE STRIPS) Strp 3 times a day (BEFORE MEALS AND AT BEDTIME) 400 strip 3    blood-glucose meter Misc Use as directed four times daily before meals and at bedtime. 1 each 0    citalopram (CELEXA) 10 MG tablet Take 1 tablet (10 mg total) by mouth once daily. 90 tablet 3    dutasteride (AVODART) 0.5 mg capsule Take 1 capsule (0.5 mg total) by mouth once daily. 90 capsule 3    insulin (LANTUS SOLOSTAR U-100 INSULIN) glargine 100 units/mL (3mL) SubQ pen INJECT 33 UNITS UNDER THE SKIN ONCE A DAY 30 Syringe 1    insulin aspart U-100 (NOVOLOG FLEXPEN U-100 INSULIN) 100 unit/mL (3 mL) InPn pen USE AS DIRECTED PER SLIDING SCALE. MAX OF 14 UNITS DAILY) 1 Box 3    lancets (ACCU-CHEK SOFTCLIX LANCETS) Misc Check glucose three times daily. 200 each 11    levothyroxine (SYNTHROID) 100 MCG tablet Take 1 tablet (100 mcg total) by mouth once daily. 90 tablet 3    montelukast (SINGULAIR) 10 mg tablet Take 1 tablet (10 mg total) by mouth every evening. 90 tablet 3    pen needle, diabetic (BD ULTRA-FINE MINI PEN NEEDLE) 31 gauge x 3/16" Ndle USE DAILY WITH LANTUS AND NOVOLOG 300 each 3    pravastatin (PRAVACHOL) 40 MG tablet Take 1 tablet (40 mg total) by mouth every evening. 90 tablet 3    tamsulosin (FLOMAX) 0.4 mg Cap Take 1 capsule (0.4 mg total) by mouth every evening. 90 capsule 3    neomycin-polymyxin-dexamethasone (DEXACINE) 3.5 mg/g-10,000 unit/g-0.1 % Oint        No current facility-administered medications on file prior to visit.      Review of patient's allergies indicates:   Allergen Reactions    Ace inhibitors      Other reaction(s): cough    Metformin      Abdominal " pain       Objective:     Physical Exam   Constitutional: He is oriented to person, place, and time. He appears well-developed and well-nourished.   HENT:   Head: Normocephalic and atraumatic.   Eyes: Pupils are equal, round, and reactive to light. Conjunctivae are normal.   Neck: Normal range of motion. Neck supple.   Cardiovascular: Normal rate, regular rhythm, normal heart sounds and intact distal pulses.   Pulmonary/Chest: Effort normal and breath sounds normal.   Abdominal: Soft. Bowel sounds are normal.   Neurological: He is alert and oriented to person, place, and time.   Skin: Skin is warm and dry.   Psychiatric: He has a normal mood and affect.   Nursing note and vitals reviewed.      Assessment:     1. Hypertension associated with diabetes    2. Combined hyperlipidemia associated with type 2 diabetes mellitus    3. PAD (peripheral artery disease)    4. History of carotid endarterectomy    5. Palpitations    6. ERNESTINE (obstructive sleep apnea)    7. Chronic fatigue        Plan:     Hypertension associated with diabetes    Combined hyperlipidemia associated with type 2 diabetes mellitus    PAD (peripheral artery disease)    History of carotid endarterectomy    Palpitations    ERNESTINE (obstructive sleep apnea)    Chronic fatigue      Continue norvasc-htn  Continue statin-hlp  Continue asa- primary prevention  start

## 2020-12-31 ENCOUNTER — EXTERNAL CHRONIC CARE MANAGEMENT (OUTPATIENT)
Dept: PRIMARY CARE CLINIC | Facility: CLINIC | Age: 80
End: 2020-12-31
Payer: MEDICARE

## 2020-12-31 PROCEDURE — 99490 PR CHRONIC CARE MGMT, 1ST 20 MIN: ICD-10-PCS | Mod: S$PBB,,, | Performed by: FAMILY MEDICINE

## 2020-12-31 PROCEDURE — 99490 CHRNC CARE MGMT STAFF 1ST 20: CPT | Mod: PBBFAC,PO | Performed by: FAMILY MEDICINE

## 2020-12-31 PROCEDURE — 99490 CHRNC CARE MGMT STAFF 1ST 20: CPT | Mod: S$PBB,,, | Performed by: FAMILY MEDICINE

## 2021-01-10 ENCOUNTER — IMMUNIZATION (OUTPATIENT)
Dept: FAMILY MEDICINE | Facility: CLINIC | Age: 81
End: 2021-01-10
Payer: MEDICARE

## 2021-01-10 DIAGNOSIS — Z23 NEED FOR VACCINATION: ICD-10-CM

## 2021-01-10 PROCEDURE — 91300 COVID-19, MRNA, LNP-S, PF, 30 MCG/0.3 ML DOSE VACCINE: CPT | Mod: PBBFAC | Performed by: FAMILY MEDICINE

## 2021-01-19 ENCOUNTER — OFFICE VISIT (OUTPATIENT)
Dept: CARDIOLOGY | Facility: CLINIC | Age: 81
End: 2021-01-19
Payer: MEDICARE

## 2021-01-19 VITALS
DIASTOLIC BLOOD PRESSURE: 70 MMHG | OXYGEN SATURATION: 98 % | HEART RATE: 66 BPM | WEIGHT: 159.81 LBS | SYSTOLIC BLOOD PRESSURE: 130 MMHG | BODY MASS INDEX: 21.68 KG/M2

## 2021-01-19 DIAGNOSIS — Z98.890 HISTORY OF CAROTID ENDARTERECTOMY: ICD-10-CM

## 2021-01-19 DIAGNOSIS — I65.22 OCCLUSION AND STENOSIS OF LEFT CAROTID ARTERY: ICD-10-CM

## 2021-01-19 DIAGNOSIS — E11.69 COMBINED HYPERLIPIDEMIA ASSOCIATED WITH TYPE 2 DIABETES MELLITUS: ICD-10-CM

## 2021-01-19 DIAGNOSIS — E11.59 HYPERTENSION ASSOCIATED WITH DIABETES: Primary | ICD-10-CM

## 2021-01-19 DIAGNOSIS — I73.9 PAD (PERIPHERAL ARTERY DISEASE): ICD-10-CM

## 2021-01-19 DIAGNOSIS — I15.2 HYPERTENSION ASSOCIATED WITH DIABETES: Primary | ICD-10-CM

## 2021-01-19 DIAGNOSIS — I49.3 PVC (PREMATURE VENTRICULAR CONTRACTION): ICD-10-CM

## 2021-01-19 DIAGNOSIS — E78.2 COMBINED HYPERLIPIDEMIA ASSOCIATED WITH TYPE 2 DIABETES MELLITUS: ICD-10-CM

## 2021-01-19 DIAGNOSIS — G47.33 OSA (OBSTRUCTIVE SLEEP APNEA): ICD-10-CM

## 2021-01-19 PROCEDURE — 99999 PR PBB SHADOW E&M-EST. PATIENT-LVL IV: CPT | Mod: PBBFAC,,, | Performed by: INTERNAL MEDICINE

## 2021-01-19 PROCEDURE — 99999 PR PBB SHADOW E&M-EST. PATIENT-LVL IV: ICD-10-PCS | Mod: PBBFAC,,, | Performed by: INTERNAL MEDICINE

## 2021-01-19 PROCEDURE — 99214 PR OFFICE/OUTPT VISIT, EST, LEVL IV, 30-39 MIN: ICD-10-PCS | Mod: S$PBB,,, | Performed by: INTERNAL MEDICINE

## 2021-01-19 PROCEDURE — 99214 OFFICE O/P EST MOD 30 MIN: CPT | Mod: S$PBB,,, | Performed by: INTERNAL MEDICINE

## 2021-01-19 PROCEDURE — 99214 OFFICE O/P EST MOD 30 MIN: CPT | Mod: PBBFAC,PO | Performed by: INTERNAL MEDICINE

## 2021-01-31 ENCOUNTER — EXTERNAL CHRONIC CARE MANAGEMENT (OUTPATIENT)
Dept: PRIMARY CARE CLINIC | Facility: CLINIC | Age: 81
End: 2021-01-31
Payer: MEDICARE

## 2021-01-31 ENCOUNTER — IMMUNIZATION (OUTPATIENT)
Dept: FAMILY MEDICINE | Facility: CLINIC | Age: 81
End: 2021-01-31
Payer: MEDICARE

## 2021-01-31 DIAGNOSIS — Z23 NEED FOR VACCINATION: Primary | ICD-10-CM

## 2021-01-31 PROCEDURE — 91300 COVID-19, MRNA, LNP-S, PF, 30 MCG/0.3 ML DOSE VACCINE: CPT | Mod: PBBFAC,PO

## 2021-01-31 PROCEDURE — 0002A COVID-19, MRNA, LNP-S, PF, 30 MCG/0.3 ML DOSE VACCINE: CPT | Mod: PBBFAC,PO

## 2021-01-31 PROCEDURE — 99490 CHRNC CARE MGMT STAFF 1ST 20: CPT | Mod: S$PBB,,, | Performed by: FAMILY MEDICINE

## 2021-01-31 PROCEDURE — 99490 PR CHRONIC CARE MGMT, 1ST 20 MIN: ICD-10-PCS | Mod: S$PBB,,, | Performed by: FAMILY MEDICINE

## 2021-01-31 PROCEDURE — 99490 CHRNC CARE MGMT STAFF 1ST 20: CPT | Mod: PBBFAC,PO | Performed by: FAMILY MEDICINE

## 2021-02-04 ENCOUNTER — HOSPITAL ENCOUNTER (OUTPATIENT)
Dept: CARDIOLOGY | Facility: HOSPITAL | Age: 81
Discharge: HOME | End: 2021-02-04
Attending: INTERNAL MEDICINE
Payer: MEDICARE

## 2021-02-04 VITALS
DIASTOLIC BLOOD PRESSURE: 70 MMHG | BODY MASS INDEX: 21.54 KG/M2 | WEIGHT: 159 LBS | HEIGHT: 72 IN | SYSTOLIC BLOOD PRESSURE: 130 MMHG | TEMPERATURE: 99 F

## 2021-02-04 DIAGNOSIS — I65.22 OCCLUSION AND STENOSIS OF LEFT CAROTID ARTERY: ICD-10-CM

## 2021-02-04 DIAGNOSIS — Z98.890 HISTORY OF CAROTID ENDARTERECTOMY: ICD-10-CM

## 2021-02-04 LAB
LEFT ARM DIASTOLIC BLOOD PRESSURE: 70 MMHG
LEFT ARM SYSTOLIC BLOOD PRESSURE: 128 MMHG
LEFT CBA DIAS: 9 CM/S
LEFT CBA SYS: 72 CM/S
LEFT CCA DIST DIAS: 13 CM/S
LEFT CCA DIST SYS: 80 CM/S
LEFT CCA MID DIAS: 18 CM/S
LEFT CCA MID SYS: 108 CM/S
LEFT CCA PROX DIAS: 19 CM/S
LEFT CCA PROX SYS: 133 CM/S
LEFT ECA DIAS: 17 CM/S
LEFT ECA SYS: 157 CM/S
LEFT ICA DIST DIAS: 19 CM/S
LEFT ICA DIST SYS: 67 CM/S
LEFT ICA MID DIAS: 24 CM/S
LEFT ICA MID SYS: 86 CM/S
LEFT ICA PROX DIAS: 22 CM/S
LEFT ICA PROX SYS: 83 CM/S
LEFT VERTEBRAL DIAS: 7 CM/S
LEFT VERTEBRAL SYS: 29 CM/S
OHS CV CAROTID ULTRASOUND LEFT ICA/CCA RATIO: 1.08
OHS CV PV CAROTID LEFT HIGHEST CCA: 133
OHS CV PV CAROTID LEFT HIGHEST ICA: 86
OHS CV PV CAROTID RIGHT HIGHEST CCA: 69
OHS CV US CAROTID LEFT HIGHEST EDV: 24
RIGHT ARM DIASTOLIC BLOOD PRESSURE: 68 MMHG
RIGHT ARM SYSTOLIC BLOOD PRESSURE: 125 MMHG
RIGHT CBA DIAS: 6 CM/S
RIGHT CBA SYS: 41 CM/S
RIGHT CCA DIST DIAS: 5 CM/S
RIGHT CCA DIST SYS: 55 CM/S
RIGHT CCA MID DIAS: 6 CM/S
RIGHT CCA MID SYS: 69 CM/S
RIGHT CCA PROX DIAS: 7 CM/S
RIGHT CCA PROX SYS: 66 CM/S
RIGHT ECA DIAS: 9 CM/S
RIGHT ECA SYS: 116 CM/S
RIGHT VERTEBRAL DIAS: 12 CM/S
RIGHT VERTEBRAL SYS: 48 CM/S

## 2021-02-04 PROCEDURE — 93880 EXTRACRANIAL BILAT STUDY: CPT | Mod: 26,,, | Performed by: INTERNAL MEDICINE

## 2021-02-04 PROCEDURE — 93880 EXTRACRANIAL BILAT STUDY: CPT | Mod: PO

## 2021-02-04 PROCEDURE — 93880 CV US DOPPLER CAROTID (CUPID ONLY): ICD-10-PCS | Mod: 26,,, | Performed by: INTERNAL MEDICINE

## 2021-02-08 ENCOUNTER — TELEPHONE (OUTPATIENT)
Dept: CARDIOLOGY | Facility: CLINIC | Age: 81
End: 2021-02-08

## 2021-02-28 ENCOUNTER — EXTERNAL CHRONIC CARE MANAGEMENT (OUTPATIENT)
Dept: PRIMARY CARE CLINIC | Facility: CLINIC | Age: 81
End: 2021-02-28
Payer: MEDICARE

## 2021-02-28 PROCEDURE — 99490 PR CHRONIC CARE MGMT, 1ST 20 MIN: ICD-10-PCS | Mod: S$PBB,,, | Performed by: FAMILY MEDICINE

## 2021-02-28 PROCEDURE — 99490 CHRNC CARE MGMT STAFF 1ST 20: CPT | Mod: S$PBB,,, | Performed by: FAMILY MEDICINE

## 2021-02-28 PROCEDURE — 99490 CHRNC CARE MGMT STAFF 1ST 20: CPT | Mod: PBBFAC,PO | Performed by: FAMILY MEDICINE

## 2021-03-31 ENCOUNTER — EXTERNAL CHRONIC CARE MANAGEMENT (OUTPATIENT)
Dept: PRIMARY CARE CLINIC | Facility: CLINIC | Age: 81
End: 2021-03-31
Payer: MEDICARE

## 2021-03-31 PROCEDURE — 99490 CHRNC CARE MGMT STAFF 1ST 20: CPT | Mod: S$PBB,,, | Performed by: FAMILY MEDICINE

## 2021-03-31 PROCEDURE — 99490 CHRNC CARE MGMT STAFF 1ST 20: CPT | Mod: PBBFAC,PO | Performed by: FAMILY MEDICINE

## 2021-03-31 PROCEDURE — 99490 PR CHRONIC CARE MGMT, 1ST 20 MIN: ICD-10-PCS | Mod: S$PBB,,, | Performed by: FAMILY MEDICINE

## 2021-04-05 ENCOUNTER — TELEPHONE (OUTPATIENT)
Dept: FAMILY MEDICINE | Facility: CLINIC | Age: 81
End: 2021-04-05

## 2021-04-08 ENCOUNTER — TELEPHONE (OUTPATIENT)
Dept: FAMILY MEDICINE | Facility: CLINIC | Age: 81
End: 2021-04-08

## 2021-04-09 ENCOUNTER — OFFICE VISIT (OUTPATIENT)
Dept: FAMILY MEDICINE | Facility: CLINIC | Age: 81
End: 2021-04-09
Payer: MEDICARE

## 2021-04-09 VITALS
HEIGHT: 72 IN | TEMPERATURE: 98 F | OXYGEN SATURATION: 97 % | HEART RATE: 56 BPM | DIASTOLIC BLOOD PRESSURE: 80 MMHG | SYSTOLIC BLOOD PRESSURE: 132 MMHG | BODY MASS INDEX: 21.24 KG/M2 | WEIGHT: 156.81 LBS

## 2021-04-09 DIAGNOSIS — S16.1XXA STRAIN OF NECK MUSCLE, INITIAL ENCOUNTER: Primary | ICD-10-CM

## 2021-04-09 PROCEDURE — 99999 PR PBB SHADOW E&M-EST. PATIENT-LVL V: ICD-10-PCS | Mod: PBBFAC,,, | Performed by: NURSE PRACTITIONER

## 2021-04-09 PROCEDURE — 99213 PR OFFICE/OUTPT VISIT, EST, LEVL III, 20-29 MIN: ICD-10-PCS | Mod: S$PBB,,, | Performed by: NURSE PRACTITIONER

## 2021-04-09 PROCEDURE — 99215 OFFICE O/P EST HI 40 MIN: CPT | Mod: PBBFAC,PO | Performed by: NURSE PRACTITIONER

## 2021-04-09 PROCEDURE — 99999 PR PBB SHADOW E&M-EST. PATIENT-LVL V: CPT | Mod: PBBFAC,,, | Performed by: NURSE PRACTITIONER

## 2021-04-09 PROCEDURE — 96372 THER/PROPH/DIAG INJ SC/IM: CPT | Mod: PBBFAC,PO

## 2021-04-09 PROCEDURE — 99213 OFFICE O/P EST LOW 20 MIN: CPT | Mod: S$PBB,,, | Performed by: NURSE PRACTITIONER

## 2021-04-09 RX ORDER — METHOCARBAMOL 500 MG/1
500 TABLET, FILM COATED ORAL 2 TIMES DAILY PRN
Qty: 20 TABLET | Refills: 0 | Status: SHIPPED | OUTPATIENT
Start: 2021-04-09 | End: 2021-04-19

## 2021-04-09 RX ORDER — IBUPROFEN 800 MG/1
800 TABLET ORAL EVERY 6 HOURS PRN
Qty: 30 TABLET | Refills: 0 | Status: SHIPPED | OUTPATIENT
Start: 2021-04-09 | End: 2022-08-03

## 2021-04-09 RX ORDER — METHYLPREDNISOLONE ACETATE 40 MG/ML
40 INJECTION, SUSPENSION INTRA-ARTICULAR; INTRALESIONAL; INTRAMUSCULAR; SOFT TISSUE ONCE
Status: COMPLETED | OUTPATIENT
Start: 2021-04-09 | End: 2021-04-09

## 2021-04-09 RX ADMIN — METHYLPREDNISOLONE ACETATE 40 MG: 40 INJECTION, SUSPENSION INTRALESIONAL; INTRAMUSCULAR; INTRASYNOVIAL; SOFT TISSUE at 10:04

## 2021-04-23 ENCOUNTER — LAB VISIT (OUTPATIENT)
Dept: LAB | Facility: HOSPITAL | Age: 81
End: 2021-04-23
Attending: FAMILY MEDICINE
Payer: MEDICARE

## 2021-04-23 DIAGNOSIS — E11.22 CONTROLLED TYPE 2 DIABETES MELLITUS WITH STAGE 3 CHRONIC KIDNEY DISEASE, WITH LONG-TERM CURRENT USE OF INSULIN: ICD-10-CM

## 2021-04-23 DIAGNOSIS — N18.30 CONTROLLED TYPE 2 DIABETES MELLITUS WITH STAGE 3 CHRONIC KIDNEY DISEASE, WITH LONG-TERM CURRENT USE OF INSULIN: ICD-10-CM

## 2021-04-23 DIAGNOSIS — Z79.4 CONTROLLED TYPE 2 DIABETES MELLITUS WITH STAGE 3 CHRONIC KIDNEY DISEASE, WITH LONG-TERM CURRENT USE OF INSULIN: ICD-10-CM

## 2021-04-23 LAB
ESTIMATED AVG GLUCOSE: 160 MG/DL (ref 68–131)
HBA1C MFR BLD: 7.2 % (ref 4–5.6)

## 2021-04-23 PROCEDURE — 83036 HEMOGLOBIN GLYCOSYLATED A1C: CPT | Performed by: FAMILY MEDICINE

## 2021-04-23 PROCEDURE — 36415 COLL VENOUS BLD VENIPUNCTURE: CPT | Mod: PO | Performed by: FAMILY MEDICINE

## 2021-04-26 DIAGNOSIS — E11.22 CONTROLLED TYPE 2 DIABETES MELLITUS WITH STAGE 3 CHRONIC KIDNEY DISEASE, WITH LONG-TERM CURRENT USE OF INSULIN: ICD-10-CM

## 2021-04-26 DIAGNOSIS — N18.30 CONTROLLED TYPE 2 DIABETES MELLITUS WITH STAGE 3 CHRONIC KIDNEY DISEASE, WITH LONG-TERM CURRENT USE OF INSULIN: ICD-10-CM

## 2021-04-26 DIAGNOSIS — Z79.4 CONTROLLED TYPE 2 DIABETES MELLITUS WITH STAGE 3 CHRONIC KIDNEY DISEASE, WITH LONG-TERM CURRENT USE OF INSULIN: ICD-10-CM

## 2021-04-26 RX ORDER — INSULIN GLARGINE 100 [IU]/ML
INJECTION, SOLUTION SUBCUTANEOUS
Qty: 30 SYRINGE | Refills: 1 | Status: SHIPPED | OUTPATIENT
Start: 2021-04-26 | End: 2022-08-03 | Stop reason: SDUPTHER

## 2021-04-30 ENCOUNTER — EXTERNAL CHRONIC CARE MANAGEMENT (OUTPATIENT)
Dept: PRIMARY CARE CLINIC | Facility: CLINIC | Age: 81
End: 2021-04-30
Payer: MEDICARE

## 2021-04-30 PROCEDURE — 99490 CHRNC CARE MGMT STAFF 1ST 20: CPT | Mod: PBBFAC,PO | Performed by: FAMILY MEDICINE

## 2021-04-30 PROCEDURE — 99490 CHRNC CARE MGMT STAFF 1ST 20: CPT | Mod: S$PBB,,, | Performed by: FAMILY MEDICINE

## 2021-04-30 PROCEDURE — 99490 PR CHRONIC CARE MGMT, 1ST 20 MIN: ICD-10-PCS | Mod: S$PBB,,, | Performed by: FAMILY MEDICINE

## 2021-05-17 DIAGNOSIS — I65.23 BILATERAL CAROTID ARTERY STENOSIS: Primary | ICD-10-CM

## 2021-05-31 ENCOUNTER — EXTERNAL CHRONIC CARE MANAGEMENT (OUTPATIENT)
Dept: PRIMARY CARE CLINIC | Facility: CLINIC | Age: 81
End: 2021-05-31
Payer: MEDICARE

## 2021-05-31 PROCEDURE — 99490 CHRNC CARE MGMT STAFF 1ST 20: CPT | Mod: PBBFAC,PO | Performed by: FAMILY MEDICINE

## 2021-05-31 PROCEDURE — 99490 CHRNC CARE MGMT STAFF 1ST 20: CPT | Mod: S$PBB,,, | Performed by: FAMILY MEDICINE

## 2021-05-31 PROCEDURE — 99490 PR CHRONIC CARE MGMT, 1ST 20 MIN: ICD-10-PCS | Mod: S$PBB,,, | Performed by: FAMILY MEDICINE

## 2021-06-07 ENCOUNTER — TELEPHONE (OUTPATIENT)
Dept: FAMILY MEDICINE | Facility: CLINIC | Age: 81
End: 2021-06-07

## 2021-06-08 ENCOUNTER — PATIENT MESSAGE (OUTPATIENT)
Dept: FAMILY MEDICINE | Facility: CLINIC | Age: 81
End: 2021-06-08

## 2021-06-09 ENCOUNTER — PATIENT OUTREACH (OUTPATIENT)
Dept: ADMINISTRATIVE | Facility: CLINIC | Age: 81
End: 2021-06-09

## 2021-06-09 ENCOUNTER — EXTERNAL HOSPITAL ADMISSION (OUTPATIENT)
Dept: ADMINISTRATIVE | Facility: CLINIC | Age: 81
End: 2021-06-09

## 2021-06-18 ENCOUNTER — PATIENT MESSAGE (OUTPATIENT)
Dept: FAMILY MEDICINE | Facility: CLINIC | Age: 81
End: 2021-06-18

## 2021-06-18 DIAGNOSIS — Q82.9 SKIN ANOMALY: Primary | ICD-10-CM

## 2021-06-21 ENCOUNTER — PATIENT OUTREACH (OUTPATIENT)
Dept: ADMINISTRATIVE | Facility: HOSPITAL | Age: 81
End: 2021-06-21

## 2021-06-23 ENCOUNTER — PATIENT MESSAGE (OUTPATIENT)
Dept: FAMILY MEDICINE | Facility: CLINIC | Age: 81
End: 2021-06-23

## 2021-06-23 ENCOUNTER — OFFICE VISIT (OUTPATIENT)
Dept: FAMILY MEDICINE | Facility: CLINIC | Age: 81
End: 2021-06-23
Payer: MEDICARE

## 2021-06-23 VITALS
HEIGHT: 72 IN | DIASTOLIC BLOOD PRESSURE: 69 MMHG | BODY MASS INDEX: 21.26 KG/M2 | SYSTOLIC BLOOD PRESSURE: 133 MMHG | WEIGHT: 157 LBS | HEART RATE: 55 BPM

## 2021-06-23 DIAGNOSIS — R91.1 SOLITARY PULMONARY NODULE: ICD-10-CM

## 2021-06-23 DIAGNOSIS — I15.2 HYPERTENSION ASSOCIATED WITH DIABETES: ICD-10-CM

## 2021-06-23 DIAGNOSIS — R42 POSTURAL DIZZINESS WITH NEAR SYNCOPE: Primary | ICD-10-CM

## 2021-06-23 DIAGNOSIS — R55 POSTURAL DIZZINESS WITH NEAR SYNCOPE: Primary | ICD-10-CM

## 2021-06-23 DIAGNOSIS — N18.30 CONTROLLED TYPE 2 DIABETES MELLITUS WITH STAGE 3 CHRONIC KIDNEY DISEASE, WITH LONG-TERM CURRENT USE OF INSULIN: ICD-10-CM

## 2021-06-23 DIAGNOSIS — E11.59 HYPERTENSION ASSOCIATED WITH DIABETES: ICD-10-CM

## 2021-06-23 DIAGNOSIS — R91.1 NODULE OF RIGHT LUNG: ICD-10-CM

## 2021-06-23 DIAGNOSIS — E11.22 CONTROLLED TYPE 2 DIABETES MELLITUS WITH STAGE 3 CHRONIC KIDNEY DISEASE, WITH LONG-TERM CURRENT USE OF INSULIN: ICD-10-CM

## 2021-06-23 DIAGNOSIS — Z79.4 CONTROLLED TYPE 2 DIABETES MELLITUS WITH STAGE 3 CHRONIC KIDNEY DISEASE, WITH LONG-TERM CURRENT USE OF INSULIN: ICD-10-CM

## 2021-06-23 PROCEDURE — 99215 OFFICE O/P EST HI 40 MIN: CPT | Mod: PBBFAC,PO | Performed by: FAMILY MEDICINE

## 2021-06-23 PROCEDURE — 99214 PR OFFICE/OUTPT VISIT, EST, LEVL IV, 30-39 MIN: ICD-10-PCS | Mod: S$PBB,,, | Performed by: FAMILY MEDICINE

## 2021-06-23 PROCEDURE — 99214 OFFICE O/P EST MOD 30 MIN: CPT | Mod: S$PBB,,, | Performed by: FAMILY MEDICINE

## 2021-06-23 PROCEDURE — 99999 PR PBB SHADOW E&M-EST. PATIENT-LVL V: CPT | Mod: PBBFAC,,, | Performed by: FAMILY MEDICINE

## 2021-06-23 PROCEDURE — 99999 PR PBB SHADOW E&M-EST. PATIENT-LVL V: ICD-10-PCS | Mod: PBBFAC,,, | Performed by: FAMILY MEDICINE

## 2021-06-23 RX ORDER — AMLODIPINE BESYLATE 10 MG/1
10 TABLET ORAL DAILY
COMMUNITY
Start: 2021-06-08 | End: 2021-06-23

## 2021-06-23 RX ORDER — METHOCARBAMOL 500 MG/1
500 TABLET, FILM COATED ORAL
COMMUNITY
Start: 2021-04-09 | End: 2023-02-22

## 2021-06-23 RX ORDER — CLONIDINE HYDROCHLORIDE 0.2 MG/1
0.2 TABLET ORAL NIGHTLY
COMMUNITY
Start: 2021-06-08 | End: 2022-06-23 | Stop reason: SDUPTHER

## 2021-06-23 RX ORDER — FLASH GLUCOSE SENSOR
1 KIT MISCELLANEOUS DAILY
Qty: 2 KIT | Refills: 11 | Status: SHIPPED | OUTPATIENT
Start: 2021-06-23 | End: 2021-06-28 | Stop reason: SDUPTHER

## 2021-06-23 RX ORDER — MULTIVITAMIN
1 TABLET ORAL
COMMUNITY
End: 2023-02-22

## 2021-06-23 RX ORDER — ZOSTER VACCINE RECOMBINANT, ADJUVANTED 50 MCG/0.5
KIT INTRAMUSCULAR
COMMUNITY
Start: 2021-03-09 | End: 2022-07-13

## 2021-06-23 RX ORDER — FLASH GLUCOSE SCANNING READER
EACH MISCELLANEOUS
Qty: 1 EACH | Refills: 0 | Status: SHIPPED | OUTPATIENT
Start: 2021-06-23 | End: 2021-06-28 | Stop reason: SDUPTHER

## 2021-06-25 ENCOUNTER — HOSPITAL ENCOUNTER (OUTPATIENT)
Dept: RADIOLOGY | Facility: HOSPITAL | Age: 81
Discharge: HOME | End: 2021-06-25
Attending: FAMILY MEDICINE
Payer: MEDICARE

## 2021-06-25 PROCEDURE — 71250 CT THORAX DX C-: CPT | Mod: 26,,, | Performed by: RADIOLOGY

## 2021-06-25 PROCEDURE — 71250 CT THORAX DX C-: CPT | Mod: TC,PO

## 2021-06-25 PROCEDURE — 71250 CT CHEST WITHOUT CONTRAST: ICD-10-PCS | Mod: 26,,, | Performed by: RADIOLOGY

## 2021-06-28 ENCOUNTER — PATIENT MESSAGE (OUTPATIENT)
Dept: FAMILY MEDICINE | Facility: CLINIC | Age: 81
End: 2021-06-28

## 2021-06-28 DIAGNOSIS — N18.30 CONTROLLED TYPE 2 DIABETES MELLITUS WITH STAGE 3 CHRONIC KIDNEY DISEASE, WITH LONG-TERM CURRENT USE OF INSULIN: Primary | ICD-10-CM

## 2021-06-28 DIAGNOSIS — Z79.4 CONTROLLED TYPE 2 DIABETES MELLITUS WITH STAGE 3 CHRONIC KIDNEY DISEASE, WITH LONG-TERM CURRENT USE OF INSULIN: Primary | ICD-10-CM

## 2021-06-28 DIAGNOSIS — E11.22 CONTROLLED TYPE 2 DIABETES MELLITUS WITH STAGE 3 CHRONIC KIDNEY DISEASE, WITH LONG-TERM CURRENT USE OF INSULIN: Primary | ICD-10-CM

## 2021-06-28 RX ORDER — FLASH GLUCOSE SENSOR
1 KIT MISCELLANEOUS DAILY
Qty: 2 KIT | Refills: 11 | Status: SHIPPED | OUTPATIENT
Start: 2021-06-28 | End: 2021-06-29 | Stop reason: SDUPTHER

## 2021-06-28 RX ORDER — FLASH GLUCOSE SCANNING READER
EACH MISCELLANEOUS
Qty: 1 EACH | Refills: 0 | Status: SHIPPED | OUTPATIENT
Start: 2021-06-28 | End: 2021-06-29 | Stop reason: SDUPTHER

## 2021-06-29 ENCOUNTER — PATIENT MESSAGE (OUTPATIENT)
Dept: FAMILY MEDICINE | Facility: CLINIC | Age: 81
End: 2021-06-29

## 2021-06-29 DIAGNOSIS — E11.22 CONTROLLED TYPE 2 DIABETES MELLITUS WITH STAGE 3 CHRONIC KIDNEY DISEASE, WITH LONG-TERM CURRENT USE OF INSULIN: ICD-10-CM

## 2021-06-29 DIAGNOSIS — N18.30 CONTROLLED TYPE 2 DIABETES MELLITUS WITH STAGE 3 CHRONIC KIDNEY DISEASE, WITH LONG-TERM CURRENT USE OF INSULIN: ICD-10-CM

## 2021-06-29 DIAGNOSIS — Z79.4 CONTROLLED TYPE 2 DIABETES MELLITUS WITH STAGE 3 CHRONIC KIDNEY DISEASE, WITH LONG-TERM CURRENT USE OF INSULIN: ICD-10-CM

## 2021-06-29 RX ORDER — FLASH GLUCOSE SCANNING READER
EACH MISCELLANEOUS
Qty: 2 EACH | Refills: 11 | Status: SHIPPED | OUTPATIENT
Start: 2021-06-29 | End: 2021-07-25

## 2021-06-29 RX ORDER — FLASH GLUCOSE SENSOR
KIT MISCELLANEOUS
Qty: 2 KIT | Refills: 11 | Status: SHIPPED | OUTPATIENT
Start: 2021-06-29 | End: 2022-04-11 | Stop reason: SDUPTHER

## 2021-06-30 ENCOUNTER — EXTERNAL CHRONIC CARE MANAGEMENT (OUTPATIENT)
Dept: PRIMARY CARE CLINIC | Facility: CLINIC | Age: 81
End: 2021-06-30
Payer: MEDICARE

## 2021-06-30 PROCEDURE — 99490 CHRNC CARE MGMT STAFF 1ST 20: CPT | Mod: PBBFAC,PO | Performed by: FAMILY MEDICINE

## 2021-06-30 PROCEDURE — 99490 CHRNC CARE MGMT STAFF 1ST 20: CPT | Mod: S$PBB,,, | Performed by: FAMILY MEDICINE

## 2021-06-30 PROCEDURE — 99490 PR CHRONIC CARE MGMT, 1ST 20 MIN: ICD-10-PCS | Mod: S$PBB,,, | Performed by: FAMILY MEDICINE

## 2021-07-05 ENCOUNTER — PATIENT MESSAGE (OUTPATIENT)
Dept: CARDIOLOGY | Facility: CLINIC | Age: 81
End: 2021-07-05

## 2021-07-07 ENCOUNTER — OFFICE VISIT (OUTPATIENT)
Dept: CARDIOLOGY | Facility: CLINIC | Age: 81
End: 2021-07-07
Payer: MEDICARE

## 2021-07-07 VITALS
DIASTOLIC BLOOD PRESSURE: 66 MMHG | BODY MASS INDEX: 21.16 KG/M2 | WEIGHT: 156 LBS | HEART RATE: 56 BPM | SYSTOLIC BLOOD PRESSURE: 136 MMHG

## 2021-07-07 DIAGNOSIS — I95.1 ORTHOSTASIS: ICD-10-CM

## 2021-07-07 DIAGNOSIS — E11.69 COMBINED HYPERLIPIDEMIA ASSOCIATED WITH TYPE 2 DIABETES MELLITUS: ICD-10-CM

## 2021-07-07 DIAGNOSIS — Z98.890 HISTORY OF CAROTID ENDARTERECTOMY: ICD-10-CM

## 2021-07-07 DIAGNOSIS — E11.59 HYPERTENSION ASSOCIATED WITH DIABETES: ICD-10-CM

## 2021-07-07 DIAGNOSIS — I65.21 OCCLUSION OF RIGHT INTERNAL CAROTID ARTERY: Chronic | ICD-10-CM

## 2021-07-07 DIAGNOSIS — E78.2 COMBINED HYPERLIPIDEMIA ASSOCIATED WITH TYPE 2 DIABETES MELLITUS: ICD-10-CM

## 2021-07-07 DIAGNOSIS — I73.9 PAD (PERIPHERAL ARTERY DISEASE): ICD-10-CM

## 2021-07-07 DIAGNOSIS — R00.2 PALPITATIONS: ICD-10-CM

## 2021-07-07 DIAGNOSIS — I15.2 HYPERTENSION ASSOCIATED WITH DIABETES: ICD-10-CM

## 2021-07-07 DIAGNOSIS — R53.1 WEAKNESS: ICD-10-CM

## 2021-07-07 DIAGNOSIS — Z09 HOSPITAL DISCHARGE FOLLOW-UP: Primary | ICD-10-CM

## 2021-07-07 DIAGNOSIS — R55 NEAR SYNCOPE: ICD-10-CM

## 2021-07-07 PROCEDURE — 99215 OFFICE O/P EST HI 40 MIN: CPT | Mod: PBBFAC,PO | Performed by: NURSE PRACTITIONER

## 2021-07-07 PROCEDURE — 99999 PR PBB SHADOW E&M-EST. PATIENT-LVL V: ICD-10-PCS | Mod: PBBFAC,,, | Performed by: NURSE PRACTITIONER

## 2021-07-07 PROCEDURE — 99214 PR OFFICE/OUTPT VISIT, EST, LEVL IV, 30-39 MIN: ICD-10-PCS | Mod: S$PBB,,, | Performed by: NURSE PRACTITIONER

## 2021-07-07 PROCEDURE — 99999 PR PBB SHADOW E&M-EST. PATIENT-LVL V: CPT | Mod: PBBFAC,,, | Performed by: NURSE PRACTITIONER

## 2021-07-07 PROCEDURE — 99214 OFFICE O/P EST MOD 30 MIN: CPT | Mod: S$PBB,,, | Performed by: NURSE PRACTITIONER

## 2021-07-07 RX ORDER — AMLODIPINE BESYLATE 10 MG/1
10 TABLET ORAL DAILY
COMMUNITY
End: 2021-07-21 | Stop reason: SINTOL

## 2021-07-08 ENCOUNTER — PATIENT MESSAGE (OUTPATIENT)
Dept: FAMILY MEDICINE | Facility: CLINIC | Age: 81
End: 2021-07-08

## 2021-07-08 RX ORDER — BLOOD-GLUCOSE SENSOR
1 EACH MISCELLANEOUS DAILY
Qty: 3 EACH | Refills: 11 | Status: SHIPPED | OUTPATIENT
Start: 2021-07-08 | End: 2021-12-16

## 2021-07-08 RX ORDER — BLOOD-GLUCOSE TRANSMITTER
1 EACH MISCELLANEOUS
Qty: 1 EACH | Refills: 4 | Status: SHIPPED | OUTPATIENT
Start: 2021-07-08 | End: 2021-12-16

## 2021-07-08 RX ORDER — BLOOD-GLUCOSE,RECEIVER,CONT
1 EACH MISCELLANEOUS DAILY
Qty: 1 EACH | Refills: 0 | Status: SHIPPED | OUTPATIENT
Start: 2021-07-08 | End: 2021-12-16

## 2021-07-19 ENCOUNTER — HOSPITAL ENCOUNTER (OUTPATIENT)
Dept: CARDIOLOGY | Facility: HOSPITAL | Age: 81
Discharge: HOME | End: 2021-07-19
Attending: NURSE PRACTITIONER
Payer: MEDICARE

## 2021-07-19 ENCOUNTER — TELEPHONE (OUTPATIENT)
Dept: CARDIOLOGY | Facility: HOSPITAL | Age: 81
End: 2021-07-19

## 2021-07-19 VITALS
DIASTOLIC BLOOD PRESSURE: 87 MMHG | SYSTOLIC BLOOD PRESSURE: 157 MMHG | WEIGHT: 165 LBS | BODY MASS INDEX: 22.35 KG/M2 | HEIGHT: 72 IN | HEART RATE: 58 BPM

## 2021-07-19 DIAGNOSIS — R53.1 WEAKNESS: ICD-10-CM

## 2021-07-19 DIAGNOSIS — R55 NEAR SYNCOPE: ICD-10-CM

## 2021-07-19 LAB
CV STRESS BASE HR: 58 BPM
DIASTOLIC BLOOD PRESSURE: 87 MMHG
OHS CV CPX 85 PERCENT MAX PREDICTED HEART RATE MALE: 117
OHS CV CPX MAX PREDICTED HEART RATE: 138
OHS CV CPX PATIENT IS FEMALE: 0
OHS CV CPX PATIENT IS MALE: 1
OHS CV CPX PEAK DIASTOLIC BLOOD PRESSURE: 83 MMHG
OHS CV CPX PEAK HEAR RATE: 112 BPM
OHS CV CPX PEAK RATE PRESSURE PRODUCT: NORMAL
OHS CV CPX PEAK SYSTOLIC BLOOD PRESSURE: 120 MMHG
OHS CV CPX PERCENT MAX PREDICTED HEART RATE ACHIEVED: 81
OHS CV CPX RATE PRESSURE PRODUCT PRESENTING: 9106
STRESS ECHO POST EXERCISE DUR MIN: 5 MINUTES
STRESS ECHO POST EXERCISE DUR SEC: 3 SECONDS
SYSTOLIC BLOOD PRESSURE: 157 MMHG

## 2021-07-19 PROCEDURE — A9500 TC99M SESTAMIBI: HCPCS | Mod: PO

## 2021-07-19 PROCEDURE — 78452 HT MUSCLE IMAGE SPECT MULT: CPT | Mod: PO

## 2021-07-20 ENCOUNTER — PATIENT MESSAGE (OUTPATIENT)
Dept: FAMILY MEDICINE | Facility: CLINIC | Age: 81
End: 2021-07-20

## 2021-07-20 DIAGNOSIS — Z79.4 CONTROLLED TYPE 2 DIABETES MELLITUS WITH STAGE 3 CHRONIC KIDNEY DISEASE, WITH LONG-TERM CURRENT USE OF INSULIN: ICD-10-CM

## 2021-07-20 DIAGNOSIS — E11.22 CONTROLLED TYPE 2 DIABETES MELLITUS WITH STAGE 3 CHRONIC KIDNEY DISEASE, WITH LONG-TERM CURRENT USE OF INSULIN: ICD-10-CM

## 2021-07-20 DIAGNOSIS — N18.30 CONTROLLED TYPE 2 DIABETES MELLITUS WITH STAGE 3 CHRONIC KIDNEY DISEASE, WITH LONG-TERM CURRENT USE OF INSULIN: ICD-10-CM

## 2021-07-21 ENCOUNTER — OFFICE VISIT (OUTPATIENT)
Dept: CARDIOLOGY | Facility: CLINIC | Age: 81
End: 2021-07-21
Payer: MEDICARE

## 2021-07-21 ENCOUNTER — TELEPHONE (OUTPATIENT)
Dept: CARDIOLOGY | Facility: HOSPITAL | Age: 81
End: 2021-07-21

## 2021-07-21 VITALS
WEIGHT: 156.81 LBS | HEART RATE: 60 BPM | BODY MASS INDEX: 21.27 KG/M2 | DIASTOLIC BLOOD PRESSURE: 70 MMHG | SYSTOLIC BLOOD PRESSURE: 116 MMHG

## 2021-07-21 DIAGNOSIS — G47.33 OSA (OBSTRUCTIVE SLEEP APNEA): ICD-10-CM

## 2021-07-21 DIAGNOSIS — I65.23 BILATERAL CAROTID ARTERY STENOSIS: ICD-10-CM

## 2021-07-21 DIAGNOSIS — R00.2 PALPITATIONS: ICD-10-CM

## 2021-07-21 DIAGNOSIS — R55 SYNCOPE AND COLLAPSE: ICD-10-CM

## 2021-07-21 DIAGNOSIS — R09.89 BILATERAL CAROTID BRUITS: ICD-10-CM

## 2021-07-21 DIAGNOSIS — Z98.890 HISTORY OF CAROTID ENDARTERECTOMY: ICD-10-CM

## 2021-07-21 DIAGNOSIS — I49.3 PVC (PREMATURE VENTRICULAR CONTRACTION): ICD-10-CM

## 2021-07-21 DIAGNOSIS — I65.22 OCCLUSION OF LEFT CAROTID ARTERY: ICD-10-CM

## 2021-07-21 DIAGNOSIS — I15.2 HYPERTENSION ASSOCIATED WITH DIABETES: Primary | ICD-10-CM

## 2021-07-21 DIAGNOSIS — E11.69 COMBINED HYPERLIPIDEMIA ASSOCIATED WITH TYPE 2 DIABETES MELLITUS: ICD-10-CM

## 2021-07-21 DIAGNOSIS — E11.59 HYPERTENSION ASSOCIATED WITH DIABETES: Primary | ICD-10-CM

## 2021-07-21 DIAGNOSIS — I73.9 PAD (PERIPHERAL ARTERY DISEASE): ICD-10-CM

## 2021-07-21 DIAGNOSIS — E78.2 COMBINED HYPERLIPIDEMIA ASSOCIATED WITH TYPE 2 DIABETES MELLITUS: ICD-10-CM

## 2021-07-21 PROCEDURE — 99214 OFFICE O/P EST MOD 30 MIN: CPT | Mod: PBBFAC,PO | Performed by: INTERNAL MEDICINE

## 2021-07-21 PROCEDURE — 99215 PR OFFICE/OUTPT VISIT, EST, LEVL V, 40-54 MIN: ICD-10-PCS | Mod: S$PBB,,, | Performed by: INTERNAL MEDICINE

## 2021-07-21 PROCEDURE — 99999 PR PBB SHADOW E&M-EST. PATIENT-LVL IV: CPT | Mod: PBBFAC,,, | Performed by: INTERNAL MEDICINE

## 2021-07-21 PROCEDURE — 99999 PR PBB SHADOW E&M-EST. PATIENT-LVL IV: ICD-10-PCS | Mod: PBBFAC,,, | Performed by: INTERNAL MEDICINE

## 2021-07-21 PROCEDURE — 99215 OFFICE O/P EST HI 40 MIN: CPT | Mod: S$PBB,,, | Performed by: INTERNAL MEDICINE

## 2021-07-21 RX ORDER — OLMESARTAN MEDOXOMIL 20 MG/1
20 TABLET ORAL DAILY
Qty: 30 TABLET | Refills: 11 | Status: SHIPPED | OUTPATIENT
Start: 2021-07-21 | End: 2022-01-18

## 2021-07-25 RX ORDER — FLASH GLUCOSE SCANNING READER
EACH MISCELLANEOUS
Qty: 6 EACH | Refills: 3 | Status: SHIPPED | OUTPATIENT
Start: 2021-07-25 | End: 2022-04-11 | Stop reason: SDUPTHER

## 2021-07-26 ENCOUNTER — LAB VISIT (OUTPATIENT)
Dept: LAB | Facility: HOSPITAL | Age: 81
End: 2021-07-26
Attending: FAMILY MEDICINE
Payer: MEDICARE

## 2021-07-26 DIAGNOSIS — Z79.4 CONTROLLED TYPE 2 DIABETES MELLITUS WITH STAGE 3 CHRONIC KIDNEY DISEASE, WITH LONG-TERM CURRENT USE OF INSULIN: ICD-10-CM

## 2021-07-26 DIAGNOSIS — E11.22 CONTROLLED TYPE 2 DIABETES MELLITUS WITH STAGE 3 CHRONIC KIDNEY DISEASE, WITH LONG-TERM CURRENT USE OF INSULIN: ICD-10-CM

## 2021-07-26 DIAGNOSIS — N18.30 CONTROLLED TYPE 2 DIABETES MELLITUS WITH STAGE 3 CHRONIC KIDNEY DISEASE, WITH LONG-TERM CURRENT USE OF INSULIN: ICD-10-CM

## 2021-07-26 LAB
ESTIMATED AVG GLUCOSE: 143 MG/DL (ref 68–131)
HBA1C MFR BLD: 6.6 % (ref 4–5.6)

## 2021-07-26 PROCEDURE — 36415 COLL VENOUS BLD VENIPUNCTURE: CPT | Mod: PO | Performed by: FAMILY MEDICINE

## 2021-07-26 PROCEDURE — 83036 HEMOGLOBIN GLYCOSYLATED A1C: CPT | Performed by: FAMILY MEDICINE

## 2021-07-31 ENCOUNTER — EXTERNAL CHRONIC CARE MANAGEMENT (OUTPATIENT)
Dept: PRIMARY CARE CLINIC | Facility: CLINIC | Age: 81
End: 2021-07-31
Payer: MEDICARE

## 2021-07-31 PROCEDURE — 99490 CHRNC CARE MGMT STAFF 1ST 20: CPT | Mod: S$PBB,,, | Performed by: FAMILY MEDICINE

## 2021-07-31 PROCEDURE — 99490 PR CHRONIC CARE MGMT, 1ST 20 MIN: ICD-10-PCS | Mod: S$PBB,,, | Performed by: FAMILY MEDICINE

## 2021-07-31 PROCEDURE — 99490 CHRNC CARE MGMT STAFF 1ST 20: CPT | Mod: PBBFAC,PO | Performed by: FAMILY MEDICINE

## 2021-08-06 ENCOUNTER — TELEPHONE (OUTPATIENT)
Dept: CARDIOLOGY | Facility: HOSPITAL | Age: 81
End: 2021-08-06

## 2021-08-09 ENCOUNTER — TELEPHONE (OUTPATIENT)
Dept: CARDIOLOGY | Facility: HOSPITAL | Age: 81
End: 2021-08-09

## 2021-08-11 ENCOUNTER — HOSPITAL ENCOUNTER (OUTPATIENT)
Dept: PULMONOLOGY | Facility: HOSPITAL | Age: 81
Discharge: HOME | End: 2021-08-11
Attending: INTERNAL MEDICINE
Payer: MEDICARE

## 2021-08-11 ENCOUNTER — TELEPHONE (OUTPATIENT)
Dept: CARDIOLOGY | Facility: HOSPITAL | Age: 81
End: 2021-08-11

## 2021-08-11 ENCOUNTER — HOSPITAL ENCOUNTER (OUTPATIENT)
Dept: RADIOLOGY | Facility: HOSPITAL | Age: 81
Discharge: HOME | End: 2021-08-11
Attending: INTERNAL MEDICINE
Payer: MEDICARE

## 2021-08-11 VITALS
HEIGHT: 72 IN | SYSTOLIC BLOOD PRESSURE: 177 MMHG | HEART RATE: 65 BPM | WEIGHT: 156 LBS | BODY MASS INDEX: 21.13 KG/M2 | DIASTOLIC BLOOD PRESSURE: 106 MMHG

## 2021-08-11 DIAGNOSIS — R55 SYNCOPE AND COLLAPSE: ICD-10-CM

## 2021-08-11 DIAGNOSIS — I47.10 SVT (SUPRAVENTRICULAR TACHYCARDIA): Primary | ICD-10-CM

## 2021-08-11 PROCEDURE — 78452 HT MUSCLE IMAGE SPECT MULT: CPT

## 2021-08-11 PROCEDURE — 99213 PR OFFICE/OUTPT VISIT, EST, LEVL III, 20-29 MIN: ICD-10-PCS | Mod: ,,, | Performed by: PHYSICIAN ASSISTANT

## 2021-08-11 PROCEDURE — A9502 TC99M TETROFOSMIN: HCPCS

## 2021-08-11 PROCEDURE — 63600175 PHARM REV CODE 636 W HCPCS: Performed by: PHYSICIAN ASSISTANT

## 2021-08-11 PROCEDURE — 99213 OFFICE O/P EST LOW 20 MIN: CPT | Mod: ,,, | Performed by: PHYSICIAN ASSISTANT

## 2021-08-11 RX ORDER — REGADENOSON 0.08 MG/ML
0.4 INJECTION, SOLUTION INTRAVENOUS ONCE
Status: COMPLETED | OUTPATIENT
Start: 2021-08-11 | End: 2021-08-11

## 2021-08-11 RX ORDER — METOPROLOL SUCCINATE 25 MG/1
25 TABLET, EXTENDED RELEASE ORAL DAILY
Qty: 30 TABLET | Refills: 11 | Status: SHIPPED | OUTPATIENT
Start: 2021-08-11 | End: 2022-08-03 | Stop reason: SDUPTHER

## 2021-08-11 RX ADMIN — REGADENOSON 0.4 MG: 0.08 INJECTION, SOLUTION INTRAVENOUS at 03:08

## 2021-08-12 ENCOUNTER — TELEPHONE (OUTPATIENT)
Dept: CARDIOLOGY | Facility: CLINIC | Age: 81
End: 2021-08-12

## 2021-08-12 LAB
CV STRESS BASE HR: 65 BPM
DIASTOLIC BLOOD PRESSURE: 106 MMHG
EJECTION FRACTION- HIGH: 65 %
END DIASTOLIC INDEX-HIGH: 158 ML/M2
END DIASTOLIC INDEX-LOW: 94 ML/M2
END SYSTOLIC INDEX-HIGH: 71 ML/M2
END SYSTOLIC INDEX-LOW: 33 ML/M2
NUC REST DIASTOLIC VOLUME INDEX: 102
NUC REST EJECTION FRACTION: 69
NUC REST SYSTOLIC VOLUME INDEX: 31
NUC STRESS DIASTOLIC VOLUME INDEX: 73
NUC STRESS EJECTION FRACTION: 60 %
NUC STRESS SYSTOLIC VOLUME INDEX: 29
OHS CV CPX 85 PERCENT MAX PREDICTED HEART RATE MALE: 117
OHS CV CPX MAX PREDICTED HEART RATE: 138
OHS CV CPX PATIENT IS FEMALE: 0
OHS CV CPX PATIENT IS MALE: 1
OHS CV CPX PEAK DIASTOLIC BLOOD PRESSURE: 80 MMHG
OHS CV CPX PEAK HEAR RATE: 97 BPM
OHS CV CPX PEAK RATE PRESSURE PRODUCT: NORMAL
OHS CV CPX PEAK SYSTOLIC BLOOD PRESSURE: 123 MMHG
OHS CV CPX PERCENT MAX PREDICTED HEART RATE ACHIEVED: 70
OHS CV CPX RATE PRESSURE PRODUCT PRESENTING: NORMAL
RETIRED EF AND QEF - SEE NOTES: 53 %
SYSTOLIC BLOOD PRESSURE: 177 MMHG

## 2021-08-13 ENCOUNTER — TELEPHONE (OUTPATIENT)
Dept: CARDIOLOGY | Facility: CLINIC | Age: 81
End: 2021-08-13

## 2021-08-16 ENCOUNTER — HOSPITAL ENCOUNTER (OUTPATIENT)
Dept: CARDIOLOGY | Facility: HOSPITAL | Age: 81
Discharge: HOME | End: 2021-08-16
Attending: PHYSICIAN ASSISTANT
Payer: MEDICARE

## 2021-08-16 DIAGNOSIS — I47.10 SVT (SUPRAVENTRICULAR TACHYCARDIA): ICD-10-CM

## 2021-08-16 PROCEDURE — 93225 XTRNL ECG REC<48 HRS REC: CPT | Mod: PO

## 2021-08-16 PROCEDURE — 93227 HOLTER MONITOR - 48 HOUR (CUPID ONLY): ICD-10-PCS | Mod: ,,, | Performed by: INTERNAL MEDICINE

## 2021-08-16 PROCEDURE — 93227 XTRNL ECG REC<48 HR R&I: CPT | Mod: ,,, | Performed by: INTERNAL MEDICINE

## 2021-08-18 LAB
OHS CV EVENT MONITOR DAY: 2
OHS CV HOLTER LENGTH DECIMAL HOURS: 95.98
OHS CV HOLTER LENGTH HOURS: 47
OHS CV HOLTER LENGTH MINUTES: 59
OHS CV HOLTER SINUS AVERAGE HR: 52
OHS CV HOLTER SINUS MAX HR DAY: 2
OHS CV HOLTER SINUS MAX HR HOUR MINUTES: NORMAL
OHS CV HOLTER SINUS MAX HR: 83
OHS CV HOLTER SINUS MIN HR DAY: 2
OHS CV HOLTER SINUS MIN HR HOUR MINUTE: NORMAL
OHS CV HOLTER SINUS MIN HR: 43

## 2021-08-25 ENCOUNTER — OFFICE VISIT (OUTPATIENT)
Dept: CARDIOLOGY | Facility: CLINIC | Age: 81
End: 2021-08-25
Payer: MEDICARE

## 2021-08-25 VITALS
WEIGHT: 158.13 LBS | HEART RATE: 58 BPM | BODY MASS INDEX: 21.44 KG/M2 | SYSTOLIC BLOOD PRESSURE: 140 MMHG | DIASTOLIC BLOOD PRESSURE: 60 MMHG

## 2021-08-25 DIAGNOSIS — N18.30 CKD STAGE 3 SECONDARY TO DIABETES: ICD-10-CM

## 2021-08-25 DIAGNOSIS — G47.33 OSA (OBSTRUCTIVE SLEEP APNEA): ICD-10-CM

## 2021-08-25 DIAGNOSIS — E11.59 HYPERTENSION ASSOCIATED WITH DIABETES: Primary | ICD-10-CM

## 2021-08-25 DIAGNOSIS — R53.82 CHRONIC FATIGUE: ICD-10-CM

## 2021-08-25 DIAGNOSIS — E11.22 CKD STAGE 3 SECONDARY TO DIABETES: ICD-10-CM

## 2021-08-25 DIAGNOSIS — I49.3 PVC (PREMATURE VENTRICULAR CONTRACTION): ICD-10-CM

## 2021-08-25 DIAGNOSIS — Z98.890 HISTORY OF CAROTID ENDARTERECTOMY: ICD-10-CM

## 2021-08-25 DIAGNOSIS — I15.2 HYPERTENSION ASSOCIATED WITH DIABETES: Primary | ICD-10-CM

## 2021-08-25 DIAGNOSIS — I73.9 PAD (PERIPHERAL ARTERY DISEASE): ICD-10-CM

## 2021-08-25 PROCEDURE — 99999 PR PBB SHADOW E&M-EST. PATIENT-LVL IV: ICD-10-PCS | Mod: PBBFAC,,, | Performed by: INTERNAL MEDICINE

## 2021-08-25 PROCEDURE — 99999 PR PBB SHADOW E&M-EST. PATIENT-LVL IV: CPT | Mod: PBBFAC,,, | Performed by: INTERNAL MEDICINE

## 2021-08-25 PROCEDURE — 99214 OFFICE O/P EST MOD 30 MIN: CPT | Mod: S$PBB,,, | Performed by: INTERNAL MEDICINE

## 2021-08-25 PROCEDURE — 99214 OFFICE O/P EST MOD 30 MIN: CPT | Mod: PBBFAC,PO | Performed by: INTERNAL MEDICINE

## 2021-08-25 PROCEDURE — 99214 PR OFFICE/OUTPT VISIT, EST, LEVL IV, 30-39 MIN: ICD-10-PCS | Mod: S$PBB,,, | Performed by: INTERNAL MEDICINE

## 2021-08-31 ENCOUNTER — EXTERNAL CHRONIC CARE MANAGEMENT (OUTPATIENT)
Dept: PRIMARY CARE CLINIC | Facility: CLINIC | Age: 81
End: 2021-08-31
Payer: MEDICARE

## 2021-08-31 PROCEDURE — 99490 CHRNC CARE MGMT STAFF 1ST 20: CPT | Mod: S$PBB,,, | Performed by: FAMILY MEDICINE

## 2021-08-31 PROCEDURE — 99490 CHRNC CARE MGMT STAFF 1ST 20: CPT | Mod: PBBFAC,PO | Performed by: FAMILY MEDICINE

## 2021-08-31 PROCEDURE — 99490 PR CHRONIC CARE MGMT, 1ST 20 MIN: ICD-10-PCS | Mod: S$PBB,,, | Performed by: FAMILY MEDICINE

## 2021-09-28 ENCOUNTER — IMMUNIZATION (OUTPATIENT)
Dept: FAMILY MEDICINE | Facility: CLINIC | Age: 81
End: 2021-09-28
Payer: MEDICARE

## 2021-09-28 DIAGNOSIS — Z23 NEED FOR VACCINATION: Primary | ICD-10-CM

## 2021-09-28 PROCEDURE — 91300 COVID-19, MRNA, LNP-S, PF, 30 MCG/0.3 ML DOSE VACCINE: CPT | Mod: PBBFAC,PO

## 2021-09-28 PROCEDURE — 0003A COVID-19, MRNA, LNP-S, PF, 30 MCG/0.3 ML DOSE VACCINE: CPT | Mod: PBBFAC | Performed by: FAMILY MEDICINE

## 2021-09-30 ENCOUNTER — EXTERNAL CHRONIC CARE MANAGEMENT (OUTPATIENT)
Dept: PRIMARY CARE CLINIC | Facility: CLINIC | Age: 81
End: 2021-09-30
Payer: MEDICARE

## 2021-09-30 PROCEDURE — 99490 CHRNC CARE MGMT STAFF 1ST 20: CPT | Mod: PBBFAC,PO | Performed by: FAMILY MEDICINE

## 2021-09-30 PROCEDURE — 99490 CHRNC CARE MGMT STAFF 1ST 20: CPT | Mod: S$PBB,,, | Performed by: FAMILY MEDICINE

## 2021-09-30 PROCEDURE — 99490 PR CHRONIC CARE MGMT, 1ST 20 MIN: ICD-10-PCS | Mod: S$PBB,,, | Performed by: FAMILY MEDICINE

## 2021-10-31 ENCOUNTER — EXTERNAL CHRONIC CARE MANAGEMENT (OUTPATIENT)
Dept: PRIMARY CARE CLINIC | Facility: CLINIC | Age: 81
End: 2021-10-31
Payer: MEDICARE

## 2021-10-31 PROCEDURE — 99490 CHRNC CARE MGMT STAFF 1ST 20: CPT | Mod: S$PBB,,, | Performed by: FAMILY MEDICINE

## 2021-10-31 PROCEDURE — 99490 CHRNC CARE MGMT STAFF 1ST 20: CPT | Mod: PBBFAC,PO | Performed by: FAMILY MEDICINE

## 2021-10-31 PROCEDURE — 99490 PR CHRONIC CARE MGMT, 1ST 20 MIN: ICD-10-PCS | Mod: S$PBB,,, | Performed by: FAMILY MEDICINE

## 2021-11-30 ENCOUNTER — EXTERNAL CHRONIC CARE MANAGEMENT (OUTPATIENT)
Dept: PRIMARY CARE CLINIC | Facility: CLINIC | Age: 81
End: 2021-11-30
Payer: MEDICARE

## 2021-11-30 PROCEDURE — 99490 CHRNC CARE MGMT STAFF 1ST 20: CPT | Mod: S$PBB,,, | Performed by: FAMILY MEDICINE

## 2021-11-30 PROCEDURE — 99490 CHRNC CARE MGMT STAFF 1ST 20: CPT | Mod: PBBFAC,PO | Performed by: FAMILY MEDICINE

## 2021-11-30 PROCEDURE — 99490 PR CHRONIC CARE MGMT, 1ST 20 MIN: ICD-10-PCS | Mod: S$PBB,,, | Performed by: FAMILY MEDICINE

## 2021-12-10 LAB
LEFT EYE DM RETINOPATHY: POSITIVE
RIGHT EYE DM RETINOPATHY: POSITIVE

## 2021-12-13 ENCOUNTER — OFFICE VISIT (OUTPATIENT)
Dept: CARDIOLOGY | Facility: CLINIC | Age: 81
End: 2021-12-13
Payer: MEDICARE

## 2021-12-13 VITALS
SYSTOLIC BLOOD PRESSURE: 164 MMHG | HEIGHT: 72 IN | OXYGEN SATURATION: 98 % | WEIGHT: 158.75 LBS | DIASTOLIC BLOOD PRESSURE: 84 MMHG | HEART RATE: 56 BPM | BODY MASS INDEX: 21.5 KG/M2

## 2021-12-13 DIAGNOSIS — G47.33 OSA (OBSTRUCTIVE SLEEP APNEA): ICD-10-CM

## 2021-12-13 DIAGNOSIS — I65.23 BILATERAL CAROTID ARTERY STENOSIS: ICD-10-CM

## 2021-12-13 DIAGNOSIS — E11.59 HYPERTENSION ASSOCIATED WITH DIABETES: Primary | ICD-10-CM

## 2021-12-13 DIAGNOSIS — I49.3 PVC (PREMATURE VENTRICULAR CONTRACTION): ICD-10-CM

## 2021-12-13 DIAGNOSIS — E11.69 COMBINED HYPERLIPIDEMIA ASSOCIATED WITH TYPE 2 DIABETES MELLITUS: ICD-10-CM

## 2021-12-13 DIAGNOSIS — I15.2 HYPERTENSION ASSOCIATED WITH DIABETES: Primary | ICD-10-CM

## 2021-12-13 DIAGNOSIS — I73.9 PAD (PERIPHERAL ARTERY DISEASE): ICD-10-CM

## 2021-12-13 DIAGNOSIS — E78.2 COMBINED HYPERLIPIDEMIA ASSOCIATED WITH TYPE 2 DIABETES MELLITUS: ICD-10-CM

## 2021-12-13 PROCEDURE — 99215 OFFICE O/P EST HI 40 MIN: CPT | Mod: PBBFAC | Performed by: INTERNAL MEDICINE

## 2021-12-13 PROCEDURE — 99999 PR PBB SHADOW E&M-EST. PATIENT-LVL V: CPT | Mod: PBBFAC,,, | Performed by: INTERNAL MEDICINE

## 2021-12-13 PROCEDURE — 99214 OFFICE O/P EST MOD 30 MIN: CPT | Mod: S$PBB,,, | Performed by: INTERNAL MEDICINE

## 2021-12-13 PROCEDURE — 99999 PR PBB SHADOW E&M-EST. PATIENT-LVL V: ICD-10-PCS | Mod: PBBFAC,,, | Performed by: INTERNAL MEDICINE

## 2021-12-13 PROCEDURE — 99214 PR OFFICE/OUTPT VISIT, EST, LEVL IV, 30-39 MIN: ICD-10-PCS | Mod: S$PBB,,, | Performed by: INTERNAL MEDICINE

## 2021-12-13 RX ORDER — HYDROCHLOROTHIAZIDE 12.5 MG/1
12.5 TABLET ORAL DAILY
Qty: 30 TABLET | Refills: 11 | Status: SHIPPED | OUTPATIENT
Start: 2021-12-13 | End: 2022-01-18

## 2021-12-16 ENCOUNTER — OFFICE VISIT (OUTPATIENT)
Dept: FAMILY MEDICINE | Facility: CLINIC | Age: 81
End: 2021-12-16
Payer: MEDICARE

## 2021-12-16 VITALS
TEMPERATURE: 98 F | HEART RATE: 62 BPM | BODY MASS INDEX: 21.4 KG/M2 | WEIGHT: 158 LBS | SYSTOLIC BLOOD PRESSURE: 132 MMHG | RESPIRATION RATE: 18 BRPM | DIASTOLIC BLOOD PRESSURE: 66 MMHG | HEIGHT: 72 IN

## 2021-12-16 DIAGNOSIS — E11.22 CONTROLLED TYPE 2 DIABETES MELLITUS WITH STAGE 3 CHRONIC KIDNEY DISEASE, WITH LONG-TERM CURRENT USE OF INSULIN: ICD-10-CM

## 2021-12-16 DIAGNOSIS — Z79.4 CONTROLLED TYPE 2 DIABETES MELLITUS WITH STAGE 3 CHRONIC KIDNEY DISEASE, WITH LONG-TERM CURRENT USE OF INSULIN: ICD-10-CM

## 2021-12-16 DIAGNOSIS — N40.0 BENIGN PROSTATIC HYPERPLASIA, PRESENCE OF LOWER URINARY TRACT SYMPTOMS UNSPECIFIED: Primary | ICD-10-CM

## 2021-12-16 DIAGNOSIS — N18.30 CONTROLLED TYPE 2 DIABETES MELLITUS WITH STAGE 3 CHRONIC KIDNEY DISEASE, WITH LONG-TERM CURRENT USE OF INSULIN: ICD-10-CM

## 2021-12-16 LAB
ALBUMIN/CREAT UR: 48.1 UG/MG (ref 0–30)
CREAT UR-MCNC: 79 MG/DL (ref 23–375)
MICROALBUMIN UR DL<=1MG/L-MCNC: 38 UG/ML

## 2021-12-16 PROCEDURE — 99215 OFFICE O/P EST HI 40 MIN: CPT | Mod: PBBFAC,PO | Performed by: NURSE PRACTITIONER

## 2021-12-16 PROCEDURE — 99999 PR PBB SHADOW E&M-EST. PATIENT-LVL V: ICD-10-PCS | Mod: PBBFAC,,, | Performed by: NURSE PRACTITIONER

## 2021-12-16 PROCEDURE — 99214 OFFICE O/P EST MOD 30 MIN: CPT | Mod: S$PBB,,, | Performed by: NURSE PRACTITIONER

## 2021-12-16 PROCEDURE — 99214 PR OFFICE/OUTPT VISIT, EST, LEVL IV, 30-39 MIN: ICD-10-PCS | Mod: S$PBB,,, | Performed by: NURSE PRACTITIONER

## 2021-12-16 PROCEDURE — 82570 ASSAY OF URINE CREATININE: CPT | Performed by: NURSE PRACTITIONER

## 2021-12-16 PROCEDURE — 99999 PR PBB SHADOW E&M-EST. PATIENT-LVL V: CPT | Mod: PBBFAC,,, | Performed by: NURSE PRACTITIONER

## 2021-12-21 ENCOUNTER — PATIENT OUTREACH (OUTPATIENT)
Dept: ADMINISTRATIVE | Facility: HOSPITAL | Age: 81
End: 2021-12-21
Payer: MEDICARE

## 2021-12-31 ENCOUNTER — EXTERNAL CHRONIC CARE MANAGEMENT (OUTPATIENT)
Dept: PRIMARY CARE CLINIC | Facility: CLINIC | Age: 81
End: 2021-12-31
Payer: MEDICARE

## 2021-12-31 PROCEDURE — 99490 CHRNC CARE MGMT STAFF 1ST 20: CPT | Mod: PBBFAC,PO | Performed by: FAMILY MEDICINE

## 2021-12-31 PROCEDURE — 99490 PR CHRONIC CARE MGMT, 1ST 20 MIN: ICD-10-PCS | Mod: S$PBB,,, | Performed by: FAMILY MEDICINE

## 2021-12-31 PROCEDURE — 99490 CHRNC CARE MGMT STAFF 1ST 20: CPT | Mod: S$PBB,,, | Performed by: FAMILY MEDICINE

## 2022-01-18 ENCOUNTER — OFFICE VISIT (OUTPATIENT)
Dept: CARDIOLOGY | Facility: CLINIC | Age: 82
End: 2022-01-18
Payer: MEDICARE

## 2022-01-18 VITALS
DIASTOLIC BLOOD PRESSURE: 86 MMHG | BODY MASS INDEX: 21.18 KG/M2 | HEART RATE: 56 BPM | SYSTOLIC BLOOD PRESSURE: 180 MMHG | WEIGHT: 156.38 LBS | HEIGHT: 72 IN | OXYGEN SATURATION: 98 %

## 2022-01-18 DIAGNOSIS — E11.69 COMBINED HYPERLIPIDEMIA ASSOCIATED WITH TYPE 2 DIABETES MELLITUS: Primary | ICD-10-CM

## 2022-01-18 DIAGNOSIS — R55 SYNCOPE AND COLLAPSE: ICD-10-CM

## 2022-01-18 DIAGNOSIS — E11.59 HYPERTENSION ASSOCIATED WITH DIABETES: ICD-10-CM

## 2022-01-18 DIAGNOSIS — I49.3 PVC (PREMATURE VENTRICULAR CONTRACTION): ICD-10-CM

## 2022-01-18 DIAGNOSIS — G47.33 OSA (OBSTRUCTIVE SLEEP APNEA): ICD-10-CM

## 2022-01-18 DIAGNOSIS — I73.9 PAD (PERIPHERAL ARTERY DISEASE): ICD-10-CM

## 2022-01-18 DIAGNOSIS — I65.23 BILATERAL CAROTID ARTERY STENOSIS: ICD-10-CM

## 2022-01-18 DIAGNOSIS — R42 DIZZINESS: ICD-10-CM

## 2022-01-18 DIAGNOSIS — E78.2 COMBINED HYPERLIPIDEMIA ASSOCIATED WITH TYPE 2 DIABETES MELLITUS: Primary | ICD-10-CM

## 2022-01-18 DIAGNOSIS — I15.2 HYPERTENSION ASSOCIATED WITH DIABETES: ICD-10-CM

## 2022-01-18 PROCEDURE — 99999 PR PBB SHADOW E&M-EST. PATIENT-LVL IV: CPT | Mod: PBBFAC,,, | Performed by: INTERNAL MEDICINE

## 2022-01-18 PROCEDURE — 99214 OFFICE O/P EST MOD 30 MIN: CPT | Mod: S$PBB,,, | Performed by: INTERNAL MEDICINE

## 2022-01-18 PROCEDURE — 99214 PR OFFICE/OUTPT VISIT, EST, LEVL IV, 30-39 MIN: ICD-10-PCS | Mod: S$PBB,,, | Performed by: INTERNAL MEDICINE

## 2022-01-18 PROCEDURE — 99214 OFFICE O/P EST MOD 30 MIN: CPT | Mod: PBBFAC,PO | Performed by: INTERNAL MEDICINE

## 2022-01-18 PROCEDURE — 99999 PR PBB SHADOW E&M-EST. PATIENT-LVL IV: ICD-10-PCS | Mod: PBBFAC,,, | Performed by: INTERNAL MEDICINE

## 2022-01-18 RX ORDER — OLMESARTAN MEDOXOMIL 40 MG/1
40 TABLET ORAL DAILY
Qty: 30 TABLET | Refills: 11 | Status: SHIPPED | OUTPATIENT
Start: 2022-01-18 | End: 2022-06-23 | Stop reason: SDUPTHER

## 2022-01-18 RX ORDER — HYDROCHLOROTHIAZIDE 25 MG/1
25 TABLET ORAL DAILY
Qty: 30 TABLET | Refills: 11 | Status: SHIPPED | OUTPATIENT
Start: 2022-01-18 | End: 2022-05-25 | Stop reason: ALTCHOICE

## 2022-01-18 NOTE — PROGRESS NOTES
Subjective:   Patient ID:  Bebeto Santiago is a 81 y.o. male who presents for follow-up of Hypertension associated with diabetes  -212 HR 50s.  Patient denies CP, angina or anginal equivalent.  Hypertension  This is a chronic problem. The current episode started more than 1 year ago. The problem has been gradually improving since onset. The problem is controlled. Pertinent negatives include no chest pain, palpitations or shortness of breath. Past treatments include beta blockers and calcium channel blockers. The current treatment provides moderate improvement. There are no compliance problems.    Hyperlipidemia  This is a chronic problem. The current episode started more than 1 year ago. The problem is controlled. Recent lipid tests were reviewed and are variable. Pertinent negatives include no chest pain or shortness of breath. Current antihyperlipidemic treatment includes statins. The current treatment provides moderate improvement of lipids. There are no compliance problems.    Palpitations   This is a chronic problem. The current episode started more than 1 year ago. The problem occurs intermittently. The problem has been waxing and waning. Nothing aggravates the symptoms. Pertinent negatives include no chest pain, dizziness or shortness of breath. He has tried beta blockers for the symptoms. The treatment provided moderate relief.       Review of Systems   Constitutional: Negative. Negative for weight gain.   HENT: Negative.    Eyes: Negative.    Cardiovascular: Negative.  Negative for chest pain, leg swelling and palpitations.   Respiratory: Negative.  Negative for chest tightness and shortness of breath.    Endocrine: Negative.    Hematologic/Lymphatic: Negative.    Skin: Negative.    Musculoskeletal: Negative for muscle weakness.   Gastrointestinal: Negative.    Genitourinary: Negative.    Neurological: Negative.  Negative for dizziness.   Psychiatric/Behavioral: Negative.    Allergic/Immunologic:  "Negative.      Family History   Problem Relation Age of Onset    Heart disease Mother     Stroke Maternal Grandfather     Diabetes Neg Hx     Cancer Neg Hx     Hypertension Neg Hx      Past Medical History:   Diagnosis Date    Allergy     BPH (benign prostatic hypertrophy)     Controlled type 2 diabetes mellitus with stage 3 chronic kidney disease, with long-term current use of insulin     The patient presents with diabetes.  The patient denies polyuria, polydipsia, polyphagia, hypoglycemia and paresthesias.  The patient's glucose control has been good.  Home glucose averages are routinely checked.  The patient is without retinopathy currently.  The patient has no history of neuropathy.  The patient currently complains of no podiatric problems.  The patient has excellent compliance.    Dehydration     Diabetes mellitus type II, uncontrolled     GERD (gastroesophageal reflux disease)     grade IV/IV    Hypertension     Hypothyroidism     Male hypogonadism     Non-proliferative diabetic retinopathy     Urinary retention      Social History     Socioeconomic History    Marital status:      Spouse name: Babs    Number of children: 2   Occupational History    Occupation: Retired   Tobacco Use    Smoking status: Former Smoker     Quit date: 1986     Years since quittin.3    Smokeless tobacco: Current User     Types: Chew   Substance and Sexual Activity    Alcohol use: Yes     Comment: rare    Drug use: No    Sexual activity: Not Currently     Partners: Female     Current Outpatient Medications on File Prior to Visit   Medication Sig Dispense Refill    aspirin 325 MG tablet Take 325 mg by mouth once daily.      BD LUER-MANUEL SYRINGE 3 mL 22 x 1 1/2" Syrg   3    blood sugar diagnostic (FREESTYLE LITE STRIPS) Strp TEST 3 TIMES DAILY 300 strip 5    blood-glucose meter Misc Use as directed four times daily before meals and at bedtime. 1 each 0    citalopram (CELEXA) 10 MG " "tablet TAKE 1 TABLET BY MOUTH EVERY DAY 90 tablet 3    cloNIDine (CATAPRES) 0.2 MG tablet Take 0.2 mg by mouth nightly. And 1/2 tablet tablet prn if SBP > 200      flash glucose scanning reader (FREESTYLE PUMA 2 READER) [facility] Use in conjunction with glucose sensor every 14 days for continuous monitoring of Glucose. DX:E11.22, N18.30, Z79.44 6 each 3    flash glucose sensor (FREESTYLE PUMA 2 SENSOR) Kit Use one sensor Every 14 days for continuous monitoring of Glucose. DX:E11.22, N18.30, Z79.44 2 kit 11    hydroCHLOROthiazide (HYDRODIURIL) 12.5 MG Tab Take 1 tablet (12.5 mg total) by mouth once daily. 30 tablet 11    insulin (LANTUS SOLOSTAR U-100 INSULIN) glargine 100 units/mL (3mL) SubQ pen INJECT 36 UNITS UNDER THE SKIN ONCE A DAY (Patient taking differently: INJECT 33 UNITS UNDER THE SKIN ONCE A DAY) 30 Syringe 1    insulin aspart U-100 (NOVOLOG FLEXPEN U-100 INSULIN) 100 unit/mL (3 mL) InPn pen USE AS DIRECTED PER SLIDING SCALE. MAX OF 14 UNITS DAILY) 15 Syringe 3    lancets (ACCU-CHEK SOFTCLIX LANCETS) Misc Check glucose three times daily. 200 each 11    levothyroxine (SYNTHROID) 100 MCG tablet TAKE 1 TABLET BY MOUTH EVERY DAY 90 tablet 0    methocarbamoL (ROBAXIN) 500 MG Tab Take 500 mg by mouth.      metoprolol succinate (TOPROL-XL) 25 MG 24 hr tablet Take 1 tablet (25 mg total) by mouth once daily. 30 tablet 11    montelukast (SINGULAIR) 10 mg tablet TAKE 1 TABLET BY MOUTH EVERY DAY IN THE EVENING 90 tablet 3    multivitamin (THERAGRAN) per tablet Take 1 tablet by mouth.      neomycin-polymyxin-dexamethasone (DEXACINE) 3.5 mg/g-10,000 unit/g-0.1 % Oint       olmesartan (BENICAR) 20 MG tablet Take 1 tablet (20 mg total) by mouth once daily. 30 tablet 11    pen needle, diabetic (BD ULTRA-FINE MINI PEN NEEDLE) 31 gauge x 3/16" Ndle USE DAILY WITH LANTUS AND NOVOLOG 300 each 3    pravastatin (PRAVACHOL) 40 MG tablet TAKE 1 TABLET BY MOUTH EVERY DAY IN THE EVENING 90 tablet 3    SHINGRIX, PF, 50 " mcg/0.5 mL injection       ibuprofen (ADVIL,MOTRIN) 800 MG tablet Take 1 tablet (800 mg total) by mouth every 6 (six) hours as needed for Pain. (Patient not taking: Reported on 1/18/2022) 30 tablet 0     No current facility-administered medications on file prior to visit.     Review of patient's allergies indicates:   Allergen Reactions    Ace inhibitors      Other reaction(s): cough    Metformin      Abdominal pain       Objective:     Physical Exam  Vitals and nursing note reviewed.   Constitutional:       Appearance: He is well-developed and well-nourished.   HENT:      Head: Normocephalic and atraumatic.   Eyes:      Conjunctiva/sclera: Conjunctivae normal.      Pupils: Pupils are equal, round, and reactive to light.   Cardiovascular:      Rate and Rhythm: Normal rate and regular rhythm.      Pulses: Intact distal pulses.      Heart sounds: Normal heart sounds.   Pulmonary:      Effort: Pulmonary effort is normal.      Breath sounds: Normal breath sounds.   Abdominal:      General: Bowel sounds are normal.      Palpations: Abdomen is soft.   Musculoskeletal:      Cervical back: Normal range of motion and neck supple.   Skin:     General: Skin is warm and dry.   Neurological:      Mental Status: He is alert and oriented to person, place, and time.   Psychiatric:         Mood and Affect: Mood and affect normal.         Assessment:     1. Combined hyperlipidemia associated with type 2 diabetes mellitus    2. Hypertension associated with diabetes    3. PAD (peripheral artery disease)    4. Bilateral carotid artery stenosis    5. PVC (premature ventricular contraction)    6. ERNESTINE (obstructive sleep apnea)        Plan:     Combined hyperlipidemia associated with type 2 diabetes mellitus    Hypertension associated with diabetes    PAD (peripheral artery disease)    Bilateral carotid artery stenosis    PVC (premature ventricular contraction)    ERNESTINE (obstructive sleep apnea)    Continue clonidine, toprol,  olmesartan -htn/SVT  Continue statin-hlp  Continue asa- primary prevention

## 2022-01-20 ENCOUNTER — HOSPITAL ENCOUNTER (OUTPATIENT)
Dept: CARDIOLOGY | Facility: HOSPITAL | Age: 82
Discharge: HOME | End: 2022-01-20
Attending: INTERNAL MEDICINE
Payer: MEDICARE

## 2022-01-20 VITALS
BODY MASS INDEX: 21.13 KG/M2 | HEIGHT: 72 IN | DIASTOLIC BLOOD PRESSURE: 63 MMHG | SYSTOLIC BLOOD PRESSURE: 141 MMHG | WEIGHT: 156 LBS

## 2022-01-20 DIAGNOSIS — R55 SYNCOPE AND COLLAPSE: ICD-10-CM

## 2022-01-20 DIAGNOSIS — R42 DIZZINESS: ICD-10-CM

## 2022-01-20 LAB
LEFT ARM DIASTOLIC BLOOD PRESSURE: 63 MMHG
LEFT ARM SYSTOLIC BLOOD PRESSURE: 141 MMHG
LEFT CBA DIAS: 11 CM/S
LEFT CBA SYS: 69 CM/S
LEFT CCA DIST DIAS: 12 CM/S
LEFT CCA DIST SYS: 75 CM/S
LEFT CCA MID DIAS: 11 CM/S
LEFT CCA MID SYS: 70 CM/S
LEFT CCA PROX DIAS: 15 CM/S
LEFT CCA PROX SYS: 122 CM/S
LEFT ECA DIAS: 12 CM/S
LEFT ECA SYS: 156 CM/S
LEFT ICA DIST DIAS: 17 CM/S
LEFT ICA DIST SYS: 64 CM/S
LEFT ICA MID DIAS: 20 CM/S
LEFT ICA MID SYS: 79 CM/S
LEFT ICA PROX DIAS: 16 CM/S
LEFT ICA PROX SYS: 81 CM/S
LEFT VERTEBRAL DIAS: 8 CM/S
LEFT VERTEBRAL SYS: 33 CM/S
OHS CV CAROTID RIGHT ICA EDV HIGHEST: 0
OHS CV CAROTID ULTRASOUND LEFT ICA/CCA RATIO: 1.08
OHS CV CAROTID ULTRASOUND RIGHT ICA/CCA RATIO: 0
OHS CV PV CAROTID LEFT HIGHEST CCA: 122
OHS CV PV CAROTID LEFT HIGHEST ICA: 81
OHS CV PV CAROTID RIGHT HIGHEST CCA: 60
OHS CV PV CAROTID RIGHT HIGHEST ICA: 0
OHS CV US CAROTID LEFT HIGHEST EDV: 20
RIGHT ARM DIASTOLIC BLOOD PRESSURE: 68 MMHG
RIGHT ARM SYSTOLIC BLOOD PRESSURE: 165 MMHG
RIGHT CBA DIAS: 5 CM/S
RIGHT CBA SYS: 49 CM/S
RIGHT CCA DIST DIAS: 5 CM/S
RIGHT CCA DIST SYS: 53 CM/S
RIGHT CCA MID DIAS: 3 CM/S
RIGHT CCA MID SYS: 60 CM/S
RIGHT CCA PROX DIAS: 5 CM/S
RIGHT CCA PROX SYS: 56 CM/S
RIGHT ECA DIAS: 8 CM/S
RIGHT ECA SYS: 113 CM/S
RIGHT ICA DIST DIAS: 0 CM/S
RIGHT ICA DIST SYS: 0 CM/S
RIGHT ICA MID DIAS: 0 CM/S
RIGHT ICA MID SYS: 0 CM/S
RIGHT ICA PROX DIAS: 0 CM/S
RIGHT ICA PROX SYS: 0 CM/S
RIGHT VERTEBRAL DIAS: 16 CM/S
RIGHT VERTEBRAL SYS: 54 CM/S

## 2022-01-20 PROCEDURE — 93880 EXTRACRANIAL BILAT STUDY: CPT | Mod: 26,,, | Performed by: INTERNAL MEDICINE

## 2022-01-20 PROCEDURE — 93880 EXTRACRANIAL BILAT STUDY: CPT | Mod: PO

## 2022-01-20 PROCEDURE — 93880 CV US DOPPLER CAROTID (CUPID ONLY): ICD-10-PCS | Mod: 26,,, | Performed by: INTERNAL MEDICINE

## 2022-01-24 ENCOUNTER — TELEPHONE (OUTPATIENT)
Dept: CARDIOLOGY | Facility: CLINIC | Age: 82
End: 2022-01-24
Payer: MEDICARE

## 2022-01-24 NOTE — TELEPHONE ENCOUNTER
----- Message from Abad Villagran MD sent at 1/21/2022  9:30 PM CST -----  Please tell pt:  Carotid us shows no changes

## 2022-01-24 NOTE — TELEPHONE ENCOUNTER
The patient has been notified of this information and all questions answered.  Carotid us shows no changes. Patient verbalized understanding.

## 2022-01-27 ENCOUNTER — LAB VISIT (OUTPATIENT)
Dept: LAB | Facility: HOSPITAL | Age: 82
End: 2022-01-27
Attending: FAMILY MEDICINE
Payer: MEDICARE

## 2022-01-27 DIAGNOSIS — Z79.4 CONTROLLED TYPE 2 DIABETES MELLITUS WITH STAGE 3 CHRONIC KIDNEY DISEASE, WITH LONG-TERM CURRENT USE OF INSULIN: ICD-10-CM

## 2022-01-27 DIAGNOSIS — E11.22 CONTROLLED TYPE 2 DIABETES MELLITUS WITH STAGE 3 CHRONIC KIDNEY DISEASE, WITH LONG-TERM CURRENT USE OF INSULIN: ICD-10-CM

## 2022-01-27 DIAGNOSIS — N18.30 CONTROLLED TYPE 2 DIABETES MELLITUS WITH STAGE 3 CHRONIC KIDNEY DISEASE, WITH LONG-TERM CURRENT USE OF INSULIN: ICD-10-CM

## 2022-01-27 LAB
ESTIMATED AVG GLUCOSE: 137 MG/DL (ref 68–131)
HBA1C MFR BLD: 6.4 % (ref 4–5.6)

## 2022-01-27 PROCEDURE — 36415 COLL VENOUS BLD VENIPUNCTURE: CPT | Mod: PO | Performed by: FAMILY MEDICINE

## 2022-01-27 PROCEDURE — 83036 HEMOGLOBIN GLYCOSYLATED A1C: CPT | Performed by: FAMILY MEDICINE

## 2022-01-31 ENCOUNTER — EXTERNAL CHRONIC CARE MANAGEMENT (OUTPATIENT)
Dept: PRIMARY CARE CLINIC | Facility: CLINIC | Age: 82
End: 2022-01-31
Payer: MEDICARE

## 2022-01-31 PROCEDURE — 99490 CHRNC CARE MGMT STAFF 1ST 20: CPT | Mod: PBBFAC,PO | Performed by: FAMILY MEDICINE

## 2022-01-31 PROCEDURE — 99490 CHRNC CARE MGMT STAFF 1ST 20: CPT | Mod: S$PBB,,, | Performed by: FAMILY MEDICINE

## 2022-01-31 PROCEDURE — 99490 PR CHRONIC CARE MGMT, 1ST 20 MIN: ICD-10-PCS | Mod: S$PBB,,, | Performed by: FAMILY MEDICINE

## 2022-02-22 ENCOUNTER — OFFICE VISIT (OUTPATIENT)
Dept: CARDIOLOGY | Facility: CLINIC | Age: 82
End: 2022-02-22
Payer: MEDICARE

## 2022-02-22 VITALS
BODY MASS INDEX: 21.15 KG/M2 | OXYGEN SATURATION: 99 % | SYSTOLIC BLOOD PRESSURE: 136 MMHG | WEIGHT: 156.13 LBS | HEART RATE: 53 BPM | DIASTOLIC BLOOD PRESSURE: 72 MMHG | HEIGHT: 72 IN

## 2022-02-22 DIAGNOSIS — E11.59 HYPERTENSION ASSOCIATED WITH DIABETES: ICD-10-CM

## 2022-02-22 DIAGNOSIS — E11.69 COMBINED HYPERLIPIDEMIA ASSOCIATED WITH TYPE 2 DIABETES MELLITUS: Primary | ICD-10-CM

## 2022-02-22 DIAGNOSIS — R09.89 BILATERAL CAROTID BRUITS: ICD-10-CM

## 2022-02-22 DIAGNOSIS — R00.2 PALPITATIONS: ICD-10-CM

## 2022-02-22 DIAGNOSIS — I49.3 PVC (PREMATURE VENTRICULAR CONTRACTION): ICD-10-CM

## 2022-02-22 DIAGNOSIS — I73.9 PAD (PERIPHERAL ARTERY DISEASE): ICD-10-CM

## 2022-02-22 DIAGNOSIS — I65.22 OCCLUSION OF LEFT CAROTID ARTERY: ICD-10-CM

## 2022-02-22 DIAGNOSIS — I15.2 HYPERTENSION ASSOCIATED WITH DIABETES: ICD-10-CM

## 2022-02-22 DIAGNOSIS — G47.33 OSA (OBSTRUCTIVE SLEEP APNEA): ICD-10-CM

## 2022-02-22 DIAGNOSIS — E78.2 COMBINED HYPERLIPIDEMIA ASSOCIATED WITH TYPE 2 DIABETES MELLITUS: Primary | ICD-10-CM

## 2022-02-22 DIAGNOSIS — Z98.890 HISTORY OF CAROTID ENDARTERECTOMY: ICD-10-CM

## 2022-02-22 PROCEDURE — 99999 PR PBB SHADOW E&M-EST. PATIENT-LVL IV: ICD-10-PCS | Mod: PBBFAC,,, | Performed by: INTERNAL MEDICINE

## 2022-02-22 PROCEDURE — 99214 OFFICE O/P EST MOD 30 MIN: CPT | Mod: S$PBB,,, | Performed by: INTERNAL MEDICINE

## 2022-02-22 PROCEDURE — 99999 PR PBB SHADOW E&M-EST. PATIENT-LVL IV: CPT | Mod: PBBFAC,,, | Performed by: INTERNAL MEDICINE

## 2022-02-22 PROCEDURE — 99214 PR OFFICE/OUTPT VISIT, EST, LEVL IV, 30-39 MIN: ICD-10-PCS | Mod: S$PBB,,, | Performed by: INTERNAL MEDICINE

## 2022-02-22 PROCEDURE — 99214 OFFICE O/P EST MOD 30 MIN: CPT | Mod: PBBFAC,PO | Performed by: INTERNAL MEDICINE

## 2022-02-22 RX ORDER — FINASTERIDE 5 MG/1
5 TABLET, FILM COATED ORAL DAILY
COMMUNITY
Start: 2022-02-21 | End: 2022-05-22

## 2022-02-22 NOTE — PROGRESS NOTES
Subjective:   Patient ID:  Bebeto Santiago is a 81 y.o. male who presents for follow-up of Follow-up  Patient denies CP, angina or anginal equivalent.  BP high in am ( SBP 180s). Pt started HCTZ 1 month ago    Hypertension  This is a chronic problem. The current episode started more than 1 year ago. The problem has been gradually improving since onset. The problem is controlled. Pertinent negatives include no chest pain, palpitations or shortness of breath. Past treatments include beta blockers and calcium channel blockers. The current treatment provides moderate improvement. There are no compliance problems.    Hyperlipidemia  This is a chronic problem. The current episode started more than 1 year ago. The problem is controlled. Recent lipid tests were reviewed and are variable. Pertinent negatives include no chest pain or shortness of breath. Current antihyperlipidemic treatment includes statins. The current treatment provides moderate improvement of lipids. There are no compliance problems.    Palpitations   This is a chronic problem. The current episode started more than 1 year ago. The problem occurs intermittently. The problem has been waxing and waning. Nothing aggravates the symptoms. Pertinent negatives include no chest pain, dizziness or shortness of breath. He has tried beta blockers for the symptoms. The treatment provided moderate relief.       Review of Systems   Constitutional: Negative. Negative for weight gain.   HENT: Negative.    Eyes: Negative.    Cardiovascular: Negative.  Negative for chest pain, leg swelling and palpitations.   Respiratory: Negative.  Negative for shortness of breath.    Endocrine: Negative.    Hematologic/Lymphatic: Negative.    Skin: Negative.    Musculoskeletal: Negative for muscle weakness.   Gastrointestinal: Negative.    Genitourinary: Negative.    Neurological: Negative.  Negative for dizziness.   Psychiatric/Behavioral: Negative.    Allergic/Immunologic: Negative.   "    Family History   Problem Relation Age of Onset    Heart disease Mother     Stroke Maternal Grandfather     Diabetes Neg Hx     Cancer Neg Hx     Hypertension Neg Hx      Past Medical History:   Diagnosis Date    Allergy     BPH (benign prostatic hypertrophy)     Controlled type 2 diabetes mellitus with stage 3 chronic kidney disease, with long-term current use of insulin     The patient presents with diabetes.  The patient denies polyuria, polydipsia, polyphagia, hypoglycemia and paresthesias.  The patient's glucose control has been good.  Home glucose averages are routinely checked.  The patient is without retinopathy currently.  The patient has no history of neuropathy.  The patient currently complains of no podiatric problems.  The patient has excellent compliance.    Dehydration     Diabetes mellitus type II, uncontrolled     GERD (gastroesophageal reflux disease)     grade IV/IV    Hypertension     Hypothyroidism     Male hypogonadism     Non-proliferative diabetic retinopathy     Urinary retention      Social History     Socioeconomic History    Marital status:      Spouse name: Babs    Number of children: 2   Occupational History    Occupation: Retired   Tobacco Use    Smoking status: Former Smoker     Quit date: 1986     Years since quittin.4    Smokeless tobacco: Current User     Types: Chew   Substance and Sexual Activity    Alcohol use: Yes     Comment: rare    Drug use: No    Sexual activity: Not Currently     Partners: Female     Current Outpatient Medications on File Prior to Visit   Medication Sig Dispense Refill    aspirin 325 MG tablet Take 325 mg by mouth once daily.      BD LUER-MANUEL SYRINGE 3 mL 22 x 1 1/2" Syrg   3    blood sugar diagnostic (FREESTYLE LITE STRIPS) Strp TEST 3 TIMES DAILY 300 strip 5    blood-glucose meter Misc Use as directed four times daily before meals and at bedtime. 1 each 0    citalopram (CELEXA) 10 MG tablet TAKE 1 " "TABLET BY MOUTH EVERY DAY 90 tablet 3    cloNIDine (CATAPRES) 0.2 MG tablet Take 0.2 mg by mouth nightly. And 1/2 tablet tablet prn if SBP > 200      finasteride (PROSCAR) 5 mg tablet Take 5 mg by mouth once daily.      flash glucose scanning reader (FREESTYLE PUMA 2 READER) [facility] [place] Use in conjunction with glucose sensor every 14 days for continuous monitoring of Glucose. DX:E11.22, N18.30, Z79.44 6 each 3    flash glucose sensor (FREESTYLE PUMA 2 SENSOR) Kit Use one sensor Every 14 days for continuous monitoring of Glucose. DX:E11.22, N18.30, Z79.44 2 kit 11    hydroCHLOROthiazide (HYDRODIURIL) 25 MG tablet Take 1 tablet (25 mg total) by mouth once daily. 30 tablet 11    insulin (LANTUS SOLOSTAR U-100 INSULIN) glargine 100 units/mL (3mL) SubQ pen INJECT 36 UNITS UNDER THE SKIN ONCE A DAY (Patient taking differently: INJECT 33 UNITS UNDER THE SKIN ONCE A DAY) 30 Syringe 1    insulin aspart U-100 (NOVOLOG FLEXPEN U-100 INSULIN) 100 unit/mL (3 mL) InPn pen USE AS DIRECTED PER SLIDING SCALE. MAX OF 14 UNITS DAILY) 15 Syringe 3    lancets (ACCU-CHEK SOFTCLIX LANCETS) Misc Check glucose three times daily. 200 each 11    levothyroxine (SYNTHROID) 100 MCG tablet TAKE 1 TABLET BY MOUTH EVERY DAY 90 tablet 0    metoprolol succinate (TOPROL-XL) 25 MG 24 hr tablet Take 1 tablet (25 mg total) by mouth once daily. 30 tablet 11    montelukast (SINGULAIR) 10 mg tablet TAKE 1 TABLET BY MOUTH EVERY DAY IN THE EVENING 90 tablet 3    multivitamin (THERAGRAN) per tablet Take 1 tablet by mouth.      neomycin-polymyxin-dexamethasone (DEXACINE) 3.5 mg/g-10,000 unit/g-0.1 % Oint       olmesartan (BENICAR) 40 MG tablet Take 1 tablet (40 mg total) by mouth once daily. 30 tablet 11    pen needle, diabetic (BD ULTRA-FINE MINI PEN NEEDLE) 31 gauge x 3/16" Ndle USE DAILY WITH LANTUS AND NOVOLOG 300 each 3    pravastatin (PRAVACHOL) 40 MG tablet TAKE 1 TABLET BY MOUTH EVERY DAY IN THE EVENING 90 tablet 3    SHINGRIX, PF, 50 " mcg/0.5 mL injection       ibuprofen (ADVIL,MOTRIN) 800 MG tablet Take 1 tablet (800 mg total) by mouth every 6 (six) hours as needed for Pain. 30 tablet 0    methocarbamoL (ROBAXIN) 500 MG Tab Take 500 mg by mouth.       No current facility-administered medications on file prior to visit.     Review of patient's allergies indicates:   Allergen Reactions    Ace inhibitors      Other reaction(s): cough    Metformin      Abdominal pain       Objective:     Physical Exam  Vitals and nursing note reviewed.   Constitutional:       Appearance: He is well-developed.   HENT:      Head: Normocephalic and atraumatic.   Eyes:      Conjunctiva/sclera: Conjunctivae normal.      Pupils: Pupils are equal, round, and reactive to light.   Cardiovascular:      Rate and Rhythm: Normal rate and regular rhythm.      Pulses: Intact distal pulses.      Heart sounds: Normal heart sounds.   Pulmonary:      Effort: Pulmonary effort is normal.      Breath sounds: Normal breath sounds.   Abdominal:      General: Bowel sounds are normal.      Palpations: Abdomen is soft.   Musculoskeletal:      Cervical back: Normal range of motion and neck supple.   Skin:     General: Skin is warm and dry.   Neurological:      Mental Status: He is alert and oriented to person, place, and time.         Assessment:     1. Combined hyperlipidemia associated with type 2 diabetes mellitus    2. Hypertension associated with diabetes    3. PAD (peripheral artery disease)    4. Bilateral carotid bruits    5. Occlusion of left carotid artery    6. History of carotid endarterectomy    7. Palpitations    8. PVC (premature ventricular contraction)    9. ERNESTINE (obstructive sleep apnea)        Plan:     Combined hyperlipidemia associated with type 2 diabetes mellitus    Hypertension associated with diabetes    PAD (peripheral artery disease)    Bilateral carotid bruits    Occlusion of left carotid artery    History of carotid endarterectomy    Palpitations    PVC  (premature ventricular contraction)    ERNESTINE (obstructive sleep apnea)      Continue clonidine, toprol, olmesartan, hctz -htn/SVT  Continue statin-hlp  Continue asa- primary prevention    Can take hctz in PM

## 2022-02-28 ENCOUNTER — EXTERNAL CHRONIC CARE MANAGEMENT (OUTPATIENT)
Dept: PRIMARY CARE CLINIC | Facility: CLINIC | Age: 82
End: 2022-02-28
Payer: MEDICARE

## 2022-02-28 PROCEDURE — 99490 CHRNC CARE MGMT STAFF 1ST 20: CPT | Mod: PBBFAC,PO | Performed by: FAMILY MEDICINE

## 2022-02-28 PROCEDURE — 99490 CHRNC CARE MGMT STAFF 1ST 20: CPT | Mod: S$PBB,,, | Performed by: FAMILY MEDICINE

## 2022-02-28 PROCEDURE — 99490 PR CHRONIC CARE MGMT, 1ST 20 MIN: ICD-10-PCS | Mod: S$PBB,,, | Performed by: FAMILY MEDICINE

## 2022-03-31 ENCOUNTER — EXTERNAL CHRONIC CARE MANAGEMENT (OUTPATIENT)
Dept: PRIMARY CARE CLINIC | Facility: CLINIC | Age: 82
End: 2022-03-31
Payer: MEDICARE

## 2022-03-31 PROCEDURE — 99490 CHRNC CARE MGMT STAFF 1ST 20: CPT | Mod: S$PBB,,, | Performed by: FAMILY MEDICINE

## 2022-03-31 PROCEDURE — 99490 PR CHRONIC CARE MGMT, 1ST 20 MIN: ICD-10-PCS | Mod: S$PBB,,, | Performed by: FAMILY MEDICINE

## 2022-03-31 PROCEDURE — 99490 CHRNC CARE MGMT STAFF 1ST 20: CPT | Mod: PBBFAC,PO | Performed by: FAMILY MEDICINE

## 2022-04-11 DIAGNOSIS — N18.30 CONTROLLED TYPE 2 DIABETES MELLITUS WITH STAGE 3 CHRONIC KIDNEY DISEASE, WITH LONG-TERM CURRENT USE OF INSULIN: ICD-10-CM

## 2022-04-11 DIAGNOSIS — E11.22 CONTROLLED TYPE 2 DIABETES MELLITUS WITH STAGE 3 CHRONIC KIDNEY DISEASE, WITH LONG-TERM CURRENT USE OF INSULIN: ICD-10-CM

## 2022-04-11 DIAGNOSIS — Z79.4 CONTROLLED TYPE 2 DIABETES MELLITUS WITH STAGE 3 CHRONIC KIDNEY DISEASE, WITH LONG-TERM CURRENT USE OF INSULIN: ICD-10-CM

## 2022-04-11 RX ORDER — FLASH GLUCOSE SCANNING READER
EACH MISCELLANEOUS
Qty: 6 EACH | Refills: 3 | Status: SHIPPED | OUTPATIENT
Start: 2022-04-11

## 2022-04-11 RX ORDER — FLASH GLUCOSE SENSOR
KIT MISCELLANEOUS
Qty: 2 KIT | Refills: 11 | Status: SHIPPED | OUTPATIENT
Start: 2022-04-11 | End: 2022-08-03 | Stop reason: SDUPTHER

## 2022-04-11 NOTE — TELEPHONE ENCOUNTER
Good morning.     I had the pleasure of speaking with the pt this morning who wanted to request a written prescription for the Landen 2 Censor. The pt will be traveling and would like to have it filled at a different pharmacy. Pt is planning to leave this week. Pt claims that Dr. Krueger has done this in the past for the pt. I am happy to help anyway I can and thank you for allowing us to care for your pt. Have a wonderful day!     Muna Drake LVN, Care Coordinator   [facility] [phone] ext [phone]

## 2022-04-11 NOTE — TELEPHONE ENCOUNTER
No new care gaps identified.  Powered by [employer]. Reference number: [id number].   4/11/2022 10:44:44 AM CDT

## 2022-04-11 NOTE — TELEPHONE ENCOUNTER
Muna DUEÑAS Staff  Phone Number: [phone]     Good morning.     I had the pleasure of speaking with the pt this morning who wanted to request a written prescription for the Landen 2 Censor. The pt will be traveling and would like to have it filled at a different pharmacy. Pt is planning to leave this week. Pt claims that Dr. Krueger has done this in the past for the pt. I am happy to help anyway I can and thank you for allowing us to care for your pt. Have a wonderful day!     Muna Drake LVN, Care Coordinator   [facility] [phone] ext [phone]

## 2022-04-11 NOTE — TELEPHONE ENCOUNTER
Care Due:                  Date            Visit Type   Department     Provider  --------------------------------------------------------------------------------                                [facility]  Last Visit: 06-      FOLLOW UP    MEDICINE       Darin Krueger  Next Visit: None Scheduled  None         None Found                                                            Last  Test          Frequency    Reason                     Performed    Due Date  --------------------------------------------------------------------------------    Office Visit  12 months..  citalopram, insulin,       06- 06-                             montelukast, pravastatin.    Powered by [employer]. Reference number: [id number].   4/11/2022 10:40:37 AM CDT

## 2022-04-30 ENCOUNTER — EXTERNAL CHRONIC CARE MANAGEMENT (OUTPATIENT)
Dept: PRIMARY CARE CLINIC | Facility: CLINIC | Age: 82
End: 2022-04-30
Payer: MEDICARE

## 2022-04-30 PROCEDURE — 99490 CHRNC CARE MGMT STAFF 1ST 20: CPT | Mod: S$PBB,,, | Performed by: FAMILY MEDICINE

## 2022-04-30 PROCEDURE — 99490 PR CHRONIC CARE MGMT, 1ST 20 MIN: ICD-10-PCS | Mod: S$PBB,,, | Performed by: FAMILY MEDICINE

## 2022-04-30 PROCEDURE — 99490 CHRNC CARE MGMT STAFF 1ST 20: CPT | Mod: PBBFAC,PO | Performed by: FAMILY MEDICINE

## 2022-05-11 ENCOUNTER — HOSPITAL ENCOUNTER (OUTPATIENT)
Dept: RADIOLOGY | Facility: HOSPITAL | Age: 82
Discharge: HOME | End: 2022-05-11
Attending: THORACIC SURGERY (CARDIOTHORACIC VASCULAR SURGERY)
Payer: MEDICARE

## 2022-05-11 DIAGNOSIS — I65.23 BILATERAL CAROTID ARTERY STENOSIS: ICD-10-CM

## 2022-05-11 PROCEDURE — 93880 US CAROTID BILATERAL: ICD-10-PCS | Mod: 26,,, | Performed by: RADIOLOGY

## 2022-05-11 PROCEDURE — 93880 EXTRACRANIAL BILAT STUDY: CPT | Mod: TC,PO

## 2022-05-11 PROCEDURE — 93880 EXTRACRANIAL BILAT STUDY: CPT | Mod: 26,,, | Performed by: RADIOLOGY

## 2022-05-25 ENCOUNTER — OFFICE VISIT (OUTPATIENT)
Dept: CARDIOLOGY | Facility: CLINIC | Age: 82
End: 2022-05-25
Payer: MEDICARE

## 2022-05-25 VITALS
BODY MASS INDEX: 21.31 KG/M2 | WEIGHT: 157.31 LBS | SYSTOLIC BLOOD PRESSURE: 150 MMHG | DIASTOLIC BLOOD PRESSURE: 82 MMHG | HEART RATE: 50 BPM | OXYGEN SATURATION: 97 % | HEIGHT: 72 IN

## 2022-05-25 DIAGNOSIS — E11.69 COMBINED HYPERLIPIDEMIA ASSOCIATED WITH TYPE 2 DIABETES MELLITUS: ICD-10-CM

## 2022-05-25 DIAGNOSIS — I49.3 PVC (PREMATURE VENTRICULAR CONTRACTION): ICD-10-CM

## 2022-05-25 DIAGNOSIS — I65.23 BILATERAL CAROTID ARTERY STENOSIS: ICD-10-CM

## 2022-05-25 DIAGNOSIS — G47.33 OSA (OBSTRUCTIVE SLEEP APNEA): ICD-10-CM

## 2022-05-25 DIAGNOSIS — E78.2 COMBINED HYPERLIPIDEMIA ASSOCIATED WITH TYPE 2 DIABETES MELLITUS: ICD-10-CM

## 2022-05-25 DIAGNOSIS — I73.9 PAD (PERIPHERAL ARTERY DISEASE): ICD-10-CM

## 2022-05-25 DIAGNOSIS — I15.2 HYPERTENSION ASSOCIATED WITH DIABETES: Primary | ICD-10-CM

## 2022-05-25 DIAGNOSIS — Z98.890 HISTORY OF CAROTID ENDARTERECTOMY: ICD-10-CM

## 2022-05-25 DIAGNOSIS — R00.2 PALPITATIONS: ICD-10-CM

## 2022-05-25 DIAGNOSIS — E11.59 HYPERTENSION ASSOCIATED WITH DIABETES: Primary | ICD-10-CM

## 2022-05-25 PROCEDURE — 99999 PR PBB SHADOW E&M-EST. PATIENT-LVL V: ICD-10-PCS | Mod: PBBFAC,,, | Performed by: INTERNAL MEDICINE

## 2022-05-25 PROCEDURE — 99215 OFFICE O/P EST HI 40 MIN: CPT | Mod: PBBFAC,PO | Performed by: INTERNAL MEDICINE

## 2022-05-25 PROCEDURE — 99214 OFFICE O/P EST MOD 30 MIN: CPT | Mod: S$PBB,,, | Performed by: INTERNAL MEDICINE

## 2022-05-25 PROCEDURE — 99999 PR PBB SHADOW E&M-EST. PATIENT-LVL V: CPT | Mod: PBBFAC,,, | Performed by: INTERNAL MEDICINE

## 2022-05-25 PROCEDURE — 99214 PR OFFICE/OUTPT VISIT, EST, LEVL IV, 30-39 MIN: ICD-10-PCS | Mod: S$PBB,,, | Performed by: INTERNAL MEDICINE

## 2022-05-25 RX ORDER — CHLORTHALIDONE 25 MG/1
25 TABLET ORAL DAILY
Qty: 30 TABLET | Refills: 11 | Status: SHIPPED | OUTPATIENT
Start: 2022-05-25 | End: 2022-08-03 | Stop reason: ALTCHOICE

## 2022-05-25 NOTE — PROGRESS NOTES
Subjective:   Patient ID:  Bebeto Santiago is a 82 y.o. male who presents for follow-up of Follow-up and Fatigue  -212 HR 50s  Patient denies CP, angina or anginal equivalent.BP high lately at home  Hypertension  This is a chronic problem. The current episode started more than 1 year ago. The problem has been gradually improving since onset. The problem is controlled. Pertinent negatives include no chest pain, palpitations or shortness of breath. Past treatments include beta blockers and calcium channel blockers. The current treatment provides moderate improvement. There are no compliance problems.    Hyperlipidemia  This is a chronic problem. The current episode started more than 1 year ago. The problem is controlled. Recent lipid tests were reviewed and are variable. Pertinent negatives include no chest pain or shortness of breath. Current antihyperlipidemic treatment includes statins. The current treatment provides moderate improvement of lipids. There are no compliance problems.    Palpitations   This is a chronic problem. The current episode started more than 1 year ago. The problem occurs intermittently. The problem has been waxing and waning. Nothing aggravates the symptoms. Pertinent negatives include no chest pain, dizziness or shortness of breath. He has tried beta blockers for the symptoms. The treatment provided moderate relief.       Review of Systems   Constitutional: Negative. Negative for weight gain.   HENT: Negative.    Eyes: Negative.    Cardiovascular: Negative.  Negative for chest pain, leg swelling and palpitations.   Respiratory: Negative.  Negative for shortness of breath.    Endocrine: Negative.    Hematologic/Lymphatic: Negative.    Skin: Negative.    Musculoskeletal: Negative for muscle weakness.   Gastrointestinal: Negative.    Genitourinary: Negative.    Neurological: Negative.  Negative for dizziness.   Psychiatric/Behavioral: Negative.    Allergic/Immunologic: Negative.   "    Family History   Problem Relation Age of Onset    Heart disease Mother     Stroke Maternal Grandfather     Diabetes Neg Hx     Cancer Neg Hx     Hypertension Neg Hx      Past Medical History:   Diagnosis Date    Allergy     BPH (benign prostatic hypertrophy)     Controlled type 2 diabetes mellitus with stage 3 chronic kidney disease, with long-term current use of insulin     The patient presents with diabetes.  The patient denies polyuria, polydipsia, polyphagia, hypoglycemia and paresthesias.  The patient's glucose control has been good.  Home glucose averages are routinely checked.  The patient is without retinopathy currently.  The patient has no history of neuropathy.  The patient currently complains of no podiatric problems.  The patient has excellent compliance.    Dehydration     Diabetes mellitus type II, uncontrolled     GERD (gastroesophageal reflux disease)     grade IV/IV    Hypertension     Hypothyroidism     Male hypogonadism     Non-proliferative diabetic retinopathy     Urinary retention      Social History     Socioeconomic History    Marital status:      Spouse name: Babs    Number of children: 2   Occupational History    Occupation: Retired   Tobacco Use    Smoking status: Former Smoker     Quit date: 1986     Years since quittin.7    Smokeless tobacco: Current User     Types: Chew   Substance and Sexual Activity    Alcohol use: Yes     Comment: rare    Drug use: No    Sexual activity: Not Currently     Partners: Female     Current Outpatient Medications on File Prior to Visit   Medication Sig Dispense Refill    aspirin 325 MG tablet Take 325 mg by mouth once daily.      BD LUER-MANUEL SYRINGE 3 mL 22 x 1 1/2" Syrg   3    blood sugar diagnostic (FREESTYLE LITE STRIPS) Strp TEST 3 TIMES DAILY 300 strip 5    blood-glucose meter Misc Use as directed four times daily before meals and at bedtime. 1 each 0    citalopram (CELEXA) 10 MG tablet TAKE 1 " TABLET BY MOUTH EVERY DAY 90 tablet 3    cloNIDine (CATAPRES) 0.2 MG tablet Take 0.2 mg by mouth nightly. And 1/2 tablet tablet prn if SBP > 200      flash glucose scanning reader (FREESTYLE PUMA 2 READER) [facility] Use in conjunction with glucose sensor every 14 days for continuous monitoring of Glucose. DX:E11.22, N18.30, Z79.44 6 each 3    flash glucose sensor (FREESTYLE PUMA 2 SENSOR) Kit Use one sensor Every 14 days for continuous monitoring of Glucose. DX:E11.22, N18.30, Z79.44 2 kit 11    flash glucose sensor (FREESTYLE PUMA 2 SENSOR) Kit Use one sensor Every 14 days for continuous monitoring of Glucose. DX:E11.22, N18.30, Z79.44 2 kit 11    hydroCHLOROthiazide (HYDRODIURIL) 25 MG tablet Take 1 tablet (25 mg total) by mouth once daily. 30 tablet 11    ibuprofen (ADVIL,MOTRIN) 800 MG tablet Take 1 tablet (800 mg total) by mouth every 6 (six) hours as needed for Pain. 30 tablet 0    insulin (LANTUS SOLOSTAR U-100 INSULIN) glargine 100 units/mL (3mL) SubQ pen INJECT 36 UNITS UNDER THE SKIN ONCE A DAY (Patient taking differently: INJECT 33 UNITS UNDER THE SKIN ONCE A DAY) 30 Syringe 1    insulin aspart U-100 (NOVOLOG FLEXPEN U-100 INSULIN) 100 unit/mL (3 mL) InPn pen USE AS DIRECTED PER SLIDING SCALE. MAX OF 14 UNITS DAILY) 15 Syringe 3    lancets (ACCU-CHEK SOFTCLIX LANCETS) Misc Check glucose three times daily. 200 each 11    levothyroxine (SYNTHROID) 100 MCG tablet TAKE 1 TABLET BY MOUTH EVERY DAY 90 tablet 0    methocarbamoL (ROBAXIN) 500 MG Tab Take 500 mg by mouth.      metoprolol succinate (TOPROL-XL) 25 MG 24 hr tablet Take 1 tablet (25 mg total) by mouth once daily. 30 tablet 11    montelukast (SINGULAIR) 10 mg tablet TAKE 1 TABLET BY MOUTH EVERY DAY IN THE EVENING 90 tablet 3    multivitamin (THERAGRAN) per tablet Take 1 tablet by mouth.      neomycin-polymyxin-dexamethasone (DEXACINE) 3.5 mg/g-10,000 unit/g-0.1 % Oint       olmesartan (BENICAR) 40 MG tablet Take 1 tablet (40 mg total) by  "mouth once daily. 30 tablet 11    pen needle, diabetic (BD ULTRA-FINE MINI PEN NEEDLE) 31 gauge x 3/16" Ndle USE DAILY WITH LANTUS AND NOVOLOG 300 each 3    pravastatin (PRAVACHOL) 40 MG tablet TAKE 1 TABLET BY MOUTH EVERY DAY IN THE EVENING 90 tablet 3    SHINGRIX, PF, 50 mcg/0.5 mL injection        No current facility-administered medications on file prior to visit.     Review of patient's allergies indicates:   Allergen Reactions    Ace inhibitors      Other reaction(s): cough    Metformin      Abdominal pain       Objective:     Physical Exam  Vitals and nursing note reviewed.   Constitutional:       Appearance: He is well-developed.   HENT:      Head: Normocephalic and atraumatic.   Eyes:      Conjunctiva/sclera: Conjunctivae normal.      Pupils: Pupils are equal, round, and reactive to light.   Cardiovascular:      Rate and Rhythm: Normal rate and regular rhythm.      Pulses: Intact distal pulses.      Heart sounds: Normal heart sounds.   Pulmonary:      Effort: Pulmonary effort is normal.      Breath sounds: Normal breath sounds.   Abdominal:      General: Bowel sounds are normal.      Palpations: Abdomen is soft.   Musculoskeletal:      Cervical back: Normal range of motion and neck supple.   Skin:     General: Skin is warm and dry.   Neurological:      Mental Status: He is alert and oriented to person, place, and time.         Assessment:     1. Hypertension associated with diabetes    2. Combined hyperlipidemia associated with type 2 diabetes mellitus    3. PAD (peripheral artery disease)    4. Bilateral carotid artery stenosis    5. History of carotid endarterectomy    6. Palpitations    7. PVC (premature ventricular contraction)    8. ERNESTINE (obstructive sleep apnea)        Plan:     Hypertension associated with diabetes    Combined hyperlipidemia associated with type 2 diabetes mellitus    PAD (peripheral artery disease)    Bilateral carotid artery stenosis    History of carotid " endarterectomy    Palpitations    PVC (premature ventricular contraction)    ERNESTINE (obstructive sleep apnea)      Continue clonidine, toprol, olmesartan,  -htn/SVT  Continue statin-hlp  Continue asa- primary prevention     Change HCTZ to chlorthalidone

## 2022-05-25 NOTE — PROGRESS NOTES
"Subjective:   Patient ID:  Bebeto Santiago is a 82 y.o. male who presents for follow-up of Follow-up and Fatigue      HPI    ROS  Family History   Problem Relation Age of Onset    Heart disease Mother     Stroke Maternal Grandfather     Diabetes Neg Hx     Cancer Neg Hx     Hypertension Neg Hx      Past Medical History:   Diagnosis Date    Allergy     BPH (benign prostatic hypertrophy)     Controlled type 2 diabetes mellitus with stage 3 chronic kidney disease, with long-term current use of insulin     The patient presents with diabetes.  The patient denies polyuria, polydipsia, polyphagia, hypoglycemia and paresthesias.  The patient's glucose control has been good.  Home glucose averages are routinely checked.  The patient is without retinopathy currently.  The patient has no history of neuropathy.  The patient currently complains of no podiatric problems.  The patient has excellent compliance.    Dehydration     Diabetes mellitus type II, uncontrolled     GERD (gastroesophageal reflux disease)     grade IV/IV    Hypertension     Hypothyroidism     Male hypogonadism     Non-proliferative diabetic retinopathy     Urinary retention      Social History     Socioeconomic History    Marital status:      Spouse name: Babs    Number of children: 2   Occupational History    Occupation: Retired   Tobacco Use    Smoking status: Former Smoker     Quit date: 1986     Years since quittin.7    Smokeless tobacco: Current User     Types: Chew   Substance and Sexual Activity    Alcohol use: Yes     Comment: rare    Drug use: No    Sexual activity: Not Currently     Partners: Female     Current Outpatient Medications on File Prior to Visit   Medication Sig Dispense Refill    aspirin 325 MG tablet Take 325 mg by mouth once daily.      BD LUER-MANUEL SYRINGE 3 mL 22 x 1 1/2" Syrg   3    blood sugar diagnostic (FREESTYLE LITE STRIPS) Strp TEST 3 TIMES DAILY 300 strip 5    blood-glucose " meter Misc Use as directed four times daily before meals and at bedtime. 1 each 0    citalopram (CELEXA) 10 MG tablet TAKE 1 TABLET BY MOUTH EVERY DAY 90 tablet 3    cloNIDine (CATAPRES) 0.2 MG tablet Take 0.2 mg by mouth nightly. And 1/2 tablet tablet prn if SBP > 200      flash glucose scanning reader (FREESTYLE PUMA 2 READER) Misc Use in conjunction with glucose sensor every 14 days for continuous monitoring of Glucose. DX:E11.22, N18.30, Z79.44 6 each 3    flash glucose sensor (FREESTYLE PUMA 2 SENSOR) Kit Use one sensor Every 14 days for continuous monitoring of Glucose. DX:E11.22, N18.30, Z79.44 2 kit 11    flash glucose sensor (FREESTYLE PUMA 2 SENSOR) Kit Use one sensor Every 14 days for continuous monitoring of Glucose. DX:E11.22, N18.30, Z79.44 2 kit 11    hydroCHLOROthiazide (HYDRODIURIL) 25 MG tablet Take 1 tablet (25 mg total) by mouth once daily. 30 tablet 11    ibuprofen (ADVIL,MOTRIN) 800 MG tablet Take 1 tablet (800 mg total) by mouth every 6 (six) hours as needed for Pain. 30 tablet 0    insulin (LANTUS SOLOSTAR U-100 INSULIN) glargine 100 units/mL (3mL) SubQ pen INJECT 36 UNITS UNDER THE SKIN ONCE A DAY (Patient taking differently: INJECT 33 UNITS UNDER THE SKIN ONCE A DAY) 30 Syringe 1    insulin aspart U-100 (NOVOLOG FLEXPEN U-100 INSULIN) 100 unit/mL (3 mL) InPn pen USE AS DIRECTED PER SLIDING SCALE. MAX OF 14 UNITS DAILY) 15 Syringe 3    lancets (ACCU-CHEK SOFTCLIX LANCETS) Misc Check glucose three times daily. 200 each 11    levothyroxine (SYNTHROID) 100 MCG tablet TAKE 1 TABLET BY MOUTH EVERY DAY 90 tablet 0    methocarbamoL (ROBAXIN) 500 MG Tab Take 500 mg by mouth.      metoprolol succinate (TOPROL-XL) 25 MG 24 hr tablet Take 1 tablet (25 mg total) by mouth once daily. 30 tablet 11    montelukast (SINGULAIR) 10 mg tablet TAKE 1 TABLET BY MOUTH EVERY DAY IN THE EVENING 90 tablet 3    multivitamin (THERAGRAN) per tablet Take 1 tablet by mouth.       "neomycin-polymyxin-dexamethasone (DEXACINE) 3.5 mg/g-10,000 unit/g-0.1 % Oint       olmesartan (BENICAR) 40 MG tablet Take 1 tablet (40 mg total) by mouth once daily. 30 tablet 11    pen needle, diabetic (BD ULTRA-FINE MINI PEN NEEDLE) 31 gauge x 3/16" Ndle USE DAILY WITH LANTUS AND NOVOLOG 300 each 3    pravastatin (PRAVACHOL) 40 MG tablet TAKE 1 TABLET BY MOUTH EVERY DAY IN THE EVENING 90 tablet 3    SHINGRIX, PF, 50 mcg/0.5 mL injection        No current facility-administered medications on file prior to visit.     Review of patient's allergies indicates:   Allergen Reactions    Ace inhibitors      Other reaction(s): cough    Metformin      Abdominal pain       Objective:     Physical Exam    Assessment:     1. Hypertension associated with diabetes    2. Combined hyperlipidemia associated with type 2 diabetes mellitus    3. PAD (peripheral artery disease)    4. Bilateral carotid artery stenosis    5. History of carotid endarterectomy    6. Palpitations    7. PVC (premature ventricular contraction)    8. ERNESTINE (obstructive sleep apnea)        Plan:     Hypertension associated with diabetes    Combined hyperlipidemia associated with type 2 diabetes mellitus    PAD (peripheral artery disease)    Bilateral carotid artery stenosis    History of carotid endarterectomy    Palpitations    PVC (premature ventricular contraction)    ERNESTINE (obstructive sleep apnea)    Continue clonidine, toprol, olmesartan, hctz -htn/SVT  Continue statin-hlp  Continue asa- primary prevention     Change hctz to chlorthalidone  Check K       "

## 2022-05-26 ENCOUNTER — PATIENT MESSAGE (OUTPATIENT)
Dept: FAMILY MEDICINE | Facility: CLINIC | Age: 82
End: 2022-05-26
Payer: MEDICARE

## 2022-05-27 ENCOUNTER — PATIENT MESSAGE (OUTPATIENT)
Dept: FAMILY MEDICINE | Facility: CLINIC | Age: 82
End: 2022-05-27
Payer: MEDICARE

## 2022-05-31 ENCOUNTER — EXTERNAL CHRONIC CARE MANAGEMENT (OUTPATIENT)
Dept: PRIMARY CARE CLINIC | Facility: CLINIC | Age: 82
End: 2022-05-31
Payer: MEDICARE

## 2022-05-31 PROCEDURE — 99490 CHRNC CARE MGMT STAFF 1ST 20: CPT | Mod: S$PBB,,, | Performed by: FAMILY MEDICINE

## 2022-05-31 PROCEDURE — 99490 PR CHRONIC CARE MGMT, 1ST 20 MIN: ICD-10-PCS | Mod: S$PBB,,, | Performed by: FAMILY MEDICINE

## 2022-05-31 PROCEDURE — 99490 CHRNC CARE MGMT STAFF 1ST 20: CPT | Mod: PBBFAC,PO | Performed by: FAMILY MEDICINE

## 2022-06-05 DIAGNOSIS — E03.9 HYPOTHYROIDISM, UNSPECIFIED TYPE: ICD-10-CM

## 2022-06-05 NOTE — TELEPHONE ENCOUNTER
Care Due:                  Date            Visit Type   Department     Provider  --------------------------------------------------------------------------------                                [facility]  Last Visit: 06-      FOLLOW UP    MEDICINE       Darin Krueger                               -                              [facility]  Next Visit: 08-      CARE ([facility])   MEDICINE       Darin Krueger                                                            Last  Test          Frequency    Reason                     Performed    Due Date  --------------------------------------------------------------------------------    CMP.........  12 months..  insulin, pravastatin.....  11-   10-    HBA1C.......  6 months...  insulin..................  01- 07-    Lipid Panel.  12 months..  pravastatin..............  11-   10-    [facility] Embedded Care Gaps. Reference number: [id number]. 6/05/2022   12:13:17 AM CDT

## 2022-06-06 RX ORDER — LEVOTHYROXINE SODIUM 100 UG/1
TABLET ORAL
Qty: 90 TABLET | Refills: 0 | Status: SHIPPED | OUTPATIENT
Start: 2022-06-06 | End: 2022-06-28

## 2022-06-06 NOTE — TELEPHONE ENCOUNTER
The patient requested a thryoid med which I called in x 1 month.  The patient is overdue for a TSH check.  Please order one on the patient.  Other things that may be due on the Health Maintenance are below.  Please order them if the patient is willing to get them done.  Health Maintenance Due   Topic Date Due    Lipid Panel  11/04/2021

## 2022-06-06 NOTE — TELEPHONE ENCOUNTER
Refill Routing Note   Medication(s) are not appropriate for processing by [facility] for the following reason(s):      - Required laboratory values are outdated    [facility] action(s):  Defer          Medication reconciliation completed: No     Appointments  past 12m or future 3m with PCP    Date Provider   Last Visit   6/23/2021 Darin Krueger MD   Next Visit   8/3/2022 Darin Krueger MD   ED visits in past 90 days: 0        Note composed:10:36 AM 06/06/2022

## 2022-06-20 ENCOUNTER — TELEPHONE (OUTPATIENT)
Dept: CARDIOLOGY | Facility: CLINIC | Age: 82
End: 2022-06-20
Payer: MEDICARE

## 2022-06-20 NOTE — TELEPHONE ENCOUNTER
----- Message from America Zeng sent at 6/20/2022 11:06 AM CDT -----  Patients' wife Babs phoned to advise that patient had total hip replacement due to fall on [place] Day and needs to review all medicaitions due to Dr Villagran advising patient to not stop taking a particular one.  Patients wife Babs does not know which medication.  I made an appt for Thursday June 23rd with Kalee Mandujano if needed.  Please call [phone] home or mobile [phone].  [facility] [place] [facility]

## 2022-06-20 NOTE — TELEPHONE ENCOUNTER
Followed up with patient and wife, patients wife agreed to bring all medication at the visit with Mrs. Mandujano on Thursday.

## 2022-06-23 ENCOUNTER — OFFICE VISIT (OUTPATIENT)
Dept: CARDIOLOGY | Facility: CLINIC | Age: 82
End: 2022-06-23
Payer: MEDICARE

## 2022-06-23 VITALS
BODY MASS INDEX: 21.01 KG/M2 | WEIGHT: 155.13 LBS | SYSTOLIC BLOOD PRESSURE: 102 MMHG | OXYGEN SATURATION: 98 % | HEIGHT: 72 IN | DIASTOLIC BLOOD PRESSURE: 58 MMHG | HEART RATE: 56 BPM

## 2022-06-23 DIAGNOSIS — G47.33 OSA (OBSTRUCTIVE SLEEP APNEA): ICD-10-CM

## 2022-06-23 DIAGNOSIS — N18.30 CKD STAGE 3 SECONDARY TO DIABETES: ICD-10-CM

## 2022-06-23 DIAGNOSIS — Z98.890 HISTORY OF CAROTID ENDARTERECTOMY: ICD-10-CM

## 2022-06-23 DIAGNOSIS — I49.3 PVC (PREMATURE VENTRICULAR CONTRACTION): ICD-10-CM

## 2022-06-23 DIAGNOSIS — E11.59 HYPERTENSION ASSOCIATED WITH DIABETES: ICD-10-CM

## 2022-06-23 DIAGNOSIS — E78.2 COMBINED HYPERLIPIDEMIA ASSOCIATED WITH TYPE 2 DIABETES MELLITUS: ICD-10-CM

## 2022-06-23 DIAGNOSIS — E11.69 COMBINED HYPERLIPIDEMIA ASSOCIATED WITH TYPE 2 DIABETES MELLITUS: ICD-10-CM

## 2022-06-23 DIAGNOSIS — Z79.899 MEDICATION MANAGEMENT: Primary | ICD-10-CM

## 2022-06-23 DIAGNOSIS — I73.9 PAD (PERIPHERAL ARTERY DISEASE): ICD-10-CM

## 2022-06-23 DIAGNOSIS — I65.23 BILATERAL CAROTID ARTERY STENOSIS: ICD-10-CM

## 2022-06-23 DIAGNOSIS — I15.2 HYPERTENSION ASSOCIATED WITH DIABETES: ICD-10-CM

## 2022-06-23 DIAGNOSIS — E11.22 CKD STAGE 3 SECONDARY TO DIABETES: ICD-10-CM

## 2022-06-23 PROCEDURE — 99999 PR PBB SHADOW E&M-EST. PATIENT-LVL V: ICD-10-PCS | Mod: PBBFAC,,, | Performed by: NURSE PRACTITIONER

## 2022-06-23 PROCEDURE — 99215 OFFICE O/P EST HI 40 MIN: CPT | Mod: PBBFAC,PO | Performed by: NURSE PRACTITIONER

## 2022-06-23 PROCEDURE — 99214 PR OFFICE/OUTPT VISIT, EST, LEVL IV, 30-39 MIN: ICD-10-PCS | Mod: S$PBB,,, | Performed by: NURSE PRACTITIONER

## 2022-06-23 PROCEDURE — 99214 OFFICE O/P EST MOD 30 MIN: CPT | Mod: S$PBB,,, | Performed by: NURSE PRACTITIONER

## 2022-06-23 PROCEDURE — 99999 PR PBB SHADOW E&M-EST. PATIENT-LVL V: CPT | Mod: PBBFAC,,, | Performed by: NURSE PRACTITIONER

## 2022-06-23 RX ORDER — FINASTERIDE 5 MG/1
5 TABLET, FILM COATED ORAL DAILY
COMMUNITY
Start: 2022-06-11 | End: 2022-07-11

## 2022-06-23 RX ORDER — OLMESARTAN MEDOXOMIL 20 MG/1
20 TABLET ORAL DAILY
Qty: 30 TABLET | Refills: 2 | Status: SHIPPED | OUTPATIENT
Start: 2022-06-23 | End: 2022-07-13

## 2022-06-23 RX ORDER — AMLODIPINE BESYLATE 10 MG/1
10 TABLET ORAL DAILY
COMMUNITY
Start: 2022-06-10 | End: 2022-07-13

## 2022-06-23 RX ORDER — HYDROCHLOROTHIAZIDE 25 MG/1
25 TABLET ORAL
COMMUNITY
Start: 2022-06-10 | End: 2022-07-10

## 2022-06-23 RX ORDER — OXYCODONE HYDROCHLORIDE 5 MG/1
5 TABLET ORAL EVERY 6 HOURS PRN
COMMUNITY
Start: 2022-06-10 | End: 2023-02-22

## 2022-06-23 RX ORDER — CLONIDINE HYDROCHLORIDE 0.2 MG/1
0.2 TABLET ORAL NIGHTLY
Start: 2022-06-23 | End: 2022-08-03 | Stop reason: SDUPTHER

## 2022-06-23 RX ORDER — ENOXAPARIN SODIUM 100 MG/ML
40 INJECTION SUBCUTANEOUS DAILY
COMMUNITY
Start: 2022-06-10 | End: 2022-07-13

## 2022-06-23 NOTE — PROGRESS NOTES
Subjective:    Patient ID:  Bebeto Santiago is a 82 y.o. male who presents for follow-up of Follow-up and Medication Management      HPI: Mr. Bebeto Santiago presents to the clinic for medication reconciliation. He had fractured hip and surgery at [facility] and multiple medications were changed, especially after he went to rehab.  He stated that he feels lightheaded in the morning time after he takes his morning medications.  He denies any chest pain or shortness of breath.  He denies any palpitations or near syncope.  He denies any edema, orthopnea, or PND.  He indicated that he does not lie flat to sleep due to elevations in blood pressure when he does this.  My previous note indicated that he has a history of nocturnal hypertension and orthostasis.  He uses a walker to assist with ambulation because his legs buckle under him without warning.  It appears that he was discharged from rehab on Valeria 10, 2022.      Last visit with Dr. Villagran 5/25/22:   Hypertension associated with diabetes   Combined hyperlipidemia associated with type 2 diabetes mellitus   PAD (peripheral artery disease)   Bilateral carotid artery stenosis   History of carotid endarterectomy   Palpitations   PVC (premature ventricular contraction)   ERNESTINE (obstructive sleep apnea)   Continue clonidine, toprol, olmesartan, hctz -htn/SVT  Continue statin-hlp  Continue asa- primary prevention   Change hctz to chlorthalidone  Check K    -----------------------------------  Hx: He was admitted to [facility] June 5, 2021 with several weak spells/near syncope. No discharge summary is available on Care Everywhere. He had told ED staff that his legs give out with standing or walking. This has been chronic for the past 3 years. He finally had an episode in bathroom, when he fell and hit his head on bathtub. He denied LOC. He was found to be orthostatic (171/81, HR 58 BPM lying; 170/84, HR 58 BPM sitting; 133/68, HR 60 BPM standing without dizziness). His flomax  and avodart were D/C'd and he was given normal saline. Neurology was consulted. CTA head and neck shows occlusion ELENA with collaterals on right side. Dr. Quiles recommended continued ASA and statin. He also added clonidine at night. MRI L spine was ok. C-spine to be done as outpatient. Metoprolol was also discontinued.  he stated that he has not had any episodes of weak legs or falls since he has been out of hospital. He denied any lightheadedness, dizziness, or near syncope.  He denies any palpitations.  -------------------------------------    Medications: he is not missing any doses.  He is taking amlodipine 10mg QD, Clonidine 0.2m gBID, metoprolol 25mg QD, HCTZ 25mg QD, pravastatin.  Olmesartan and chlorthalidone were stopped.  Sodium: he does not add salt to foods when cooking,  he is not reading labels for sodium content. he does not eat salty foods.  Exercise: he is not.  Tobacco: uses smokeless tobacco. A can will last 4-5 days.  Alcohol: no alcohol use  Caffeine: 2 cups of community coffee a day.     Weight: 70.4 kg (155 lb 1.6 oz) he states that his daily weights has been stableBody mass index is 21.04 kg/m².   Wt Readings from Last 3 Encounters:   06/23/22 70.4 kg (155 lb 1.6 oz)   05/25/22 71.4 kg (157 lb 4.8 oz)   02/22/22 70.8 kg (156 lb 1.6 oz)     BP log:             Review of Systems   Constitutional: Negative for chills, decreased appetite, fever, night sweats, weight gain and weight loss.   HENT: Negative for congestion.    Cardiovascular: Negative for chest pain, claudication, cyanosis, dyspnea on exertion, irregular heartbeat, leg swelling, near-syncope, orthopnea, palpitations, paroxysmal nocturnal dyspnea and syncope.   Respiratory: Negative for cough, hemoptysis, shortness of breath, sputum production and wheezing.    Hematologic/Lymphatic: Negative for adenopathy and bleeding problem. Does not bruise/bleed easily.   Skin: Negative for color change and nail changes.   Gastrointestinal:  Negative for bloating, abdominal pain, change in bowel habit, heartburn, hematochezia, melena, nausea and vomiting.   Genitourinary: Negative for hematuria.   Neurological: Positive for light-headedness. Negative for dizziness.   Psychiatric/Behavioral: Negative for altered mental status.       Objective:   Physical Exam  Constitutional:       General: He is not in acute distress.     Appearance: He is well-developed. He is not diaphoretic.   HENT:      Head: Normocephalic and atraumatic.   Eyes:      General: No scleral icterus.     Conjunctiva/sclera: Conjunctivae normal.   Neck:      Thyroid: No thyromegaly.      Vascular: No JVD.      Trachea: No tracheal deviation.   Cardiovascular:      Rate and Rhythm: Normal rate and regular rhythm.      Pulses: Intact distal pulses.      Heart sounds: Normal heart sounds. No murmur heard.    No friction rub. No gallop.   Pulmonary:      Effort: Pulmonary effort is normal. No respiratory distress.      Breath sounds: Normal breath sounds. No wheezing or rales.   Chest:      Chest wall: No tenderness.   Abdominal:      General: Bowel sounds are normal. There is no distension.      Palpations: Abdomen is soft. There is no mass.      Tenderness: There is no abdominal tenderness. There is no guarding or rebound.   Musculoskeletal:         General: Normal range of motion.      Cervical back: Neck supple.   Lymphadenopathy:      Cervical: No cervical adenopathy.   Skin:     General: Skin is warm and dry.      Coloration: Skin is not pale.      Findings: No erythema or rash.      Comments: [place]   Neurological:      Mental Status: He is alert and oriented to person, place, and time.        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  · There is 0-19% left  Internal Carotid Stenosis.    Pharmacologic NM Stress Test 08/11/21: Conclusion     Normal myocardial perfusion scan. There is no evidence of myocardial ischemia or infarction.    The gated perfusion images showed an ejection fraction of 69% at rest. The gated perfusion images showed an ejection fraction of 60% post stress. Normal ejection fraction is greater than 53%.    There is normal wall motion at rest and post stress.    LV cavity size is normal at rest and normal at stress.    The EKG portion of this study is negative for ischemia.       Assessment:      1. Medication management    2. Hypertension associated with diabetes    3. PAD (peripheral artery disease)    4. Bilateral carotid artery stenosis    5. History of carotid endarterectomy    6. Combined hyperlipidemia associated with type 2 diabetes mellitus    7. PVC (premature ventricular contraction)    8. ERNESTINE (obstructive sleep apnea)    9. CKD stage 3 secondary to diabetes        Plan:     Medication management    Hypertension associated with diabetes  -     olmesartan (BENICAR) 20 MG tablet; Take 1 tablet (20 mg total) by mouth once daily.  Dispense: 30 tablet; Refill: 2  -     cloNIDine (CATAPRES) 0.2 MG tablet; Take 1 tablet (0.2 mg total) by mouth nightly. And 1/2 tablet tablet prn if SBP > 200    PAD (peripheral artery disease)    Bilateral carotid artery stenosis    History of carotid endarterectomy    Combined hyperlipidemia associated with type 2 diabetes mellitus    PVC (premature ventricular contraction)    ERNESTINE (obstructive sleep apnea)    CKD stage 3 secondary to diabetes       Decrease clonidine to 0.2mg QHS due to daytime lightheadedness.  Can take half a tablet during the day for systolic blood pressure greater than 200 mmHg.  Stop amlodipine.  Start olmesartan 20mg QD and titrate prn.  Continue HCTZ 25 mg p.o. q.day and metoprolol succinate 25 mg p.o. once daily.    Anticipate changing to chlorthalidone if needed.  Reviewed recent labs  from [place]:  Potassium and kidney function were stable.  Monitor BP at home. BP log to next appointment.  Continue ASA and statin- carotid artery disease. Known occlusion of ELENA with collaterals; HLP.  Keep appointment with neurologist to evaluate weakness in his legs.  F/U with Dr. Villagran on July 6, 2022 as planned or call sooner for any problems.  Can follow-up with me in 6 weeks for antihypertensive medication management if needed.  Kalee Mandujano NP  [facility]

## 2022-06-28 ENCOUNTER — TELEPHONE (OUTPATIENT)
Dept: FAMILY MEDICINE | Facility: CLINIC | Age: 82
End: 2022-06-28
Payer: MEDICARE

## 2022-06-28 NOTE — TELEPHONE ENCOUNTER
----- Message from Paige Drake sent at 6/28/2022  9:59 AM CDT -----   Ms. Dee Calling from [facility] Care Mgmt program [phone] ext [phone] regarding pt meds synthroid 100 mcg pt is running out of medication pharmacy needs new script please       [id number] - [place] - [address] [place]  [address]  [place] [address]  Phone: [phone] Fax: [phone]    Thanks bs

## 2022-06-30 ENCOUNTER — EXTERNAL CHRONIC CARE MANAGEMENT (OUTPATIENT)
Dept: PRIMARY CARE CLINIC | Facility: CLINIC | Age: 82
End: 2022-06-30
Payer: MEDICARE

## 2022-06-30 PROCEDURE — 99490 PR CHRONIC CARE MGMT, 1ST 20 MIN: ICD-10-PCS | Mod: S$PBB,,, | Performed by: FAMILY MEDICINE

## 2022-06-30 PROCEDURE — 99490 CHRNC CARE MGMT STAFF 1ST 20: CPT | Mod: S$PBB,,, | Performed by: FAMILY MEDICINE

## 2022-06-30 PROCEDURE — 99439 CHRNC CARE MGMT STAF EA ADDL: CPT | Mod: S$PBB,,, | Performed by: FAMILY MEDICINE

## 2022-06-30 PROCEDURE — 99490 CHRNC CARE MGMT STAFF 1ST 20: CPT | Mod: PBBFAC,PO | Performed by: FAMILY MEDICINE

## 2022-06-30 PROCEDURE — 99439 PR CHRONIC CARE MGMT, EA ADDTL 20 MIN: ICD-10-PCS | Mod: S$PBB,,, | Performed by: FAMILY MEDICINE

## 2022-06-30 PROCEDURE — 99439 CHRNC CARE MGMT STAF EA ADDL: CPT | Mod: PBBFAC,PO | Performed by: FAMILY MEDICINE

## 2022-07-08 ENCOUNTER — PATIENT MESSAGE (OUTPATIENT)
Dept: FAMILY MEDICINE | Facility: CLINIC | Age: 82
End: 2022-07-08
Payer: MEDICARE

## 2022-07-12 NOTE — PROGRESS NOTES
Subjective:    Patient ID:  Bebeto Santiago is a 82 y.o. male who presents for follow-up of Follow-up and Medication Management      HPI: Mr. Bebeto Santiago presents to the clinic with his wife i and n daughter for medication management for HTN with history of nocturnal hypertension and orthostasis. He started olmesartan with continued HCTZ.  Decreased Clonidine to nighttime dosing only due to lightheadedness.  He reports frequent urination throughout the day and the night, and he thinks that is related to HCTZ.  He denied any hematuria or pain associated with urination.    He denied any lightheadedness. He does report edema to left foot that began 4 days ago. He had hip replacement surgery on the left 6 weeks ago. He is checking BP after walking in [place] sometimes.  He denied any chest pain or SOB. He denies any palpitations or near syncope.  He denies any orthopnea, or PND.    He doesn't have a CPAP machine; He does not want to do a sleep study. He does snore loudly and he wakes up coughing sometimes.    He indicated that he does not lie flat to sleep due to elevations in blood pressure when he does this.  My previous note indicated that he has a history of nocturnal hypertension and orthostasis.    He uses a walker to assist with ambulation because his legs buckle under him without warning.        Last visit with Dr. Villagran 5/25/22:   Hypertension associated with diabetes   Combined hyperlipidemia associated with type 2 diabetes mellitus   PAD (peripheral artery disease)   Bilateral carotid artery stenosis   History of carotid endarterectomy   Palpitations   PVC (premature ventricular contraction)   ERNESTINE (obstructive sleep apnea)   Continue clonidine, toprol, olmesartan, hctz -htn/SVT  Continue statin-hlp  Continue asa- primary prevention   Change hctz to chlorthalidone  Check K    -----------------------------------  Hx: He was admitted to [facility] June 5, 2021 with several weak spells/near syncope. No  discharge summary is available on Care Everywhere. He had told ED staff that his legs give out with standing or walking. This has been chronic for the past 3 years. He finally had an episode in bathroom, when he fell and hit his head on bathtub. He denied LOC. He was found to be orthostatic (171/81, HR 58 BPM lying; 170/84, HR 58 BPM sitting; 133/68, HR 60 BPM standing without dizziness). His flomax and avodart were D/C'd and he was given normal saline. Neurology was consulted. CTA head and neck shows occlusion ELENA with collaterals on right side. Dr. Quiles recommended continued ASA and statin. He also added clonidine at night. MRI L spine was ok. C-spine to be done as outpatient. Metoprolol was also discontinued.  he stated that he has not had any episodes of weak legs or falls since he has been out of hospital. He denied any lightheadedness, dizziness, or near syncope.  He denies any palpitations.  -------------------------------------    Medications: he is not missing any doses.  He is taking olmesartan 20 mg q.a.m., Clonidine 0.2m g every evening, metoprolol 25mg a.m., HCTZ 25mg Q a.m., pravastatin.  Sodium: he does not add salt to foods when cooking,  he is not reading labels for sodium content. he does not eat salty foods.  Exercise: he is not.  Tobacco: uses smokeless tobacco. A can will last 4-5 days.  Alcohol: no alcohol use  Caffeine: 2 cups of community coffee a day.     Weight: 68 kg (149 lb 14.6 oz) he states that his daily weights has been stableBody mass index is 20.33 kg/m².   Wt Readings from Last 3 Encounters:   07/13/22 68 kg (149 lb 14.6 oz)   06/23/22 70.4 kg (155 lb 1.6 oz)   05/25/22 71.4 kg (157 lb 4.8 oz)     BP log:                     Review of Systems   Constitutional: Positive for weight loss (Decreased appetite due to pain in the left hip.). Negative for chills, decreased appetite, fever, night sweats and weight gain.   HENT: Negative for congestion.    Cardiovascular: Negative for  chest pain, claudication, cyanosis, dyspnea on exertion, irregular heartbeat, leg swelling, near-syncope, orthopnea, palpitations, paroxysmal nocturnal dyspnea and syncope.   Respiratory: Negative for cough, hemoptysis, shortness of breath, sputum production and wheezing.    Hematologic/Lymphatic: Negative for adenopathy and bleeding problem. Does not bruise/bleed easily.   Skin: Negative for color change and nail changes.   Gastrointestinal: Negative for bloating, abdominal pain, change in bowel habit, heartburn, hematochezia, melena, nausea and vomiting.   Genitourinary: Negative for hematuria.   Neurological: Negative for dizziness and light-headedness.   Psychiatric/Behavioral: Negative for altered mental status.       Objective:   Physical Exam  Constitutional:       General: He is not in acute distress.     Appearance: He is well-developed. He is not diaphoretic.   HENT:      Head: Normocephalic and atraumatic.   Eyes:      General: No scleral icterus.     Conjunctiva/sclera: Conjunctivae normal.   Neck:      Thyroid: No thyromegaly.      Vascular: No JVD.      Trachea: No tracheal deviation.   Cardiovascular:      Rate and Rhythm: Normal rate and regular rhythm.      Pulses: Intact distal pulses.      Heart sounds: Normal heart sounds. No murmur heard.    No friction rub. No gallop.   Pulmonary:      Effort: Pulmonary effort is normal. No respiratory distress.      Breath sounds: Normal breath sounds. No wheezing or rales.   Chest:      Chest wall: No tenderness.   Abdominal:      General: Bowel sounds are normal. There is no distension.      Palpations: Abdomen is soft. There is no mass.      Tenderness: There is no abdominal tenderness. There is no guarding or rebound.   Musculoskeletal:         General: Normal range of motion.      Cervical back: Neck supple.      Comments: Edema noted to left ankle and foot, slightly pitting.   Lymphadenopathy:      Cervical: No cervical adenopathy.   Skin:     General:  Skin is warm and dry.      Coloration: Skin is not pale.      Findings: No erythema or rash.      Comments: [place]   Neurological:      Mental Status: He is alert and oriented to person, place, and time.        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  · There is 0-19% left Internal Carotid Stenosis.    Pharmacologic NM Stress Test 08/11/21: Conclusion     Normal myocardial perfusion scan. There is no evidence of myocardial ischemia or infarction.    The gated perfusion images showed an ejection fraction of 69% at rest. The gated perfusion images showed an ejection fraction of 60% post stress. Normal ejection fraction is greater than 53%.    There is normal wall motion at rest and post stress.    LV cavity size is normal at rest and normal at stress.    The EKG portion of this study is negative for ischemia.       Assessment:      1. Medication management    2. Hypertension associated with diabetes    3. Bilateral carotid artery stenosis    4. History of carotid endarterectomy    5. Combined hyperlipidemia associated with type 2 diabetes mellitus    6. PVC (premature ventricular contraction)    7. ERNESTINE (obstructive sleep apnea)    8. CKD stage 3 secondary to diabetes    9. Orthostasis        Plan:     Medication management    Hypertension associated with diabetes    Bilateral carotid artery stenosis    History of carotid endarterectomy    Combined hyperlipidemia associated with type 2 diabetes mellitus    PVC (premature ventricular contraction)    ERNESTINE (obstructive sleep apnea)    CKD stage 3 secondary to diabetes    Orthostasis       Continue clonidine 0.2mg QHS.  Can take half a tablet during the day for systolic blood pressure greater than 200 mmHg.  Increase olmesartan to 20mg BID for  now.  Plan to move olmesartan 40 mg to evening dosing to help with hypertension later in the day.  Stop HCTZ    Restart chlorthalidone 25mg QD and continue metoprolol succinate 25 mg p.o. once daily.    Check calibration of home BP monitor.  They are going to ask a family member who is a nurse.  If that does not work out they will call us to come in.   Monitor BP at home. BP log to next appointment.  Rest for 10-15 minutes before checking blood pressure.  If blood pressure is still uncontrolled will add amlodipine to the morning dosing and move olmesartan 40 mg in the evening only.  Continue ASA and statin- carotid artery disease. Known occlusion of ELENA with collaterals; HLP.  If frequent urination continues encouraged them to seek Urology appointment.  They verbalized their understanding of this.  He does not want to have a sleep study.  F/U with Dr. Villagran on July 19, 2022 as planned or call sooner for any problems.  Kalee Mandujano NP  [facility]    This note has been prepared using a combination of MModaL dictation device and typing.  It has been checked for errors but some errors may still exist within the note as a result of speech recognition errors and/or typographical errors.

## 2022-07-13 ENCOUNTER — OFFICE VISIT (OUTPATIENT)
Dept: CARDIOLOGY | Facility: CLINIC | Age: 82
End: 2022-07-13
Payer: MEDICARE

## 2022-07-13 VITALS
WEIGHT: 149.94 LBS | BODY MASS INDEX: 20.31 KG/M2 | OXYGEN SATURATION: 98 % | SYSTOLIC BLOOD PRESSURE: 140 MMHG | DIASTOLIC BLOOD PRESSURE: 70 MMHG | HEART RATE: 56 BPM | HEIGHT: 72 IN

## 2022-07-13 DIAGNOSIS — I65.23 BILATERAL CAROTID ARTERY STENOSIS: ICD-10-CM

## 2022-07-13 DIAGNOSIS — N18.30 CKD STAGE 3 SECONDARY TO DIABETES: ICD-10-CM

## 2022-07-13 DIAGNOSIS — E11.22 CKD STAGE 3 SECONDARY TO DIABETES: ICD-10-CM

## 2022-07-13 DIAGNOSIS — I49.3 PVC (PREMATURE VENTRICULAR CONTRACTION): ICD-10-CM

## 2022-07-13 DIAGNOSIS — G47.33 OSA (OBSTRUCTIVE SLEEP APNEA): ICD-10-CM

## 2022-07-13 DIAGNOSIS — E78.2 COMBINED HYPERLIPIDEMIA ASSOCIATED WITH TYPE 2 DIABETES MELLITUS: ICD-10-CM

## 2022-07-13 DIAGNOSIS — I15.2 HYPERTENSION ASSOCIATED WITH DIABETES: ICD-10-CM

## 2022-07-13 DIAGNOSIS — I95.1 ORTHOSTASIS: ICD-10-CM

## 2022-07-13 DIAGNOSIS — E11.69 COMBINED HYPERLIPIDEMIA ASSOCIATED WITH TYPE 2 DIABETES MELLITUS: ICD-10-CM

## 2022-07-13 DIAGNOSIS — Z79.899 MEDICATION MANAGEMENT: Primary | ICD-10-CM

## 2022-07-13 DIAGNOSIS — E11.59 HYPERTENSION ASSOCIATED WITH DIABETES: ICD-10-CM

## 2022-07-13 DIAGNOSIS — Z98.890 HISTORY OF CAROTID ENDARTERECTOMY: ICD-10-CM

## 2022-07-13 PROCEDURE — 99999 PR PBB SHADOW E&M-EST. PATIENT-LVL V: ICD-10-PCS | Mod: PBBFAC,,, | Performed by: NURSE PRACTITIONER

## 2022-07-13 PROCEDURE — 99214 PR OFFICE/OUTPT VISIT, EST, LEVL IV, 30-39 MIN: ICD-10-PCS | Mod: S$PBB,,, | Performed by: NURSE PRACTITIONER

## 2022-07-13 PROCEDURE — 99214 OFFICE O/P EST MOD 30 MIN: CPT | Mod: S$PBB,,, | Performed by: NURSE PRACTITIONER

## 2022-07-13 PROCEDURE — 99999 PR PBB SHADOW E&M-EST. PATIENT-LVL V: CPT | Mod: PBBFAC,,, | Performed by: NURSE PRACTITIONER

## 2022-07-13 PROCEDURE — 99215 OFFICE O/P EST HI 40 MIN: CPT | Mod: PBBFAC,PO | Performed by: NURSE PRACTITIONER

## 2022-07-13 RX ORDER — FINASTERIDE 5 MG/1
5 TABLET, FILM COATED ORAL DAILY
COMMUNITY
End: 2022-08-03 | Stop reason: SDUPTHER

## 2022-07-13 RX ORDER — HYDROCHLOROTHIAZIDE 25 MG/1
25 TABLET ORAL DAILY
COMMUNITY
End: 2022-07-13 | Stop reason: ALTCHOICE

## 2022-07-13 RX ORDER — OLMESARTAN MEDOXOMIL 20 MG/1
20 TABLET ORAL 2 TIMES DAILY
Qty: 30 TABLET | Refills: 2
Start: 2022-07-13 | End: 2022-08-03 | Stop reason: SDUPTHER

## 2022-07-15 ENCOUNTER — PATIENT MESSAGE (OUTPATIENT)
Dept: CARDIOLOGY | Facility: CLINIC | Age: 82
End: 2022-07-15
Payer: MEDICARE

## 2022-07-15 NOTE — TELEPHONE ENCOUNTER
Followed up with patient. Patients wife informed me of patients b/p reading from the blood pressure machine and a manual reading done by the grand daughter. Both were in a good range. Advised to continue to monitor and to follow up with Dr. Villagran on next week. Patient verbalized understanding.

## 2022-07-19 ENCOUNTER — OFFICE VISIT (OUTPATIENT)
Dept: CARDIOLOGY | Facility: CLINIC | Age: 82
End: 2022-07-19
Payer: MEDICARE

## 2022-07-19 VITALS
HEIGHT: 72 IN | WEIGHT: 147.63 LBS | SYSTOLIC BLOOD PRESSURE: 120 MMHG | DIASTOLIC BLOOD PRESSURE: 70 MMHG | BODY MASS INDEX: 19.99 KG/M2 | OXYGEN SATURATION: 99 % | HEART RATE: 56 BPM

## 2022-07-19 DIAGNOSIS — I73.9 PAD (PERIPHERAL ARTERY DISEASE): ICD-10-CM

## 2022-07-19 DIAGNOSIS — I15.2 HYPERTENSION ASSOCIATED WITH DIABETES: Primary | ICD-10-CM

## 2022-07-19 DIAGNOSIS — Z98.890 HISTORY OF CAROTID ENDARTERECTOMY: ICD-10-CM

## 2022-07-19 DIAGNOSIS — I49.3 PVC (PREMATURE VENTRICULAR CONTRACTION): ICD-10-CM

## 2022-07-19 DIAGNOSIS — E78.2 COMBINED HYPERLIPIDEMIA ASSOCIATED WITH TYPE 2 DIABETES MELLITUS: ICD-10-CM

## 2022-07-19 DIAGNOSIS — R00.2 PALPITATIONS: ICD-10-CM

## 2022-07-19 DIAGNOSIS — E11.59 HYPERTENSION ASSOCIATED WITH DIABETES: Primary | ICD-10-CM

## 2022-07-19 DIAGNOSIS — E11.69 COMBINED HYPERLIPIDEMIA ASSOCIATED WITH TYPE 2 DIABETES MELLITUS: ICD-10-CM

## 2022-07-19 PROCEDURE — 99215 OFFICE O/P EST HI 40 MIN: CPT | Mod: PBBFAC,PO | Performed by: INTERNAL MEDICINE

## 2022-07-19 PROCEDURE — 99999 PR PBB SHADOW E&M-EST. PATIENT-LVL V: ICD-10-PCS | Mod: PBBFAC,,, | Performed by: INTERNAL MEDICINE

## 2022-07-19 PROCEDURE — 99214 PR OFFICE/OUTPT VISIT, EST, LEVL IV, 30-39 MIN: ICD-10-PCS | Mod: S$PBB,,, | Performed by: INTERNAL MEDICINE

## 2022-07-19 PROCEDURE — 99999 PR PBB SHADOW E&M-EST. PATIENT-LVL V: CPT | Mod: PBBFAC,,, | Performed by: INTERNAL MEDICINE

## 2022-07-19 PROCEDURE — 99214 OFFICE O/P EST MOD 30 MIN: CPT | Mod: S$PBB,,, | Performed by: INTERNAL MEDICINE

## 2022-07-19 NOTE — PROGRESS NOTES
Subjective:   Patient ID:  Bebeto Santiago is a 82 y.o. male who presents for follow-up of Follow-up  Patient denies CP, angina or anginal equivalent.  Pt on benicar 20 mg q day , chlorthalidone 25 q day    Hypertension  This is a chronic problem. The current episode started more than 1 year ago. The problem has been gradually improving since onset. The problem is controlled. Pertinent negatives include no chest pain, palpitations or shortness of breath. Past treatments include beta blockers and calcium channel blockers. The current treatment provides moderate improvement. There are no compliance problems.    Hyperlipidemia  This is a chronic problem. The current episode started more than 1 year ago. The problem is controlled. Recent lipid tests were reviewed and are variable. Pertinent negatives include no chest pain or shortness of breath. Current antihyperlipidemic treatment includes statins. The current treatment provides moderate improvement of lipids. There are no compliance problems.    Palpitations   This is a chronic problem. The current episode started more than 1 year ago. The problem occurs intermittently. The problem has been waxing and waning. Nothing aggravates the symptoms. Pertinent negatives include no chest pain, dizziness or shortness of breath. He has tried beta blockers for the symptoms. The treatment provided moderate relief.       Review of Systems   Constitutional: Negative. Negative for weight gain.   HENT: Negative.    Eyes: Negative.    Cardiovascular: Negative.  Negative for chest pain, leg swelling and palpitations.   Respiratory: Negative.  Negative for shortness of breath.    Endocrine: Negative.    Hematologic/Lymphatic: Negative.    Skin: Negative.    Musculoskeletal: Negative for muscle weakness.   Gastrointestinal: Negative.    Genitourinary: Negative.    Neurological: Negative.  Negative for dizziness.   Psychiatric/Behavioral: Negative.    Allergic/Immunologic: Negative.   "    Family History   Problem Relation Age of Onset    Heart disease Mother     Stroke Maternal Grandfather     Diabetes Neg Hx     Cancer Neg Hx     Hypertension Neg Hx      Past Medical History:   Diagnosis Date    Allergy     BPH (benign prostatic hypertrophy)     Controlled type 2 diabetes mellitus with stage 3 chronic kidney disease, with long-term current use of insulin     The patient presents with diabetes.  The patient denies polyuria, polydipsia, polyphagia, hypoglycemia and paresthesias.  The patient's glucose control has been good.  Home glucose averages are routinely checked.  The patient is without retinopathy currently.  The patient has no history of neuropathy.  The patient currently complains of no podiatric problems.  The patient has excellent compliance.    Dehydration     Diabetes mellitus type II, uncontrolled     GERD (gastroesophageal reflux disease)     grade IV/IV    Hypertension     Hypothyroidism     Male hypogonadism     Non-proliferative diabetic retinopathy     Urinary retention      Social History     Socioeconomic History    Marital status:      Spouse name: Babs    Number of children: 2   Occupational History    Occupation: Retired   Tobacco Use    Smoking status: Former Smoker     Quit date: 1986     Years since quittin.8    Smokeless tobacco: Current User     Types: Chew   Substance and Sexual Activity    Alcohol use: Yes     Comment: rare    Drug use: No    Sexual activity: Not Currently     Partners: Female     Current Outpatient Medications on File Prior to Visit   Medication Sig Dispense Refill    aspirin 325 MG tablet Take 325 mg by mouth once daily.      BD LUER-MANUEL SYRINGE 3 mL 22 x 1 1/2" Syrg   3    blood sugar diagnostic (FREESTYLE LITE STRIPS) Strp TEST 3 TIMES DAILY 300 strip 5    blood-glucose meter Misc Use as directed four times daily before meals and at bedtime. 1 each 0    chlorthalidone (HYGROTEN) 25 MG Tab Take 1 " tablet (25 mg total) by mouth once daily. (Patient not taking: No sig reported) 30 tablet 11    citalopram (CELEXA) 10 MG tablet TAKE 1 TABLET BY MOUTH EVERY DAY 90 tablet 3    cloNIDine (CATAPRES) 0.2 MG tablet Take 1 tablet (0.2 mg total) by mouth nightly. And 1/2 tablet tablet prn if SBP > 200      finasteride (PROSCAR) 5 mg tablet Take 5 mg by mouth once daily.      flash glucose scanning reader (FREESTYLE PUMA 2 READER) [facility] Use in conjunction with glucose sensor every 14 days for continuous monitoring of Glucose. DX:E11.22, N18.30, Z79.44 6 each 3    flash glucose sensor (FREESTYLE PUMA 2 SENSOR) Kit Use one sensor Every 14 days for continuous monitoring of Glucose. DX:E11.22, N18.30, Z79.44 2 kit 11    flash glucose sensor (FREESTYLE PUMA 2 SENSOR) Kit Use one sensor Every 14 days for continuous monitoring of Glucose. DX:E11.22, N18.30, Z79.44 2 kit 11    ibuprofen (ADVIL,MOTRIN) 800 MG tablet Take 1 tablet (800 mg total) by mouth every 6 (six) hours as needed for Pain. (Patient not taking: Reported on 7/13/2022) 30 tablet 0    insulin (LANTUS SOLOSTAR U-100 INSULIN) glargine 100 units/mL (3mL) SubQ pen INJECT 36 UNITS UNDER THE SKIN ONCE A DAY (Patient taking differently: INJECT 33 UNITS UNDER THE SKIN ONCE A DAY) 30 Syringe 1    insulin aspart U-100 (NOVOLOG FLEXPEN U-100 INSULIN) 100 unit/mL (3 mL) InPn pen USE AS DIRECTED PER SLIDING SCALE. MAX OF 14 UNITS DAILY) 15 Syringe 3    lancets (ACCU-CHEK SOFTCLIX LANCETS) Misc Check glucose three times daily. 200 each 11    levothyroxine (SYNTHROID) 100 MCG tablet TAKE 1 TABLET BY MOUTH EVERY DAY 90 tablet 0    methocarbamoL (ROBAXIN) 500 MG Tab Take 500 mg by mouth.      metoprolol succinate (TOPROL-XL) 25 MG 24 hr tablet Take 1 tablet (25 mg total) by mouth once daily. 30 tablet 11    montelukast (SINGULAIR) 10 mg tablet TAKE 1 TABLET BY MOUTH EVERY DAY IN THE EVENING 90 tablet 3    multivitamin (THERAGRAN) per tablet Take 1 tablet by mouth.   "    olmesartan (BENICAR) 20 MG tablet Take 1 tablet (20 mg total) by mouth 2 (two) times a day. 30 tablet 2    oxyCODONE (ROXICODONE) 5 MG immediate release tablet Take 5 mg by mouth every 6 (six) hours as needed.      pen needle, diabetic (BD ULTRA-FINE MINI PEN NEEDLE) 31 gauge x 3/16" Ndle USE DAILY WITH LANTUS AND NOVOLOG 300 each 3    pravastatin (PRAVACHOL) 40 MG tablet TAKE 1 TABLET BY MOUTH EVERY DAY IN THE EVENING 90 tablet 3     No current facility-administered medications on file prior to visit.     Review of patient's allergies indicates:   Allergen Reactions    Ace inhibitors      Other reaction(s): cough    Metformin      Abdominal pain       Objective:     Physical Exam  Vitals and nursing note reviewed.   Constitutional:       Appearance: He is well-developed.   HENT:      Head: Normocephalic and atraumatic.   Eyes:      Conjunctiva/sclera: Conjunctivae normal.      Pupils: Pupils are equal, round, and reactive to light.   Cardiovascular:      Rate and Rhythm: Normal rate and regular rhythm.      Pulses: Intact distal pulses.      Heart sounds: Normal heart sounds.   Pulmonary:      Effort: Pulmonary effort is normal.      Breath sounds: Normal breath sounds.   Abdominal:      General: Bowel sounds are normal.      Palpations: Abdomen is soft.   Musculoskeletal:      Cervical back: Normal range of motion and neck supple.   Skin:     General: Skin is warm and dry.   Neurological:      Mental Status: He is alert and oriented to person, place, and time.         Assessment:     1. Hypertension associated with diabetes    2. Combined hyperlipidemia associated with type 2 diabetes mellitus    3. PAD (peripheral artery disease)    4. History of carotid endarterectomy    5. Palpitations    6. PVC (premature ventricular contraction)        Plan:     Hypertension associated with diabetes    Combined hyperlipidemia associated with type 2 diabetes mellitus    PAD (peripheral artery disease)    History of " carotid endarterectomy    Palpitations    PVC (premature ventricular contraction)      Continue clonidine, toprol, olmesartan, hctz -htn/SVT  Continue statin-hlp  Continue asa- primary prevention

## 2022-07-27 ENCOUNTER — DOCUMENT SCAN (OUTPATIENT)
Dept: HOME HEALTH SERVICES | Facility: HOSPITAL | Age: 82
End: 2022-07-27
Payer: MEDICARE

## 2022-07-31 ENCOUNTER — EXTERNAL CHRONIC CARE MANAGEMENT (OUTPATIENT)
Dept: PRIMARY CARE CLINIC | Facility: CLINIC | Age: 82
End: 2022-07-31
Payer: MEDICARE

## 2022-07-31 PROCEDURE — 99439 CHRNC CARE MGMT STAF EA ADDL: CPT | Mod: S$PBB,,, | Performed by: FAMILY MEDICINE

## 2022-07-31 PROCEDURE — 99490 CHRNC CARE MGMT STAFF 1ST 20: CPT | Mod: S$PBB,,, | Performed by: FAMILY MEDICINE

## 2022-07-31 PROCEDURE — 99490 PR CHRONIC CARE MGMT, 1ST 20 MIN: ICD-10-PCS | Mod: S$PBB,,, | Performed by: FAMILY MEDICINE

## 2022-07-31 PROCEDURE — 99439 CHRNC CARE MGMT STAF EA ADDL: CPT | Mod: PBBFAC,27,PO | Performed by: FAMILY MEDICINE

## 2022-07-31 PROCEDURE — 99439 PR CHRONIC CARE MGMT, EA ADDTL 20 MIN: ICD-10-PCS | Mod: S$PBB,,, | Performed by: FAMILY MEDICINE

## 2022-07-31 PROCEDURE — 99490 CHRNC CARE MGMT STAFF 1ST 20: CPT | Mod: PBBFAC,25,PO | Performed by: FAMILY MEDICINE

## 2022-08-01 ENCOUNTER — PES CALL (OUTPATIENT)
Dept: ADMINISTRATIVE | Facility: CLINIC | Age: 82
End: 2022-08-01
Payer: MEDICARE

## 2022-08-03 ENCOUNTER — OFFICE VISIT (OUTPATIENT)
Dept: FAMILY MEDICINE | Facility: CLINIC | Age: 82
End: 2022-08-03
Payer: MEDICARE

## 2022-08-03 VITALS
WEIGHT: 153.38 LBS | DIASTOLIC BLOOD PRESSURE: 60 MMHG | SYSTOLIC BLOOD PRESSURE: 110 MMHG | BODY MASS INDEX: 20.77 KG/M2 | HEART RATE: 41 BPM | TEMPERATURE: 98 F | HEIGHT: 72 IN

## 2022-08-03 DIAGNOSIS — E11.69 COMBINED HYPERLIPIDEMIA ASSOCIATED WITH TYPE 2 DIABETES MELLITUS: ICD-10-CM

## 2022-08-03 DIAGNOSIS — E78.2 COMBINED HYPERLIPIDEMIA ASSOCIATED WITH TYPE 2 DIABETES MELLITUS: ICD-10-CM

## 2022-08-03 DIAGNOSIS — Z87.81 HISTORY OF FRACTURE OF LEFT HIP: ICD-10-CM

## 2022-08-03 DIAGNOSIS — N18.30 CONTROLLED TYPE 2 DIABETES MELLITUS WITH STAGE 3 CHRONIC KIDNEY DISEASE, WITH LONG-TERM CURRENT USE OF INSULIN: ICD-10-CM

## 2022-08-03 DIAGNOSIS — I15.2 HYPERTENSION ASSOCIATED WITH DIABETES: ICD-10-CM

## 2022-08-03 DIAGNOSIS — E11.59 HYPERTENSION ASSOCIATED WITH DIABETES: ICD-10-CM

## 2022-08-03 DIAGNOSIS — F32.1 CURRENT MODERATE EPISODE OF MAJOR DEPRESSIVE DISORDER WITHOUT PRIOR EPISODE: ICD-10-CM

## 2022-08-03 DIAGNOSIS — Z79.4 CONTROLLED TYPE 2 DIABETES MELLITUS WITH STAGE 3 CHRONIC KIDNEY DISEASE, WITH LONG-TERM CURRENT USE OF INSULIN: ICD-10-CM

## 2022-08-03 DIAGNOSIS — J30.1 SEASONAL ALLERGIC RHINITIS DUE TO POLLEN: ICD-10-CM

## 2022-08-03 DIAGNOSIS — E03.9 HYPOTHYROIDISM, UNSPECIFIED TYPE: ICD-10-CM

## 2022-08-03 DIAGNOSIS — E11.22 CONTROLLED TYPE 2 DIABETES MELLITUS WITH STAGE 3 CHRONIC KIDNEY DISEASE, WITH LONG-TERM CURRENT USE OF INSULIN: ICD-10-CM

## 2022-08-03 PROCEDURE — 99999 PR PBB SHADOW E&M-EST. PATIENT-LVL III: CPT | Mod: PBBFAC,,, | Performed by: FAMILY MEDICINE

## 2022-08-03 PROCEDURE — 99213 OFFICE O/P EST LOW 20 MIN: CPT | Mod: PBBFAC,PO | Performed by: FAMILY MEDICINE

## 2022-08-03 PROCEDURE — 99999 PR PBB SHADOW E&M-EST. PATIENT-LVL III: ICD-10-PCS | Mod: PBBFAC,,, | Performed by: FAMILY MEDICINE

## 2022-08-03 PROCEDURE — 99214 PR OFFICE/OUTPT VISIT, EST, LEVL IV, 30-39 MIN: ICD-10-PCS | Mod: S$PBB,,, | Performed by: FAMILY MEDICINE

## 2022-08-03 PROCEDURE — 99214 OFFICE O/P EST MOD 30 MIN: CPT | Mod: S$PBB,,, | Performed by: FAMILY MEDICINE

## 2022-08-03 RX ORDER — ALENDRONATE SODIUM 70 MG/1
70 TABLET ORAL
Qty: 12 TABLET | Refills: 3 | Status: SHIPPED | OUTPATIENT
Start: 2022-08-03 | End: 2023-02-22

## 2022-08-03 RX ORDER — PRAVASTATIN SODIUM 40 MG/1
40 TABLET ORAL NIGHTLY
Qty: 90 TABLET | Refills: 3 | Status: SHIPPED | OUTPATIENT
Start: 2022-08-03 | End: 2023-02-22 | Stop reason: SDUPTHER

## 2022-08-03 RX ORDER — FLASH GLUCOSE SENSOR
KIT MISCELLANEOUS
Qty: 2 KIT | Refills: 11 | Status: SHIPPED | OUTPATIENT
Start: 2022-08-03 | End: 2023-07-25

## 2022-08-03 RX ORDER — OLMESARTAN MEDOXOMIL 20 MG/1
20 TABLET ORAL 2 TIMES DAILY
Qty: 180 TABLET | Refills: 3 | Status: SHIPPED | OUTPATIENT
Start: 2022-08-03 | End: 2023-02-22 | Stop reason: SDUPTHER

## 2022-08-03 RX ORDER — FINASTERIDE 5 MG/1
5 TABLET, FILM COATED ORAL DAILY
Qty: 90 TABLET | Refills: 3 | Status: SHIPPED | OUTPATIENT
Start: 2022-08-03 | End: 2023-02-22 | Stop reason: SDUPTHER

## 2022-08-03 RX ORDER — CITALOPRAM 10 MG/1
10 TABLET ORAL DAILY
Qty: 90 TABLET | Refills: 3 | Status: SHIPPED | OUTPATIENT
Start: 2022-08-03 | End: 2023-02-22 | Stop reason: SDUPTHER

## 2022-08-03 RX ORDER — INSULIN GLARGINE 100 [IU]/ML
INJECTION, SOLUTION SUBCUTANEOUS
Qty: 30 EACH | Refills: 1 | Status: SHIPPED | OUTPATIENT
Start: 2022-08-03 | End: 2023-02-22 | Stop reason: SDUPTHER

## 2022-08-03 RX ORDER — METOPROLOL SUCCINATE 25 MG/1
25 TABLET, EXTENDED RELEASE ORAL DAILY
Qty: 90 TABLET | Refills: 3 | Status: SHIPPED | OUTPATIENT
Start: 2022-08-03 | End: 2023-02-22 | Stop reason: SDUPTHER

## 2022-08-03 RX ORDER — CALCIUM CARBONATE 500(1250)
1 TABLET ORAL 2 TIMES DAILY
Qty: 180 TABLET | Refills: 3 | Status: SHIPPED | OUTPATIENT
Start: 2022-08-03 | End: 2023-02-22

## 2022-08-03 RX ORDER — MONTELUKAST SODIUM 10 MG/1
10 TABLET ORAL NIGHTLY
Qty: 90 TABLET | Refills: 3 | Status: SHIPPED | OUTPATIENT
Start: 2022-08-03 | End: 2023-02-22 | Stop reason: SDUPTHER

## 2022-08-03 RX ORDER — FLASH GLUCOSE SENSOR
KIT MISCELLANEOUS
Qty: 2 KIT | Refills: 11 | Status: SHIPPED | OUTPATIENT
Start: 2022-08-03 | End: 2023-07-25 | Stop reason: SDUPTHER

## 2022-08-03 RX ORDER — INSULIN ASPART 100 [IU]/ML
INJECTION, SOLUTION INTRAVENOUS; SUBCUTANEOUS
Qty: 15 EACH | Refills: 3 | Status: SHIPPED | OUTPATIENT
Start: 2022-08-03 | End: 2023-02-22 | Stop reason: SDUPTHER

## 2022-08-03 RX ORDER — LEVOTHYROXINE SODIUM 100 UG/1
100 TABLET ORAL DAILY
Qty: 90 TABLET | Refills: 3 | Status: SHIPPED | OUTPATIENT
Start: 2022-08-03 | End: 2023-02-22 | Stop reason: SDUPTHER

## 2022-08-03 RX ORDER — CLONIDINE HYDROCHLORIDE 0.2 MG/1
0.2 TABLET ORAL NIGHTLY
Qty: 180 TABLET | Refills: 3 | Status: SHIPPED | OUTPATIENT
Start: 2022-08-03 | End: 2023-01-24

## 2022-08-03 NOTE — PROGRESS NOTES
Subjective:      Patient ID: Bebeto Santiago is a 82 y.o. male.    Chief Complaint: Annual Exam    Problem List Items Addressed This Visit     Combined hyperlipidemia associated with type 2 diabetes mellitus    Overview     The patient presents with hyperlipidemia.  The patient reports tolerating the medication well and is in excellent compliance.  There have been no medication side effects.  The patient denies chest pain, neuropathy, and myalgias.  The patient has reduced fat intake and has been exercising.  Current treatment has included the medications listed in the med card.    Lab Results   Component Value Date    CHOL 151 11/04/2020    CHOL 162 03/06/2020    CHOL 152 03/01/2019       Lab Results   Component Value Date    HDL 66 11/04/2020    HDL 62 03/06/2020    HDL 59 03/01/2019       Lab Results   Component Value Date    LDLCALC 74.4 11/04/2020    LDLCALC 86.8 03/06/2020    LDLCALC 82.6 03/01/2019       Lab Results   Component Value Date    TRIG 53 11/04/2020    TRIG 66 03/06/2020    TRIG 52 03/01/2019       Lab Results   Component Value Date    CHOLHDL 43.7 11/04/2020    CHOLHDL 38.3 03/06/2020    CHOLHDL 38.8 03/01/2019     Lab Results   Component Value Date    ALT 11 11/04/2020    AST 14 11/04/2020    ALKPHOS 89 11/04/2020    BILITOT 0.9 11/04/2020                Relevant Medications    pravastatin (PRAVACHOL) 40 MG tablet    insulin aspart U-100 (NOVOLOG FLEXPEN U-100 INSULIN) 100 unit/mL (3 mL) InPn pen    insulin (LANTUS SOLOSTAR U-100 INSULIN) glargine 100 units/mL SubQ pen    Other Relevant Orders    Lipid Panel    Controlled type 2 diabetes mellitus with stage 3 chronic kidney disease, with long-term current use of insulin    Overview     The patient presents with diabetes.  The patient denies polyuria, polydipsia, polyphagia, hypoglycemia and paresthesias.  The patient's glucose control has been good.  Home glucose averages are routinely checked.  The patient is without retinopathy currently.  The  patient has no history of neuropathy.  The patient currently complains of no podiatric problems.  The patient has excellent compliance.  Because of him being on lantus and novolog,  the patient checks his blood sugar 4 times daily and administers insulin 3 or more times daily.   Hemoglobin A1C   Date Value Ref Range Status   01/27/2022 6.4 (H) 4.0 - 5.6 % Final     Comment:     ADA Screening Guidelines:  5.7-6.4%  Consistent with prediabetes  >or=6.5%  Consistent with diabetes    High levels of fetal hemoglobin interfere with the HbA1C  assay. Heterozygous hemoglobin variants (HbS, HgC, etc)do  not significantly interfere with this assay.   However, presence of multiple variants may affect accuracy.     07/26/2021 6.6 (H) 4.0 - 5.6 % Final     Comment:     ADA Screening Guidelines:  5.7-6.4%  Consistent with prediabetes  >or=6.5%  Consistent with diabetes    High levels of fetal hemoglobin interfere with the HbA1C  assay. Heterozygous hemoglobin variants (HbS, HgC, etc)do  not significantly interfere with this assay.   However, presence of multiple variants may affect accuracy.     04/23/2021 7.2 (H) 4.0 - 5.6 % Final     Comment:     ADA Screening Guidelines:  5.7-6.4%  Consistent with prediabetes  >or=6.5%  Consistent with diabetes    High levels of fetal hemoglobin interfere with the HbA1C  assay. Heterozygous hemoglobin variants (HbS, HgC, etc)do  not significantly interfere with this assay.   However, presence of multiple variants may affect accuracy.       No results found for: MICROALBUR, [id number]  Diabetes Management Status    Statin: Taking  ACE/ARB: Not taking  He has changed to 33 u of the lantus from 28 u since he had a high a1c below.  Screening or Prevention Patient's value Goal Complete/Controlled?   HgA1C Testing and Control   Lab Results   Component Value Date    HGBA1C 6.4 (H) 01/27/2022      Annually/Less than 8% Yes   Lipid profile : 11/04/2020 Annually Yes   LDL control Lab Results   Component  Value Date    LDLCALC 74.4 11/04/2020    Annually/Less than 100 mg/dl  Yes   Nephropathy screening Lab Results   Component Value Date    LABMICR 38.0 12/16/2021     Lab Results   Component Value Date    PROTEINUA Negative 09/20/2017    Annually Yes   Blood pressure BP Readings from Last 1 Encounters:   08/03/22 110/60    Less than 140/90 Yes   Dilated retinal exam : 12/10/2021 Annually No   Foot exam   : 12/16/2021 Annually Yes                Relevant Medications    flash glucose sensor (FREESTYLE PUMA 2 SENSOR) Kit    flash glucose sensor (FREESTYLE PUMA 2 SENSOR) Kit    insulin aspart U-100 (NOVOLOG FLEXPEN U-100 INSULIN) 100 unit/mL (3 mL) InPn pen    insulin (LANTUS SOLOSTAR U-100 INSULIN) glargine 100 units/mL SubQ pen    Other Relevant Orders    Hemoglobin A1C    Microalbumin/Creatinine Ratio, Urine    History of fracture of left hip    Overview     He had a fracture of the left hip in May 2022. He had a repair.           Relevant Medications    alendronate (FOSAMAX) 70 MG tablet    Hypertension associated with diabetes    Overview     The patient presents with essential hypertension.  The patient is tolerating the medication well and is in excellent compliance except he is urinating a lot with the chlorthalidone and he is interested in stopping it.  We discussed and agreed to try this.  The patient is experiencing no side effects.  Counseling was offered regarding low salt diets.  The patient has a reduced salt intake.  The patient denies chest pain, palpitations, shortness of breath, dyspnea on exertion, left or murmur neck pain, nausea, vomiting, diaphoresis, paroxysmal nocturnal dyspnea, and orthopnea.             Relevant Medications    insulin aspart U-100 (NOVOLOG FLEXPEN U-100 INSULIN) 100 unit/mL (3 mL) InPn pen    insulin (LANTUS SOLOSTAR U-100 INSULIN) glargine 100 units/mL SubQ pen    olmesartan (BENICAR) 20 MG tablet    cloNIDine (CATAPRES) 0.2 MG tablet    metoprolol succinate (TOPROL-XL) 25 MG  24 hr tablet    Other Relevant Orders    Comprehensive Metabolic Panel    Hypothyroidism    Overview     The patient presents with hypothyroidism.  The patient denies agitation, anxiety, blurred vision, chest pain, cold intolerance, constipation, dizziness, dry skin, fatigue, lightheadedness, paresthesias, skin coarsening, tachycardia, tremor, weight gain or weight loss.  The patient's current treatment has included Synthroid with a good response.    Lab Results   Component Value Date    TSH 0.809 11/04/2020                Relevant Medications    levothyroxine (SYNTHROID) 100 MCG tablet    Other Relevant Orders    TSH      Other Visit Diagnoses     Current moderate episode of major depressive disorder without prior episode        Relevant Medications    citalopram (CELEXA) 10 MG tablet    Seasonal allergic rhinitis due to pollen        Relevant Medications    montelukast (SINGULAIR) 10 mg tablet          The patient's Health Maintenance was reviewed and the following appears to be due:   Health Maintenance Due   Topic Date Due    Lipid Panel  11/04/2021       Past Medical History:  Past Medical History:   Diagnosis Date    Allergy     BPH (benign prostatic hypertrophy)     Controlled type 2 diabetes mellitus with stage 3 chronic kidney disease, with long-term current use of insulin     The patient presents with diabetes.  The patient denies polyuria, polydipsia, polyphagia, hypoglycemia and paresthesias.  The patient's glucose control has been good.  Home glucose averages are routinely checked.  The patient is without retinopathy currently.  The patient has no history of neuropathy.  The patient currently complains of no podiatric problems.  The patient has excellent compliance.    Dehydration     Diabetes mellitus type II, uncontrolled     GERD (gastroesophageal reflux disease) 2013    grade IV/IV    Hypertension     Hypothyroidism     Male hypogonadism     Non-proliferative diabetic retinopathy      "Urinary retention      Past Surgical History:   Procedure Laterality Date    COLONOSCOPY N/A 1/16/2020    Procedure: COLONOSCOPY;  Surgeon: Darin Krueger MD;  Location: [facility];  Service: Endoscopy;  Laterality: N/A;    EYE SURGERY      HIP SURGERY Left 2022    SPINE SURGERY       Review of patient's allergies indicates:   Allergen Reactions    Ace inhibitors      Other reaction(s): cough    Metformin      Abdominal pain     Current Outpatient Medications on File Prior to Visit   Medication Sig Dispense Refill    aspirin 325 MG tablet Take 325 mg by mouth once daily.      BD LUER-MANUEL SYRINGE 3 mL 22 x 1 1/2" Syrg   3    blood sugar diagnostic (FREESTYLE LITE STRIPS) Strp TEST 3 TIMES DAILY 300 strip 5    blood-glucose meter Misc Use as directed four times daily before meals and at bedtime. 1 each 0    flash glucose scanning reader (FREESTYLE PUMA 2 READER) Misc Use in conjunction with glucose sensor every 14 days for continuous monitoring of Glucose. DX:E11.22, N18.30, Z79.44 6 each 3    lancets (ACCU-CHEK SOFTCLIX LANCETS) Misc Check glucose three times daily. 200 each 11    methocarbamoL (ROBAXIN) 500 MG Tab Take 500 mg by mouth.      multivitamin (THERAGRAN) per tablet Take 1 tablet by mouth.      oxyCODONE (ROXICODONE) 5 MG immediate release tablet Take 5 mg by mouth every 6 (six) hours as needed.      pen needle, diabetic (BD ULTRA-FINE MINI PEN NEEDLE) 31 gauge x 3/16" Ndle USE DAILY WITH LANTUS AND NOVOLOG 300 each 3    [DISCONTINUED] chlorthalidone (HYGROTEN) 25 MG Tab Take 1 tablet (25 mg total) by mouth once daily. 30 tablet 11    [DISCONTINUED] citalopram (CELEXA) 10 MG tablet TAKE 1 TABLET BY MOUTH EVERY DAY 90 tablet 3    [DISCONTINUED] cloNIDine (CATAPRES) 0.2 MG tablet Take 1 tablet (0.2 mg total) by mouth nightly. And 1/2 tablet tablet prn if SBP > 200      [DISCONTINUED] finasteride (PROSCAR) 5 mg tablet Take 5 mg by mouth once daily.      [DISCONTINUED] flash glucose " sensor (FREESTYLE PUMA 2 SENSOR) Kit Use one sensor Every 14 days for continuous monitoring of Glucose. DX:E11.22, N18.30, Z79.44 2 kit 11    [DISCONTINUED] flash glucose sensor (FREESTYLE PUMA 2 SENSOR) Kit Use one sensor Every 14 days for continuous monitoring of Glucose. DX:E11.22, N18.30, Z79.44 2 kit 11    [DISCONTINUED] insulin (LANTUS SOLOSTAR U-100 INSULIN) glargine 100 units/mL (3mL) SubQ pen INJECT 36 UNITS UNDER THE SKIN ONCE A DAY 30 Syringe 1    [DISCONTINUED] insulin aspart U-100 (NOVOLOG FLEXPEN U-100 INSULIN) 100 unit/mL (3 mL) InPn pen USE AS DIRECTED PER SLIDING SCALE. MAX OF 14 UNITS DAILY) 15 Syringe 3    [DISCONTINUED] levothyroxine (SYNTHROID) 100 MCG tablet TAKE 1 TABLET BY MOUTH EVERY DAY 90 tablet 0    [DISCONTINUED] metoprolol succinate (TOPROL-XL) 25 MG 24 hr tablet Take 1 tablet (25 mg total) by mouth once daily. 30 tablet 11    [DISCONTINUED] montelukast (SINGULAIR) 10 mg tablet TAKE 1 TABLET BY MOUTH EVERY DAY IN THE EVENING 90 tablet 3    [DISCONTINUED] olmesartan (BENICAR) 20 MG tablet Take 1 tablet (20 mg total) by mouth 2 (two) times a day. 30 tablet 2    [DISCONTINUED] pravastatin (PRAVACHOL) 40 MG tablet TAKE 1 TABLET BY MOUTH EVERY DAY IN THE EVENING 90 tablet 3    [DISCONTINUED] ibuprofen (ADVIL,MOTRIN) 800 MG tablet Take 1 tablet (800 mg total) by mouth every 6 (six) hours as needed for Pain. (Patient not taking: No sig reported) 30 tablet 0     No current facility-administered medications on file prior to visit.     Social History     Socioeconomic History    Marital status:      Spouse name: Babs    Number of children: 2   Occupational History    Occupation: Retired   Tobacco Use    Smoking status: Former Smoker     Quit date: 1986     Years since quittin.9    Smokeless tobacco: Current User     Types: Chew   Substance and Sexual Activity    Alcohol use: Yes     Comment: rare    Drug use: No    Sexual activity: Not Currently     Partners:  Female     Family History   Problem Relation Age of Onset    Heart disease Mother     Stroke Maternal Grandfather     Diabetes Neg Hx     Cancer Neg Hx     Hypertension Neg Hx        Review of Systems    Objective:   /60   Pulse (!) 41   Temp 97.8 °F (36.6 °C)   Ht 6' (1.829 m)   Wt 69.6 kg (153 lb 6.4 oz)   BMI 20.80 kg/m²     Physical Exam  Assessment:     1. Combined hyperlipidemia associated with type 2 diabetes mellitus    2. Controlled type 2 diabetes mellitus with stage 3 chronic kidney disease, with long-term current use of insulin    3. Current moderate episode of major depressive disorder without prior episode    4. Hypertension associated with diabetes    5. Hypothyroidism, unspecified type    6. Seasonal allergic rhinitis due to pollen    7. History of fracture of left hip      Plan:   I have discontinued Bebeto Santiago's finasteride, chlorthalidone, and finasteride. I have changed his LANTUS SOLOSTAR U-100 INSULIN to insulin. I have also changed his pravastatin, citalopram, levothyroxine, montelukast, and finasteride. Additionally, I am having him start on alendronate. Lastly, I am having him maintain his BD LUER-MANUEL SYRINGE, blood-glucose meter, aspirin, lancets, (pen needle, diabetic), FREESTYLE LITE STRIPS, methocarbamoL, multivitamin, FREESTYLE PUMA 2 READER, oxyCODONE, FREESTYLE PUMA 2 SENSOR, FREESTYLE PUMA 2 SENSOR, insulin aspart U-100, olmesartan, cloNIDine, and metoprolol succinate.  Problem List Items Addressed This Visit     Combined hyperlipidemia associated with type 2 diabetes mellitus    Relevant Medications    pravastatin (PRAVACHOL) 40 MG tablet    insulin aspart U-100 (NOVOLOG FLEXPEN U-100 INSULIN) 100 unit/mL (3 mL) InPn pen    insulin (LANTUS SOLOSTAR U-100 INSULIN) glargine 100 units/mL SubQ pen    Other Relevant Orders    Lipid Panel    Controlled type 2 diabetes mellitus with stage 3 chronic kidney disease, with long-term current use of insulin    Relevant  Medications    flash glucose sensor (FREESTYLE PUMA 2 SENSOR) Kit    flash glucose sensor (FREESTYLE PUMA 2 SENSOR) Kit    insulin aspart U-100 (NOVOLOG FLEXPEN U-100 INSULIN) 100 unit/mL (3 mL) InPn pen    insulin (LANTUS SOLOSTAR U-100 INSULIN) glargine 100 units/mL SubQ pen    Other Relevant Orders    Hemoglobin A1C    Microalbumin/Creatinine Ratio, Urine    History of fracture of left hip    Relevant Medications    alendronate (FOSAMAX) 70 MG tablet    Hypertension associated with diabetes    Relevant Medications    insulin aspart U-100 (NOVOLOG FLEXPEN U-100 INSULIN) 100 unit/mL (3 mL) InPn pen    insulin (LANTUS SOLOSTAR U-100 INSULIN) glargine 100 units/mL SubQ pen    olmesartan (BENICAR) 20 MG tablet    cloNIDine (CATAPRES) 0.2 MG tablet    metoprolol succinate (TOPROL-XL) 25 MG 24 hr tablet    Other Relevant Orders    Comprehensive Metabolic Panel    Hypothyroidism    Relevant Medications    levothyroxine (SYNTHROID) 100 MCG tablet    Other Relevant Orders    TSH      Other Visit Diagnoses     Current moderate episode of major depressive disorder without prior episode        Relevant Medications    citalopram (CELEXA) 10 MG tablet    Seasonal allergic rhinitis due to pollen        Relevant Medications    montelukast (SINGULAIR) 10 mg tablet        Follow up in about 1 day (around 8/4/2022) for lab draw of labs ordered today.    Bebeto was seen today for annual exam.    Diagnoses and all orders for this visit:    Combined hyperlipidemia associated with type 2 diabetes mellitus  -     pravastatin (PRAVACHOL) 40 MG tablet; Take 1 tablet (40 mg total) by mouth every evening.  -     Lipid Panel; Future    Controlled type 2 diabetes mellitus with stage 3 chronic kidney disease, with long-term current use of insulin  -     flash glucose sensor (FREESTYLE PUMA 2 SENSOR) Kit; Use one sensor Every 14 days for continuous monitoring of Glucose. DX:E11.22, N18.30, Z79.44  -     flash glucose sensor (FREESTYLE PUMA 2  SENSOR) Kit; Use one sensor Every 14 days for continuous monitoring of Glucose. DX:E11.22, N18.30, Z79.44  -     insulin aspart U-100 (NOVOLOG FLEXPEN U-100 INSULIN) 100 unit/mL (3 mL) InPn pen; USE AS DIRECTED PER SLIDING SCALE. MAX OF 14 UNITS DAILY)  -     insulin (LANTUS SOLOSTAR U-100 INSULIN) glargine 100 units/mL SubQ pen; INJECT 36 UNITS UNDER THE SKIN ONCE A DAY  -     Hemoglobin A1C; Future  -     Microalbumin/Creatinine Ratio, Urine; Future    Current moderate episode of major depressive disorder without prior episode  -     citalopram (CELEXA) 10 MG tablet; Take 1 tablet (10 mg total) by mouth once daily.    Hypertension associated with diabetes  -     olmesartan (BENICAR) 20 MG tablet; Take 1 tablet (20 mg total) by mouth 2 (two) times a day.  -     cloNIDine (CATAPRES) 0.2 MG tablet; Take 1 tablet (0.2 mg total) by mouth nightly. And 1/2 tablet tablet prn if SBP > 200  -     metoprolol succinate (TOPROL-XL) 25 MG 24 hr tablet; Take 1 tablet (25 mg total) by mouth once daily.  -     Comprehensive Metabolic Panel; Future    Hypothyroidism, unspecified type  -     levothyroxine (SYNTHROID) 100 MCG tablet; Take 1 tablet (100 mcg total) by mouth once daily.  -     TSH; Future    Seasonal allergic rhinitis due to pollen  -     montelukast (SINGULAIR) 10 mg tablet; Take 1 tablet (10 mg total) by mouth every evening.    History of fracture of left hip  -     alendronate (FOSAMAX) 70 MG tablet; Take 1 tablet (70 mg total) by mouth every 7 days.    Other orders  -     finasteride (PROSCAR) 5 mg tablet; Take 1 tablet (5 mg total) by mouth once daily.      Medications Ordered This Encounter   Medications    alendronate (FOSAMAX) 70 MG tablet     Sig: Take 1 tablet (70 mg total) by mouth every 7 days.     Dispense:  12 tablet     Refill:  3    citalopram (CELEXA) 10 MG tablet     Sig: Take 1 tablet (10 mg total) by mouth once daily.     Dispense:  90 tablet     Refill:  3    cloNIDine (CATAPRES) 0.2 MG tablet      Sig: Take 1 tablet (0.2 mg total) by mouth nightly. And 1/2 tablet tablet prn if SBP > 200     Dispense:  180 tablet     Refill:  3    finasteride (PROSCAR) 5 mg tablet     Sig: Take 1 tablet (5 mg total) by mouth once daily.     Dispense:  90 tablet     Refill:  3    flash glucose sensor (FREESTYLE PUMA 2 SENSOR) Kit     Sig: Use one sensor Every 14 days for continuous monitoring of Glucose. DX:E11.22, N18.30, Z79.44     Dispense:  2 kit     Refill:  11    flash glucose sensor (FREESTYLE PUMA 2 SENSOR) Kit     Sig: Use one sensor Every 14 days for continuous monitoring of Glucose. DX:E11.22, N18.30, Z79.44     Dispense:  2 kit     Refill:  11    insulin (LANTUS SOLOSTAR U-100 INSULIN) glargine 100 units/mL SubQ pen     Sig: INJECT 36 UNITS UNDER THE SKIN ONCE A DAY     Dispense:  30 each     Refill:  1    insulin aspart U-100 (NOVOLOG FLEXPEN U-100 INSULIN) 100 unit/mL (3 mL) InPn pen     Sig: USE AS DIRECTED PER SLIDING SCALE. MAX OF 14 UNITS DAILY)     Dispense:  15 each     Refill:  3     PRESCRIPTION IS     levothyroxine (SYNTHROID) 100 MCG tablet     Sig: Take 1 tablet (100 mcg total) by mouth once daily.     Dispense:  90 tablet     Refill:  3     DX Code Needed  PT LOST RX, NEEDING A NEW RX FOR US TO REFILL.    metoprolol succinate (TOPROL-XL) 25 MG 24 hr tablet     Sig: Take 1 tablet (25 mg total) by mouth once daily.     Dispense:  90 tablet     Refill:  3     .    montelukast (SINGULAIR) 10 mg tablet     Sig: Take 1 tablet (10 mg total) by mouth every evening.     Dispense:  90 tablet     Refill:  3    olmesartan (BENICAR) 20 MG tablet     Sig: Take 1 tablet (20 mg total) by mouth 2 (two) times a day.     Dispense:  180 tablet     Refill:  3     .    pravastatin (PRAVACHOL) 40 MG tablet     Sig: Take 1 tablet (40 mg total) by mouth every evening.     Dispense:  90 tablet     Refill:  3     The patient was instructed to stop the following meds:  Medications Discontinued During This  "Encounter   Medication Reason    ibuprofen (ADVIL,MOTRIN) 800 MG tablet Patient no longer taking    chlorthalidone (HYGROTEN) 25 MG Tab Therapy completed    finasteride (PROSCAR) 5 mg tablet Other - Commment Required    finasteride (PROSCAR) 5 mg tablet Other - Commment Required    insulin aspart U-100 (NOVOLOG FLEXPEN U-100 INSULIN) 100 unit/mL (3 mL) InPn pen Reorder    insulin (LANTUS SOLOSTAR U-100 INSULIN) glargine 100 units/mL (3mL) SubQ pen Reorder    metoprolol succinate (TOPROL-XL) 25 MG 24 hr tablet Reorder    citalopram (CELEXA) 10 MG tablet Reorder    pravastatin (PRAVACHOL) 40 MG tablet Reorder    montelukast (SINGULAIR) 10 mg tablet Reorder    flash glucose sensor (FREESTYLE PUMA 2 SENSOR) Kit Reorder    flash glucose sensor (FREESTYLE PUMA 2 SENSOR) Kit Reorder    cloNIDine (CATAPRES) 0.2 MG tablet Reorder    levothyroxine (SYNTHROID) 100 MCG tablet Reorder    finasteride (PROSCAR) 5 mg tablet Reorder    olmesartan (BENICAR) 20 MG tablet Reorder     Orders Placed This Encounter   Procedures    Comprehensive Metabolic Panel     Standing Status:   Future     Standing Expiration Date:   8/3/2023    Lipid Panel     Standing Status:   Future     Standing Expiration Date:   8/3/2023    Hemoglobin A1C     Standing Status:   Future     Standing Expiration Date:   8/3/2023    Microalbumin/Creatinine Ratio, Urine     May change to clinic collect if the patient is in the clinic at the time.     Standing Status:   Future     Standing Expiration Date:   8/3/2023     Order Specific Question:   Specimen Source     Answer:   Urine    TSH     Standing Status:   Future     Standing Expiration Date:   8/3/2023       Medication List with Changes/Refills   New Medications    ALENDRONATE (FOSAMAX) 70 MG TABLET    Take 1 tablet (70 mg total) by mouth every 7 days.   Current Medications    ASPIRIN 325 MG TABLET    Take 325 mg by mouth once daily.    BD LUER-MANUEL SYRINGE 3 ML 22 X 1 1/2" SYRG        " "BLOOD SUGAR DIAGNOSTIC (FREESTYLE LITE STRIPS) STRP    TEST 3 TIMES DAILY    BLOOD-GLUCOSE METER MISC    Use as directed four times daily before meals and at bedtime.    FLASH GLUCOSE SCANNING READER (FREESTYLE PUMA 2 READER) MISC    Use in conjunction with glucose sensor every 14 days for continuous monitoring of Glucose. DX:E11.22, N18.30, Z79.44    LANCETS (ACCU-CHEK SOFTCLIX LANCETS) MISC    Check glucose three times daily.    METHOCARBAMOL (ROBAXIN) 500 MG TAB    Take 500 mg by mouth.    MULTIVITAMIN (THERAGRAN) PER TABLET    Take 1 tablet by mouth.    OXYCODONE (ROXICODONE) 5 MG IMMEDIATE RELEASE TABLET    Take 5 mg by mouth every 6 (six) hours as needed.    PEN NEEDLE, DIABETIC (BD ULTRA-FINE MINI PEN NEEDLE) 31 GAUGE X 3/16" NDLE    USE DAILY WITH LANTUS AND NOVOLOG   Changed and/or Refilled Medications    Modified Medication Previous Medication    CITALOPRAM (CELEXA) 10 MG TABLET citalopram (CELEXA) 10 MG tablet       Take 1 tablet (10 mg total) by mouth once daily.    TAKE 1 TABLET BY MOUTH EVERY DAY    CLONIDINE (CATAPRES) 0.2 MG TABLET cloNIDine (CATAPRES) 0.2 MG tablet       Take 1 tablet (0.2 mg total) by mouth nightly. And 1/2 tablet tablet prn if SBP > 200    Take 1 tablet (0.2 mg total) by mouth nightly. And 1/2 tablet tablet prn if SBP > 200    FINASTERIDE (PROSCAR) 5 MG TABLET finasteride (PROSCAR) 5 mg tablet       Take 1 tablet (5 mg total) by mouth once daily.    Take 5 mg by mouth once daily.    FLASH GLUCOSE SENSOR (FREESTYLE PUMA 2 SENSOR) KIT flash glucose sensor (FREESTYLE PUMA 2 SENSOR) Kit       Use one sensor Every 14 days for continuous monitoring of Glucose. DX:E11.22, N18.30, Z79.44    Use one sensor Every 14 days for continuous monitoring of Glucose. DX:E11.22, N18.30, Z79.44    FLASH GLUCOSE SENSOR (FREESTYLE PUMA 2 SENSOR) KIT flash glucose sensor (FREESTYLE PUMA 2 SENSOR) Kit       Use one sensor Every 14 days for continuous monitoring of Glucose. DX:E11.22, N18.30, Z79.44   "  Use one sensor Every 14 days for continuous monitoring of Glucose. DX:E11.22, N18.30, Z79.44    INSULIN (LANTUS SOLOSTAR U-100 INSULIN) GLARGINE 100 UNITS/ML SUBQ PEN insulin (LANTUS SOLOSTAR U-100 INSULIN) glargine 100 units/mL (3mL) SubQ pen       INJECT 36 UNITS UNDER THE SKIN ONCE A DAY    INJECT 36 UNITS UNDER THE SKIN ONCE A DAY    INSULIN ASPART U-100 (NOVOLOG FLEXPEN U-100 INSULIN) 100 UNIT/ML (3 ML) INPN PEN insulin aspart U-100 (NOVOLOG FLEXPEN U-100 INSULIN) 100 unit/mL (3 mL) InPn pen       USE AS DIRECTED PER SLIDING SCALE. MAX OF 14 UNITS DAILY)    USE AS DIRECTED PER SLIDING SCALE. MAX OF 14 UNITS DAILY)    LEVOTHYROXINE (SYNTHROID) 100 MCG TABLET levothyroxine (SYNTHROID) 100 MCG tablet       Take 1 tablet (100 mcg total) by mouth once daily.    TAKE 1 TABLET BY MOUTH EVERY DAY    METOPROLOL SUCCINATE (TOPROL-XL) 25 MG 24 HR TABLET metoprolol succinate (TOPROL-XL) 25 MG 24 hr tablet       Take 1 tablet (25 mg total) by mouth once daily.    Take 1 tablet (25 mg total) by mouth once daily.    MONTELUKAST (SINGULAIR) 10 MG TABLET montelukast (SINGULAIR) 10 mg tablet       Take 1 tablet (10 mg total) by mouth every evening.    TAKE 1 TABLET BY MOUTH EVERY DAY IN THE EVENING    OLMESARTAN (BENICAR) 20 MG TABLET olmesartan (BENICAR) 20 MG tablet       Take 1 tablet (20 mg total) by mouth 2 (two) times a day.    Take 1 tablet (20 mg total) by mouth 2 (two) times a day.    PRAVASTATIN (PRAVACHOL) 40 MG TABLET pravastatin (PRAVACHOL) 40 MG tablet       Take 1 tablet (40 mg total) by mouth every evening.    TAKE 1 TABLET BY MOUTH EVERY DAY IN THE EVENING   Discontinued Medications    CHLORTHALIDONE (HYGROTEN) 25 MG TAB    Take 1 tablet (25 mg total) by mouth once daily.    FINASTERIDE (PROSCAR) 5 MG TABLET    Take 5 mg by mouth once daily.    FINASTERIDE (PROSCAR) 5 MG TABLET    Take 5 mg by mouth once daily at 6am.    IBUPROFEN (ADVIL,MOTRIN) 800 MG TABLET    Take 1 tablet (800 mg total) by mouth every 6  "(six) hours as needed for Pain.      Medication List with Changes/Refills   New Medications    ALENDRONATE (FOSAMAX) 70 MG TABLET    Take 1 tablet (70 mg total) by mouth every 7 days.       Start Date: 8/3/2022  End Date: 8/3/2023   Current Medications    ASPIRIN 325 MG TABLET    Take 325 mg by mouth once daily.       Start Date: --        End Date: --    BD LUER-MANUEL SYRINGE 3 ML 22 X 1 1/2" SYRG           Start Date: 9/9/2015  End Date: --    BLOOD SUGAR DIAGNOSTIC (FREESTYLE LITE STRIPS) STRP    TEST 3 TIMES DAILY       Start Date: 4/8/2021  End Date: --    BLOOD-GLUCOSE METER MISC    Use as directed four times daily before meals and at bedtime.       Start Date: 8/23/2016 End Date: --    FLASH GLUCOSE SCANNING READER (FREESTYLE PUMA 2 READER) MISC    Use in conjunction with glucose sensor every 14 days for continuous monitoring of Glucose. DX:E11.22, N18.30, Z79.44       Start Date: 4/11/2022 End Date: --    LANCETS (ACCU-CHEK SOFTCLIX LANCETS) MISC    Check glucose three times daily.       Start Date: 3/6/2020  End Date: --    METHOCARBAMOL (ROBAXIN) 500 MG TAB    Take 500 mg by mouth.       Start Date: 4/9/2021  End Date: --    MULTIVITAMIN (THERAGRAN) PER TABLET    Take 1 tablet by mouth.       Start Date: --        End Date: --    OXYCODONE (ROXICODONE) 5 MG IMMEDIATE RELEASE TABLET    Take 5 mg by mouth every 6 (six) hours as needed.       Start Date: 6/10/2022 End Date: --    PEN NEEDLE, DIABETIC (BD ULTRA-FINE MINI PEN NEEDLE) 31 GAUGE X 3/16" NDLE    USE DAILY WITH LANTUS AND NOVOLOG       Start Date: 3/6/2020  End Date: --   Changed and/or Refilled Medications    Modified Medication Previous Medication    CITALOPRAM (CELEXA) 10 MG TABLET citalopram (CELEXA) 10 MG tablet       Take 1 tablet (10 mg total) by mouth once daily.    TAKE 1 TABLET BY MOUTH EVERY DAY       Start Date: 8/3/2022  End Date: --    Start Date: 11/3/2021 End Date: 8/3/2022    CLONIDINE (CATAPRES) 0.2 MG TABLET cloNIDine (CATAPRES) " 0.2 MG tablet       Take 1 tablet (0.2 mg total) by mouth nightly. And 1/2 tablet tablet prn if SBP > 200    Take 1 tablet (0.2 mg total) by mouth nightly. And 1/2 tablet tablet prn if SBP > 200       Start Date: 8/3/2022  End Date: --    Start Date: 6/23/2022 End Date: 8/3/2022    FINASTERIDE (PROSCAR) 5 MG TABLET finasteride (PROSCAR) 5 mg tablet       Take 1 tablet (5 mg total) by mouth once daily.    Take 5 mg by mouth once daily.       Start Date: 8/3/2022  End Date: --    Start Date: --        End Date: 8/3/2022    FLASH GLUCOSE SENSOR (FREESTYLE PUMA 2 SENSOR) KIT flash glucose sensor (FREESTYLE PUMA 2 SENSOR) Kit       Use one sensor Every 14 days for continuous monitoring of Glucose. DX:E11.22, N18.30, Z79.44    Use one sensor Every 14 days for continuous monitoring of Glucose. DX:E11.22, N18.30, Z79.44       Start Date: 8/3/2022  End Date: --    Start Date: 4/11/2022 End Date: 8/3/2022    FLASH GLUCOSE SENSOR (FREESTYLE PUMA 2 SENSOR) KIT flash glucose sensor (FREESTYLE PUMA 2 SENSOR) Kit       Use one sensor Every 14 days for continuous monitoring of Glucose. DX:E11.22, N18.30, Z79.44    Use one sensor Every 14 days for continuous monitoring of Glucose. DX:E11.22, N18.30, Z79.44       Start Date: 8/3/2022  End Date: --    Start Date: 4/11/2022 End Date: 8/3/2022    INSULIN (LANTUS SOLOSTAR U-100 INSULIN) GLARGINE 100 UNITS/ML SUBQ PEN insulin (LANTUS SOLOSTAR U-100 INSULIN) glargine 100 units/mL (3mL) SubQ pen       INJECT 36 UNITS UNDER THE SKIN ONCE A DAY    INJECT 36 UNITS UNDER THE SKIN ONCE A DAY       Start Date: 8/3/2022  End Date: --    Start Date: 4/26/2021 End Date: 8/3/2022    INSULIN ASPART U-100 (NOVOLOG FLEXPEN U-100 INSULIN) 100 UNIT/ML (3 ML) INPN PEN insulin aspart U-100 (NOVOLOG FLEXPEN U-100 INSULIN) 100 unit/mL (3 mL) InPn pen       USE AS DIRECTED PER SLIDING SCALE. MAX OF 14 UNITS DAILY)    USE AS DIRECTED PER SLIDING SCALE. MAX OF 14 UNITS DAILY)       Start Date: 8/3/2022  End  Date: --    Start Date: 12/27/2020End Date: 8/3/2022    LEVOTHYROXINE (SYNTHROID) 100 MCG TABLET levothyroxine (SYNTHROID) 100 MCG tablet       Take 1 tablet (100 mcg total) by mouth once daily.    TAKE 1 TABLET BY MOUTH EVERY DAY       Start Date: 8/3/2022  End Date: --    Start Date: 6/28/2022 End Date: 8/3/2022    METOPROLOL SUCCINATE (TOPROL-XL) 25 MG 24 HR TABLET metoprolol succinate (TOPROL-XL) 25 MG 24 hr tablet       Take 1 tablet (25 mg total) by mouth once daily.    Take 1 tablet (25 mg total) by mouth once daily.       Start Date: 8/3/2022  End Date: 8/3/2023    Start Date: 8/11/2021 End Date: 8/3/2022    MONTELUKAST (SINGULAIR) 10 MG TABLET montelukast (SINGULAIR) 10 mg tablet       Take 1 tablet (10 mg total) by mouth every evening.    TAKE 1 TABLET BY MOUTH EVERY DAY IN THE EVENING       Start Date: 8/3/2022  End Date: --    Start Date: 11/29/2021End Date: 8/3/2022    OLMESARTAN (BENICAR) 20 MG TABLET olmesartan (BENICAR) 20 MG tablet       Take 1 tablet (20 mg total) by mouth 2 (two) times a day.    Take 1 tablet (20 mg total) by mouth 2 (two) times a day.       Start Date: 8/3/2022  End Date: 8/3/2023    Start Date: 7/13/2022 End Date: 8/3/2022    PRAVASTATIN (PRAVACHOL) 40 MG TABLET pravastatin (PRAVACHOL) 40 MG tablet       Take 1 tablet (40 mg total) by mouth every evening.    TAKE 1 TABLET BY MOUTH EVERY DAY IN THE EVENING       Start Date: 8/3/2022  End Date: --    Start Date: 11/29/2021End Date: 8/3/2022   Discontinued Medications    CHLORTHALIDONE (HYGROTEN) 25 MG TAB    Take 1 tablet (25 mg total) by mouth once daily.       Start Date: 5/25/2022 End Date: 8/3/2022    FINASTERIDE (PROSCAR) 5 MG TABLET    Take 5 mg by mouth once daily.       Start Date: 2/21/2022 End Date: 5/22/2022    FINASTERIDE (PROSCAR) 5 MG TABLET    Take 5 mg by mouth once daily at 6am.       Start Date: 6/11/2022 End Date: 7/11/2022    IBUPROFEN (ADVIL,MOTRIN) 800 MG TABLET    Take 1 tablet (800 mg total) by mouth every  6 (six) hours as needed for Pain.       Start Date: 4/9/2021  End Date: 8/3/2022

## 2022-08-04 ENCOUNTER — LAB VISIT (OUTPATIENT)
Dept: LAB | Facility: HOSPITAL | Age: 82
End: 2022-08-04
Attending: INTERNAL MEDICINE
Payer: MEDICARE

## 2022-08-04 DIAGNOSIS — I15.2 HYPERTENSION ASSOCIATED WITH DIABETES: ICD-10-CM

## 2022-08-04 DIAGNOSIS — E11.22 CONTROLLED TYPE 2 DIABETES MELLITUS WITH STAGE 3 CHRONIC KIDNEY DISEASE, WITH LONG-TERM CURRENT USE OF INSULIN: ICD-10-CM

## 2022-08-04 DIAGNOSIS — E03.9 HYPOTHYROIDISM, UNSPECIFIED TYPE: ICD-10-CM

## 2022-08-04 DIAGNOSIS — E11.59 HYPERTENSION ASSOCIATED WITH DIABETES: ICD-10-CM

## 2022-08-04 DIAGNOSIS — E78.2 COMBINED HYPERLIPIDEMIA ASSOCIATED WITH TYPE 2 DIABETES MELLITUS: ICD-10-CM

## 2022-08-04 DIAGNOSIS — Z79.4 CONTROLLED TYPE 2 DIABETES MELLITUS WITH STAGE 3 CHRONIC KIDNEY DISEASE, WITH LONG-TERM CURRENT USE OF INSULIN: ICD-10-CM

## 2022-08-04 DIAGNOSIS — N18.30 CONTROLLED TYPE 2 DIABETES MELLITUS WITH STAGE 3 CHRONIC KIDNEY DISEASE, WITH LONG-TERM CURRENT USE OF INSULIN: ICD-10-CM

## 2022-08-04 DIAGNOSIS — E11.69 COMBINED HYPERLIPIDEMIA ASSOCIATED WITH TYPE 2 DIABETES MELLITUS: ICD-10-CM

## 2022-08-04 LAB
ALBUMIN SERPL BCP-MCNC: 3.3 G/DL (ref 3.5–5.2)
ALP SERPL-CCNC: 78 U/L (ref 55–135)
ALT SERPL W/O P-5'-P-CCNC: 10 U/L (ref 10–44)
ANION GAP SERPL CALC-SCNC: 6 MMOL/L (ref 8–16)
ANION GAP SERPL CALC-SCNC: 6 MMOL/L (ref 8–16)
AST SERPL-CCNC: 14 U/L (ref 10–40)
BILIRUB SERPL-MCNC: 0.6 MG/DL (ref 0.1–1)
BUN SERPL-MCNC: 27 MG/DL (ref 8–23)
BUN SERPL-MCNC: 27 MG/DL (ref 8–23)
CALCIUM SERPL-MCNC: 10.1 MG/DL (ref 8.7–10.5)
CALCIUM SERPL-MCNC: 10.1 MG/DL (ref 8.7–10.5)
CHLORIDE SERPL-SCNC: 101 MMOL/L (ref 95–110)
CHLORIDE SERPL-SCNC: 101 MMOL/L (ref 95–110)
CHOLEST SERPL-MCNC: 124 MG/DL (ref 120–199)
CHOLEST/HDLC SERPL: 2.9 {RATIO} (ref 2–5)
CO2 SERPL-SCNC: 29 MMOL/L (ref 23–29)
CO2 SERPL-SCNC: 29 MMOL/L (ref 23–29)
CREAT SERPL-MCNC: 1.1 MG/DL (ref 0.5–1.4)
CREAT SERPL-MCNC: 1.1 MG/DL (ref 0.5–1.4)
EST. GFR  (NO RACE VARIABLE): >60 ML/MIN/1.73 M^2
EST. GFR  (NO RACE VARIABLE): >60 ML/MIN/1.73 M^2
ESTIMATED AVG GLUCOSE: 137 MG/DL (ref 68–131)
GLUCOSE SERPL-MCNC: 227 MG/DL (ref 70–110)
GLUCOSE SERPL-MCNC: 227 MG/DL (ref 70–110)
HBA1C MFR BLD: 6.4 % (ref 4–5.6)
HDLC SERPL-MCNC: 43 MG/DL (ref 40–75)
HDLC SERPL: 34.7 % (ref 20–50)
LDLC SERPL CALC-MCNC: 70.4 MG/DL (ref 63–159)
NONHDLC SERPL-MCNC: 81 MG/DL
POTASSIUM SERPL-SCNC: 4.3 MMOL/L (ref 3.5–5.1)
POTASSIUM SERPL-SCNC: 4.3 MMOL/L (ref 3.5–5.1)
PROT SERPL-MCNC: 5.8 G/DL (ref 6–8.4)
SODIUM SERPL-SCNC: 136 MMOL/L (ref 136–145)
SODIUM SERPL-SCNC: 136 MMOL/L (ref 136–145)
TRIGL SERPL-MCNC: 53 MG/DL (ref 30–150)
TSH SERPL DL<=0.005 MIU/L-ACNC: 0.78 UIU/ML (ref 0.4–4)

## 2022-08-04 PROCEDURE — 84443 ASSAY THYROID STIM HORMONE: CPT | Performed by: FAMILY MEDICINE

## 2022-08-04 PROCEDURE — 36415 COLL VENOUS BLD VENIPUNCTURE: CPT | Mod: PO | Performed by: FAMILY MEDICINE

## 2022-08-04 PROCEDURE — 80053 COMPREHEN METABOLIC PANEL: CPT | Performed by: FAMILY MEDICINE

## 2022-08-04 PROCEDURE — 83036 HEMOGLOBIN GLYCOSYLATED A1C: CPT | Performed by: FAMILY MEDICINE

## 2022-08-04 PROCEDURE — 80061 LIPID PANEL: CPT | Performed by: FAMILY MEDICINE

## 2022-08-05 ENCOUNTER — TELEPHONE (OUTPATIENT)
Dept: CARDIOLOGY | Facility: CLINIC | Age: 82
End: 2022-08-05
Payer: MEDICARE

## 2022-08-05 NOTE — PROGRESS NOTES
I have reviewed the labs and am recommending the following:  A1C NORMAL-The A1c is at goal.  Repeat an a1c in 6 months.     CMP/BMP NORMAL-The electrolytes all appear stable at this time.  This includes kidney functions along with routine electrolytes like sugar, potassium and sodium.  If it was a CMP test, the liver enzymes were noted to be stable also.  LIPID NORMAL SCREEN-The cholesterol panel screening showed levels that are considered at target at this time.  Recheck each year.     Health maintenance items that remain on your list that need to be arranged are listed below.   Please notify me if you are prepared to get them completed.    There are no preventive care reminders to display for this patient.    Dr. Darin Krueger

## 2022-08-05 NOTE — TELEPHONE ENCOUNTER
----- Message from Abad Villagran MD sent at 8/4/2022  6:24 PM CDT -----  BMP reviewed  Continue current meds

## 2022-08-05 NOTE — TELEPHONE ENCOUNTER
The patient has been notified of this information and all questions answered.  BMP reviewed  Continue current meds. Patient verbalized understanding.

## 2022-08-14 ENCOUNTER — DOCUMENT SCAN (OUTPATIENT)
Dept: HOME HEALTH SERVICES | Facility: HOSPITAL | Age: 82
End: 2022-08-14
Payer: MEDICARE

## 2022-08-15 ENCOUNTER — TELEPHONE (OUTPATIENT)
Dept: FAMILY MEDICINE | Facility: CLINIC | Age: 82
End: 2022-08-15
Payer: MEDICARE

## 2022-08-15 NOTE — TELEPHONE ENCOUNTER
----- Message from Rodney Do sent at 8/15/2022 10:59 AM CDT -----  Regarding: Handicap Sticker/Call Back  Pt. Wife stated that they dropped off paperwork to be completed last week for a handicap sticker. Pt. Wanted to know how much longer would it take before it's complete and would like for someone to call her in regards to it. A good call back number is [phone].

## 2022-08-31 ENCOUNTER — EXTERNAL CHRONIC CARE MANAGEMENT (OUTPATIENT)
Dept: PRIMARY CARE CLINIC | Facility: CLINIC | Age: 82
End: 2022-08-31
Payer: MEDICARE

## 2022-08-31 PROCEDURE — 99490 CHRNC CARE MGMT STAFF 1ST 20: CPT | Mod: S$PBB,,, | Performed by: FAMILY MEDICINE

## 2022-08-31 PROCEDURE — 99490 PR CHRONIC CARE MGMT, 1ST 20 MIN: ICD-10-PCS | Mod: S$PBB,,, | Performed by: FAMILY MEDICINE

## 2022-08-31 PROCEDURE — 99490 CHRNC CARE MGMT STAFF 1ST 20: CPT | Mod: PBBFAC,PO | Performed by: FAMILY MEDICINE

## 2022-09-30 ENCOUNTER — EXTERNAL CHRONIC CARE MANAGEMENT (OUTPATIENT)
Dept: PRIMARY CARE CLINIC | Facility: CLINIC | Age: 82
End: 2022-09-30
Payer: MEDICARE

## 2022-09-30 PROCEDURE — 99490 CHRNC CARE MGMT STAFF 1ST 20: CPT | Mod: S$PBB,,, | Performed by: FAMILY MEDICINE

## 2022-09-30 PROCEDURE — 99490 PR CHRONIC CARE MGMT, 1ST 20 MIN: ICD-10-PCS | Mod: S$PBB,,, | Performed by: FAMILY MEDICINE

## 2022-09-30 PROCEDURE — 99490 CHRNC CARE MGMT STAFF 1ST 20: CPT | Mod: PBBFAC,PO | Performed by: FAMILY MEDICINE

## 2022-10-31 ENCOUNTER — EXTERNAL CHRONIC CARE MANAGEMENT (OUTPATIENT)
Dept: PRIMARY CARE CLINIC | Facility: CLINIC | Age: 82
End: 2022-10-31
Payer: MEDICARE

## 2022-10-31 PROCEDURE — 99490 CHRNC CARE MGMT STAFF 1ST 20: CPT | Mod: S$PBB,,, | Performed by: FAMILY MEDICINE

## 2022-10-31 PROCEDURE — 99490 PR CHRONIC CARE MGMT, 1ST 20 MIN: ICD-10-PCS | Mod: S$PBB,,, | Performed by: FAMILY MEDICINE

## 2022-10-31 PROCEDURE — 99490 CHRNC CARE MGMT STAFF 1ST 20: CPT | Mod: PBBFAC,PO | Performed by: FAMILY MEDICINE

## 2022-11-30 ENCOUNTER — EXTERNAL CHRONIC CARE MANAGEMENT (OUTPATIENT)
Dept: PRIMARY CARE CLINIC | Facility: CLINIC | Age: 82
End: 2022-11-30
Payer: MEDICARE

## 2022-11-30 PROCEDURE — 99490 CHRNC CARE MGMT STAFF 1ST 20: CPT | Mod: PBBFAC,PO | Performed by: FAMILY MEDICINE

## 2022-11-30 PROCEDURE — 99490 CHRNC CARE MGMT STAFF 1ST 20: CPT | Mod: S$PBB,,, | Performed by: FAMILY MEDICINE

## 2022-11-30 PROCEDURE — 99490 PR CHRONIC CARE MGMT, 1ST 20 MIN: ICD-10-PCS | Mod: S$PBB,,, | Performed by: FAMILY MEDICINE

## 2022-12-12 DIAGNOSIS — I65.23 BILATERAL CAROTID ARTERY STENOSIS: Primary | ICD-10-CM

## 2022-12-23 ENCOUNTER — PATIENT MESSAGE (OUTPATIENT)
Dept: FAMILY MEDICINE | Facility: CLINIC | Age: 82
End: 2022-12-23
Payer: MEDICARE

## 2022-12-31 ENCOUNTER — EXTERNAL CHRONIC CARE MANAGEMENT (OUTPATIENT)
Dept: PRIMARY CARE CLINIC | Facility: CLINIC | Age: 82
End: 2022-12-31
Payer: MEDICARE

## 2022-12-31 PROCEDURE — 99439 CHRNC CARE MGMT STAF EA ADDL: CPT | Mod: S$PBB,,, | Performed by: FAMILY MEDICINE

## 2022-12-31 PROCEDURE — 99490 CHRNC CARE MGMT STAFF 1ST 20: CPT | Mod: S$PBB,,, | Performed by: FAMILY MEDICINE

## 2022-12-31 PROCEDURE — 99490 CHRNC CARE MGMT STAFF 1ST 20: CPT | Mod: PBBFAC,25,PO | Performed by: FAMILY MEDICINE

## 2022-12-31 PROCEDURE — 99490 PR CHRONIC CARE MGMT, 1ST 20 MIN: ICD-10-PCS | Mod: S$PBB,,, | Performed by: FAMILY MEDICINE

## 2022-12-31 PROCEDURE — 99439 CHRNC CARE MGMT STAF EA ADDL: CPT | Mod: PBBFAC,PO | Performed by: FAMILY MEDICINE

## 2022-12-31 PROCEDURE — 99439 PR CHRONIC CARE MGMT, EA ADDTL 20 MIN: ICD-10-PCS | Mod: S$PBB,,, | Performed by: FAMILY MEDICINE

## 2023-01-24 ENCOUNTER — OFFICE VISIT (OUTPATIENT)
Dept: CARDIOLOGY | Facility: CLINIC | Age: 83
End: 2023-01-24
Payer: MEDICARE

## 2023-01-24 VITALS
HEIGHT: 72 IN | BODY MASS INDEX: 21.04 KG/M2 | DIASTOLIC BLOOD PRESSURE: 62 MMHG | HEART RATE: 54 BPM | WEIGHT: 155.31 LBS | OXYGEN SATURATION: 97 % | SYSTOLIC BLOOD PRESSURE: 130 MMHG

## 2023-01-24 DIAGNOSIS — E11.59 HYPERTENSION ASSOCIATED WITH DIABETES: Primary | ICD-10-CM

## 2023-01-24 DIAGNOSIS — I49.3 PVC (PREMATURE VENTRICULAR CONTRACTION): ICD-10-CM

## 2023-01-24 DIAGNOSIS — Z98.890 HISTORY OF CAROTID ENDARTERECTOMY: ICD-10-CM

## 2023-01-24 DIAGNOSIS — I73.9 PAD (PERIPHERAL ARTERY DISEASE): ICD-10-CM

## 2023-01-24 DIAGNOSIS — I65.23 BILATERAL CAROTID ARTERY STENOSIS: ICD-10-CM

## 2023-01-24 DIAGNOSIS — R00.2 PALPITATIONS: ICD-10-CM

## 2023-01-24 DIAGNOSIS — I15.2 HYPERTENSION ASSOCIATED WITH DIABETES: Primary | ICD-10-CM

## 2023-01-24 DIAGNOSIS — E78.2 COMBINED HYPERLIPIDEMIA ASSOCIATED WITH TYPE 2 DIABETES MELLITUS: ICD-10-CM

## 2023-01-24 DIAGNOSIS — G47.33 OSA (OBSTRUCTIVE SLEEP APNEA): ICD-10-CM

## 2023-01-24 DIAGNOSIS — R09.89 BILATERAL CAROTID BRUITS: ICD-10-CM

## 2023-01-24 DIAGNOSIS — E11.69 COMBINED HYPERLIPIDEMIA ASSOCIATED WITH TYPE 2 DIABETES MELLITUS: ICD-10-CM

## 2023-01-24 PROCEDURE — 99214 OFFICE O/P EST MOD 30 MIN: CPT | Mod: S$PBB,,, | Performed by: INTERNAL MEDICINE

## 2023-01-24 PROCEDURE — 99215 OFFICE O/P EST HI 40 MIN: CPT | Mod: PBBFAC,PO | Performed by: INTERNAL MEDICINE

## 2023-01-24 PROCEDURE — 99999 PR PBB SHADOW E&M-EST. PATIENT-LVL V: ICD-10-PCS | Mod: PBBFAC,,, | Performed by: INTERNAL MEDICINE

## 2023-01-24 PROCEDURE — 99999 PR PBB SHADOW E&M-EST. PATIENT-LVL V: CPT | Mod: PBBFAC,,, | Performed by: INTERNAL MEDICINE

## 2023-01-24 PROCEDURE — 99214 PR OFFICE/OUTPT VISIT, EST, LEVL IV, 30-39 MIN: ICD-10-PCS | Mod: S$PBB,,, | Performed by: INTERNAL MEDICINE

## 2023-01-24 RX ORDER — MECLIZINE HCL 25MG 25 MG/1
25 TABLET, CHEWABLE ORAL
COMMUNITY
End: 2023-02-22

## 2023-01-24 RX ORDER — CLONIDINE HYDROCHLORIDE 0.2 MG/1
0.2 TABLET ORAL EVERY 6 HOURS PRN
Qty: 180 TABLET | Refills: 3 | Status: SHIPPED | OUTPATIENT
Start: 2023-01-24 | End: 2023-02-22

## 2023-01-24 RX ORDER — CHLORTHALIDONE 25 MG/1
25 TABLET ORAL DAILY
COMMUNITY
End: 2023-02-22

## 2023-01-24 RX ORDER — HYDRALAZINE HYDROCHLORIDE 50 MG/1
50 TABLET, FILM COATED ORAL EVERY 12 HOURS
Qty: 60 TABLET | Refills: 11 | Status: SHIPPED | OUTPATIENT
Start: 2023-01-24 | End: 2023-02-22

## 2023-01-24 NOTE — PROGRESS NOTES
Subjective:   Patient ID:  Bebeto Santiago is a 82 y.o. male who presents for follow-up of Follow-up  SBP around 140 or less  HR upper 40s  Pt with fatigue  NMT/stress nml 2021  Hypertension  This is a chronic problem. The current episode started more than 1 year ago. The problem has been gradually improving since onset. The problem is controlled. Pertinent negatives include no chest pain, palpitations or shortness of breath. Past treatments include beta blockers and calcium channel blockers. The current treatment provides moderate improvement. There are no compliance problems.    Hyperlipidemia  This is a chronic problem. The current episode started more than 1 year ago. The problem is controlled. Recent lipid tests were reviewed and are variable. Pertinent negatives include no chest pain or shortness of breath. Current antihyperlipidemic treatment includes statins. The current treatment provides moderate improvement of lipids. There are no compliance problems.    Palpitations   This is a chronic problem. The current episode started more than 1 year ago. The problem occurs intermittently. The problem has been waxing and waning. Nothing aggravates the symptoms. Pertinent negatives include no chest pain, dizziness or shortness of breath. He has tried beta blockers for the symptoms. The treatment provided moderate relief.     Review of Systems   Constitutional: Negative. Negative for weight gain.   HENT: Negative.     Eyes: Negative.    Cardiovascular: Negative.  Negative for chest pain, leg swelling and palpitations.   Respiratory: Negative.  Negative for shortness of breath.    Endocrine: Negative.    Hematologic/Lymphatic: Negative.    Skin: Negative.    Musculoskeletal:  Negative for muscle weakness.   Gastrointestinal: Negative.    Genitourinary: Negative.    Neurological: Negative.  Negative for dizziness.   Psychiatric/Behavioral: Negative.     Allergic/Immunologic: Negative.    All other systems reviewed and  are negative.  Family History   Problem Relation Age of Onset    Heart disease Mother     Stroke Maternal Grandfather     Diabetes Neg Hx     Cancer Neg Hx     Hypertension Neg Hx      Past Medical History:   Diagnosis Date    Allergy     BPH (benign prostatic hypertrophy)     Controlled type 2 diabetes mellitus with stage 3 chronic kidney disease, with long-term current use of insulin     The patient presents with diabetes.  The patient denies polyuria, polydipsia, polyphagia, hypoglycemia and paresthesias.  The patient's glucose control has been good.  Home glucose averages are routinely checked.  The patient is without retinopathy currently.  The patient has no history of neuropathy.  The patient currently complains of no podiatric problems.  The patient has excellent compliance.    Dehydration     Diabetes mellitus type II, uncontrolled     GERD (gastroesophageal reflux disease) 2013    grade IV/IV    Hypertension     Hypothyroidism     Male hypogonadism     Non-proliferative diabetic retinopathy     Urinary retention      Social History     Socioeconomic History    Marital status:      Spouse name: Babs    Number of children: 2   Occupational History    Occupation: Retired   Tobacco Use    Smoking status: Former    Smokeless tobacco: Current     Types: Chew   Substance and Sexual Activity    Alcohol use: Yes     Comment: rare    Drug use: No    Sexual activity: Not Currently     Partners: Female     Current Outpatient Medications on File Prior to Visit   Medication Sig Dispense Refill    cloNIDine (CATAPRES) 0.2 MG tablet Take 1 tablet (0.2 mg total) by mouth nightly. And 1/2 tablet tablet prn if SBP > 200 180 tablet 3    montelukast (SINGULAIR) 10 mg tablet Take 1 tablet (10 mg total) by mouth every evening. 90 tablet 3    olmesartan (BENICAR) 20 MG tablet Take 1 tablet (20 mg total) by mouth 2 (two) times a day. 180 tablet 3    pravastatin (PRAVACHOL) 40 MG tablet Take 1  "tablet (40 mg total) by mouth every evening. 90 tablet 3    alendronate (FOSAMAX) 70 MG tablet Take 1 tablet (70 mg total) by mouth every 7 days. 12 tablet 3    aspirin 325 MG tablet Take 325 mg by mouth once daily.      BD LUER-MANUEL SYRINGE 3 mL 22 x 1 1/2" Syrg   3    blood sugar diagnostic (FREESTYLE LITE STRIPS) Strp TEST 3 TIMES DAILY 300 strip 5    blood-glucose meter Misc Use as directed four times daily before meals and at bedtime. 1 each 0    calcium carbonate (OS-MARIA DEL ROSARIO) 500 mg calcium (1,250 mg) tablet Take 1 tablet (500 mg total) by mouth 2 (two) times daily. 180 tablet 3    citalopram (CELEXA) 10 MG tablet Take 1 tablet (10 mg total) by mouth once daily. 90 tablet 3    finasteride (PROSCAR) 5 mg tablet Take 1 tablet (5 mg total) by mouth once daily. 90 tablet 3    flash glucose scanning reader (FREESTYLE PUMA 2 READER) Misc Use in conjunction with glucose sensor every 14 days for continuous monitoring of Glucose. DX:E11.22, N18.30, Z79.44 6 each 3    flash glucose sensor (FREESTYLE PUMA 2 SENSOR) Kit Use one sensor Every 14 days for continuous monitoring of Glucose. DX:E11.22, N18.30, Z79.44 2 kit 11    flash glucose sensor (FREESTYLE PUMA 2 SENSOR) Kit Use one sensor Every 14 days for continuous monitoring of Glucose. DX:E11.22, N18.30, Z79.44 2 kit 11    insulin (LANTUS SOLOSTAR U-100 INSULIN) glargine 100 units/mL SubQ pen INJECT 36 UNITS UNDER THE SKIN ONCE A DAY 30 each 1    insulin aspart U-100 (NOVOLOG FLEXPEN U-100 INSULIN) 100 unit/mL (3 mL) InPn pen USE AS DIRECTED PER SLIDING SCALE. MAX OF 14 UNITS DAILY) 15 each 3    lancets (ACCU-CHEK SOFTCLIX LANCETS) Misc Check glucose three times daily. 200 each 11    levothyroxine (SYNTHROID) 100 MCG tablet Take 1 tablet (100 mcg total) by mouth once daily. 90 tablet 3    methocarbamoL (ROBAXIN) 500 MG Tab Take 500 mg by mouth.      metoprolol succinate (TOPROL-XL) 25 MG 24 hr tablet Take 1 tablet (25 mg total) by mouth once daily. 90 " "tablet 3    multivitamin (THERAGRAN) per tablet Take 1 tablet by mouth.      oxyCODONE (ROXICODONE) 5 MG immediate release tablet Take 5 mg by mouth every 6 (six) hours as needed.      pen needle, diabetic (BD ULTRA-FINE MINI PEN NEEDLE) 31 gauge x 3/16" Ndle USE DAILY WITH LANTUS AND NOVOLOG 300 each 3     No current facility-administered medications on file prior to visit.     Review of patient's allergies indicates:   Allergen Reactions    Ace inhibitors      Other reaction(s): cough    Metformin      Abdominal pain       Objective:     Physical Exam  Vitals and nursing note reviewed.   Constitutional:       Appearance: He is well-developed.   HENT:      Head: Normocephalic and atraumatic.   Eyes:      Conjunctiva/sclera: Conjunctivae normal.      Pupils: Pupils are equal, round, and reactive to light.   Cardiovascular:      Rate and Rhythm: Normal rate and regular rhythm.      Pulses: Intact distal pulses.      Heart sounds: Normal heart sounds.   Pulmonary:      Effort: Pulmonary effort is normal.      Breath sounds: Normal breath sounds.   Abdominal:      General: Bowel sounds are normal.      Palpations: Abdomen is soft.   Musculoskeletal:      Cervical back: Normal range of motion and neck supple.   Skin:     General: Skin is warm and dry.   Neurological:      Mental Status: He is alert and oriented to person, place, and time.       Assessment:     1. Hypertension associated with diabetes    2. Combined hyperlipidemia associated with type 2 diabetes mellitus    3. Bilateral carotid bruits    4. PAD (peripheral artery disease)    5. Bilateral carotid artery stenosis    6. History of carotid endarterectomy    7. Palpitations    8. PVC (premature ventricular contraction)    9. ERNESTINE (obstructive sleep apnea)        Plan:     Hypertension associated with diabetes    Combined hyperlipidemia associated with type 2 diabetes mellitus    Bilateral carotid bruits    PAD (peripheral artery disease)    Bilateral " carotid artery stenosis    History of carotid endarterectomy    Palpitations    PVC (premature ventricular contraction)    ERNESTINE (obstructive sleep apnea)    Continue clonidine, toprol, olmesartan, hctz -htn/SVT  Continue statin-hlp  Continue asa- primary prevention    Change clonidine to prn  Start hydralazine

## 2023-01-26 ENCOUNTER — PES CALL (OUTPATIENT)
Dept: ADMINISTRATIVE | Facility: CLINIC | Age: 83
End: 2023-01-26
Payer: MEDICARE

## 2023-01-31 ENCOUNTER — EXTERNAL CHRONIC CARE MANAGEMENT (OUTPATIENT)
Dept: PRIMARY CARE CLINIC | Facility: CLINIC | Age: 83
End: 2023-01-31
Payer: MEDICARE

## 2023-01-31 PROCEDURE — 99490 CHRNC CARE MGMT STAFF 1ST 20: CPT | Mod: S$PBB,,, | Performed by: FAMILY MEDICINE

## 2023-01-31 PROCEDURE — 99490 CHRNC CARE MGMT STAFF 1ST 20: CPT | Mod: PBBFAC,PO | Performed by: FAMILY MEDICINE

## 2023-01-31 PROCEDURE — 99490 PR CHRONIC CARE MGMT, 1ST 20 MIN: ICD-10-PCS | Mod: S$PBB,,, | Performed by: FAMILY MEDICINE

## 2023-02-02 ENCOUNTER — TELEPHONE (OUTPATIENT)
Dept: CARDIOLOGY | Facility: CLINIC | Age: 83
End: 2023-02-02
Payer: MEDICARE

## 2023-02-02 NOTE — TELEPHONE ENCOUNTER
"The daughter called stating that her father is on the way to er due to some "spells" she states pain in the upper back and its still present. Called 911 and they were transporting to the er. Pt daughter states that her fathers blood pressure was 80/31 and has been low on previous reading. She wants me to let you know they are going to [facility]  "

## 2023-02-07 ENCOUNTER — TELEPHONE (OUTPATIENT)
Dept: ADMINISTRATIVE | Facility: CLINIC | Age: 83
End: 2023-02-07
Payer: MEDICARE

## 2023-02-07 NOTE — TELEPHONE ENCOUNTER
Called pt, informed pt I was calling to remind pt of his in office [facility] on 2/9/23; clinic location provided to patient; pt confirmed appointment

## 2023-02-09 ENCOUNTER — OFFICE VISIT (OUTPATIENT)
Dept: FAMILY MEDICINE | Facility: CLINIC | Age: 83
End: 2023-02-09
Payer: MEDICARE

## 2023-02-09 VITALS
OXYGEN SATURATION: 100 % | HEART RATE: 63 BPM | HEIGHT: 72 IN | BODY MASS INDEX: 21.13 KG/M2 | SYSTOLIC BLOOD PRESSURE: 118 MMHG | WEIGHT: 156 LBS | DIASTOLIC BLOOD PRESSURE: 62 MMHG

## 2023-02-09 DIAGNOSIS — E78.2 COMBINED HYPERLIPIDEMIA ASSOCIATED WITH TYPE 2 DIABETES MELLITUS: ICD-10-CM

## 2023-02-09 DIAGNOSIS — I49.3 PVC (PREMATURE VENTRICULAR CONTRACTION): ICD-10-CM

## 2023-02-09 DIAGNOSIS — I65.23 BILATERAL CAROTID ARTERY STENOSIS: ICD-10-CM

## 2023-02-09 DIAGNOSIS — G47.33 OSA (OBSTRUCTIVE SLEEP APNEA): ICD-10-CM

## 2023-02-09 DIAGNOSIS — N18.30 CONTROLLED TYPE 2 DIABETES MELLITUS WITH STAGE 3 CHRONIC KIDNEY DISEASE, WITH LONG-TERM CURRENT USE OF INSULIN: ICD-10-CM

## 2023-02-09 DIAGNOSIS — T78.40XA ALLERGY, INITIAL ENCOUNTER: ICD-10-CM

## 2023-02-09 DIAGNOSIS — E11.69 COMBINED HYPERLIPIDEMIA ASSOCIATED WITH TYPE 2 DIABETES MELLITUS: ICD-10-CM

## 2023-02-09 DIAGNOSIS — R00.2 PALPITATIONS: ICD-10-CM

## 2023-02-09 DIAGNOSIS — I15.2 HYPERTENSION ASSOCIATED WITH DIABETES: ICD-10-CM

## 2023-02-09 DIAGNOSIS — E11.3299 NON-PROLIFERATIVE DIABETIC RETINOPATHY: ICD-10-CM

## 2023-02-09 DIAGNOSIS — E29.1 MALE HYPOGONADISM: ICD-10-CM

## 2023-02-09 DIAGNOSIS — K57.30 DIVERTICULOSIS OF COLON: ICD-10-CM

## 2023-02-09 DIAGNOSIS — I73.9 PAD (PERIPHERAL ARTERY DISEASE): ICD-10-CM

## 2023-02-09 DIAGNOSIS — Z00.00 ENCOUNTER FOR MEDICARE ANNUAL WELLNESS EXAM: Primary | ICD-10-CM

## 2023-02-09 DIAGNOSIS — K21.9 GASTROESOPHAGEAL REFLUX DISEASE, UNSPECIFIED WHETHER ESOPHAGITIS PRESENT: ICD-10-CM

## 2023-02-09 DIAGNOSIS — N18.30 CKD STAGE 3 SECONDARY TO DIABETES: ICD-10-CM

## 2023-02-09 DIAGNOSIS — Z86.010 HISTORY OF COLON POLYPS: ICD-10-CM

## 2023-02-09 DIAGNOSIS — R09.89 BILATERAL CAROTID BRUITS: ICD-10-CM

## 2023-02-09 DIAGNOSIS — E03.9 HYPOTHYROIDISM, UNSPECIFIED TYPE: ICD-10-CM

## 2023-02-09 DIAGNOSIS — Z98.890 HISTORY OF CAROTID ENDARTERECTOMY: ICD-10-CM

## 2023-02-09 DIAGNOSIS — E11.22 CKD STAGE 3 SECONDARY TO DIABETES: ICD-10-CM

## 2023-02-09 DIAGNOSIS — F32.1 MAJOR DEPRESSIVE DISORDER, SINGLE EPISODE, MODERATE: ICD-10-CM

## 2023-02-09 DIAGNOSIS — Z79.4 CONTROLLED TYPE 2 DIABETES MELLITUS WITH STAGE 3 CHRONIC KIDNEY DISEASE, WITH LONG-TERM CURRENT USE OF INSULIN: ICD-10-CM

## 2023-02-09 DIAGNOSIS — E11.59 HYPERTENSION ASSOCIATED WITH DIABETES: ICD-10-CM

## 2023-02-09 DIAGNOSIS — N40.0 BENIGN PROSTATIC HYPERPLASIA WITHOUT LOWER URINARY TRACT SYMPTOMS: ICD-10-CM

## 2023-02-09 DIAGNOSIS — E11.22 CONTROLLED TYPE 2 DIABETES MELLITUS WITH STAGE 3 CHRONIC KIDNEY DISEASE, WITH LONG-TERM CURRENT USE OF INSULIN: ICD-10-CM

## 2023-02-09 PROBLEM — Z12.11 COLON CANCER SCREENING: Status: RESOLVED | Noted: 2020-01-15 | Resolved: 2023-02-09

## 2023-02-09 PROBLEM — Z12.11 SPECIAL SCREENING FOR MALIGNANT NEOPLASMS, COLON: Status: RESOLVED | Noted: 2020-01-16 | Resolved: 2023-02-09

## 2023-02-09 PROBLEM — K63.5 COLON POLYP: Status: RESOLVED | Noted: 2020-01-16 | Resolved: 2023-02-09

## 2023-02-09 PROCEDURE — 99999 PR PBB SHADOW E&M-EST. PATIENT-LVL V: CPT | Mod: PBBFAC,,, | Performed by: NURSE PRACTITIONER

## 2023-02-09 PROCEDURE — 99999 PR PBB SHADOW E&M-EST. PATIENT-LVL V: ICD-10-PCS | Mod: PBBFAC,,, | Performed by: NURSE PRACTITIONER

## 2023-02-09 PROCEDURE — G0439 PPPS, SUBSEQ VISIT: HCPCS | Mod: ,,, | Performed by: NURSE PRACTITIONER

## 2023-02-09 PROCEDURE — 99215 OFFICE O/P EST HI 40 MIN: CPT | Mod: PBBFAC,PO | Performed by: NURSE PRACTITIONER

## 2023-02-09 PROCEDURE — G0439 PR MEDICARE ANNUAL WELLNESS SUBSEQUENT VISIT: ICD-10-PCS | Mod: ,,, | Performed by: NURSE PRACTITIONER

## 2023-02-09 RX ORDER — AMLODIPINE BESYLATE 5 MG/1
5 TABLET ORAL
COMMUNITY
Start: 2023-02-05 | End: 2023-02-22 | Stop reason: SDUPTHER

## 2023-02-09 RX ORDER — CLOPIDOGREL BISULFATE 75 MG/1
75 TABLET ORAL
COMMUNITY
Start: 2023-02-05 | End: 2023-02-22 | Stop reason: SDUPTHER

## 2023-02-09 NOTE — PROGRESS NOTES
Bebeto Santiago presented for a  Medicare AWV and comprehensive Health Risk Assessment today. The following components were reviewed and updated:    Medical history  Family History  Social history  Allergies and Current Medications  Health Risk Assessment  Health Maintenance  Care Team     ** See Completed Assessments for Annual Wellness Visit within the encounter summary.**     The following assessments were completed:  Living Situation  CAGE  Depression Screening  Timed Get Up and Go  Whisper Test  Cognitive Function Screening- 3 word recall and clock drawing completed correctly   Nutrition Screening  ADL Screening  PAQ Screening  Opioid screening:   - Reviewed any potential opioid use disorder (OUD) risk factors  - Evaluated their pain severity and current treatment plan  - Provided non-opioid treatment options information  - Consider referral to a specialist, as appropriate    Vitals:    02/09/23 1100   BP: 118/62   BP Location: Right arm   Pulse: 63   SpO2: 100%   Weight: 70.8 kg (156 lb)   Height: 6' (1.829 m)     Body mass index is 21.16 kg/m².  Physical Exam  Vitals reviewed.   Constitutional:       General: He is not in acute distress.     Appearance: Normal appearance. He is not ill-appearing.   HENT:      Head: Normocephalic and atraumatic.      Right Ear: External ear normal.      Left Ear: External ear normal.   Eyes:      Extraocular Movements: Extraocular movements intact.      Conjunctiva/sclera: Conjunctivae normal.   Cardiovascular:      Rate and Rhythm: Normal rate.      Heart sounds: Normal heart sounds.   Pulmonary:      Effort: Pulmonary effort is normal. No respiratory distress.      Breath sounds: Normal breath sounds.   Abdominal:      General: Abdomen is flat. There is no distension.   Musculoskeletal:         General: Normal range of motion.      Cervical back: Normal range of motion.   Skin:     General: Skin is warm and dry.      Capillary Refill: Capillary refill takes less than 2  seconds.      Coloration: Skin is not pale.      Findings: No rash.   Neurological:      General: No focal deficit present.      Mental Status: He is alert and oriented to person, place, and time. Mental status is at baseline.   Psychiatric:         Mood and Affect: Mood normal.         Speech: Speech normal.         Behavior: Behavior normal. Behavior is cooperative.         Diagnoses and health risks identified today and associated recommendations/orders:    1. Encounter for Medicare annual wellness exam  Complete history and physical was completed today.  Complete and thorough medication reconciliation was performed.  Discussed risks and benefits of medications.  Advised patient on orders and health maintenance.  We discussed old records and old labs if available.  Will request any records not available through epic.  Continue current medications listed on your summary sheet.    2. Non-proliferative diabetic retinopathy  Chronic. Stable  Following with ophthalmology     3. Hypertension associated with diabetes  -at goal today  -Continue current medications and plan of care per PCP  Hypertension Medications               amLODIPine (NORVASC) 5 MG tablet Take 5 mg by mouth.    metoprolol succinate (TOPROL-XL) 25 MG 24 hr tablet Take 1 tablet (25 mg total) by mouth once daily.    olmesartan (BENICAR) 20 MG tablet Take 1 tablet (20 mg total) by mouth 2 (two) times a day.    chlorthalidone (HYGROTEN) 25 MG Tab Take 25 mg by mouth once daily.    cloNIDine (CATAPRES) 0.2 MG tablet Take 1 tablet (0.2 mg total) by mouth every 6 (six) hours as needed. And 1/2 tablet tablet prn if SBP > 200    hydrALAZINE (APRESOLINE) 50 MG tablet Take 1 tablet (50 mg total) by mouth every 12 (twelve) hours.        4. Combined hyperlipidemia associated with type 2 diabetes mellitus  -chronic condition. Currently stable.    -Continue current medications and plan of care per PCP  -recent labs listed below:  Lab Results   Component Value Date     CHOL 124 08/04/2022     Lab Results   Component Value Date    HDL 43 08/04/2022     Lab Results   Component Value Date    LDLCALC 70.4 08/04/2022     Lab Results   Component Value Date    TRIG 53 08/04/2022     Lab Results   Component Value Date    ALT 10 08/04/2022    AST 14 08/04/2022    ALKPHOS 78 08/04/2022    BILITOT 0.6 08/04/2022     Hyperlipidemia Medications               pravastatin (PRAVACHOL) 40 MG tablet Take 1 tablet (40 mg total) by mouth every evening.     5. Bilateral carotid bruits  Chronic. Stable  Following with cardiology and vascular surgery     6. PAD (peripheral artery disease)  Chronic. Stable  Following with cardiology and vascular surgery     7. Bilateral carotid artery stenosis  Chronic. Stable  Following with cardiology and vascular surgery     8. History of carotid endarterectomy  Chronic. Stable  Following with cardiology and vascular surgery     9. Palpitations  Chronic. Stable  Following with cardiology    10. PVC (premature ventricular contraction)  Chronic. Stable  Following with cardiology     11. Benign prostatic hyperplasia without lower urinary tract symptoms  Chronic. Stable  Taking Finasteride   Following with urology    12. CKD stage 3 secondary to diabetes  Chronic. Stable  Avoid anti-inflammatories  Closely monitor labs    BMP  Lab Results   Component Value Date     08/04/2022     08/04/2022    K 4.3 08/04/2022    K 4.3 08/04/2022     08/04/2022     08/04/2022    CO2 29 08/04/2022    CO2 29 08/04/2022    BUN 27 (H) 08/04/2022    BUN 27 (H) 08/04/2022    CREATININE 1.1 08/04/2022    CREATININE 1.1 08/04/2022    CALCIUM 10.1 08/04/2022    CALCIUM 10.1 08/04/2022    ANIONGAP 6 (L) 08/04/2022    ANIONGAP 6 (L) 08/04/2022    EGFRNORACEVR >60.0 08/04/2022    EGFRNORACEVR >60.0 08/04/2022     13. Hypothyroidism, unspecified type  Chronic. Stable  Taking levothyroxine 100 mcg daily  Continue current medications and plan of care per PCP  Lab Results    Component Value Date    TSH 0.782 08/04/2022     14. Male hypogonadism  Chronic. Stable  Following with urology     15. Controlled type 2 diabetes mellitus with stage 3 chronic kidney disease, with long-term current use of insulin  Diabetes Medications               insulin (LANTUS SOLOSTAR U-100 INSULIN) glargine 100 units/mL SubQ pen INJECT 36 UNITS UNDER THE SKIN ONCE A DAY    insulin aspart U-100 (NOVOLOG FLEXPEN U-100 INSULIN) 100 unit/mL (3 mL) InPn pen USE AS DIRECTED PER SLIDING SCALE. MAX OF 14 UNITS DAILY)     -chronic problem  -condition is currently stable  -Continue current medications and plan of care per PCP  Lab Results   Component Value Date    HGBA1C 6.4 (H) 02/02/2023     16. Gastroesophageal reflux disease, unspecified whether esophagitis present  -chronic. Stable  -symptoms controlled with OTC medications  -Continue current medications and plan of care per PCP    17. History of colon polyps  Chronic. Stable  Colonoscopy UTD; no repeat recommended due to age     18. Diverticulosis of colon  Chronic. Stable  Colonoscopy UTD; no repeat recommended due to age     19. Allergy, initial encounter  Chronic. stable  Uses OTC medications PRN  Continue current medications and plan of care per PCP    20. ERNESTINE (obstructive sleep apnea)  Chronic. Stable   Does not use CPAP  Continue current medications and plan of care per PCP    21. Major depressive disorder, single episode, moderate  Chronic. Stable   Taking Celexa  Continue current medications and plan of care per PCP    I offered to discuss end of life issues, including information on how to make advance directives that the patient could use to name someone who would make medical decisions on their behalf if they became too ill to make themselves.    ___Patient declined - already done.    _x__Patient is interested, I provided paperwork and offered to discuss.    Provided Bebeto with a 5-10 year written screening schedule and personal prevention plan.  Recommendations were developed using the USPSTF age appropriate recommendations. Education, counseling, and referrals were provided as needed. After Visit Summary printed and given to patient which includes a list of additional screenings\tests needed.    Follow up in about 6 months (around 8/9/2023), or if symptoms worsen or fail to improve, for follow up with PCP.    Maria De Jesus Perez NP

## 2023-02-22 ENCOUNTER — OFFICE VISIT (OUTPATIENT)
Dept: FAMILY MEDICINE | Facility: CLINIC | Age: 83
End: 2023-02-22
Payer: MEDICARE

## 2023-02-22 ENCOUNTER — PATIENT OUTREACH (OUTPATIENT)
Dept: ADMINISTRATIVE | Facility: HOSPITAL | Age: 83
End: 2023-02-22
Payer: MEDICARE

## 2023-02-22 VITALS
DIASTOLIC BLOOD PRESSURE: 58 MMHG | HEART RATE: 57 BPM | WEIGHT: 156 LBS | HEIGHT: 72 IN | BODY MASS INDEX: 21.13 KG/M2 | SYSTOLIC BLOOD PRESSURE: 128 MMHG

## 2023-02-22 DIAGNOSIS — E11.22 CONTROLLED TYPE 2 DIABETES MELLITUS WITH STAGE 3 CHRONIC KIDNEY DISEASE, WITH LONG-TERM CURRENT USE OF INSULIN: ICD-10-CM

## 2023-02-22 DIAGNOSIS — E03.9 HYPOTHYROIDISM, UNSPECIFIED TYPE: ICD-10-CM

## 2023-02-22 DIAGNOSIS — N18.30 CKD STAGE 3 SECONDARY TO DIABETES: ICD-10-CM

## 2023-02-22 DIAGNOSIS — F32.1 CURRENT MODERATE EPISODE OF MAJOR DEPRESSIVE DISORDER WITHOUT PRIOR EPISODE: ICD-10-CM

## 2023-02-22 DIAGNOSIS — Z79.4 CONTROLLED TYPE 2 DIABETES MELLITUS WITH STAGE 3 CHRONIC KIDNEY DISEASE, WITH LONG-TERM CURRENT USE OF INSULIN: ICD-10-CM

## 2023-02-22 DIAGNOSIS — N18.30 CONTROLLED TYPE 2 DIABETES MELLITUS WITH STAGE 3 CHRONIC KIDNEY DISEASE, WITH LONG-TERM CURRENT USE OF INSULIN: ICD-10-CM

## 2023-02-22 DIAGNOSIS — J30.1 SEASONAL ALLERGIC RHINITIS DUE TO POLLEN: ICD-10-CM

## 2023-02-22 DIAGNOSIS — E11.59 HYPERTENSION ASSOCIATED WITH DIABETES: ICD-10-CM

## 2023-02-22 DIAGNOSIS — I15.2 HYPERTENSION ASSOCIATED WITH DIABETES: ICD-10-CM

## 2023-02-22 DIAGNOSIS — Z98.890 HISTORY OF CAROTID ENDARTERECTOMY: ICD-10-CM

## 2023-02-22 DIAGNOSIS — I73.9 PAD (PERIPHERAL ARTERY DISEASE): Primary | ICD-10-CM

## 2023-02-22 DIAGNOSIS — E11.69 COMBINED HYPERLIPIDEMIA ASSOCIATED WITH TYPE 2 DIABETES MELLITUS: ICD-10-CM

## 2023-02-22 DIAGNOSIS — E11.22 CKD STAGE 3 SECONDARY TO DIABETES: ICD-10-CM

## 2023-02-22 DIAGNOSIS — E78.2 COMBINED HYPERLIPIDEMIA ASSOCIATED WITH TYPE 2 DIABETES MELLITUS: ICD-10-CM

## 2023-02-22 PROCEDURE — 99999 PR PBB SHADOW E&M-EST. PATIENT-LVL III: ICD-10-PCS | Mod: PBBFAC,,, | Performed by: FAMILY MEDICINE

## 2023-02-22 PROCEDURE — 99214 OFFICE O/P EST MOD 30 MIN: CPT | Mod: S$PBB,,, | Performed by: FAMILY MEDICINE

## 2023-02-22 PROCEDURE — 99999 PR PBB SHADOW E&M-EST. PATIENT-LVL III: CPT | Mod: PBBFAC,,, | Performed by: FAMILY MEDICINE

## 2023-02-22 PROCEDURE — 99213 OFFICE O/P EST LOW 20 MIN: CPT | Mod: PBBFAC,PO | Performed by: FAMILY MEDICINE

## 2023-02-22 PROCEDURE — 99214 PR OFFICE/OUTPT VISIT, EST, LEVL IV, 30-39 MIN: ICD-10-PCS | Mod: S$PBB,,, | Performed by: FAMILY MEDICINE

## 2023-02-22 RX ORDER — FINASTERIDE 5 MG/1
5 TABLET, FILM COATED ORAL DAILY
Qty: 90 TABLET | Refills: 3 | Status: SHIPPED | OUTPATIENT
Start: 2023-02-22 | End: 2023-08-23 | Stop reason: SDUPTHER

## 2023-02-22 RX ORDER — GABAPENTIN 300 MG/1
300 CAPSULE ORAL 3 TIMES DAILY
COMMUNITY
Start: 2023-02-10 | End: 2023-10-03

## 2023-02-22 RX ORDER — MONTELUKAST SODIUM 10 MG/1
10 TABLET ORAL NIGHTLY
Qty: 90 TABLET | Refills: 3 | Status: SHIPPED | OUTPATIENT
Start: 2023-02-22 | End: 2023-08-23 | Stop reason: SDUPTHER

## 2023-02-22 RX ORDER — PRAVASTATIN SODIUM 40 MG/1
40 TABLET ORAL NIGHTLY
Qty: 90 TABLET | Refills: 3 | Status: SHIPPED | OUTPATIENT
Start: 2023-02-22 | End: 2023-08-23 | Stop reason: SDUPTHER

## 2023-02-22 RX ORDER — LEVOTHYROXINE SODIUM 100 UG/1
100 TABLET ORAL DAILY
Qty: 90 TABLET | Refills: 3 | Status: SHIPPED | OUTPATIENT
Start: 2023-02-22 | End: 2023-08-23 | Stop reason: SDUPTHER

## 2023-02-22 RX ORDER — METOPROLOL SUCCINATE 25 MG/1
25 TABLET, EXTENDED RELEASE ORAL DAILY
Qty: 90 TABLET | Refills: 3 | Status: SHIPPED | OUTPATIENT
Start: 2023-02-22 | End: 2023-08-23 | Stop reason: SDUPTHER

## 2023-02-22 RX ORDER — AMLODIPINE BESYLATE 5 MG/1
5 TABLET ORAL DAILY
Qty: 90 TABLET | Refills: 3 | Status: SHIPPED | OUTPATIENT
Start: 2023-02-22 | End: 2023-08-23 | Stop reason: SDUPTHER

## 2023-02-22 RX ORDER — CITALOPRAM 10 MG/1
10 TABLET ORAL DAILY
Qty: 90 TABLET | Refills: 3 | Status: SHIPPED | OUTPATIENT
Start: 2023-02-22 | End: 2023-08-23 | Stop reason: SDUPTHER

## 2023-02-22 RX ORDER — INSULIN GLARGINE 100 [IU]/ML
INJECTION, SOLUTION SUBCUTANEOUS
Qty: 30 EACH | Refills: 1 | Status: SHIPPED | OUTPATIENT
Start: 2023-02-22 | End: 2023-08-23 | Stop reason: SDUPTHER

## 2023-02-22 RX ORDER — OLMESARTAN MEDOXOMIL 20 MG/1
20 TABLET ORAL DAILY
Qty: 90 TABLET | Refills: 3 | Status: SHIPPED | OUTPATIENT
Start: 2023-02-22 | End: 2023-08-23 | Stop reason: SDUPTHER

## 2023-02-22 RX ORDER — CLOPIDOGREL BISULFATE 75 MG/1
75 TABLET ORAL DAILY
Qty: 90 TABLET | Refills: 3 | Status: SHIPPED | OUTPATIENT
Start: 2023-02-22 | End: 2023-08-23 | Stop reason: SDUPTHER

## 2023-02-22 RX ORDER — INSULIN ASPART 100 [IU]/ML
INJECTION, SOLUTION INTRAVENOUS; SUBCUTANEOUS
Qty: 15 EACH | Refills: 3 | Status: SHIPPED | OUTPATIENT
Start: 2023-02-22

## 2023-02-22 NOTE — LETTER
February 22, 2023      Jeff Kim MD              [facility]  [address]  [place] [address]  Phone: [phone] February 22, 2023     Patient: Bebeto Santiago    YOB: 1940   Date of Visit: 2/22/2023     To whom it may concern     We are seeing Bebeto Santiago, JAMES.O.B is 1940, at [facility]. Darin Krueger MD is their primary care physician. To help with our health maintenance records could you please send the following:     Last Diabetic Eye Exam Report that states Positive or Negative Retinopathy.    Please send fax to [phone] Attention Daniela GAN LPN Care Coordination.    Thank-you in advance for your assistance. If you have any questions or concerns, please don't hesitate to contact me at [phone].     Daniela GAN LPN  Care Coordination Department  [facility]  Phone number [phone]

## 2023-02-22 NOTE — PROGRESS NOTES
Subjective:      Patient ID: Bebeto Santiago is a 82 y.o. male.    Chief Complaint: Annual Exam    Problem List Items Addressed This Visit       CKD stage 3 secondary to diabetes    Overview     He has stage 3 CKD.   Bebeto Santiago has an Estimated Glumerular Filtration Rate (EGFR) between 30 and 59 consistent with the definition of chronic kidney disease stage 3.    eGFR if non    Date Value Ref Range Status   03/01/2019 >60.0 >60 mL/min/1.73 m^2 Final     Comment:     Calculation used to obtain the estimated glomerular filtration  rate (eGFR) is the CKD-EPI equation.            Lab Results   Component Value Date    CREATININE 1.0 03/01/2019    BUN 20 03/01/2019     03/01/2019    K 4.1 03/01/2019     03/01/2019    CO2 29 03/01/2019       The patient's chronic kidney disease stage 3 was monitored, evaluated, addressed and/or treated.             Relevant Medications    insulin aspart U-100 (NOVOLOG FLEXPEN U-100 INSULIN) 100 unit/mL (3 mL) InPn pen    insulin (LANTUS SOLOSTAR U-100 INSULIN) glargine 100 units/mL SubQ pen    Combined hyperlipidemia associated with type 2 diabetes mellitus    Overview     The patient presents with hyperlipidemia.  The patient reports tolerating the medication well and is in excellent compliance.  There have been no medication side effects.  The patient denies chest pain, neuropathy, and myalgias.  The patient has reduced fat intake and has been exercising.  Current treatment has included the medications listed in the med card.    Lab Results   Component Value Date    CHOL 151 11/04/2020    CHOL 162 03/06/2020    CHOL 152 03/01/2019       Lab Results   Component Value Date    HDL 66 11/04/2020    HDL 62 03/06/2020    HDL 59 03/01/2019       Lab Results   Component Value Date    LDLCALC 74.4 11/04/2020    LDLCALC 86.8 03/06/2020    LDLCALC 82.6 03/01/2019       Lab Results   Component Value Date    TRIG 53 11/04/2020    TRIG 66 03/06/2020    TRIG 52  03/01/2019       Lab Results   Component Value Date    CHOLHDL 43.7 11/04/2020    CHOLHDL 38.3 03/06/2020    CHOLHDL 38.8 03/01/2019     Lab Results   Component Value Date    ALT 11 11/04/2020    AST 14 11/04/2020    ALKPHOS 89 11/04/2020    BILITOT 0.9 11/04/2020            Relevant Medications    pravastatin (PRAVACHOL) 40 MG tablet    insulin aspart U-100 (NOVOLOG FLEXPEN U-100 INSULIN) 100 unit/mL (3 mL) InPn pen    insulin (LANTUS SOLOSTAR U-100 INSULIN) glargine 100 units/mL SubQ pen    Controlled type 2 diabetes mellitus with stage 3 chronic kidney disease, with long-term current use of insulin    Overview     The patient presents with diabetes.  The patient denies polyuria, polydipsia, polyphagia, hypoglycemia and paresthesias.  The patient's glucose control has been good.  Home glucose averages are routinely checked.  The patient is without retinopathy currently.  The patient has no history of neuropathy.  The patient currently complains of no podiatric problems.  The patient has excellent compliance.  Because of him being on lantus and novolog,  the patient checks his blood sugar 4 times daily and administers insulin 3 or more times daily.  His last a1c at [facility] in 2/23 was 6.4.  Hemoglobin A1C   Date Value Ref Range Status   01/27/2022 6.4 (H) 4.0 - 5.6 % Final     Comment:     ADA Screening Guidelines:  5.7-6.4%  Consistent with prediabetes  >or=6.5%  Consistent with diabetes    High levels of fetal hemoglobin interfere with the HbA1C  assay. Heterozygous hemoglobin variants (HbS, HgC, etc)do  not significantly interfere with this assay.   However, presence of multiple variants may affect accuracy.     07/26/2021 6.6 (H) 4.0 - 5.6 % Final     Comment:     ADA Screening Guidelines:  5.7-6.4%  Consistent with prediabetes  >or=6.5%  Consistent with diabetes    High levels of fetal hemoglobin interfere with the HbA1C  assay. Heterozygous hemoglobin variants (HbS, HgC, etc)do  not significantly interfere with  this assay.   However, presence of multiple variants may affect accuracy.     04/23/2021 7.2 (H) 4.0 - 5.6 % Final     Comment:     ADA Screening Guidelines:  5.7-6.4%  Consistent with prediabetes  >or=6.5%  Consistent with diabetes    High levels of fetal hemoglobin interfere with the HbA1C  assay. Heterozygous hemoglobin variants (HbS, HgC, etc)do  not significantly interfere with this assay.   However, presence of multiple variants may affect accuracy.       No results found for: JADON MORE  Diabetes Management Status    Statin: Taking  ACE/ARB: Not taking  He has changed to 33 u of the lantus from 28 u since he had a high a1c below.  Screening or Prevention Patient's value Goal Complete/Controlled?   HgA1C Testing and Control   Lab Results   Component Value Date    HGBA1C 6.4 (H) 01/27/2022      Annually/Less than 8% Yes   Lipid profile : 11/04/2020 Annually Yes   LDL control Lab Results   Component Value Date    LDLCALC 74.4 11/04/2020    Annually/Less than 100 mg/dl  Yes   Nephropathy screening Lab Results   Component Value Date    LABMICR 38.0 12/16/2021     Lab Results   Component Value Date    PROTEINUA Negative 09/20/2017    Annually Yes   Blood pressure BP Readings from Last 1 Encounters:   08/03/22 110/60    Less than 140/90 Yes   Dilated retinal exam : 12/10/2021 Annually No   Foot exam   : 12/16/2021 Annually Yes              Relevant Medications    insulin aspart U-100 (NOVOLOG FLEXPEN U-100 INSULIN) 100 unit/mL (3 mL) InPn pen    insulin (LANTUS SOLOSTAR U-100 INSULIN) glargine 100 units/mL SubQ pen    History of carotid endarterectomy    Relevant Medications    clopidogreL (PLAVIX) 75 mg tablet    Hypertension associated with diabetes    Overview     The patient presents with essential hypertension.  The patient is tolerating the medication well and is in excellent compliance except he is urinating a lot with the chlorthalidone and he is interested in stopping it.  We discussed and agreed  to try this.  The patient is experiencing no side effects.  Counseling was offered regarding low salt diets.  The patient has a reduced salt intake.  The patient denies chest pain, palpitations, shortness of breath, dyspnea on exertion, left or murmur neck pain, nausea, vomiting, diaphoresis, paroxysmal nocturnal dyspnea, and orthopnea.           Relevant Medications    insulin aspart U-100 (NOVOLOG FLEXPEN U-100 INSULIN) 100 unit/mL (3 mL) InPn pen    insulin (LANTUS SOLOSTAR U-100 INSULIN) glargine 100 units/mL SubQ pen    metoprolol succinate (TOPROL-XL) 25 MG 24 hr tablet    olmesartan (BENICAR) 20 MG tablet    amLODIPine (NORVASC) 5 MG tablet    Hypothyroidism    Overview     The patient presents with hypothyroidism.  The patient denies agitation, anxiety, blurred vision, chest pain, cold intolerance, constipation, dizziness, dry skin, fatigue, lightheadedness, paresthesias, skin coarsening, tachycardia, tremor, weight gain or weight loss.  The patient's current treatment has included Synthroid with a good response.  Last thyroid check was at [facility] and was good in 2/23.  Lab Results   Component Value Date    TSH 0.809 11/04/2020            Relevant Medications    levothyroxine (SYNTHROID) 100 MCG tablet    PAD (peripheral artery disease) - Primary    Overview     He has PAD.  He has an ability to be able to walk 1/2 a mile before he hast to stop and rest.  He does take an aspirin.         Relevant Medications    clopidogreL (PLAVIX) 75 mg tablet     Other Visit Diagnoses       Current moderate episode of major depressive disorder without prior episode        Relevant Medications    citalopram (CELEXA) 10 MG tablet    Seasonal allergic rhinitis due to pollen        Relevant Medications    montelukast (SINGULAIR) 10 mg tablet            The patient's Health Maintenance was reviewed and the following appears to be due:   Health Maintenance Due   Topic Date Due    COVID-19 Vaccine (5 - Booster for Pfizer series)  "06/14/2022    Eye Exam  12/10/2022       Past Medical History:  Past Medical History:   Diagnosis Date    Allergy     BPH (benign prostatic hypertrophy)     Controlled type 2 diabetes mellitus with stage 3 chronic kidney disease, with long-term current use of insulin     The patient presents with diabetes.  The patient denies polyuria, polydipsia, polyphagia, hypoglycemia and paresthesias.  The patient's glucose control has been good.  Home glucose averages are routinely checked.  The patient is without retinopathy currently.  The patient has no history of neuropathy.  The patient currently complains of no podiatric problems.  The patient has excellent compliance.    Dehydration     Diabetes mellitus type II, uncontrolled     GERD (gastroesophageal reflux disease) 2013    grade IV/IV    Hypertension     Hypothyroidism     Male hypogonadism     Non-proliferative diabetic retinopathy     Urinary retention      Past Surgical History:   Procedure Laterality Date    COLONOSCOPY N/A 1/16/2020    Procedure: COLONOSCOPY;  Surgeon: Darin Krueger MD;  Location: [facility];  Service: Endoscopy;  Laterality: N/A;    EYE SURGERY      HIP SURGERY Left 2022    SPINE SURGERY       Review of patient's allergies indicates:   Allergen Reactions    Ace inhibitors      Other reaction(s): cough    Metformin      Abdominal pain     Current Outpatient Medications on File Prior to Visit   Medication Sig Dispense Refill    aspirin 325 MG tablet Take 325 mg by mouth once daily.      BD LUER-MANUEL SYRINGE 3 mL 22 x 1 1/2" Syrg   3    blood sugar diagnostic (FREESTYLE LITE STRIPS) Strp TEST 3 TIMES DAILY 300 strip 5    blood-glucose meter Misc Use as directed four times daily before meals and at bedtime. 1 each 0    flash glucose scanning reader (FREESTYLE PUMA 2 READER) Misc Use in conjunction with glucose sensor every 14 days for continuous monitoring of Glucose. DX:E11.22, N18.30, Z79.44 6 each 3    flash glucose sensor (FREESTYLE PUMA 2 " "SENSOR) Kit Use one sensor Every 14 days for continuous monitoring of Glucose. DX:E11.22, N18.30, Z79.44 2 kit 11    flash glucose sensor (FREESTYLE PUMA 2 SENSOR) Kit Use one sensor Every 14 days for continuous monitoring of Glucose. DX:E11.22, N18.30, Z79.44 2 kit 11    gabapentin (NEURONTIN) 300 MG capsule Take 300 mg by mouth 3 (three) times daily.      lancets (ACCU-CHEK SOFTCLIX LANCETS) Misc Check glucose three times daily. 200 each 11    pen needle, diabetic (BD ULTRA-FINE MINI PEN NEEDLE) 31 gauge x 3/16" Ndle USE DAILY WITH LANTUS AND NOVOLOG 300 each 3    [DISCONTINUED] amLODIPine (NORVASC) 5 MG tablet Take 5 mg by mouth.      [DISCONTINUED] citalopram (CELEXA) 10 MG tablet Take 1 tablet (10 mg total) by mouth once daily. 90 tablet 3    [DISCONTINUED] clopidogreL (PLAVIX) 75 mg tablet Take 75 mg by mouth.      [DISCONTINUED] finasteride (PROSCAR) 5 mg tablet Take 1 tablet (5 mg total) by mouth once daily. 90 tablet 3    [DISCONTINUED] insulin (LANTUS SOLOSTAR U-100 INSULIN) glargine 100 units/mL SubQ pen INJECT 36 UNITS UNDER THE SKIN ONCE A DAY 30 each 1    [DISCONTINUED] insulin aspart U-100 (NOVOLOG FLEXPEN U-100 INSULIN) 100 unit/mL (3 mL) InPn pen USE AS DIRECTED PER SLIDING SCALE. MAX OF 14 UNITS DAILY) 15 each 3    [DISCONTINUED] levothyroxine (SYNTHROID) 100 MCG tablet Take 1 tablet (100 mcg total) by mouth once daily. 90 tablet 3    [DISCONTINUED] metoprolol succinate (TOPROL-XL) 25 MG 24 hr tablet Take 1 tablet (25 mg total) by mouth once daily. 90 tablet 3    [DISCONTINUED] montelukast (SINGULAIR) 10 mg tablet Take 1 tablet (10 mg total) by mouth every evening. 90 tablet 3    [DISCONTINUED] multivitamin (THERAGRAN) per tablet Take 1 tablet by mouth.      [DISCONTINUED] olmesartan (BENICAR) 20 MG tablet Take 1 tablet (20 mg total) by mouth 2 (two) times a day. 180 tablet 3    [DISCONTINUED] pravastatin (PRAVACHOL) 40 MG tablet Take 1 tablet (40 mg total) by mouth every evening. 90 tablet 3    " [DISCONTINUED] alendronate (FOSAMAX) 70 MG tablet Take 1 tablet (70 mg total) by mouth every 7 days. 12 tablet 3    [DISCONTINUED] calcium carbonate (OS-MARIA DEL ROSARIO) 500 mg calcium (1,250 mg) tablet Take 1 tablet (500 mg total) by mouth 2 (two) times daily. 180 tablet 3    [DISCONTINUED] chlorthalidone (HYGROTEN) 25 MG Tab Take 25 mg by mouth once daily.      [DISCONTINUED] cloNIDine (CATAPRES) 0.2 MG tablet Take 1 tablet (0.2 mg total) by mouth every 6 (six) hours as needed. And 1/2 tablet tablet prn if SBP > 200 180 tablet 3    [DISCONTINUED] hydrALAZINE (APRESOLINE) 50 MG tablet Take 1 tablet (50 mg total) by mouth every 12 (twelve) hours. 60 tablet 11    [DISCONTINUED] meclizine (ANTIVERT) 25 MG tablet Take 25 mg by mouth.      [DISCONTINUED] methocarbamoL (ROBAXIN) 500 MG Tab Take 500 mg by mouth.      [DISCONTINUED] oxyCODONE (ROXICODONE) 5 MG immediate release tablet Take 5 mg by mouth every 6 (six) hours as needed.       No current facility-administered medications on file prior to visit.     Social History     Socioeconomic History    Marital status:      Spouse name: Babs    Number of children: 2   Occupational History    Occupation: Retired   Tobacco Use    Smoking status: Former    Smokeless tobacco: Current     Types: Chew   Substance and Sexual Activity    Alcohol use: Yes     Comment: rare    Drug use: No    Sexual activity: Not Currently     Partners: Female     Social Determinants of Health     Financial Resource Strain: Low Risk     Difficulty of Paying Living Expenses: Not hard at all   Food Insecurity: No Food Insecurity    Worried About Running Out of Food in the Last Year: Never true    Ran Out of Food in the Last Year: Never true   Transportation Needs: No Transportation Needs    Lack of Transportation (Medical): No    Lack of Transportation (Non-Medical): No   Physical Activity: Inactive    Days of Exercise per Week: 0 days    Minutes of Exercise per Session: 0 min   Stress: No Stress  Concern Present    Feeling of Stress : Not at all   Social Connections: Moderately Integrated    Frequency of Communication with Friends and Family: More than three times a week    Frequency of Social Gatherings with Friends and Family: More than three times a week    Attends Sabianism Services: More than 4 times per year    Active Member of Clubs or Organizations: No    Attends Club or Organization Meetings: Never    Marital Status:    Housing Stability: Low Risk     Unable to Pay for Housing in the Last Year: No    Number of Places Lived in the Last Year: 1    Unstable Housing in the Last Year: No     Family History   Problem Relation Age of Onset    Heart disease Mother     Stroke Maternal Grandfather     Diabetes Neg Hx     Cancer Neg Hx     Hypertension Neg Hx        Review of Systems   Constitutional: Negative.  Negative for chills, diaphoresis and fever.   HENT:  Negative for congestion, hearing loss, mouth sores, postnasal drip and sore throat.    Eyes:  Negative for pain and visual disturbance.   Respiratory:  Negative for cough, chest tightness, shortness of breath and wheezing.    Cardiovascular:  Negative for chest pain.   Gastrointestinal:  Negative for abdominal pain, anal bleeding, blood in stool, constipation, diarrhea, nausea and vomiting.   Genitourinary:  Negative for dysuria and hematuria.   Musculoskeletal:  Negative for back pain, neck pain and neck stiffness.   Skin:  Negative for rash.   Neurological:  Negative for dizziness and weakness.     Objective:   BP (!) 128/58   Pulse (!) 57   Ht 6' (1.829 m)   Wt 70.8 kg (156 lb)   BMI 21.16 kg/m²     Physical Exam  Vitals reviewed.   Constitutional:       General: He is not in acute distress.     Appearance: Normal appearance. He is well-developed. He is not ill-appearing or diaphoretic.   HENT:      Head: Normocephalic and atraumatic.      Right Ear: Tympanic membrane, ear canal and external ear normal.      Left Ear: Tympanic membrane,  ear canal and external ear normal.      Nose: Nose normal.      Mouth/Throat:      Pharynx: No oropharyngeal exudate.   Eyes:      General: No scleral icterus.        Right eye: No discharge.         Left eye: No discharge.      Conjunctiva/sclera: Conjunctivae normal.      Pupils: Pupils are equal, round, and reactive to light.   Neck:      Thyroid: No thyromegaly.      Vascular: No JVD.   Cardiovascular:      Rate and Rhythm: Normal rate and regular rhythm.      Pulses:           Dorsalis pedis pulses are 0 on the right side and 0 on the left side.        Posterior tibial pulses are 0 on the right side and 0 on the left side.      Heart sounds: Normal heart sounds. No murmur heard.    No friction rub. No gallop.   Pulmonary:      Effort: Pulmonary effort is normal. No respiratory distress.      Breath sounds: Normal breath sounds. No wheezing or rales.   Chest:      Chest wall: No tenderness.   Abdominal:      General: Bowel sounds are normal. There is no distension.      Palpations: Abdomen is soft. There is no mass.      Tenderness: There is no abdominal tenderness. There is no guarding or rebound.   Musculoskeletal:         General: No tenderness. Normal range of motion.      Cervical back: Normal range of motion and neck supple.   Lymphadenopathy:      Cervical: No cervical adenopathy.   Skin:     General: Skin is warm and dry.   Neurological:      Mental Status: He is alert and oriented to person, place, and time.      Cranial Nerves: No cranial nerve deficit.      Coordination: Coordination normal.     Assessment:     1. PAD (peripheral artery disease)    2. Combined hyperlipidemia associated with type 2 diabetes mellitus    3. Controlled type 2 diabetes mellitus with stage 3 chronic kidney disease, with long-term current use of insulin    4. Current moderate episode of major depressive disorder without prior episode    5. Hypertension associated with diabetes    6. Hypothyroidism, unspecified type    7.  Seasonal allergic rhinitis due to pollen    8. CKD stage 3 secondary to diabetes    9. History of carotid endarterectomy      Plan:   I have changed Bebeto Santiago's olmesartan, clopidogreL, and amLODIPine. I am also having him maintain his BD LUER-MANUEL SYRINGE, blood-glucose meter, aspirin, lancets, (pen needle, diabetic), FREESTYLE LITE STRIPS, FREESTYLE PUMA 2 READER, FREESTYLE PUMA 2 SENSOR, FREESTYLE PUMA 2 SENSOR, gabapentin, pravastatin, insulin aspart U-100, insulin, citalopram, metoprolol succinate, levothyroxine, montelukast, and finasteride.  No problem-specific Assessment & Plan notes found for this encounter.      No follow-ups on file.    Bebeto was seen today for annual exam.    Diagnoses and all orders for this visit:    PAD (peripheral artery disease)  -     clopidogreL (PLAVIX) 75 mg tablet; Take 1 tablet (75 mg total) by mouth once daily.    Combined hyperlipidemia associated with type 2 diabetes mellitus  -     pravastatin (PRAVACHOL) 40 MG tablet; Take 1 tablet (40 mg total) by mouth every evening.    Controlled type 2 diabetes mellitus with stage 3 chronic kidney disease, with long-term current use of insulin  -     insulin aspart U-100 (NOVOLOG FLEXPEN U-100 INSULIN) 100 unit/mL (3 mL) InPn pen; USE AS DIRECTED PER SLIDING SCALE. MAX OF 14 UNITS DAILY)  -     insulin (LANTUS SOLOSTAR U-100 INSULIN) glargine 100 units/mL SubQ pen; INJECT 36 UNITS UNDER THE SKIN ONCE A DAY    Current moderate episode of major depressive disorder without prior episode  -     citalopram (CELEXA) 10 MG tablet; Take 1 tablet (10 mg total) by mouth once daily.    Hypertension associated with diabetes  -     metoprolol succinate (TOPROL-XL) 25 MG 24 hr tablet; Take 1 tablet (25 mg total) by mouth once daily.  -     olmesartan (BENICAR) 20 MG tablet; Take 1 tablet (20 mg total) by mouth once daily.  -     amLODIPine (NORVASC) 5 MG tablet; Take 1 tablet (5 mg total) by mouth once daily.    Hypothyroidism, unspecified  type  -     levothyroxine (SYNTHROID) 100 MCG tablet; Take 1 tablet (100 mcg total) by mouth once daily.    Seasonal allergic rhinitis due to pollen  -     montelukast (SINGULAIR) 10 mg tablet; Take 1 tablet (10 mg total) by mouth every evening.    CKD stage 3 secondary to diabetes  Track over time.  History of carotid endarterectomy  -     clopidogreL (PLAVIX) 75 mg tablet; Take 1 tablet (75 mg total) by mouth once daily.    Other orders  -     finasteride (PROSCAR) 5 mg tablet; Take 1 tablet (5 mg total) by mouth once daily.      Medications Ordered This Encounter   Medications    amLODIPine (NORVASC) 5 MG tablet     Sig: Take 1 tablet (5 mg total) by mouth once daily.     Dispense:  90 tablet     Refill:  3     .    citalopram (CELEXA) 10 MG tablet     Sig: Take 1 tablet (10 mg total) by mouth once daily.     Dispense:  90 tablet     Refill:  3    clopidogreL (PLAVIX) 75 mg tablet     Sig: Take 1 tablet (75 mg total) by mouth once daily.     Dispense:  90 tablet     Refill:  3    finasteride (PROSCAR) 5 mg tablet     Sig: Take 1 tablet (5 mg total) by mouth once daily.     Dispense:  90 tablet     Refill:  3    insulin (LANTUS SOLOSTAR U-100 INSULIN) glargine 100 units/mL SubQ pen     Sig: INJECT 36 UNITS UNDER THE SKIN ONCE A DAY     Dispense:  30 each     Refill:  1    insulin aspart U-100 (NOVOLOG FLEXPEN U-100 INSULIN) 100 unit/mL (3 mL) InPn pen     Sig: USE AS DIRECTED PER SLIDING SCALE. MAX OF 14 UNITS DAILY)     Dispense:  15 each     Refill:  3     PRESCRIPTION IS     levothyroxine (SYNTHROID) 100 MCG tablet     Sig: Take 1 tablet (100 mcg total) by mouth once daily.     Dispense:  90 tablet     Refill:  3     DX Code Needed  PT LOST RX, NEEDING A NEW RX FOR US TO REFILL.    metoprolol succinate (TOPROL-XL) 25 MG 24 hr tablet     Sig: Take 1 tablet (25 mg total) by mouth once daily.     Dispense:  90 tablet     Refill:  3     .    montelukast (SINGULAIR) 10 mg tablet     Sig: Take 1 tablet (10 mg  "total) by mouth every evening.     Dispense:  90 tablet     Refill:  3    olmesartan (BENICAR) 20 MG tablet     Sig: Take 1 tablet (20 mg total) by mouth once daily.     Dispense:  90 tablet     Refill:  3     .    pravastatin (PRAVACHOL) 40 MG tablet     Sig: Take 1 tablet (40 mg total) by mouth every evening.     Dispense:  90 tablet     Refill:  3     The patient was instructed to stop the following meds:  Medications Discontinued During This Encounter   Medication Reason    calcium carbonate (OS-MARIA DEL ROSARIO) 500 mg calcium (1,250 mg) tablet Patient no longer taking    alendronate (FOSAMAX) 70 MG tablet Patient no longer taking    cloNIDine (CATAPRES) 0.2 MG tablet Patient no longer taking    chlorthalidone (HYGROTEN) 25 MG Tab Patient no longer taking    oxyCODONE (ROXICODONE) 5 MG immediate release tablet Patient no longer taking    multivitamin (THERAGRAN) per tablet Patient no longer taking    methocarbamoL (ROBAXIN) 500 MG Tab Patient no longer taking    meclizine (ANTIVERT) 25 MG tablet Patient no longer taking    hydrALAZINE (APRESOLINE) 50 MG tablet Patient no longer taking    pravastatin (PRAVACHOL) 40 MG tablet Reorder    insulin aspart U-100 (NOVOLOG FLEXPEN U-100 INSULIN) 100 unit/mL (3 mL) InPn pen Reorder    insulin (LANTUS SOLOSTAR U-100 INSULIN) glargine 100 units/mL SubQ pen Reorder    citalopram (CELEXA) 10 MG tablet Reorder    olmesartan (BENICAR) 20 MG tablet Reorder    levothyroxine (SYNTHROID) 100 MCG tablet Reorder    montelukast (SINGULAIR) 10 mg tablet Reorder    metoprolol succinate (TOPROL-XL) 25 MG 24 hr tablet Reorder    finasteride (PROSCAR) 5 mg tablet Reorder    amLODIPine (NORVASC) 5 MG tablet Reorder    clopidogreL (PLAVIX) 75 mg tablet Reorder     No orders of the defined types were placed in this encounter.      Medication List with Changes/Refills   Current Medications    ASPIRIN 325 MG TABLET    Take 325 mg by mouth once daily.    BD LUER-MANUEL SYRINGE 3 ML 22 X 1 1/2" SYRG        " "BLOOD SUGAR DIAGNOSTIC (FREESTYLE LITE STRIPS) STRP    TEST 3 TIMES DAILY    BLOOD-GLUCOSE METER MISC    Use as directed four times daily before meals and at bedtime.    FLASH GLUCOSE SCANNING READER (FREESTYLE PUMA 2 READER) MISC    Use in conjunction with glucose sensor every 14 days for continuous monitoring of Glucose. DX:E11.22, N18.30, Z79.44    FLASH GLUCOSE SENSOR (FREESTYLE PUMA 2 SENSOR) KIT    Use one sensor Every 14 days for continuous monitoring of Glucose. DX:E11.22, N18.30, Z79.44    FLASH GLUCOSE SENSOR (FREESTYLE PUMA 2 SENSOR) KIT    Use one sensor Every 14 days for continuous monitoring of Glucose. DX:E11.22, N18.30, Z79.44    GABAPENTIN (NEURONTIN) 300 MG CAPSULE    Take 300 mg by mouth 3 (three) times daily.    LANCETS (ACCU-CHEK SOFTCLIX LANCETS) MISC    Check glucose three times daily.    PEN NEEDLE, DIABETIC (BD ULTRA-FINE MINI PEN NEEDLE) 31 GAUGE X 3/16" NDLE    USE DAILY WITH LANTUS AND NOVOLOG   Changed and/or Refilled Medications    Modified Medication Previous Medication    AMLODIPINE (NORVASC) 5 MG TABLET amLODIPine (NORVASC) 5 MG tablet       Take 1 tablet (5 mg total) by mouth once daily.    Take 5 mg by mouth.    CITALOPRAM (CELEXA) 10 MG TABLET citalopram (CELEXA) 10 MG tablet       Take 1 tablet (10 mg total) by mouth once daily.    Take 1 tablet (10 mg total) by mouth once daily.    CLOPIDOGREL (PLAVIX) 75 MG TABLET clopidogreL (PLAVIX) 75 mg tablet       Take 1 tablet (75 mg total) by mouth once daily.    Take 75 mg by mouth.    FINASTERIDE (PROSCAR) 5 MG TABLET finasteride (PROSCAR) 5 mg tablet       Take 1 tablet (5 mg total) by mouth once daily.    Take 1 tablet (5 mg total) by mouth once daily.    INSULIN (LANTUS SOLOSTAR U-100 INSULIN) GLARGINE 100 UNITS/ML SUBQ PEN insulin (LANTUS SOLOSTAR U-100 INSULIN) glargine 100 units/mL SubQ pen       INJECT 36 UNITS UNDER THE SKIN ONCE A DAY    INJECT 36 UNITS UNDER THE SKIN ONCE A DAY    INSULIN ASPART U-100 (NOVOLOG FLEXPEN " U-100 INSULIN) 100 UNIT/ML (3 ML) INPN PEN insulin aspart U-100 (NOVOLOG FLEXPEN U-100 INSULIN) 100 unit/mL (3 mL) InPn pen       USE AS DIRECTED PER SLIDING SCALE. MAX OF 14 UNITS DAILY)    USE AS DIRECTED PER SLIDING SCALE. MAX OF 14 UNITS DAILY)    LEVOTHYROXINE (SYNTHROID) 100 MCG TABLET levothyroxine (SYNTHROID) 100 MCG tablet       Take 1 tablet (100 mcg total) by mouth once daily.    Take 1 tablet (100 mcg total) by mouth once daily.    METOPROLOL SUCCINATE (TOPROL-XL) 25 MG 24 HR TABLET metoprolol succinate (TOPROL-XL) 25 MG 24 hr tablet       Take 1 tablet (25 mg total) by mouth once daily.    Take 1 tablet (25 mg total) by mouth once daily.    MONTELUKAST (SINGULAIR) 10 MG TABLET montelukast (SINGULAIR) 10 mg tablet       Take 1 tablet (10 mg total) by mouth every evening.    Take 1 tablet (10 mg total) by mouth every evening.    OLMESARTAN (BENICAR) 20 MG TABLET olmesartan (BENICAR) 20 MG tablet       Take 1 tablet (20 mg total) by mouth once daily.    Take 1 tablet (20 mg total) by mouth 2 (two) times a day.    PRAVASTATIN (PRAVACHOL) 40 MG TABLET pravastatin (PRAVACHOL) 40 MG tablet       Take 1 tablet (40 mg total) by mouth every evening.    Take 1 tablet (40 mg total) by mouth every evening.   Discontinued Medications    ALENDRONATE (FOSAMAX) 70 MG TABLET    Take 1 tablet (70 mg total) by mouth every 7 days.    CALCIUM CARBONATE (OS-MARIA DEL ROSARIO) 500 MG CALCIUM (1,250 MG) TABLET    Take 1 tablet (500 mg total) by mouth 2 (two) times daily.    CHLORTHALIDONE (HYGROTEN) 25 MG TAB    Take 25 mg by mouth once daily.    CLONIDINE (CATAPRES) 0.2 MG TABLET    Take 1 tablet (0.2 mg total) by mouth every 6 (six) hours as needed. And 1/2 tablet tablet prn if SBP > 200    HYDRALAZINE (APRESOLINE) 50 MG TABLET    Take 1 tablet (50 mg total) by mouth every 12 (twelve) hours.    MECLIZINE (ANTIVERT) 25 MG TABLET    Take 25 mg by mouth.    METHOCARBAMOL (ROBAXIN) 500 MG TAB    Take 500 mg by mouth.    MULTIVITAMIN  "(THERAGRAN) PER TABLET    Take 1 tablet by mouth.    OXYCODONE (ROXICODONE) 5 MG IMMEDIATE RELEASE TABLET    Take 5 mg by mouth every 6 (six) hours as needed.      Medication List with Changes/Refills   Current Medications    ASPIRIN 325 MG TABLET    Take 325 mg by mouth once daily.       Start Date: --        End Date: --    BD LUER-MANUEL SYRINGE 3 ML 22 X 1 1/2" SYRG           Start Date: 9/9/2015  End Date: --    BLOOD SUGAR DIAGNOSTIC (FREESTYLE LITE STRIPS) STRP    TEST 3 TIMES DAILY       Start Date: 4/8/2021  End Date: --    BLOOD-GLUCOSE METER MISC    Use as directed four times daily before meals and at bedtime.       Start Date: 8/23/2016 End Date: --    FLASH GLUCOSE SCANNING READER (FREESTYLE PUMA 2 READER) MISC    Use in conjunction with glucose sensor every 14 days for continuous monitoring of Glucose. DX:E11.22, N18.30, Z79.44       Start Date: 4/11/2022 End Date: --    FLASH GLUCOSE SENSOR (FREESTYLE PUMA 2 SENSOR) KIT    Use one sensor Every 14 days for continuous monitoring of Glucose. DX:E11.22, N18.30, Z79.44       Start Date: 8/3/2022  End Date: --    FLASH GLUCOSE SENSOR (FREESTYLE PUMA 2 SENSOR) KIT    Use one sensor Every 14 days for continuous monitoring of Glucose. DX:E11.22, N18.30, Z79.44       Start Date: 8/3/2022  End Date: --    GABAPENTIN (NEURONTIN) 300 MG CAPSULE    Take 300 mg by mouth 3 (three) times daily.       Start Date: 2/10/2023 End Date: --    LANCETS (ACCU-CHEK SOFTCLIX LANCETS) MISC    Check glucose three times daily.       Start Date: 3/6/2020  End Date: --    PEN NEEDLE, DIABETIC (BD ULTRA-FINE MINI PEN NEEDLE) 31 GAUGE X 3/16" NDLE    USE DAILY WITH LANTUS AND NOVOLOG       Start Date: 3/6/2020  End Date: --   Changed and/or Refilled Medications    Modified Medication Previous Medication    AMLODIPINE (NORVASC) 5 MG TABLET amLODIPine (NORVASC) 5 MG tablet       Take 1 tablet (5 mg total) by mouth once daily.    Take 5 mg by mouth.       Start Date: 2/22/2023 End Date: " --    Start Date: 2/5/2023  End Date: 2/22/2023    CITALOPRAM (CELEXA) 10 MG TABLET citalopram (CELEXA) 10 MG tablet       Take 1 tablet (10 mg total) by mouth once daily.    Take 1 tablet (10 mg total) by mouth once daily.       Start Date: 2/22/2023 End Date: --    Start Date: 8/3/2022  End Date: 2/22/2023    CLOPIDOGREL (PLAVIX) 75 MG TABLET clopidogreL (PLAVIX) 75 mg tablet       Take 1 tablet (75 mg total) by mouth once daily.    Take 75 mg by mouth.       Start Date: 2/22/2023 End Date: --    Start Date: 2/5/2023  End Date: 2/22/2023    FINASTERIDE (PROSCAR) 5 MG TABLET finasteride (PROSCAR) 5 mg tablet       Take 1 tablet (5 mg total) by mouth once daily.    Take 1 tablet (5 mg total) by mouth once daily.       Start Date: 2/22/2023 End Date: --    Start Date: 8/3/2022  End Date: 2/22/2023    INSULIN (LANTUS SOLOSTAR U-100 INSULIN) GLARGINE 100 UNITS/ML SUBQ PEN insulin (LANTUS SOLOSTAR U-100 INSULIN) glargine 100 units/mL SubQ pen       INJECT 36 UNITS UNDER THE SKIN ONCE A DAY    INJECT 36 UNITS UNDER THE SKIN ONCE A DAY       Start Date: 2/22/2023 End Date: --    Start Date: 8/3/2022  End Date: 2/22/2023    INSULIN ASPART U-100 (NOVOLOG FLEXPEN U-100 INSULIN) 100 UNIT/ML (3 ML) INPN PEN insulin aspart U-100 (NOVOLOG FLEXPEN U-100 INSULIN) 100 unit/mL (3 mL) InPn pen       USE AS DIRECTED PER SLIDING SCALE. MAX OF 14 UNITS DAILY)    USE AS DIRECTED PER SLIDING SCALE. MAX OF 14 UNITS DAILY)       Start Date: 2/22/2023 End Date: --    Start Date: 8/3/2022  End Date: 2/22/2023    LEVOTHYROXINE (SYNTHROID) 100 MCG TABLET levothyroxine (SYNTHROID) 100 MCG tablet       Take 1 tablet (100 mcg total) by mouth once daily.    Take 1 tablet (100 mcg total) by mouth once daily.       Start Date: 2/22/2023 End Date: --    Start Date: 8/3/2022  End Date: 2/22/2023    METOPROLOL SUCCINATE (TOPROL-XL) 25 MG 24 HR TABLET metoprolol succinate (TOPROL-XL) 25 MG 24 hr tablet       Take 1 tablet (25 mg total) by mouth once  daily.    Take 1 tablet (25 mg total) by mouth once daily.       Start Date: 2/22/2023 End Date: 2/22/2024    Start Date: 8/3/2022  End Date: 2/22/2023    MONTELUKAST (SINGULAIR) 10 MG TABLET montelukast (SINGULAIR) 10 mg tablet       Take 1 tablet (10 mg total) by mouth every evening.    Take 1 tablet (10 mg total) by mouth every evening.       Start Date: 2/22/2023 End Date: --    Start Date: 8/3/2022  End Date: 2/22/2023    OLMESARTAN (BENICAR) 20 MG TABLET olmesartan (BENICAR) 20 MG tablet       Take 1 tablet (20 mg total) by mouth once daily.    Take 1 tablet (20 mg total) by mouth 2 (two) times a day.       Start Date: 2/22/2023 End Date: 2/22/2024    Start Date: 8/3/2022  End Date: 2/22/2023    PRAVASTATIN (PRAVACHOL) 40 MG TABLET pravastatin (PRAVACHOL) 40 MG tablet       Take 1 tablet (40 mg total) by mouth every evening.    Take 1 tablet (40 mg total) by mouth every evening.       Start Date: 2/22/2023 End Date: --    Start Date: 8/3/2022  End Date: 2/22/2023   Discontinued Medications    ALENDRONATE (FOSAMAX) 70 MG TABLET    Take 1 tablet (70 mg total) by mouth every 7 days.       Start Date: 8/3/2022  End Date: 2/22/2023    CALCIUM CARBONATE (OS-MARIA DEL ROSARIO) 500 MG CALCIUM (1,250 MG) TABLET    Take 1 tablet (500 mg total) by mouth 2 (two) times daily.       Start Date: 8/3/2022  End Date: 2/22/2023    CHLORTHALIDONE (HYGROTEN) 25 MG TAB    Take 25 mg by mouth once daily.       Start Date: --        End Date: 2/22/2023    CLONIDINE (CATAPRES) 0.2 MG TABLET    Take 1 tablet (0.2 mg total) by mouth every 6 (six) hours as needed. And 1/2 tablet tablet prn if SBP > 200       Start Date: 1/24/2023 End Date: 2/22/2023    HYDRALAZINE (APRESOLINE) 50 MG TABLET    Take 1 tablet (50 mg total) by mouth every 12 (twelve) hours.       Start Date: 1/24/2023 End Date: 2/22/2023    MECLIZINE (ANTIVERT) 25 MG TABLET    Take 25 mg by mouth.       Start Date: --        End Date: 2/22/2023    METHOCARBAMOL (ROBAXIN) 500 MG TAB     Take 500 mg by mouth.       Start Date: 4/9/2021  End Date: 2/22/2023    MULTIVITAMIN (THERAGRAN) PER TABLET    Take 1 tablet by mouth.       Start Date: --        End Date: 2/22/2023    OXYCODONE (ROXICODONE) 5 MG IMMEDIATE RELEASE TABLET    Take 5 mg by mouth every 6 (six) hours as needed.       Start Date: 6/10/2022 End Date: 2/22/2023            Request eye exam from Dr. Jimenez.

## 2023-02-28 ENCOUNTER — EXTERNAL CHRONIC CARE MANAGEMENT (OUTPATIENT)
Dept: PRIMARY CARE CLINIC | Facility: CLINIC | Age: 83
End: 2023-02-28
Payer: MEDICARE

## 2023-02-28 PROCEDURE — 99490 PR CHRONIC CARE MGMT, 1ST 20 MIN: ICD-10-PCS | Mod: S$PBB,,, | Performed by: FAMILY MEDICINE

## 2023-02-28 PROCEDURE — 99490 CHRNC CARE MGMT STAFF 1ST 20: CPT | Mod: S$PBB,,, | Performed by: FAMILY MEDICINE

## 2023-02-28 PROCEDURE — 99490 CHRNC CARE MGMT STAFF 1ST 20: CPT | Mod: PBBFAC,PO | Performed by: FAMILY MEDICINE

## 2023-03-31 ENCOUNTER — EXTERNAL CHRONIC CARE MANAGEMENT (OUTPATIENT)
Dept: PRIMARY CARE CLINIC | Facility: CLINIC | Age: 83
End: 2023-03-31
Payer: MEDICARE

## 2023-03-31 PROCEDURE — 99439 CHRNC CARE MGMT STAF EA ADDL: CPT | Mod: S$PBB,,, | Performed by: FAMILY MEDICINE

## 2023-03-31 PROCEDURE — 99490 CHRNC CARE MGMT STAFF 1ST 20: CPT | Mod: S$PBB,,, | Performed by: FAMILY MEDICINE

## 2023-03-31 PROCEDURE — 99439 PR CHRONIC CARE MGMT, EA ADDTL 20 MIN: ICD-10-PCS | Mod: S$PBB,,, | Performed by: FAMILY MEDICINE

## 2023-03-31 PROCEDURE — 99439 CHRNC CARE MGMT STAF EA ADDL: CPT | Mod: PBBFAC,PO | Performed by: FAMILY MEDICINE

## 2023-03-31 PROCEDURE — 99490 PR CHRONIC CARE MGMT, 1ST 20 MIN: ICD-10-PCS | Mod: S$PBB,,, | Performed by: FAMILY MEDICINE

## 2023-03-31 PROCEDURE — 99490 CHRNC CARE MGMT STAFF 1ST 20: CPT | Mod: PBBFAC,PO | Performed by: FAMILY MEDICINE

## 2023-04-05 ENCOUNTER — OFFICE VISIT (OUTPATIENT)
Dept: CARDIOLOGY | Facility: CLINIC | Age: 83
End: 2023-04-05
Payer: MEDICARE

## 2023-04-05 VITALS
OXYGEN SATURATION: 98 % | SYSTOLIC BLOOD PRESSURE: 136 MMHG | WEIGHT: 160.63 LBS | BODY MASS INDEX: 21.76 KG/M2 | HEIGHT: 72 IN | HEART RATE: 60 BPM | DIASTOLIC BLOOD PRESSURE: 72 MMHG

## 2023-04-05 DIAGNOSIS — R09.89 BILATERAL CAROTID BRUITS: ICD-10-CM

## 2023-04-05 DIAGNOSIS — E11.69 COMBINED HYPERLIPIDEMIA ASSOCIATED WITH TYPE 2 DIABETES MELLITUS: ICD-10-CM

## 2023-04-05 DIAGNOSIS — N18.30 CKD STAGE 3 SECONDARY TO DIABETES: ICD-10-CM

## 2023-04-05 DIAGNOSIS — E11.22 CKD STAGE 3 SECONDARY TO DIABETES: ICD-10-CM

## 2023-04-05 DIAGNOSIS — I49.3 PVC (PREMATURE VENTRICULAR CONTRACTION): ICD-10-CM

## 2023-04-05 DIAGNOSIS — I73.9 PAD (PERIPHERAL ARTERY DISEASE): ICD-10-CM

## 2023-04-05 DIAGNOSIS — I15.2 HYPERTENSION ASSOCIATED WITH DIABETES: Primary | ICD-10-CM

## 2023-04-05 DIAGNOSIS — E11.59 HYPERTENSION ASSOCIATED WITH DIABETES: Primary | ICD-10-CM

## 2023-04-05 DIAGNOSIS — E78.2 COMBINED HYPERLIPIDEMIA ASSOCIATED WITH TYPE 2 DIABETES MELLITUS: ICD-10-CM

## 2023-04-05 PROCEDURE — 99215 OFFICE O/P EST HI 40 MIN: CPT | Mod: PBBFAC,PO | Performed by: INTERNAL MEDICINE

## 2023-04-05 PROCEDURE — 99999 PR PBB SHADOW E&M-EST. PATIENT-LVL V: ICD-10-PCS | Mod: PBBFAC,,, | Performed by: INTERNAL MEDICINE

## 2023-04-05 PROCEDURE — 99999 PR PBB SHADOW E&M-EST. PATIENT-LVL V: CPT | Mod: PBBFAC,,, | Performed by: INTERNAL MEDICINE

## 2023-04-05 PROCEDURE — 99214 OFFICE O/P EST MOD 30 MIN: CPT | Mod: S$PBB,,, | Performed by: INTERNAL MEDICINE

## 2023-04-05 PROCEDURE — 99214 PR OFFICE/OUTPT VISIT, EST, LEVL IV, 30-39 MIN: ICD-10-PCS | Mod: S$PBB,,, | Performed by: INTERNAL MEDICINE

## 2023-04-05 RX ORDER — DOCUSATE SODIUM 100 MG/1
100 CAPSULE, LIQUID FILLED ORAL 2 TIMES DAILY
COMMUNITY

## 2023-04-05 RX ORDER — MECLIZINE HYDROCHLORIDE 50 MG/1
50 TABLET ORAL 2 TIMES DAILY
COMMUNITY
End: 2023-10-03

## 2023-04-05 NOTE — PROGRESS NOTES
Subjective:   Patient ID:  Bebeto Santiago is a 83 y.o. male who presents for follow-up of Follow-up  Last clinic visit:  Follow-up  SBP around 140 or less  HR upper 40s  Pt with fatigue  NMT/stress nml 2021  Hydralazine started    Today  2-23 hospital visit for weakness  Apparent w/u (-)  Hctz and hydralazine stopped, norvasc started  No dizziness since then    Hypertension  This is a chronic problem. The current episode started more than 1 year ago. The problem has been gradually improving since onset. The problem is controlled. Pertinent negatives include no chest pain, palpitations or shortness of breath. Past treatments include beta blockers and calcium channel blockers. The current treatment provides moderate improvement. There are no compliance problems.    Hyperlipidemia  This is a chronic problem. The current episode started more than 1 year ago. The problem is controlled. Recent lipid tests were reviewed and are variable. Pertinent negatives include no chest pain or shortness of breath. Current antihyperlipidemic treatment includes statins. The current treatment provides moderate improvement of lipids. There are no compliance problems.    Palpitations   This is a chronic problem. The current episode started more than 1 year ago. The problem occurs intermittently. The problem has been waxing and waning. Nothing aggravates the symptoms. Pertinent negatives include no chest pain, dizziness or shortness of breath. He has tried beta blockers for the symptoms. The treatment provided moderate relief.     Review of Systems   Constitutional: Negative. Negative for weight gain.   HENT: Negative.     Eyes: Negative.    Cardiovascular: Negative.  Negative for chest pain, leg swelling and palpitations.   Respiratory: Negative.  Negative for shortness of breath.    Endocrine: Negative.    Hematologic/Lymphatic: Negative.    Skin: Negative.    Musculoskeletal:  Negative for muscle weakness.   Gastrointestinal: Negative.     Genitourinary: Negative.    Neurological: Negative.  Negative for dizziness.   Psychiatric/Behavioral: Negative.     Allergic/Immunologic: Negative.    All other systems reviewed and are negative.  Family History   Problem Relation Age of Onset    Heart disease Mother     Stroke Maternal Grandfather     Diabetes Neg Hx     Cancer Neg Hx     Hypertension Neg Hx      Past Medical History:   Diagnosis Date    Allergy     BPH (benign prostatic hypertrophy)     Controlled type 2 diabetes mellitus with stage 3 chronic kidney disease, with long-term current use of insulin     The patient presents with diabetes.  The patient denies polyuria, polydipsia, polyphagia, hypoglycemia and paresthesias.  The patient's glucose control has been good.  Home glucose averages are routinely checked.  The patient is without retinopathy currently.  The patient has no history of neuropathy.  The patient currently complains of no podiatric problems.  The patient has excellent compliance.    Dehydration     Diabetes mellitus type II, uncontrolled     GERD (gastroesophageal reflux disease) 2013    grade IV/IV    Hypertension     Hypothyroidism     Male hypogonadism     Non-proliferative diabetic retinopathy     Urinary retention      Social History     Socioeconomic History    Marital status:      Spouse name: Babs    Number of children: 2   Occupational History    Occupation: Retired   Tobacco Use    Smoking status: Former    Smokeless tobacco: Current     Types: Chew   Substance and Sexual Activity    Alcohol use: Yes     Comment: rare    Drug use: No    Sexual activity: Not Currently     Partners: Female     Social Determinants of Health     Financial Resource Strain: Low Risk     Difficulty of Paying Living Expenses: Not hard at all   Food Insecurity: No Food Insecurity    Worried About Running Out of Food in the Last Year: Never true    Ran Out of Food in the Last Year: Never true   Transportation Needs: No Transportation Needs  "   Lack of Transportation (Medical): No    Lack of Transportation (Non-Medical): No   Physical Activity: Inactive    Days of Exercise per Week: 0 days    Minutes of Exercise per Session: 0 min   Stress: No Stress Concern Present    Feeling of Stress : Not at all   Social Connections: Moderately Integrated    Frequency of Communication with Friends and Family: More than three times a week    Frequency of Social Gatherings with Friends and Family: More than three times a week    Attends Je[employer] Services: More than 4 times per year    Active Member of Clubs or Organizations: No    Attends Club or Organization Meetings: Never    Marital Status:    Housing Stability: Low Risk     Unable to Pay for Housing in the Last Year: No    Number of Places Lived in the Last Year: 1    Unstable Housing in the Last Year: No     Current Outpatient Medications on File Prior to Visit   Medication Sig Dispense Refill    amLODIPine (NORVASC) 5 MG tablet Take 1 tablet (5 mg total) by mouth once daily. 90 tablet 3    aspirin 325 MG tablet Take 325 mg by mouth once daily.      BD LUER-MANUEL SYRINGE 3 mL 22 x 1 1/2" Syrg   3    blood sugar diagnostic (FREESTYLE LITE STRIPS) Strp TEST 3 TIMES DAILY 300 strip 5    blood-glucose meter Misc Use as directed four times daily before meals and at bedtime. 1 each 0    citalopram (CELEXA) 10 MG tablet Take 1 tablet (10 mg total) by mouth once daily. 90 tablet 3    clopidogreL (PLAVIX) 75 mg tablet Take 1 tablet (75 mg total) by mouth once daily. 90 tablet 3    docusate sodium (COLACE) 100 MG capsule Take 100 mg by mouth 2 (two) times daily.      finasteride (PROSCAR) 5 mg tablet Take 1 tablet (5 mg total) by mouth once daily. 90 tablet 3    flash glucose scanning reader (FREESTYLE PUMA 2 READER) [facility] Use in conjunction with glucose sensor every 14 days for continuous monitoring of Glucose. DX:E11.22, N18.30, Z79.44 6 each 3    flash glucose sensor (FREESTYLE PUMA 2 SENSOR) Kit Use one sensor " "Every 14 days for continuous monitoring of Glucose. DX:E11.22, N18.30, Z79.44 2 kit 11    flash glucose sensor (FREESTYLE PUMA 2 SENSOR) Kit Use one sensor Every 14 days for continuous monitoring of Glucose. DX:E11.22, N18.30, Z79.44 2 kit 11    insulin (LANTUS SOLOSTAR U-100 INSULIN) glargine 100 units/mL SubQ pen INJECT 36 UNITS UNDER THE SKIN ONCE A DAY 30 each 1    insulin aspart U-100 (NOVOLOG FLEXPEN U-100 INSULIN) 100 unit/mL (3 mL) InPn pen USE AS DIRECTED PER SLIDING SCALE. MAX OF 14 UNITS DAILY) 15 each 3    lancets (ACCU-CHEK SOFTCLIX LANCETS) Misc Check glucose three times daily. 200 each 11    levothyroxine (SYNTHROID) 100 MCG tablet Take 1 tablet (100 mcg total) by mouth once daily. 90 tablet 3    meclizine (ANTIVERT) 50 MG tablet Take 50 mg by mouth 2 (two) times a day.      metoprolol succinate (TOPROL-XL) 25 MG 24 hr tablet Take 1 tablet (25 mg total) by mouth once daily. (Patient taking differently: Take 12.5 mg by mouth once daily.) 90 tablet 3    montelukast (SINGULAIR) 10 mg tablet Take 1 tablet (10 mg total) by mouth every evening. 90 tablet 3    olmesartan (BENICAR) 20 MG tablet Take 1 tablet (20 mg total) by mouth once daily. 90 tablet 3    pen needle, diabetic (BD ULTRA-FINE MINI PEN NEEDLE) 31 gauge x 3/16" Ndle USE DAILY WITH LANTUS AND NOVOLOG 300 each 3    pravastatin (PRAVACHOL) 40 MG tablet Take 1 tablet (40 mg total) by mouth every evening. 90 tablet 3    gabapentin (NEURONTIN) 300 MG capsule Take 300 mg by mouth 3 (three) times daily.       No current facility-administered medications on file prior to visit.     Review of patient's allergies indicates:   Allergen Reactions    Ace inhibitors      Other reaction(s): cough    Metformin      Abdominal pain       Objective:     Physical Exam  Vitals and nursing note reviewed.   Constitutional:       Appearance: He is well-developed.   HENT:      Head: Normocephalic and atraumatic.   Eyes:      Conjunctiva/sclera: Conjunctivae normal.     "  Pupils: Pupils are equal, round, and reactive to light.   Cardiovascular:      Rate and Rhythm: Normal rate and regular rhythm.      Pulses: Intact distal pulses.      Heart sounds: Normal heart sounds.   Pulmonary:      Effort: Pulmonary effort is normal.      Breath sounds: Normal breath sounds.   Abdominal:      General: Bowel sounds are normal.      Palpations: Abdomen is soft.   Musculoskeletal:      Cervical back: Normal range of motion and neck supple.   Skin:     General: Skin is warm and dry.   Neurological:      Mental Status: He is alert and oriented to person, place, and time.       Assessment:     1. Hypertension associated with diabetes    2. Combined hyperlipidemia associated with type 2 diabetes mellitus    3. Bilateral carotid bruits    4. PAD (peripheral artery disease)    5. PVC (premature ventricular contraction)    6. CKD stage 3 secondary to diabetes        Plan:     Hypertension associated with diabetes    Combined hyperlipidemia associated with type 2 diabetes mellitus    Bilateral carotid bruits    PAD (peripheral artery disease)    PVC (premature ventricular contraction)    CKD stage 3 secondary to diabetes      Continue norvasc,  toprol, olmesartan -htn/SVT  Continue statin-hlp  Continue asa, plavix - primary prevention     Change clonidine to prn

## 2023-04-30 ENCOUNTER — EXTERNAL CHRONIC CARE MANAGEMENT (OUTPATIENT)
Dept: PRIMARY CARE CLINIC | Facility: CLINIC | Age: 83
End: 2023-04-30
Payer: MEDICARE

## 2023-04-30 PROCEDURE — 99490 CHRNC CARE MGMT STAFF 1ST 20: CPT | Mod: PBBFAC,PO | Performed by: FAMILY MEDICINE

## 2023-04-30 PROCEDURE — 99490 CHRNC CARE MGMT STAFF 1ST 20: CPT | Mod: S$PBB,,, | Performed by: FAMILY MEDICINE

## 2023-04-30 PROCEDURE — 99439 CHRNC CARE MGMT STAF EA ADDL: CPT | Mod: PBBFAC,27,PO | Performed by: FAMILY MEDICINE

## 2023-04-30 PROCEDURE — 99490 PR CHRONIC CARE MGMT, 1ST 20 MIN: ICD-10-PCS | Mod: S$PBB,,, | Performed by: FAMILY MEDICINE

## 2023-04-30 PROCEDURE — 99439 CHRNC CARE MGMT STAF EA ADDL: CPT | Mod: S$PBB,,, | Performed by: FAMILY MEDICINE

## 2023-04-30 PROCEDURE — 99439 PR CHRONIC CARE MGMT, EA ADDTL 20 MIN: ICD-10-PCS | Mod: S$PBB,,, | Performed by: FAMILY MEDICINE

## 2023-05-31 ENCOUNTER — EXTERNAL CHRONIC CARE MANAGEMENT (OUTPATIENT)
Dept: PRIMARY CARE CLINIC | Facility: CLINIC | Age: 83
End: 2023-05-31
Payer: MEDICARE

## 2023-05-31 PROCEDURE — 99439 CHRNC CARE MGMT STAF EA ADDL: CPT | Mod: S$PBB,,, | Performed by: FAMILY MEDICINE

## 2023-05-31 PROCEDURE — 99490 CHRNC CARE MGMT STAFF 1ST 20: CPT | Mod: S$PBB,,, | Performed by: FAMILY MEDICINE

## 2023-05-31 PROCEDURE — 99490 CHRNC CARE MGMT STAFF 1ST 20: CPT | Mod: PBBFAC,PO | Performed by: FAMILY MEDICINE

## 2023-05-31 PROCEDURE — 99490 PR CHRONIC CARE MGMT, 1ST 20 MIN: ICD-10-PCS | Mod: S$PBB,,, | Performed by: FAMILY MEDICINE

## 2023-05-31 PROCEDURE — 99439 PR CHRONIC CARE MGMT, EA ADDTL 20 MIN: ICD-10-PCS | Mod: S$PBB,,, | Performed by: FAMILY MEDICINE

## 2023-05-31 PROCEDURE — 99439 CHRNC CARE MGMT STAF EA ADDL: CPT | Mod: PBBFAC,27,PO | Performed by: FAMILY MEDICINE

## 2023-06-30 ENCOUNTER — EXTERNAL CHRONIC CARE MANAGEMENT (OUTPATIENT)
Dept: PRIMARY CARE CLINIC | Facility: CLINIC | Age: 83
End: 2023-06-30
Payer: MEDICARE

## 2023-06-30 PROCEDURE — 99490 CHRNC CARE MGMT STAFF 1ST 20: CPT | Mod: PBBFAC,PO | Performed by: FAMILY MEDICINE

## 2023-06-30 PROCEDURE — 99439 CHRNC CARE MGMT STAF EA ADDL: CPT | Mod: PBBFAC,27,PO | Performed by: FAMILY MEDICINE

## 2023-06-30 PROCEDURE — 99439 CHRNC CARE MGMT STAF EA ADDL: CPT | Mod: S$PBB,,, | Performed by: FAMILY MEDICINE

## 2023-06-30 PROCEDURE — 99439 PR CHRONIC CARE MGMT, EA ADDTL 20 MIN: ICD-10-PCS | Mod: S$PBB,,, | Performed by: FAMILY MEDICINE

## 2023-06-30 PROCEDURE — 99490 CHRNC CARE MGMT STAFF 1ST 20: CPT | Mod: S$PBB,,, | Performed by: FAMILY MEDICINE

## 2023-06-30 PROCEDURE — 99490 PR CHRONIC CARE MGMT, 1ST 20 MIN: ICD-10-PCS | Mod: S$PBB,,, | Performed by: FAMILY MEDICINE

## 2023-07-05 RX ORDER — CHLORTHALIDONE 25 MG/1
TABLET ORAL
Qty: 90 TABLET | Refills: 3 | Status: SHIPPED | OUTPATIENT
Start: 2023-07-05 | End: 2023-10-03

## 2023-07-11 ENCOUNTER — HOSPITAL ENCOUNTER (OUTPATIENT)
Dept: RADIOLOGY | Facility: HOSPITAL | Age: 83
Discharge: HOME | End: 2023-07-11
Attending: THORACIC SURGERY (CARDIOTHORACIC VASCULAR SURGERY)
Payer: MEDICARE

## 2023-07-11 DIAGNOSIS — I65.23 BILATERAL CAROTID ARTERY STENOSIS: ICD-10-CM

## 2023-07-11 PROCEDURE — 93880 EXTRACRANIAL BILAT STUDY: CPT | Mod: TC,PO

## 2023-07-11 PROCEDURE — 93880 US CAROTID BILATERAL: ICD-10-PCS | Mod: 26,,, | Performed by: RADIOLOGY

## 2023-07-11 PROCEDURE — 93880 EXTRACRANIAL BILAT STUDY: CPT | Mod: 26,,, | Performed by: RADIOLOGY

## 2023-07-12 DIAGNOSIS — I65.23 BILATERAL CAROTID ARTERY STENOSIS: Primary | ICD-10-CM

## 2023-07-25 DIAGNOSIS — N18.30 CONTROLLED TYPE 2 DIABETES MELLITUS WITH STAGE 3 CHRONIC KIDNEY DISEASE, WITH LONG-TERM CURRENT USE OF INSULIN: ICD-10-CM

## 2023-07-25 DIAGNOSIS — E11.22 CONTROLLED TYPE 2 DIABETES MELLITUS WITH STAGE 3 CHRONIC KIDNEY DISEASE, WITH LONG-TERM CURRENT USE OF INSULIN: ICD-10-CM

## 2023-07-25 DIAGNOSIS — Z79.4 CONTROLLED TYPE 2 DIABETES MELLITUS WITH STAGE 3 CHRONIC KIDNEY DISEASE, WITH LONG-TERM CURRENT USE OF INSULIN: ICD-10-CM

## 2023-07-25 RX ORDER — FLASH GLUCOSE SENSOR
KIT MISCELLANEOUS
Qty: 6 KIT | Refills: 3 | Status: SHIPPED | OUTPATIENT
Start: 2023-07-25

## 2023-07-25 NOTE — TELEPHONE ENCOUNTER
Care Due:                  Date            Visit Type   Department     Provider  --------------------------------------------------------------------------------                                EP -                              PRIMARY      [facility] FAMILY  Last Visit: 02-      CARE ([facility])   MEDICINE       Darin Krueger  Next Visit: None Scheduled  None         None Found                                                            Last  Test          Frequency    Reason                     Performed    Due Date  --------------------------------------------------------------------------------    CBC.........  12 months..  clopidogreL..............  Not Found    Overdue    CMP.........  12 months..  insulin, olmesartan,       08- 07-                             pravastatin..............    HBA1C.......  6 months...  insulin..................  08- 01-    Lipid Panel.  12 months..  pravastatin..............  08- 07-    TSH.........  12 months..  levothyroxine............  08- 07-    [employer] Embedded Care Due Messages. Reference number: [id number].   7/25/2023 2:11:49 PM CDT

## 2023-07-25 NOTE — TELEPHONE ENCOUNTER
Provider Staff:  Action required for this patient     Please see care gap opportunities below in Care Due Message.    Thanks!  [facility]     Appointments      Date Provider   Last Visit   2/22/2023 Darin Krueger MD   Next Visit   Visit date not found Darin Krueger MD     Refill Decision Note   Bebeto Santiago  is requesting a refill authorization.  Brief Assessment and Rationale for Refill:  Approve     Medication Therapy Plan:         Comments:     Note composed:3:42 PM 07/25/2023

## 2023-07-31 ENCOUNTER — EXTERNAL CHRONIC CARE MANAGEMENT (OUTPATIENT)
Dept: PRIMARY CARE CLINIC | Facility: CLINIC | Age: 83
End: 2023-07-31
Payer: MEDICARE

## 2023-07-31 PROCEDURE — 99490 PR CHRONIC CARE MGMT, 1ST 20 MIN: ICD-10-PCS | Mod: S$PBB,,, | Performed by: FAMILY MEDICINE

## 2023-07-31 PROCEDURE — 99490 CHRNC CARE MGMT STAFF 1ST 20: CPT | Mod: S$PBB,,, | Performed by: FAMILY MEDICINE

## 2023-07-31 PROCEDURE — 99490 CHRNC CARE MGMT STAFF 1ST 20: CPT | Mod: PBBFAC,PO | Performed by: FAMILY MEDICINE

## 2023-08-02 ENCOUNTER — OFFICE VISIT (OUTPATIENT)
Dept: CARDIOLOGY | Facility: CLINIC | Age: 83
End: 2023-08-02
Payer: MEDICARE

## 2023-08-02 VITALS
DIASTOLIC BLOOD PRESSURE: 60 MMHG | WEIGHT: 162 LBS | BODY MASS INDEX: 21.94 KG/M2 | OXYGEN SATURATION: 97 % | HEART RATE: 56 BPM | SYSTOLIC BLOOD PRESSURE: 126 MMHG | HEIGHT: 72 IN

## 2023-08-02 DIAGNOSIS — N18.30 CONTROLLED TYPE 2 DIABETES MELLITUS WITH STAGE 3 CHRONIC KIDNEY DISEASE, WITH LONG-TERM CURRENT USE OF INSULIN: ICD-10-CM

## 2023-08-02 DIAGNOSIS — R00.2 PALPITATIONS: ICD-10-CM

## 2023-08-02 DIAGNOSIS — I73.9 PAD (PERIPHERAL ARTERY DISEASE): ICD-10-CM

## 2023-08-02 DIAGNOSIS — R09.89 BILATERAL CAROTID BRUITS: ICD-10-CM

## 2023-08-02 DIAGNOSIS — Z79.4 CONTROLLED TYPE 2 DIABETES MELLITUS WITH STAGE 3 CHRONIC KIDNEY DISEASE, WITH LONG-TERM CURRENT USE OF INSULIN: ICD-10-CM

## 2023-08-02 DIAGNOSIS — Z98.890 HISTORY OF CAROTID ENDARTERECTOMY: ICD-10-CM

## 2023-08-02 DIAGNOSIS — E11.59 HYPERTENSION ASSOCIATED WITH DIABETES: Primary | ICD-10-CM

## 2023-08-02 DIAGNOSIS — I65.23 BILATERAL CAROTID ARTERY STENOSIS: ICD-10-CM

## 2023-08-02 DIAGNOSIS — N18.30 CKD STAGE 3 SECONDARY TO DIABETES: ICD-10-CM

## 2023-08-02 DIAGNOSIS — I65.22 OCCLUSION OF LEFT CAROTID ARTERY: ICD-10-CM

## 2023-08-02 DIAGNOSIS — E11.22 CKD STAGE 3 SECONDARY TO DIABETES: ICD-10-CM

## 2023-08-02 DIAGNOSIS — E78.2 COMBINED HYPERLIPIDEMIA ASSOCIATED WITH TYPE 2 DIABETES MELLITUS: ICD-10-CM

## 2023-08-02 DIAGNOSIS — E11.69 COMBINED HYPERLIPIDEMIA ASSOCIATED WITH TYPE 2 DIABETES MELLITUS: ICD-10-CM

## 2023-08-02 DIAGNOSIS — G47.33 OSA (OBSTRUCTIVE SLEEP APNEA): ICD-10-CM

## 2023-08-02 DIAGNOSIS — I49.3 PVC (PREMATURE VENTRICULAR CONTRACTION): ICD-10-CM

## 2023-08-02 DIAGNOSIS — I15.2 HYPERTENSION ASSOCIATED WITH DIABETES: Primary | ICD-10-CM

## 2023-08-02 DIAGNOSIS — E11.22 CONTROLLED TYPE 2 DIABETES MELLITUS WITH STAGE 3 CHRONIC KIDNEY DISEASE, WITH LONG-TERM CURRENT USE OF INSULIN: ICD-10-CM

## 2023-08-02 PROCEDURE — 99999 PR PBB SHADOW E&M-EST. PATIENT-LVL III: CPT | Mod: PBBFAC,,, | Performed by: INTERNAL MEDICINE

## 2023-08-02 PROCEDURE — 99214 PR OFFICE/OUTPT VISIT, EST, LEVL IV, 30-39 MIN: ICD-10-PCS | Mod: S$PBB,,, | Performed by: INTERNAL MEDICINE

## 2023-08-02 PROCEDURE — 99999 PR PBB SHADOW E&M-EST. PATIENT-LVL III: ICD-10-PCS | Mod: PBBFAC,,, | Performed by: INTERNAL MEDICINE

## 2023-08-02 PROCEDURE — 99213 OFFICE O/P EST LOW 20 MIN: CPT | Mod: PBBFAC,PO | Performed by: INTERNAL MEDICINE

## 2023-08-02 PROCEDURE — 99214 OFFICE O/P EST MOD 30 MIN: CPT | Mod: S$PBB,,, | Performed by: INTERNAL MEDICINE

## 2023-08-02 NOTE — PROGRESS NOTES
Subjective:   Patient ID:  Bebeto Santiago is a 83 y.o. male who presents for follow-up of Follow-up (3 months)  Patient denies CP, angina or anginal equivalent.   Lipids and BP at goal.NMT/stress 202 normal  Hypertension  This is a chronic problem. The current episode started more than 1 year ago. The problem has been gradually improving since onset. The problem is controlled. Pertinent negatives include no chest pain, palpitations or shortness of breath. Past treatments include beta blockers and calcium channel blockers. The current treatment provides moderate improvement. There are no compliance problems.    Hyperlipidemia  This is a chronic problem. The current episode started more than 1 year ago. The problem is controlled. Recent lipid tests were reviewed and are variable. Pertinent negatives include no chest pain or shortness of breath. Current antihyperlipidemic treatment includes statins. The current treatment provides moderate improvement of lipids. There are no compliance problems.    Palpitations   This is a chronic problem. The current episode started more than 1 year ago. The problem occurs intermittently. The problem has been waxing and waning. Nothing aggravates the symptoms. Pertinent negatives include no chest pain, dizziness or shortness of breath. He has tried beta blockers for the symptoms. The treatment provided moderate relief.     Review of Systems   Constitutional: Negative. Negative for weight gain.   HENT: Negative.     Eyes: Negative.    Cardiovascular: Negative.  Negative for chest pain, leg swelling and palpitations.   Respiratory: Negative.  Negative for shortness of breath.    Endocrine: Negative.    Hematologic/Lymphatic: Negative.    Skin: Negative.    Musculoskeletal:  Negative for muscle weakness.   Gastrointestinal: Negative.    Genitourinary: Negative.    Neurological: Negative.  Negative for dizziness.   Psychiatric/Behavioral: Negative.     Allergic/Immunologic: Negative.     All other systems reviewed and are negative.  Family History   Problem Relation Age of Onset    Heart disease Mother     Stroke Maternal Grandfather     Diabetes Neg Hx     Cancer Neg Hx     Hypertension Neg Hx      Past Medical History:   Diagnosis Date    Allergy     BPH (benign prostatic hypertrophy)     Controlled type 2 diabetes mellitus with stage 3 chronic kidney disease, with long-term current use of insulin     The patient presents with diabetes.  The patient denies polyuria, polydipsia, polyphagia, hypoglycemia and paresthesias.  The patient's glucose control has been good.  Home glucose averages are routinely checked.  The patient is without retinopathy currently.  The patient has no history of neuropathy.  The patient currently complains of no podiatric problems.  The patient has excellent compliance.    Dehydration     Diabetes mellitus type II, uncontrolled     GERD (gastroesophageal reflux disease) 2013    grade IV/IV    Hypertension     Hypothyroidism     Male hypogonadism     Non-proliferative diabetic retinopathy     Urinary retention      Social History     Socioeconomic History    Marital status:      Spouse name: Babs    Number of children: 2   Occupational History    Occupation: Retired   Tobacco Use    Smoking status: Former     Current packs/day: 0.00    Smokeless tobacco: Current     Types: Chew   Substance and Sexual Activity    Alcohol use: Yes     Comment: rare    Drug use: No    Sexual activity: Not Currently     Partners: Female     Social Determinants of Health     Financial Resource Strain: Low Risk  (2/9/2023)    Overall Financial Resource Strain (CARDIA)     Difficulty of Paying Living Expenses: Not hard at all   Food Insecurity: No Food Insecurity (2/9/2023)    Hunger Vital Sign     Worried About Running Out of Food in the Last Year: Never true     Ran Out of Food in the Last Year: Never true   Transportation Needs: No Transportation Needs  "(2/9/2023)    PRAPARE - Transportation     Lack of Transportation (Medical): No     Lack of Transportation (Non-Medical): No   Physical Activity: Inactive (2/9/2023)    Exercise Vital Sign     Days of Exercise per Week: 0 days     Minutes of Exercise per Session: 0 min   Stress: No Stress Concern Present (2/9/2023)    Citizen of [place] of Occupational Health - Occupational Stress Questionnaire     Feeling of Stress : Not at all   Social Connections: Moderately Integrated (2/9/2023)    Social Connection and Isolation Panel [NHANES]     Frequency of Communication with Friends and Family: More than three times a week     Frequency of Social Gatherings with Friends and Family: More than three times a week     Attends Buddhist Services: More than 4 times per year     Active Member of Clubs or Organizations: No     Attends Club or Organization Meetings: Never     Marital Status:    Housing Stability: Low Risk  (2/9/2023)    Housing Stability Vital Sign     Unable to Pay for Housing in the Last Year: No     Number of Places Lived in the Last Year: 1     Unstable Housing in the Last Year: No     Current Outpatient Medications on File Prior to Visit   Medication Sig Dispense Refill    amLODIPine (NORVASC) 5 MG tablet Take 1 tablet (5 mg total) by mouth once daily. 90 tablet 3    aspirin 325 MG tablet Take 325 mg by mouth once daily.      BD LUER-MANUEL SYRINGE 3 mL 22 x 1 1/2" Syrg   3    blood sugar diagnostic (FREESTYLE LITE STRIPS) Strp TEST 3 TIMES DAILY 300 strip 5    blood-glucose meter Misc Use as directed four times daily before meals and at bedtime. 1 each 0    chlorthalidone (HYGROTEN) 25 MG Tab TAKE 1 TABLET BY MOUTH EVERY DAY 90 tablet 3    citalopram (CELEXA) 10 MG tablet Take 1 tablet (10 mg total) by mouth once daily. 90 tablet 3    clopidogreL (PLAVIX) 75 mg tablet Take 1 tablet (75 mg total) by mouth once daily. 90 tablet 3    docusate sodium (COLACE) 100 MG capsule Take 100 mg by " "mouth 2 (two) times daily.      finasteride (PROSCAR) 5 mg tablet Take 1 tablet (5 mg total) by mouth once daily. 90 tablet 3    flash glucose scanning reader (FREESTYLE PUMA 2 READER) [facility] Use in conjunction with glucose sensor every 14 days for continuous monitoring of Glucose. DX:E11.22, N18.30, Z79.44 6 each 3    FREESTYLE PUMA 2 SENSOR Kit USE ONE SENSOR EVERY 14 DAYS FOR CONTINUOUS MONITORING OF GLUCOSE. DX:E11.22, N18.30, Z79.44 6 kit 3    gabapentin (NEURONTIN) 300 MG capsule Take 300 mg by mouth 3 (three) times daily.      insulin (LANTUS SOLOSTAR U-100 INSULIN) glargine 100 units/mL SubQ pen INJECT 36 UNITS UNDER THE SKIN ONCE A DAY 30 each 1    insulin aspart U-100 (NOVOLOG FLEXPEN U-100 INSULIN) 100 unit/mL (3 mL) InPn pen USE AS DIRECTED PER SLIDING SCALE. MAX OF 14 UNITS DAILY) 15 each 3    lancets (ACCU-CHEK SOFTCLIX LANCETS) Misc Check glucose three times daily. 200 each 11    levothyroxine (SYNTHROID) 100 MCG tablet Take 1 tablet (100 mcg total) by mouth once daily. 90 tablet 3    meclizine (ANTIVERT) 50 MG tablet Take 50 mg by mouth 2 (two) times a day.      metoprolol succinate (TOPROL-XL) 25 MG 24 hr tablet Take 1 tablet (25 mg total) by mouth once daily. (Patient taking differently: Take 12.5 mg by mouth once daily.) 90 tablet 3    montelukast (SINGULAIR) 10 mg tablet Take 1 tablet (10 mg total) by mouth every evening. 90 tablet 3    olmesartan (BENICAR) 20 MG tablet Take 1 tablet (20 mg total) by mouth once daily. 90 tablet 3    pen needle, diabetic (BD ULTRA-FINE MINI PEN NEEDLE) 31 gauge x 3/16" Ndle USE DAILY WITH LANTUS AND NOVOLOG 300 each 3    pravastatin (PRAVACHOL) 40 MG tablet Take 1 tablet (40 mg total) by mouth every evening. 90 tablet 3     No current facility-administered medications on file prior to visit.     Review of patient's allergies indicates:   Allergen Reactions    Ace inhibitors      Other reaction(s): cough    Metformin      Abdominal pain "       Objective:     Physical Exam  Vitals and nursing note reviewed.   Constitutional:       Appearance: He is well-developed.   HENT:      Head: Normocephalic and atraumatic.   Eyes:      Conjunctiva/sclera: Conjunctivae normal.      Pupils: Pupils are equal, round, and reactive to light.   Cardiovascular:      Rate and Rhythm: Normal rate and regular rhythm.      Pulses: Intact distal pulses.      Heart sounds: Normal heart sounds.   Pulmonary:      Effort: Pulmonary effort is normal.      Breath sounds: Normal breath sounds.   Abdominal:      General: Bowel sounds are normal.      Palpations: Abdomen is soft.   Musculoskeletal:      Cervical back: Normal range of motion and neck supple.   Skin:     General: Skin is warm and dry.   Neurological:      Mental Status: He is alert and oriented to person, place, and time.     Assessment:     1. Hypertension associated with diabetes    2. Combined hyperlipidemia associated with type 2 diabetes mellitus    3. Bilateral carotid bruits    4. PAD (peripheral artery disease)    5. Bilateral carotid artery stenosis    6. Occlusion of left carotid artery    7. History of carotid endarterectomy    8. Palpitations    9. PVC (premature ventricular contraction)    10. CKD stage 3 secondary to diabetes    11. Controlled type 2 diabetes mellitus with stage 3 chronic kidney disease, with long-term current use of insulin    12. ERNESTINE (obstructive sleep apnea)        Plan:     Hypertension associated with diabetes    Combined hyperlipidemia associated with type 2 diabetes mellitus    Bilateral carotid bruits    PAD (peripheral artery disease)    Bilateral carotid artery stenosis    Occlusion of left carotid artery    History of carotid endarterectomy    Palpitations    PVC (premature ventricular contraction)    CKD stage 3 secondary to diabetes    Controlled type 2 diabetes mellitus with stage 3 chronic kidney disease, with long-term current use of insulin    ERNESTINE (obstructive sleep  apnea)      Continue norvasc,  toprol, olmesartan -htn/SVT  Continue statin-hlp  Continue asa, plavix - primary prevention     Change clonidine to prn

## 2023-08-14 ENCOUNTER — OFFICE VISIT (OUTPATIENT)
Dept: FAMILY MEDICINE | Facility: CLINIC | Age: 83
End: 2023-08-14
Payer: MEDICARE

## 2023-08-14 VITALS
HEIGHT: 72 IN | SYSTOLIC BLOOD PRESSURE: 150 MMHG | WEIGHT: 161 LBS | BODY MASS INDEX: 21.81 KG/M2 | TEMPERATURE: 98 F | HEART RATE: 52 BPM | OXYGEN SATURATION: 99 % | DIASTOLIC BLOOD PRESSURE: 61 MMHG

## 2023-08-14 DIAGNOSIS — Z79.4 CONTROLLED TYPE 2 DIABETES MELLITUS WITH STAGE 3 CHRONIC KIDNEY DISEASE, WITH LONG-TERM CURRENT USE OF INSULIN: ICD-10-CM

## 2023-08-14 DIAGNOSIS — J30.1 SEASONAL ALLERGIC RHINITIS DUE TO POLLEN: ICD-10-CM

## 2023-08-14 DIAGNOSIS — E03.9 HYPOTHYROIDISM, UNSPECIFIED TYPE: ICD-10-CM

## 2023-08-14 DIAGNOSIS — E11.59 HYPERTENSION ASSOCIATED WITH DIABETES: ICD-10-CM

## 2023-08-14 DIAGNOSIS — E11.22 CONTROLLED TYPE 2 DIABETES MELLITUS WITH STAGE 3 CHRONIC KIDNEY DISEASE, WITH LONG-TERM CURRENT USE OF INSULIN: ICD-10-CM

## 2023-08-14 DIAGNOSIS — I73.9 PAD (PERIPHERAL ARTERY DISEASE): ICD-10-CM

## 2023-08-14 DIAGNOSIS — F32.1 CURRENT MODERATE EPISODE OF MAJOR DEPRESSIVE DISORDER WITHOUT PRIOR EPISODE: ICD-10-CM

## 2023-08-14 DIAGNOSIS — E78.2 COMBINED HYPERLIPIDEMIA ASSOCIATED WITH TYPE 2 DIABETES MELLITUS: ICD-10-CM

## 2023-08-14 DIAGNOSIS — I15.2 HYPERTENSION ASSOCIATED WITH DIABETES: ICD-10-CM

## 2023-08-14 DIAGNOSIS — Z00.00 ANNUAL PHYSICAL EXAM: Primary | ICD-10-CM

## 2023-08-14 DIAGNOSIS — E11.69 COMBINED HYPERLIPIDEMIA ASSOCIATED WITH TYPE 2 DIABETES MELLITUS: ICD-10-CM

## 2023-08-14 DIAGNOSIS — Z98.890 HISTORY OF CAROTID ENDARTERECTOMY: ICD-10-CM

## 2023-08-14 DIAGNOSIS — N18.30 CONTROLLED TYPE 2 DIABETES MELLITUS WITH STAGE 3 CHRONIC KIDNEY DISEASE, WITH LONG-TERM CURRENT USE OF INSULIN: ICD-10-CM

## 2023-08-14 PROCEDURE — 99999 PR PBB SHADOW E&M-EST. PATIENT-LVL IV: CPT | Mod: PBBFAC,,, | Performed by: NURSE PRACTITIONER

## 2023-08-14 PROCEDURE — 99397 PR PREVENTIVE VISIT,EST,65 & OVER: ICD-10-PCS | Mod: GZ,S$PBB,, | Performed by: NURSE PRACTITIONER

## 2023-08-14 PROCEDURE — 99214 OFFICE O/P EST MOD 30 MIN: CPT | Mod: PBBFAC,PO | Performed by: NURSE PRACTITIONER

## 2023-08-14 PROCEDURE — 99397 PER PM REEVAL EST PAT 65+ YR: CPT | Mod: GZ,S$PBB,, | Performed by: NURSE PRACTITIONER

## 2023-08-14 PROCEDURE — 99999 PR PBB SHADOW E&M-EST. PATIENT-LVL IV: ICD-10-PCS | Mod: PBBFAC,,, | Performed by: NURSE PRACTITIONER

## 2023-08-18 ENCOUNTER — TELEPHONE (OUTPATIENT)
Dept: CARDIOLOGY | Facility: CLINIC | Age: 83
End: 2023-08-18
Payer: MEDICARE

## 2023-08-18 NOTE — TELEPHONE ENCOUNTER
I returned call for yovany at [facility] urology. No answer. LVM for her to call back for more info.       ----- Message from Greer Perez sent at 8/18/2023  3:40 PM CDT -----  Contact: [facility]  .Type:  Needs Medical Advice    Who Called:  [facility]  Symptoms (please be specific): Blood in urine  How long has patient had these symptoms: 2 days   Pharmacy name and phone #:     Would the patient rather a call back or a response via [facility]? Call   Best Call Back Number: [phone]  Additional Information:  Elizabeth wants to know if pt can stop taking clopidogreL (PLAVIX) 75 mg tablet and aspirin 325 MG tablet due to having blood in urine. Please give Elizabeth a call. Thanks ZARA

## 2023-08-23 ENCOUNTER — TELEPHONE (OUTPATIENT)
Dept: CARDIOLOGY | Facility: CLINIC | Age: 83
End: 2023-08-23
Payer: MEDICARE

## 2023-08-23 RX ORDER — MONTELUKAST SODIUM 10 MG/1
10 TABLET ORAL NIGHTLY
Qty: 90 TABLET | Refills: 3 | Status: SHIPPED | OUTPATIENT
Start: 2023-08-23

## 2023-08-23 RX ORDER — METOPROLOL SUCCINATE 25 MG/1
25 TABLET, EXTENDED RELEASE ORAL DAILY
Qty: 90 TABLET | Refills: 3 | Status: SHIPPED | OUTPATIENT
Start: 2023-08-23 | End: 2024-01-15

## 2023-08-23 RX ORDER — LEVOTHYROXINE SODIUM 100 UG/1
100 TABLET ORAL DAILY
Qty: 90 TABLET | Refills: 3 | Status: SHIPPED | OUTPATIENT
Start: 2023-08-23

## 2023-08-23 RX ORDER — CITALOPRAM 10 MG/1
10 TABLET ORAL DAILY
Qty: 90 TABLET | Refills: 3 | Status: SHIPPED | OUTPATIENT
Start: 2023-08-23 | End: 2023-10-03 | Stop reason: SDUPTHER

## 2023-08-23 RX ORDER — INSULIN GLARGINE 100 [IU]/ML
INJECTION, SOLUTION SUBCUTANEOUS
Qty: 30 EACH | Refills: 1 | Status: SHIPPED | OUTPATIENT
Start: 2023-08-23

## 2023-08-23 RX ORDER — PRAVASTATIN SODIUM 40 MG/1
40 TABLET ORAL NIGHTLY
Qty: 90 TABLET | Refills: 3 | Status: SHIPPED | OUTPATIENT
Start: 2023-08-23

## 2023-08-23 RX ORDER — FINASTERIDE 5 MG/1
5 TABLET, FILM COATED ORAL DAILY
Qty: 90 TABLET | Refills: 3 | Status: SHIPPED | OUTPATIENT
Start: 2023-08-23

## 2023-08-23 RX ORDER — OLMESARTAN MEDOXOMIL 20 MG/1
20 TABLET ORAL DAILY
Qty: 90 TABLET | Refills: 3 | Status: SHIPPED | OUTPATIENT
Start: 2023-08-23 | End: 2024-02-01

## 2023-08-23 RX ORDER — AMLODIPINE BESYLATE 5 MG/1
5 TABLET ORAL DAILY
Qty: 90 TABLET | Refills: 3 | Status: SHIPPED | OUTPATIENT
Start: 2023-08-23 | End: 2023-10-10 | Stop reason: DRUGHIGH

## 2023-08-23 RX ORDER — CLOPIDOGREL BISULFATE 75 MG/1
75 TABLET ORAL DAILY
Qty: 90 TABLET | Refills: 3 | Status: SHIPPED | OUTPATIENT
Start: 2023-08-23

## 2023-08-23 NOTE — TELEPHONE ENCOUNTER
----- Message from Abad Villagran MD sent at 8/23/2023  3:06 PM CDT -----  Regarding: RE: clearance  Contact: elizabeth/Dr.Brad Lomax office  Pt cleared for procedure/surgery at moderate CV risk . Pt can hold asa and plavix 7 days before procedure.  ----- Message -----  From: Hattie Herrera MA  Sent: 8/23/2023   2:21 PM CDT  To: Abad Villagran MD  Subject: clearance                                        Patient requesting clearance for surgery scheduled on 08/25/23 with instructions plavix and aspirin.    ----- Message -----  From: Theresa Dean  Sent: 8/23/2023  11:19 AM CDT  To: Tysha[facility] Staff    Elizabeth/Dr.Brad Lomax's office would like a call back at [phone], fax at [phone], in regards to trying to get an update on preop clearance request for patient she sent on 8/21/23, patient is scheduled for surgery this Friday 8/25/23 and they need it as soon as possible.  Thanks   Am

## 2023-08-23 NOTE — PROGRESS NOTES
Subjective:       Patient ID: Bebeto Santiago is a 83 y.o. male.    Chief Complaint: Annual Exam    HPI    He comes in for routine annual wellness exam with fasting labs and medication refills     Problem List Items Addressed This Visit          Cardiac/Vascular    Hypertension associated with diabetes    Overview     The patient presents with essential hypertension.  The patient is tolerating the medication well and is in excellent compliance except he is urinating a lot with the chlorthalidone and he is interested in stopping it.  We discussed and agreed to try this.  The patient is experiencing no side effects.  Counseling was offered regarding low salt diets.  The patient has a reduced salt intake.  The patient denies chest pain, palpitations, shortness of breath, dyspnea on exertion, left or murmur neck pain, nausea, vomiting, diaphoresis, paroxysmal nocturnal dyspnea, and orthopnea.           Relevant Medications    insulin (LANTUS SOLOSTAR U-100 INSULIN) glargine 100 units/mL SubQ pen    olmesartan (BENICAR) 20 MG tablet    metoprolol succinate (TOPROL-XL) 25 MG 24 hr tablet    amLODIPine (NORVASC) 5 MG tablet    Combined hyperlipidemia associated with type 2 diabetes mellitus    Overview     The patient presents with hyperlipidemia.  The patient reports tolerating the medication well and is in excellent compliance.  There have been no medication side effects.  The patient denies chest pain, neuropathy, and myalgias.  The patient has reduced fat intake and has been exercising.  Current treatment has included the medications listed in the med card.    Lab Results   Component Value Date    CHOL 151 11/04/2020    CHOL 162 03/06/2020    CHOL 152 03/01/2019       Lab Results   Component Value Date    HDL 66 11/04/2020    HDL 62 03/06/2020    HDL 59 03/01/2019       Lab Results   Component Value Date    LDLCALC 74.4 11/04/2020    LDLCALC 86.8 03/06/2020    LDLCALC 82.6 03/01/2019       Lab Results   Component Value  Date    TRIG 53 11/04/2020    TRIG 66 03/06/2020    TRIG 52 03/01/2019       Lab Results   Component Value Date    CHOLHDL 43.7 11/04/2020    CHOLHDL 38.3 03/06/2020    CHOLHDL 38.8 03/01/2019     Lab Results   Component Value Date    ALT 11 11/04/2020    AST 14 11/04/2020    ALKPHOS 89 11/04/2020    BILITOT 0.9 11/04/2020            Relevant Medications    insulin (LANTUS SOLOSTAR U-100 INSULIN) glargine 100 units/mL SubQ pen    pravastatin (PRAVACHOL) 40 MG tablet    PAD (peripheral artery disease)    Overview     He has PAD.  He has an ability to be able to walk 1/2 a mile before he hast to stop and rest.  He does take an aspirin.         Relevant Medications    clopidogreL (PLAVIX) 75 mg tablet    History of carotid endarterectomy    Relevant Medications    clopidogreL (PLAVIX) 75 mg tablet       Endocrine    Hypothyroidism    Overview     The patient presents with hypothyroidism.  The patient denies agitation, anxiety, blurred vision, chest pain, cold intolerance, constipation, dizziness, dry skin, fatigue, lightheadedness, paresthesias, skin coarsening, tachycardia, tremor, weight gain or weight loss.  The patient's current treatment has included Synthroid with a good response.  Last thyroid check was at [facility] and was good in 2/23.  Lab Results   Component Value Date    TSH 0.809 11/04/2020            Relevant Medications    levothyroxine (SYNTHROID) 100 MCG tablet    Controlled type 2 diabetes mellitus with stage 3 chronic kidney disease, with long-term current use of insulin    Overview     The patient presents with diabetes.  The patient denies polyuria, polydipsia, polyphagia, hypoglycemia and paresthesias.  The patient's glucose control has been good.  Home glucose averages are routinely checked.  The patient is without retinopathy currently.  The patient has no history of neuropathy.  The patient currently complains of no podiatric problems.  The patient has excellent compliance.  Because of him being on  lantus and novolog,  the patient checks his blood sugar 4 times daily and administers insulin 3 or more times daily.  His last a1c at [facility] in 2/23 was 6.4.  Hemoglobin A1C   Date Value Ref Range Status   01/27/2022 6.4 (H) 4.0 - 5.6 % Final     Comment:     ADA Screening Guidelines:  5.7-6.4%  Consistent with prediabetes  >or=6.5%  Consistent with diabetes    High levels of fetal hemoglobin interfere with the HbA1C  assay. Heterozygous hemoglobin variants (HbS, HgC, etc)do  not significantly interfere with this assay.   However, presence of multiple variants may affect accuracy.     07/26/2021 6.6 (H) 4.0 - 5.6 % Final     Comment:     ADA Screening Guidelines:  5.7-6.4%  Consistent with prediabetes  >or=6.5%  Consistent with diabetes    High levels of fetal hemoglobin interfere with the HbA1C  assay. Heterozygous hemoglobin variants (HbS, HgC, etc)do  not significantly interfere with this assay.   However, presence of multiple variants may affect accuracy.     04/23/2021 7.2 (H) 4.0 - 5.6 % Final     Comment:     ADA Screening Guidelines:  5.7-6.4%  Consistent with prediabetes  >or=6.5%  Consistent with diabetes    High levels of fetal hemoglobin interfere with the HbA1C  assay. Heterozygous hemoglobin variants (HbS, HgC, etc)do  not significantly interfere with this assay.   However, presence of multiple variants may affect accuracy.       No results found for: MICROALBUR, [id number]  Diabetes Management Status    Statin: Taking  ACE/ARB: Not taking  He has changed to 33 u of the lantus from 28 u since he had a high a1c below.  Screening or Prevention Patient's value Goal Complete/Controlled?   HgA1C Testing and Control   Lab Results   Component Value Date    HGBA1C 6.4 (H) 01/27/2022      Annually/Less than 8% Yes   Lipid profile : 11/04/2020 Annually Yes   LDL control Lab Results   Component Value Date    LDLCALC 74.4 11/04/2020    Annually/Less than 100 mg/dl  Yes   Nephropathy screening Lab Results   Component  "Value Date    LABMICR 38.0 12/16/2021     Lab Results   Component Value Date    PROTEINUA Negative 09/20/2017    Annually Yes   Blood pressure BP Readings from Last 1 Encounters:   08/03/22 110/60    Less than 140/90 Yes   Dilated retinal exam : 12/10/2021 Annually No   Foot exam   : 12/16/2021 Annually Yes              Relevant Medications    insulin (LANTUS SOLOSTAR U-100 INSULIN) glargine 100 units/mL SubQ pen     Other Visit Diagnoses       Annual physical exam    -  Primary    Current moderate episode of major depressive disorder without prior episode        Relevant Medications    citalopram (CELEXA) 10 MG tablet    Seasonal allergic rhinitis due to pollen        Relevant Medications    montelukast (SINGULAIR) 10 mg tablet             Review of Systems   Constitutional:  Negative for fatigue, fever and unexpected weight change.   HENT:  Negative for ear pain and sore throat.    Eyes: Negative.  Negative for pain and visual disturbance.   Respiratory:  Negative for cough and shortness of breath.    Cardiovascular:  Negative for chest pain and palpitations.   Gastrointestinal:  Negative for abdominal pain, diarrhea, nausea and vomiting.   Genitourinary:  Negative for dysuria and frequency.   Musculoskeletal:  Negative for arthralgias and myalgias.   Skin:  Negative for color change and rash.   Neurological:  Negative for dizziness and headaches.   Psychiatric/Behavioral:  Negative for sleep disturbance. The patient is not nervous/anxious.        Vitals:    08/14/23 0933   BP: (!) 150/61   Pulse: (!) 52   Temp: 98 °F (36.7 °C)       Objective:     Current Outpatient Medications   Medication Sig Dispense Refill    aspirin 325 MG tablet Take 325 mg by mouth once daily.      BD LUER-MANUEL SYRINGE 3 mL 22 x 1 1/2" Syrg   3    blood sugar diagnostic (FREESTYLE LITE STRIPS) Strp TEST 3 TIMES DAILY 300 strip 5    blood-glucose meter Misc Use as directed four times daily before meals and at bedtime. 1 each 0    " "chlorthalidone (HYGROTEN) 25 MG Tab TAKE 1 TABLET BY MOUTH EVERY DAY 90 tablet 3    docusate sodium (COLACE) 100 MG capsule Take 100 mg by mouth 2 (two) times daily.      flash glucose scanning reader (FREESTYLE PUMA 2 READER) [facility] Use in conjunction with glucose sensor every 14 days for continuous monitoring of Glucose. DX:E11.22, N18.30, Z79.44 6 each 3    FREESTYLE PUMA 2 SENSOR Kit USE ONE SENSOR EVERY 14 DAYS FOR CONTINUOUS MONITORING OF GLUCOSE. DX:E11.22, N18.30, Z79.44 6 kit 3    insulin aspart U-100 (NOVOLOG FLEXPEN U-100 INSULIN) 100 unit/mL (3 mL) InPn pen USE AS DIRECTED PER SLIDING SCALE. MAX OF 14 UNITS DAILY) 15 each 3    lancets (ACCU-CHEK SOFTCLIX LANCETS) Misc Check glucose three times daily. 200 each 11    meclizine (ANTIVERT) 50 MG tablet Take 50 mg by mouth 2 (two) times a day.      pen needle, diabetic (BD ULTRA-FINE MINI PEN NEEDLE) 31 gauge x 3/16" Ndle USE DAILY WITH LANTUS AND NOVOLOG 300 each 3    amLODIPine (NORVASC) 5 MG tablet Take 1 tablet (5 mg total) by mouth once daily. 90 tablet 3    citalopram (CELEXA) 10 MG tablet Take 1 tablet (10 mg total) by mouth once daily. 90 tablet 3    clopidogreL (PLAVIX) 75 mg tablet Take 1 tablet (75 mg total) by mouth once daily. 90 tablet 3    finasteride (PROSCAR) 5 mg tablet Take 1 tablet (5 mg total) by mouth once daily. 90 tablet 3    gabapentin (NEURONTIN) 300 MG capsule Take 300 mg by mouth 3 (three) times daily.      insulin (LANTUS SOLOSTAR U-100 INSULIN) glargine 100 units/mL SubQ pen INJECT 36 UNITS UNDER THE SKIN ONCE A DAY 30 each 1    levothyroxine (SYNTHROID) 100 MCG tablet Take 1 tablet (100 mcg total) by mouth once daily. 90 tablet 3    metoprolol succinate (TOPROL-XL) 25 MG 24 hr tablet Take 1 tablet (25 mg total) by mouth once daily. 90 tablet 3    montelukast (SINGULAIR) 10 mg tablet Take 1 tablet (10 mg total) by mouth every evening. 90 tablet 3    olmesartan (BENICAR) 20 MG tablet Take 1 tablet (20 mg total) by mouth once " daily. 90 tablet 3    pravastatin (PRAVACHOL) 40 MG tablet Take 1 tablet (40 mg total) by mouth every evening. 90 tablet 3     No current facility-administered medications for this visit.       Physical Exam  Vitals and nursing note reviewed.   Constitutional:       General: He is not in acute distress.     Appearance: He is well-developed.   HENT:      Head: Normocephalic and atraumatic.   Eyes:      Pupils: Pupils are equal, round, and reactive to light.   Cardiovascular:      Rate and Rhythm: Normal rate and regular rhythm.   Pulmonary:      Effort: Pulmonary effort is normal.      Breath sounds: Normal breath sounds.   Musculoskeletal:         General: Normal range of motion.      Cervical back: Normal range of motion and neck supple.   Skin:     General: Skin is warm and dry.      Findings: No rash.   Neurological:      Mental Status: He is alert and oriented to person, place, and time.   Psychiatric:         Thought Content: Thought content normal.         Assessment:       1. Annual physical exam    2. Controlled type 2 diabetes mellitus with stage 3 chronic kidney disease, with long-term current use of insulin    3. Hypertension associated with diabetes    4. Combined hyperlipidemia associated with type 2 diabetes mellitus    5. Hypothyroidism, unspecified type    6. Current moderate episode of major depressive disorder without prior episode    7. Seasonal allergic rhinitis due to pollen    8. PAD (peripheral artery disease)    9. History of carotid endarterectomy        Plan:   Annual physical exam    Controlled type 2 diabetes mellitus with stage 3 chronic kidney disease, with long-term current use of insulin  -     insulin (LANTUS SOLOSTAR U-100 INSULIN) glargine 100 units/mL SubQ pen; INJECT 36 UNITS UNDER THE SKIN ONCE A DAY  Dispense: 30 each; Refill: 1    Hypertension associated with diabetes  -     olmesartan (BENICAR) 20 MG tablet; Take 1 tablet (20 mg total) by mouth once daily.  Dispense: 90  tablet; Refill: 3  -     metoprolol succinate (TOPROL-XL) 25 MG 24 hr tablet; Take 1 tablet (25 mg total) by mouth once daily.  Dispense: 90 tablet; Refill: 3  -     amLODIPine (NORVASC) 5 MG tablet; Take 1 tablet (5 mg total) by mouth once daily.  Dispense: 90 tablet; Refill: 3    Combined hyperlipidemia associated with type 2 diabetes mellitus  -     pravastatin (PRAVACHOL) 40 MG tablet; Take 1 tablet (40 mg total) by mouth every evening.  Dispense: 90 tablet; Refill: 3    Hypothyroidism, unspecified type  -     levothyroxine (SYNTHROID) 100 MCG tablet; Take 1 tablet (100 mcg total) by mouth once daily.  Dispense: 90 tablet; Refill: 3    Current moderate episode of major depressive disorder without prior episode  -     citalopram (CELEXA) 10 MG tablet; Take 1 tablet (10 mg total) by mouth once daily.  Dispense: 90 tablet; Refill: 3    Seasonal allergic rhinitis due to pollen  -     montelukast (SINGULAIR) 10 mg tablet; Take 1 tablet (10 mg total) by mouth every evening.  Dispense: 90 tablet; Refill: 3    PAD (peripheral artery disease)  -     clopidogreL (PLAVIX) 75 mg tablet; Take 1 tablet (75 mg total) by mouth once daily.  Dispense: 90 tablet; Refill: 3    History of carotid endarterectomy  -     clopidogreL (PLAVIX) 75 mg tablet; Take 1 tablet (75 mg total) by mouth once daily.  Dispense: 90 tablet; Refill: 3    Other orders  -     finasteride (PROSCAR) 5 mg tablet; Take 1 tablet (5 mg total) by mouth once daily.  Dispense: 90 tablet; Refill: 3        No follow-ups on file.    There are no Patient Instructions on file for this visit.

## 2023-08-31 ENCOUNTER — EXTERNAL CHRONIC CARE MANAGEMENT (OUTPATIENT)
Dept: PRIMARY CARE CLINIC | Facility: CLINIC | Age: 83
End: 2023-08-31
Payer: MEDICARE

## 2023-08-31 PROCEDURE — 99490 CHRNC CARE MGMT STAFF 1ST 20: CPT | Mod: S$PBB,,, | Performed by: FAMILY MEDICINE

## 2023-08-31 PROCEDURE — 99490 CHRNC CARE MGMT STAFF 1ST 20: CPT | Mod: PBBFAC,PO | Performed by: FAMILY MEDICINE

## 2023-08-31 PROCEDURE — 99490 PR CHRONIC CARE MGMT, 1ST 20 MIN: ICD-10-PCS | Mod: S$PBB,,, | Performed by: FAMILY MEDICINE

## 2023-09-22 ENCOUNTER — TELEPHONE (OUTPATIENT)
Dept: FAMILY MEDICINE | Facility: CLINIC | Age: 83
End: 2023-09-22
Payer: MEDICARE

## 2023-09-22 NOTE — TELEPHONE ENCOUNTER
----- Message from Andie Guzman sent at 9/22/2023  9:51 AM CDT -----  Contact: [facility] is calling in regards to getting the pt a hospital follow up.please call back at [phone]        Thanks  SRIKANTH

## 2023-09-27 ENCOUNTER — EXTERNAL HOSPITAL ADMISSION (OUTPATIENT)
Dept: ADMINISTRATIVE | Facility: CLINIC | Age: 83
End: 2023-09-27
Payer: MEDICARE

## 2023-09-27 ENCOUNTER — PATIENT OUTREACH (OUTPATIENT)
Dept: ADMINISTRATIVE | Facility: CLINIC | Age: 83
End: 2023-09-27
Payer: MEDICARE

## 2023-09-27 RX ORDER — CODEINE PHOSPHATE AND GUAIFENESIN 10; 100 MG/5ML; MG/5ML
5 SOLUTION ORAL 3 TIMES DAILY PRN
COMMUNITY
Start: 2023-09-26

## 2023-09-27 RX ORDER — TALC
3 POWDER (GRAM) TOPICAL NIGHTLY
COMMUNITY
Start: 2023-09-26

## 2023-09-27 RX ORDER — AMOXICILLIN 250 MG
2 CAPSULE ORAL 2 TIMES DAILY
COMMUNITY
Start: 2023-09-26

## 2023-09-27 RX ORDER — ISOSORBIDE MONONITRATE 30 MG/1
30 TABLET, EXTENDED RELEASE ORAL DAILY
COMMUNITY
Start: 2023-09-26 | End: 2024-02-01 | Stop reason: SDUPTHER

## 2023-09-27 RX ORDER — PANTOPRAZOLE SODIUM 40 MG/1
40 TABLET, DELAYED RELEASE ORAL DAILY
COMMUNITY
Start: 2023-09-26

## 2023-09-27 RX ORDER — TRAMADOL HYDROCHLORIDE 50 MG/1
50 TABLET ORAL EVERY 6 HOURS PRN
COMMUNITY
Start: 2023-08-26 | End: 2023-10-03

## 2023-09-27 RX ORDER — POLYETHYLENE GLYCOL 3350 17 G/17G
17 POWDER, FOR SOLUTION ORAL 2 TIMES DAILY
COMMUNITY
Start: 2023-09-26

## 2023-09-27 RX ORDER — INSULIN LISPRO 100 [IU]/ML
5 INJECTION, SOLUTION INTRAVENOUS; SUBCUTANEOUS 3 TIMES DAILY
COMMUNITY
Start: 2023-09-26

## 2023-09-27 RX ORDER — ACETAMINOPHEN 500 MG
5000 TABLET ORAL DAILY
COMMUNITY
Start: 2023-09-27

## 2023-09-27 NOTE — PROGRESS NOTES
C3 nurse spoke with Bebeto Santiago's wife, Babs, for a TCC post hospital discharge follow up call. The patient does not have a scheduled HOSFU appointment with Darin Krueger MD (PCP)  within 5-7 days post hospital discharge date 9/26/23. C3 nurse was unable to schedule HOSFU appointment in [facility].     Message sent to PCP staff requesting they contact patient and schedule follow up appointment.       Medications review with patient's wife, but she would like clarification on them.     Patient is also scheduled to see Abad Villagran MD (cardio) 10/10/23 @ 10:40am

## 2023-09-27 NOTE — PROGRESS NOTES
C3 nurse attempted to contact patient. The following occurred:   C3 nurse attempted to contact Bebeto Santiago for a TCC post hospital discharge follow up call. The patient is unable to conduct the call @ this time. The patient requested a callback around 4:00pm when his wife is available to conduct the call. He is trying to rest at this time.     The patient has a scheduled HOSFU appointment with Abad Villagran MD (cardio) 10/10/23 @ 10:40am.

## 2023-09-30 ENCOUNTER — EXTERNAL CHRONIC CARE MANAGEMENT (OUTPATIENT)
Dept: PRIMARY CARE CLINIC | Facility: CLINIC | Age: 83
End: 2023-09-30
Payer: MEDICARE

## 2023-09-30 PROCEDURE — 99490 CHRNC CARE MGMT STAFF 1ST 20: CPT | Mod: PBBFAC,PO | Performed by: FAMILY MEDICINE

## 2023-09-30 PROCEDURE — 99490 CHRNC CARE MGMT STAFF 1ST 20: CPT | Mod: S$PBB,,, | Performed by: FAMILY MEDICINE

## 2023-09-30 PROCEDURE — 99490 PR CHRONIC CARE MGMT, 1ST 20 MIN: ICD-10-PCS | Mod: S$PBB,,, | Performed by: FAMILY MEDICINE

## 2023-10-03 ENCOUNTER — OFFICE VISIT (OUTPATIENT)
Dept: FAMILY MEDICINE | Facility: CLINIC | Age: 83
End: 2023-10-03
Payer: MEDICARE

## 2023-10-03 VITALS
DIASTOLIC BLOOD PRESSURE: 73 MMHG | WEIGHT: 160 LBS | SYSTOLIC BLOOD PRESSURE: 152 MMHG | HEART RATE: 68 BPM | HEIGHT: 72 IN | BODY MASS INDEX: 21.67 KG/M2

## 2023-10-03 DIAGNOSIS — Z09 HOSPITAL DISCHARGE FOLLOW-UP: Primary | ICD-10-CM

## 2023-10-03 DIAGNOSIS — F32.1 CURRENT MODERATE EPISODE OF MAJOR DEPRESSIVE DISORDER WITHOUT PRIOR EPISODE: ICD-10-CM

## 2023-10-03 DIAGNOSIS — I15.2 HYPERTENSION ASSOCIATED WITH DIABETES: ICD-10-CM

## 2023-10-03 DIAGNOSIS — E11.59 HYPERTENSION ASSOCIATED WITH DIABETES: ICD-10-CM

## 2023-10-03 PROCEDURE — 99999 PR PBB SHADOW E&M-EST. PATIENT-LVL V: CPT | Mod: PBBFAC,,, | Performed by: NURSE PRACTITIONER

## 2023-10-03 PROCEDURE — 99495 TCM SERVICES (MODERATE COMPLEXITY): ICD-10-PCS | Mod: S$PBB,,, | Performed by: NURSE PRACTITIONER

## 2023-10-03 PROCEDURE — 99999 PR PBB SHADOW E&M-EST. PATIENT-LVL V: ICD-10-PCS | Mod: PBBFAC,,, | Performed by: NURSE PRACTITIONER

## 2023-10-03 PROCEDURE — 99215 OFFICE O/P EST HI 40 MIN: CPT | Mod: PBBFAC,PO | Performed by: NURSE PRACTITIONER

## 2023-10-03 PROCEDURE — 99495 TRANSJ CARE MGMT MOD F2F 14D: CPT | Mod: S$PBB,,, | Performed by: NURSE PRACTITIONER

## 2023-10-03 RX ORDER — AMLODIPINE BESYLATE 10 MG/1
10 TABLET ORAL DAILY
COMMUNITY
End: 2024-02-01 | Stop reason: SDUPTHER

## 2023-10-03 RX ORDER — CITALOPRAM 20 MG/1
20 TABLET, FILM COATED ORAL DAILY
Qty: 90 TABLET | Refills: 3 | Status: SHIPPED | OUTPATIENT
Start: 2023-10-03

## 2023-10-03 NOTE — PATIENT INSTRUCTIONS
Restart Benicar at 10 mg daily (this is half tablet of what you have at home)    Increase Citalopram to 20 mg daily ( your next refill will be 20 mg tablets- you can take 2 of the 10 mg tablets that you have since they were just filled)      Keep follow up with Dr Villagran as scheduled next week    Keep appt with urology, Dr Lomax, at [facility] as scheduled        There is a doctor on call each day- you can get in touch by calling the office and asking for the on call [facility] doctor -[phone]

## 2023-10-03 NOTE — PROGRESS NOTES
Subjective:       Patient ID: Bebeto Santiago is a 83 y.o. male.    Chief Complaint: Hospital Follow Up    HPI    He comes in for hospital follow up. He was admitted to [facility] on 9/7/23.  He presented to the ER with complaints of confusion; recently diagnosed high-grade papillary urothelial carcinoma, status post TURBT on 8/25 .  He was diagnosed with sepsis. Completed test: labs, UA, CXR. Treatment included hydration, IV antibiotics. Medical history includes hypertension, hyperlipidemia, type 2 insulin-dependent diabetes, hypothyroidism. Discharged on 9/11/23 to rehab hospital.      Problem List Items Addressed This Visit          Cardiac/Vascular    Hypertension associated with diabetes    Overview     The patient presents with essential hypertension.  The patient is tolerating the medication well and is in excellent compliance except he is urinating a lot with the chlorthalidone and he is interested in stopping it.  We discussed and agreed to try this.  The patient is experiencing no side effects.  Counseling was offered regarding low salt diets.  The patient has a reduced salt intake.  The patient denies chest pain, palpitations, shortness of breath, dyspnea on exertion, left or murmur neck pain, nausea, vomiting, diaphoresis, paroxysmal nocturnal dyspnea, and orthopnea.            Other Visit Diagnoses       Hospital discharge follow-up    -  Primary    Current moderate episode of major depressive disorder without prior episode        Relevant Medications    citalopram (CELEXA) 20 MG tablet          Transitional Care Note    Family and/or Caretaker present at visit?  Yes.  Diagnostic tests reviewed/disposition: I have reviewed all completed as well as pending diagnostic tests at the time of discharge.  Disease/illness education: hypertension  Home health/community services discussion/referrals: Patient does not have home health established from hospital visit.  They do not need home health.   "If needed, we will set up home health for the patient.   Establishment or re-establishment of referral orders for community resources: No other necessary community resources.   Discussion with other health care providers: No discussion with other health care providers necessary.              Review of Systems   Constitutional:  Negative for fatigue, fever and unexpected weight change.   HENT:  Negative for ear pain and sore throat.    Eyes: Negative.  Negative for pain and visual disturbance.   Respiratory:  Negative for cough and shortness of breath.    Cardiovascular:  Negative for chest pain and palpitations.   Gastrointestinal:  Negative for abdominal pain, diarrhea, nausea and vomiting.   Genitourinary:  Negative for dysuria and frequency.   Musculoskeletal:  Negative for arthralgias and myalgias.   Skin:  Negative for color change and rash.   Neurological:  Negative for dizziness and headaches.   Psychiatric/Behavioral:  Negative for sleep disturbance. The patient is not nervous/anxious.        Vitals:    10/03/23 1017   BP: (!) 152/73   Pulse: 68       Objective:     Current Outpatient Medications   Medication Sig Dispense Refill    amLODIPine (NORVASC) 10 MG tablet Take 10 mg by mouth once daily.      aspirin 325 MG tablet Take 325 mg by mouth once daily.      BD LUER-MANUEL SYRINGE 3 mL 22 x 1 1/2" Syrg   3    blood sugar diagnostic (FREESTYLE LITE STRIPS) Strp TEST 3 TIMES DAILY 300 strip 5    blood-glucose meter Misc Use as directed four times daily before meals and at bedtime. 1 each 0    cholecalciferol, vitamin D3, 125 mcg (5,000 unit) Tab Take 5,000 Units by mouth once daily.      clopidogreL (PLAVIX) 75 mg tablet Take 1 tablet (75 mg total) by mouth once daily. 90 tablet 3    docusate sodium (COLACE) 100 MG capsule Take 100 mg by mouth 2 (two) times daily.      finasteride (PROSCAR) 5 mg tablet Take 1 tablet (5 mg total) by mouth once daily. 90 tablet 3    flash glucose scanning reader (FREESTYLE PUMA " "2 READER) Misc Use in conjunction with glucose sensor every 14 days for continuous monitoring of Glucose. DX:E11.22, N18.30, Z79.44 6 each 3    FREESTYLE PUMA 2 SENSOR Kit USE ONE SENSOR EVERY 14 DAYS FOR CONTINUOUS MONITORING OF GLUCOSE. DX:E11.22, N18.30, Z79.44 6 kit 3    guaiFENesin-codeine 100-10 mg/5 ml (TUSSI-ORGANIDIN NR)  mg/5 mL syrup Take 5 mLs by mouth 3 times daily as needed.      insulin (LANTUS SOLOSTAR U-100 INSULIN) glargine 100 units/mL SubQ pen INJECT 36 UNITS UNDER THE SKIN ONCE A DAY 30 each 1    insulin aspart U-100 (NOVOLOG FLEXPEN U-100 INSULIN) 100 unit/mL (3 mL) InPn pen USE AS DIRECTED PER SLIDING SCALE. MAX OF 14 UNITS DAILY) 15 each 3    insulin lispro 100 unit/mL injection Inject 5 Units into the skin 3 (three) times daily.      isosorbide mononitrate (IMDUR) 30 MG 24 hr tablet Take 30 mg by mouth once daily.      Lactobacillus rhamnosus GG (CULTURELLE) 10 billion cell capsule Take 1 capsule by mouth 2 (two) times daily.      lancets (ACCU-CHEK SOFTCLIX LANCETS) Misc Check glucose three times daily. 200 each 11    levothyroxine (SYNTHROID) 100 MCG tablet Take 1 tablet (100 mcg total) by mouth once daily. 90 tablet 3    melatonin (MELATIN) 3 mg tablet Take 3 mg by mouth nightly.      metoprolol succinate (TOPROL-XL) 12.5 MG 24 hr split tablet Take 12.5 mg by mouth once daily.      montelukast (SINGULAIR) 10 mg tablet Take 1 tablet (10 mg total) by mouth every evening. 90 tablet 3    pantoprazole (PROTONIX) 40 MG tablet Take 40 mg by mouth once daily.      pen needle, diabetic (BD ULTRA-FINE MINI PEN NEEDLE) 31 gauge x 3/16" Ndle USE DAILY WITH LANTUS AND NOVOLOG 300 each 3    polyethylene glycol (GLYCOLAX) 17 gram PwPk Take 17 g by mouth 2 (two) times a day.      pravastatin (PRAVACHOL) 40 MG tablet Take 1 tablet (40 mg total) by mouth every evening. 90 tablet 3    senna-docusate 8.6-50 mg (PERICOLACE) 8.6-50 mg per tablet Take 2 tablets by mouth 2 (two) times a day.      " amLODIPine (NORVASC) 5 MG tablet Take 1 tablet (5 mg total) by mouth once daily. (Patient not taking: Reported on 10/3/2023) 90 tablet 3    chlorthalidone (HYGROTEN) 25 MG Tab TAKE 1 TABLET BY MOUTH EVERY DAY (Patient not taking: Reported on 9/27/2023) 90 tablet 3    citalopram (CELEXA) 20 MG tablet Take 1 tablet (20 mg total) by mouth once daily. 90 tablet 3    metoprolol succinate (TOPROL-XL) 25 MG 24 hr tablet Take 1 tablet (25 mg total) by mouth once daily. (Patient not taking: Reported on 10/3/2023) 90 tablet 3    olmesartan (BENICAR) 20 MG tablet Take 1 tablet (20 mg total) by mouth once daily. (Patient not taking: Reported on 9/27/2023) 90 tablet 3     No current facility-administered medications for this visit.       Physical Exam  Vitals and nursing note reviewed.   Constitutional:       General: He is not in acute distress.     Appearance: He is well-developed.   HENT:      Head: Normocephalic and atraumatic.   Eyes:      Pupils: Pupils are equal, round, and reactive to light.   Cardiovascular:      Rate and Rhythm: Normal rate and regular rhythm.   Pulmonary:      Effort: Pulmonary effort is normal.      Breath sounds: Normal breath sounds.   Musculoskeletal:         General: Normal range of motion.      Cervical back: Normal range of motion and neck supple.   Skin:     General: Skin is warm and dry.      Findings: No rash.   Neurological:      Mental Status: He is alert and oriented to person, place, and time.   Psychiatric:         Thought Content: Thought content normal.         Assessment:       1. Hospital discharge follow-up    2. Hypertension associated with diabetes    3. Current moderate episode of major depressive disorder without prior episode        Plan:   Hospital discharge follow-up    Hypertension associated with diabetes    Current moderate episode of major depressive disorder without prior episode  -     citalopram (CELEXA) 20 MG tablet; Take 1 tablet (20 mg total) by mouth once daily.   Dispense: 90 tablet; Refill: 3        No follow-ups on file.    Patient Instructions   Restart Benicar at 10 mg daily (this is half tablet of what you have at home)    Increase Citalopram to 20 mg daily ( your next refill will be 20 mg tablets- you can take 2 of the 10 mg tablets that you have since they were just filled)      Keep follow up with Dr Villagran as scheduled next week    Keep appt with urology, Dr Lomax, at [facility] as scheduled        There is a doctor on call each day- you can get in touch by calling the office and asking for the on call [facility] doctor -[phone]

## 2023-10-10 ENCOUNTER — OFFICE VISIT (OUTPATIENT)
Dept: CARDIOLOGY | Facility: CLINIC | Age: 83
End: 2023-10-10
Payer: MEDICARE

## 2023-10-10 VITALS
HEART RATE: 68 BPM | WEIGHT: 155.69 LBS | HEIGHT: 72 IN | SYSTOLIC BLOOD PRESSURE: 148 MMHG | DIASTOLIC BLOOD PRESSURE: 76 MMHG | BODY MASS INDEX: 21.09 KG/M2 | OXYGEN SATURATION: 98 %

## 2023-10-10 DIAGNOSIS — Z79.4 CONTROLLED TYPE 2 DIABETES MELLITUS WITH STAGE 3 CHRONIC KIDNEY DISEASE, WITH LONG-TERM CURRENT USE OF INSULIN: ICD-10-CM

## 2023-10-10 DIAGNOSIS — I65.23 BILATERAL CAROTID ARTERY STENOSIS: ICD-10-CM

## 2023-10-10 DIAGNOSIS — N18.30 CONTROLLED TYPE 2 DIABETES MELLITUS WITH STAGE 3 CHRONIC KIDNEY DISEASE, WITH LONG-TERM CURRENT USE OF INSULIN: ICD-10-CM

## 2023-10-10 DIAGNOSIS — I15.2 HYPERTENSION ASSOCIATED WITH DIABETES: Primary | ICD-10-CM

## 2023-10-10 DIAGNOSIS — G47.33 OSA (OBSTRUCTIVE SLEEP APNEA): ICD-10-CM

## 2023-10-10 DIAGNOSIS — E11.22 CONTROLLED TYPE 2 DIABETES MELLITUS WITH STAGE 3 CHRONIC KIDNEY DISEASE, WITH LONG-TERM CURRENT USE OF INSULIN: ICD-10-CM

## 2023-10-10 DIAGNOSIS — Z98.890 HISTORY OF CAROTID ENDARTERECTOMY: ICD-10-CM

## 2023-10-10 DIAGNOSIS — N18.30 CKD STAGE 3 SECONDARY TO DIABETES: ICD-10-CM

## 2023-10-10 DIAGNOSIS — E11.59 HYPERTENSION ASSOCIATED WITH DIABETES: Primary | ICD-10-CM

## 2023-10-10 DIAGNOSIS — E11.22 CKD STAGE 3 SECONDARY TO DIABETES: ICD-10-CM

## 2023-10-10 DIAGNOSIS — K21.00 GASTROESOPHAGEAL REFLUX DISEASE WITH ESOPHAGITIS WITHOUT HEMORRHAGE: ICD-10-CM

## 2023-10-10 DIAGNOSIS — R09.89 BILATERAL CAROTID BRUITS: ICD-10-CM

## 2023-10-10 DIAGNOSIS — E78.2 COMBINED HYPERLIPIDEMIA ASSOCIATED WITH TYPE 2 DIABETES MELLITUS: ICD-10-CM

## 2023-10-10 DIAGNOSIS — R00.2 PALPITATIONS: ICD-10-CM

## 2023-10-10 DIAGNOSIS — E11.69 COMBINED HYPERLIPIDEMIA ASSOCIATED WITH TYPE 2 DIABETES MELLITUS: ICD-10-CM

## 2023-10-10 DIAGNOSIS — I73.9 PAD (PERIPHERAL ARTERY DISEASE): ICD-10-CM

## 2023-10-10 DIAGNOSIS — I65.22 OCCLUSION OF LEFT CAROTID ARTERY: ICD-10-CM

## 2023-10-10 DIAGNOSIS — I49.3 PVC (PREMATURE VENTRICULAR CONTRACTION): ICD-10-CM

## 2023-10-10 PROCEDURE — 99999 PR PBB SHADOW E&M-EST. PATIENT-LVL III: ICD-10-PCS | Mod: PBBFAC,,, | Performed by: INTERNAL MEDICINE

## 2023-10-10 PROCEDURE — 99214 PR OFFICE/OUTPT VISIT, EST, LEVL IV, 30-39 MIN: ICD-10-PCS | Mod: S$PBB,,, | Performed by: INTERNAL MEDICINE

## 2023-10-10 PROCEDURE — 99999 PR PBB SHADOW E&M-EST. PATIENT-LVL III: CPT | Mod: PBBFAC,,, | Performed by: INTERNAL MEDICINE

## 2023-10-10 PROCEDURE — 99214 OFFICE O/P EST MOD 30 MIN: CPT | Mod: S$PBB,,, | Performed by: INTERNAL MEDICINE

## 2023-10-10 PROCEDURE — 99213 OFFICE O/P EST LOW 20 MIN: CPT | Mod: PBBFAC,PO | Performed by: INTERNAL MEDICINE

## 2023-10-10 NOTE — PROGRESS NOTES
Subjective:   Patient ID:  Bebeto Santiago is a 83 y.o. male who presents for follow-up of No chief complaint on file.  Last Clinic visit:  Patient denies CP, angina or anginal equivalent.   Lipids and BP at goal.NMT/stress 202 normal    Today:  Recent admission [facility] urosepsis  Home 2 weeks ago  -180    Hypertension  This is a chronic problem. The current episode started more than 1 year ago. The problem has been gradually improving since onset. The problem is controlled. Pertinent negatives include no chest pain, palpitations or shortness of breath. Past treatments include beta blockers and calcium channel blockers. The current treatment provides moderate improvement. There are no compliance problems.    Hyperlipidemia  This is a chronic problem. The current episode started more than 1 year ago. The problem is controlled. Recent lipid tests were reviewed and are variable. Pertinent negatives include no chest pain or shortness of breath. Current antihyperlipidemic treatment includes statins. The current treatment provides moderate improvement of lipids. There are no compliance problems.    Palpitations   This is a chronic problem. The current episode started more than 1 year ago. The problem occurs intermittently. The problem has been waxing and waning. Nothing aggravates the symptoms. Pertinent negatives include no chest pain, dizziness or shortness of breath. He has tried beta blockers for the symptoms. The treatment provided moderate relief.       Review of Systems   Constitutional: Negative. Negative for weight gain.   HENT: Negative.     Eyes: Negative.    Cardiovascular: Negative.  Negative for chest pain, leg swelling and palpitations.   Respiratory: Negative.  Negative for shortness of breath.    Endocrine: Negative.    Hematologic/Lymphatic: Negative.    Skin: Negative.    Musculoskeletal:  Negative for muscle weakness.   Gastrointestinal: Negative.    Genitourinary: Negative.    Neurological:  Negative.  Negative for dizziness.   Psychiatric/Behavioral: Negative.     Allergic/Immunologic: Negative.    All other systems reviewed and are negative.    Family History   Problem Relation Age of Onset    Heart disease Mother     Stroke Maternal Grandfather     Diabetes Neg Hx     Cancer Neg Hx     Hypertension Neg Hx      Past Medical History:   Diagnosis Date    Allergy     BPH (benign prostatic hypertrophy)     Controlled type 2 diabetes mellitus with stage 3 chronic kidney disease, with long-term current use of insulin     The patient presents with diabetes.  The patient denies polyuria, polydipsia, polyphagia, hypoglycemia and paresthesias.  The patient's glucose control has been good.  Home glucose averages are routinely checked.  The patient is without retinopathy currently.  The patient has no history of neuropathy.  The patient currently complains of no podiatric problems.  The patient has excellent compliance.    Dehydration     Diabetes mellitus type II, uncontrolled     GERD (gastroesophageal reflux disease) 2013    grade IV/IV    Hypertension     Hypothyroidism     Male hypogonadism     Non-proliferative diabetic retinopathy     Urinary retention      Social History     Socioeconomic History    Marital status:      Spouse name: Babs    Number of children: 2   Occupational History    Occupation: Retired   Tobacco Use    Smoking status: Former    Smokeless tobacco: Current     Types: Chew   Substance and Sexual Activity    Alcohol use: Yes     Comment: rare    Drug use: No    Sexual activity: Not Currently     Partners: Female     Social Determinants of Health     Financial Resource Strain: Low Risk  (2/9/2023)    Overall Financial Resource Strain (CARDIA)     Difficulty of Paying Living Expenses: Not hard at all   Food Insecurity: No Food Insecurity (2/9/2023)    Hunger Vital Sign     Worried About Running Out of Food in the Last Year: Never true     Ran Out of Food in the Last Year: Never  "true   Transportation Needs: No Transportation Needs (2/9/2023)    PRAPARE - Transportation     Lack of Transportation (Medical): No     Lack of Transportation (Non-Medical): No   Physical Activity: Inactive (2/9/2023)    Exercise Vital Sign     Days of Exercise per Week: 0 days     Minutes of Exercise per Session: 0 min   Stress: No Stress Concern Present (2/9/2023)    [employer] - Occupational Stress Questionnaire     Feeling of Stress : Not at all   Social Connections: Moderately Integrated (2/9/2023)    Social Connection and Isolation Panel [NHANES]     Frequency of Communication with Friends and Family: More than three times a week     Frequency of Social Gatherings with Friends and Family: More than three times a week     Attends Jew Services: More than 4 times per year     Active Member of Clubs or Organizations: No     Attends Club or Organization Meetings: Never     Marital Status:    Housing Stability: Low Risk  (2/9/2023)    Housing Stability Vital Sign     Unable to Pay for Housing in the Last Year: No     Number of Places Lived in the Last Year: 1     Unstable Housing in the Last Year: No     Current Outpatient Medications on File Prior to Visit   Medication Sig Dispense Refill    amLODIPine (NORVASC) 10 MG tablet Take 10 mg by mouth once daily.      amLODIPine (NORVASC) 5 MG tablet Take 1 tablet (5 mg total) by mouth once daily. (Patient not taking: Reported on 10/3/2023) 90 tablet 3    aspirin 325 MG tablet Take 325 mg by mouth once daily.      BD LUER-MANUEL SYRINGE 3 mL 22 x 1 1/2" Syrg   3    blood sugar diagnostic (FREESTYLE LITE STRIPS) Strp TEST 3 TIMES DAILY 300 strip 5    blood-glucose meter Misc Use as directed four times daily before meals and at bedtime. 1 each 0    cholecalciferol, vitamin D3, 125 mcg (5,000 unit) Tab Take 5,000 Units by mouth once daily.      citalopram (CELEXA) 20 MG tablet Take 1 tablet (20 mg total) by mouth once daily. 90 tablet " 3    clopidogreL (PLAVIX) 75 mg tablet Take 1 tablet (75 mg total) by mouth once daily. 90 tablet 3    docusate sodium (COLACE) 100 MG capsule Take 100 mg by mouth 2 (two) times daily.      finasteride (PROSCAR) 5 mg tablet Take 1 tablet (5 mg total) by mouth once daily. 90 tablet 3    flash glucose scanning reader (FREESTYLE PUMA 2 READER) [employer] [facility] [employer] Use in conjunction with glucose sensor every 14 days for continuous monitoring of Glucose. DX:E11.22, N18.30, Z79.44 6 each 3    FREESTYLE PUMA 2 SENSOR Kit USE ONE SENSOR EVERY 14 DAYS FOR CONTINUOUS MONITORING OF GLUCOSE. DX:E11.22, N18.30, Z79.44 6 kit 3    guaiFENesin-codeine 100-10 mg/5 ml (TUSSI-ORGANIDIN NR)  mg/5 mL syrup Take 5 mLs by mouth 3 times daily as needed.      insulin (LANTUS SOLOSTAR U-100 INSULIN) glargine 100 units/mL SubQ pen INJECT 36 UNITS UNDER THE SKIN ONCE A DAY 30 each 1    insulin aspart U-100 (NOVOLOG FLEXPEN U-100 INSULIN) 100 unit/mL (3 mL) InPn pen USE AS DIRECTED PER SLIDING SCALE. MAX OF 14 UNITS DAILY) 15 each 3    insulin lispro 100 unit/mL injection Inject 5 Units into the skin 3 (three) times daily.      isosorbide mononitrate (IMDUR) 30 MG 24 hr tablet Take 30 mg by mouth once daily.      Lactobacillus rhamnosus GG (CULTURELLE) 10 billion cell capsule Take 1 capsule by mouth 2 (two) times daily.      lancets (ACCU-CHEK SOFTCLIX LANCETS) Misc Check glucose three times daily. 200 each 11    levothyroxine (SYNTHROID) 100 MCG tablet Take 1 tablet (100 mcg total) by mouth once daily. 90 tablet 3    melatonin (MELATIN) 3 mg tablet Take 3 mg by mouth nightly.      metoprolol succinate (TOPROL-XL) 12.5 MG 24 hr split tablet Take 12.5 mg by mouth once daily.      metoprolol succinate (TOPROL-XL) 25 MG 24 hr tablet Take 1 tablet (25 mg total) by mouth once daily. (Patient not taking: Reported on 10/3/2023) 90 tablet 3    montelukast (SINGULAIR) 10 mg tablet Take 1 tablet (10 mg total) by mouth every evening. 90 tablet 3    olmesartan  "(BENICAR) 20 MG tablet Take 1 tablet (20 mg total) by mouth once daily. (Patient not taking: Reported on 9/27/2023) 90 tablet 3    pantoprazole (PROTONIX) 40 MG tablet Take 40 mg by mouth once daily.      pen needle, diabetic (BD ULTRA-FINE MINI PEN NEEDLE) 31 gauge x 3/16" Ndle USE DAILY WITH LANTUS AND NOVOLOG 300 each 3    polyethylene glycol (GLYCOLAX) 17 gram PwPk Take 17 g by mouth 2 (two) times a day.      pravastatin (PRAVACHOL) 40 MG tablet Take 1 tablet (40 mg total) by mouth every evening. 90 tablet 3    senna-docusate 8.6-50 mg (PERICOLACE) 8.6-50 mg per tablet Take 2 tablets by mouth 2 (two) times a day.       No current facility-administered medications on file prior to visit.     Review of patient's allergies indicates:   Allergen Reactions    Ace inhibitors      Other reaction(s): cough    Metformin      Abdominal pain       Objective:     Physical Exam  Vitals and nursing note reviewed.   Constitutional:       Appearance: He is well-developed.   HENT:      Head: Normocephalic and atraumatic.   Eyes:      Conjunctiva/sclera: Conjunctivae normal.      Pupils: Pupils are equal, round, and reactive to light.   Cardiovascular:      Rate and Rhythm: Normal rate and regular rhythm.      Pulses: Intact distal pulses.      Heart sounds: Normal heart sounds.   Pulmonary:      Effort: Pulmonary effort is normal.      Breath sounds: Normal breath sounds.   Abdominal:      General: Bowel sounds are normal.      Palpations: Abdomen is soft.   Musculoskeletal:      Cervical back: Normal range of motion and neck supple.   Skin:     General: Skin is warm and dry.   Neurological:      Mental Status: He is alert and oriented to person, place, and time.         Assessment:     1. Hypertension associated with diabetes    2. Combined hyperlipidemia associated with type 2 diabetes mellitus    3. Bilateral carotid bruits    4. PAD (peripheral artery disease)    5. Bilateral carotid artery stenosis    6. Occlusion of left " carotid artery    7. History of carotid endarterectomy    8. Palpitations    9. PVC (premature ventricular contraction)    10. CKD stage 3 secondary to diabetes    11. Controlled type 2 diabetes mellitus with stage 3 chronic kidney disease, with long-term current use of insulin    12. Gastroesophageal reflux disease with esophagitis without hemorrhage    13. ERNESTINE (obstructive sleep apnea)        Plan:     Hypertension associated with diabetes    Combined hyperlipidemia associated with type 2 diabetes mellitus    Bilateral carotid bruits    PAD (peripheral artery disease)    Bilateral carotid artery stenosis    Occlusion of left carotid artery    History of carotid endarterectomy    Palpitations    PVC (premature ventricular contraction)    CKD stage 3 secondary to diabetes    Controlled type 2 diabetes mellitus with stage 3 chronic kidney disease, with long-term current use of insulin    Gastroesophageal reflux disease with esophagitis without hemorrhage    ERNESTINE (obstructive sleep apnea)    Continue norvasc,  toprol,-htn/SVT  Continue statin-hlp  Continue asa, plavix - primary prevention     Increase toprol 25 q day

## 2023-10-11 ENCOUNTER — TELEPHONE (OUTPATIENT)
Dept: CARDIOLOGY | Facility: CLINIC | Age: 83
End: 2023-10-11
Payer: MEDICARE

## 2023-10-11 DIAGNOSIS — E11.59 HYPERTENSION ASSOCIATED WITH DIABETES: ICD-10-CM

## 2023-10-11 DIAGNOSIS — I15.2 HYPERTENSION ASSOCIATED WITH DIABETES: ICD-10-CM

## 2023-10-11 NOTE — TELEPHONE ENCOUNTER
Spoke with pt's daughter, Genie. Clarified with her that Dr. Villagran wants pt to take olmesartan 10 mg QD and toprol 25 mg QD. Genie verbalized understanding and will call back with any further questions or concerns.    ----- Message from Cheryle Verde sent at 10/10/2023  4:54 PM CDT -----  Regarding: pt call back  Name of Who is Calling:Pt Daughter         What is the request in detail: Requesting call back in regards to visit on today. Daughter would like input on his meds please advise           Can the clinic reply by [facility]: no         What Number to Call Back if not in [facility]: Telephone Information:  Mobile       [phone] Genie

## 2023-10-31 ENCOUNTER — EXTERNAL CHRONIC CARE MANAGEMENT (OUTPATIENT)
Dept: PRIMARY CARE CLINIC | Facility: CLINIC | Age: 83
End: 2023-10-31
Payer: MEDICARE

## 2023-10-31 PROCEDURE — 99490 CHRNC CARE MGMT STAFF 1ST 20: CPT | Mod: PBBFAC,PO | Performed by: FAMILY MEDICINE

## 2023-10-31 PROCEDURE — 99490 PR CHRONIC CARE MGMT, 1ST 20 MIN: ICD-10-PCS | Mod: S$PBB,,, | Performed by: FAMILY MEDICINE

## 2023-10-31 PROCEDURE — 99490 CHRNC CARE MGMT STAFF 1ST 20: CPT | Mod: S$PBB,,, | Performed by: FAMILY MEDICINE

## 2023-11-07 ENCOUNTER — PATIENT MESSAGE (OUTPATIENT)
Dept: FAMILY MEDICINE | Facility: CLINIC | Age: 83
End: 2023-11-07
Payer: MEDICARE

## 2023-11-30 ENCOUNTER — EXTERNAL CHRONIC CARE MANAGEMENT (OUTPATIENT)
Dept: PRIMARY CARE CLINIC | Facility: CLINIC | Age: 83
End: 2023-11-30
Payer: MEDICARE

## 2023-11-30 PROCEDURE — 99490 CHRNC CARE MGMT STAFF 1ST 20: CPT | Mod: PBBFAC,PO | Performed by: FAMILY MEDICINE

## 2023-11-30 PROCEDURE — 99490 PR CHRONIC CARE MGMT, 1ST 20 MIN: ICD-10-PCS | Mod: S$PBB,,, | Performed by: FAMILY MEDICINE

## 2023-11-30 PROCEDURE — 99490 CHRNC CARE MGMT STAFF 1ST 20: CPT | Mod: S$PBB,,, | Performed by: FAMILY MEDICINE

## 2023-12-22 ENCOUNTER — PATIENT OUTREACH (OUTPATIENT)
Dept: ADMINISTRATIVE | Facility: CLINIC | Age: 83
End: 2023-12-22
Payer: MEDICARE

## 2023-12-31 ENCOUNTER — EXTERNAL CHRONIC CARE MANAGEMENT (OUTPATIENT)
Dept: PRIMARY CARE CLINIC | Facility: CLINIC | Age: 83
End: 2023-12-31
Payer: MEDICARE

## 2023-12-31 PROCEDURE — 99490 CHRNC CARE MGMT STAFF 1ST 20: CPT | Mod: PBBFAC,PO | Performed by: FAMILY MEDICINE

## 2023-12-31 PROCEDURE — 99490 CHRNC CARE MGMT STAFF 1ST 20: CPT | Mod: S$PBB,,, | Performed by: FAMILY MEDICINE

## 2024-01-03 ENCOUNTER — OFFICE VISIT (OUTPATIENT)
Dept: FAMILY MEDICINE | Facility: CLINIC | Age: 84
End: 2024-01-03
Payer: MEDICARE

## 2024-01-03 ENCOUNTER — TELEPHONE (OUTPATIENT)
Dept: FAMILY MEDICINE | Facility: CLINIC | Age: 84
End: 2024-01-03
Payer: MEDICARE

## 2024-01-03 VITALS
DIASTOLIC BLOOD PRESSURE: 84 MMHG | SYSTOLIC BLOOD PRESSURE: 134 MMHG | HEIGHT: 72 IN | WEIGHT: 163 LBS | HEART RATE: 62 BPM | BODY MASS INDEX: 22.08 KG/M2 | TEMPERATURE: 98 F

## 2024-01-03 DIAGNOSIS — N18.32 STAGE 3B CHRONIC KIDNEY DISEASE: ICD-10-CM

## 2024-01-03 DIAGNOSIS — N18.30 CKD STAGE 3 SECONDARY TO DIABETES: ICD-10-CM

## 2024-01-03 DIAGNOSIS — F32.1 CURRENT MODERATE EPISODE OF MAJOR DEPRESSIVE DISORDER WITHOUT PRIOR EPISODE: ICD-10-CM

## 2024-01-03 DIAGNOSIS — I15.2 HYPERTENSION ASSOCIATED WITH DIABETES: ICD-10-CM

## 2024-01-03 DIAGNOSIS — E11.59 HYPERTENSION ASSOCIATED WITH DIABETES: ICD-10-CM

## 2024-01-03 DIAGNOSIS — I50.33 ACUTE ON CHRONIC DIASTOLIC (CONGESTIVE) HEART FAILURE: ICD-10-CM

## 2024-01-03 DIAGNOSIS — E11.22 CKD STAGE 3 SECONDARY TO DIABETES: ICD-10-CM

## 2024-01-03 DIAGNOSIS — I73.9 PAD (PERIPHERAL ARTERY DISEASE): ICD-10-CM

## 2024-01-03 DIAGNOSIS — I11.0 HYPERTENSIVE HEART DISEASE WITH HEART FAILURE: ICD-10-CM

## 2024-01-03 DIAGNOSIS — C67.9 MALIGNANT NEOPLASM OF URINARY BLADDER, UNSPECIFIED SITE: Primary | ICD-10-CM

## 2024-01-03 DIAGNOSIS — Z86.19 HISTORY OF SEPSIS: ICD-10-CM

## 2024-01-03 DIAGNOSIS — C67.8 MALIGNANT NEOPLASM OF OVERLAPPING SITES OF BLADDER: ICD-10-CM

## 2024-01-03 DIAGNOSIS — J44.9 CHRONIC OBSTRUCTIVE PULMONARY DISEASE, UNSPECIFIED COPD TYPE: ICD-10-CM

## 2024-01-03 PROCEDURE — G0008 ADMIN INFLUENZA VIRUS VAC: HCPCS | Mod: PBBFAC,PO

## 2024-01-03 PROCEDURE — 99999 PR PBB SHADOW E&M-EST. PATIENT-LVL V: CPT | Mod: PBBFAC,,, | Performed by: FAMILY MEDICINE

## 2024-01-03 PROCEDURE — 99215 OFFICE O/P EST HI 40 MIN: CPT | Mod: PBBFAC,PO | Performed by: FAMILY MEDICINE

## 2024-01-03 PROCEDURE — 90694 VACC AIIV4 NO PRSRV 0.5ML IM: CPT | Mod: PBBFAC,PO

## 2024-01-03 PROCEDURE — 99214 OFFICE O/P EST MOD 30 MIN: CPT | Mod: S$PBB,,, | Performed by: FAMILY MEDICINE

## 2024-01-03 PROCEDURE — 99999PBSHW FLU VACCINE - QUADRIVALENT - ADJUVANTED: Mod: PBBFAC,,,

## 2024-01-03 RX ORDER — GABAPENTIN 300 MG/1
300 CAPSULE ORAL 3 TIMES DAILY
COMMUNITY
Start: 2023-12-04

## 2024-01-03 RX ORDER — MELOXICAM 15 MG/1
15 TABLET ORAL DAILY
COMMUNITY

## 2024-01-03 RX ORDER — CEFUROXIME AXETIL 250 MG/1
250 TABLET ORAL DAILY
COMMUNITY
Start: 2023-12-28

## 2024-01-03 NOTE — TELEPHONE ENCOUNTER
----- Message from Theresa Dean sent at 1/3/2024  8:41 AM CST -----  Contact: Koki/Daughter  Koki/daughter called to let the office know they might be running a few minutes late due to currently being at [facility] trying to get patient blood work done. please call her at [phone] if needed.  thanks   am

## 2024-01-03 NOTE — PROGRESS NOTES
Subjective:      Patient ID: Bebeto Santiago is a 83 y.o. male.    Chief Complaint: Hospital Follow Up      The patient was recently hospitalized at [facility]  for sepsis from UTI after chemo tx for bladder canc er.  The discharge summary from the hospitalization is copied below for reference and completeness.      Transitional Care Note    Family and/or Caretaker present at visit?  Yes.  Diagnostic tests reviewed/disposition: No diagnosic tests pending after this hospitalization.  Disease/illness education: he understands what he had.  Home health/community services discussion/referrals: Patient has home health established at [place] .   Establishment or re-establishment of referral orders for community resources: No other necessary community resources.   Discussion with other health care providers: No discussion with other health care providers necessary.   Patient Care Team:  Darin Krueger MD as PCP - General (Family Medicine)  Jeff Kim MD as Consulting Physician (Ophthalmology)  DISCHARGE SUMMARY:  Dr. Lomax is his doc that is treating his bladder cancer.       Discharge Summaries  - documented in this encounter  Teddy Gurrola NP - 12/18/2023 3:40 PM CST  Formatting of this note is different from the original.  Images from the original note were not included.  [facility] DISCHARGE SUMMARY    Patient ID:  Bebeto Santiago  [id number]  83 y.o.  1940    Admit Date:  12/15/2023 9:41 AM    Discharge Date:  Discharge Today: 12/18/2023    Admitting Physician:  Bryan Boateng MD    Discharge Physician:  TEDDY GURROLA NP    Consultants/Treatment Team:  Consultants    Provider Service Role Specialty  Brockton, Teddy, NP -- Nurse Practitioner Nurse Practitioner Jose Mcarthur DO -- Consulting Physician Infectious Diseases  Matthew Aldana MD -- Consulting Physician Urology  Miguel Lomax MD -- Consulting Physician Urology  Helen Pickard NP -- Nurse Practitioner Nurse Practitioner  "Family        Reason for Admission/Admission Diagnoses:  Present on Admission:  Generalized weakness  Acute on chronic diastolic (congestive) heart failure (HCC)    Discharge Diagnoses:  Active Hospital Problems  Diagnosis Date Noted  Generalized weakness 12/15/2023  Acute on chronic diastolic (congestive) heart failure (HCC) 12/16/2023    Resolved Hospital Problems    History of Present Illness:  83-year-old male with multiple medical problems including hypertension, hyperlipidemia, hypothyroidism, chronic lower back pain, multiple previous falls, diabetes, 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 The patient apparently had a "low-grade fever" at home last night. No rhinorrhea, nasal congestion or cough. The patient denies any chest pain. No abdominal pain. Denies any dysuria. He does have urgency with urination which is baseline for him. He has a history recurrent falls for which he is seeing neurology in [place]. Currently undergoing testing and are supposed to see neurology next week as they are worried that possibly the patient could have Parkinson's versus frequent falls due to his spinal stenosis. He states yesterday he had to use the restroom. He could not reach his urinal or is relating walker in time. He ended up slipping and falling forward trying to get to the restroom. He did not strike his head. However, a couple days before that he did have a fall where he struck his left cheek. He denies any neck pain. Denies any numbness, tingling or focal weakness. Family noticed yesterday that he was so weak that he could not sit up in bed by himself and could not feed himself which is not normal for him. They state he usually is able to get about with a walker. Patient thought he would be better this " morning after getting some rest. However, he was not improved at all this morning, causing family to activate EMS.  The patient received a 500 cc normal saline bolus per EMS.    Hospital Course and Treatment:    Possible BCG sepsis / Enterococcus UTI / leukocytosis  - POA: WBC 33.8, UA positive for blood, leuk esterase, RBC, WBC and cast  - urine cultures: Enterococcus species, repeat urine culture and Blood culture pending  - cipro and rifampin per urology recommendations  - ID consult and urology consulted  - lactic acid WNL  -12/18 patient afebrile, WBC 11. Patient denies urinary complaints. Discussed with Dr. Kaba infectious disease recommends discharging home on Augmentin x 7 days. He also recommends urology concern for BCG sepsis/UTI recommends AFB stain and culture.    Generalized weakness  -PT OT recommending inpatient rehab, patient and family state he has been to inpatient rehab multiple times and has not succeeded would prefer home health with PT OT.    Hypernatremia-resolved  -Stable    MARIANA  -BUN/creatinine stable    New onset HFpEF  Grade 1 diastolic dysfunction  - .7 on admission  - CXR Bibasilar interstitial disease that may represent pneumonitis  -Echo 12/17 reveals EF 66 5%. Trace TR, mild MR, grade 1 diastolic dysfunction.  - cardiac enzymes negative    HTN  -Continue home regimen    Hypothyroid  - Synthroid continued on discharge    DM  -Continue home regimen    Patient is hemodynamically stable and clinically improved for discharge.    Admission Information    Date & Time  12/15/2023 Provider  Mal Dugan MD Department  [facility] Phone  [phone]          I discussed discharge planning with the patient and family at the bedside. All question concerns regarding discharge planning, follow-up, medications were addressed.    Appreciate urology following while inpatient. patient will continue to follow with Dr. Lomax in the outpatient  setting.    Also spoke with Dr. Kaba personally and he recommends Augmentin x 7 days upon discharge to treat UTI.    Discharge planning discussed with Dr. Dugan    I discussed with the patient disease process and treatment.    I have personally seen and examined the patient, Bebeto Santiago, in a face to face encounter on the date of discharge.    He is cleared for discharge with instructions to follow up as directed.  Total time in the care and discharge planning of this patient was greater than 30 minutes.    Significant Diagnostic Studies:  Recent Imaging and Procedure Results    Procedure Component Value Ref Range Date/Time  Urine culture [[id number]] (Abnormal) (Susceptibility) Collected: 12/16/2023 0826  Specimen: N/A from Urine Bladder Updated: 12/18/2023 1218  Urine Culture Result Enterococcus faecalis  Micro Culture Result >100,000 CFU/mL  Susceptibility    Enterococcus faecalis (1)    Antibiotic Interpretation Method Status  Ampicillin Sensitive MINIMAL INHIBITORY CONCENTRATION  Levofloxacin Sensitive MINIMAL INHIBITORY CONCENTRATION  Vancomycin Sensitive MINIMAL INHIBITORY CONCENTRATION  Nitrofurantoin Sensitive MINIMAL INHIBITORY CONCENTRATION                Echo Complete [[id number]] Collected: 12/17/2023 0916  Updated: 12/17/2023 1422  PatientHeight 182.88 cm  PatientWeight 73.9368 kg  Heart Rate 75 bpm  BSA 2 m  2D/MMode measurements and calculations: MMode 2D Measurements & Calculations  Interventricular Septum in Diastole 1.1 cm  Interventricular Septum in Systole 1.4 cm  Left Ventricle in Diastole 4.4 cm  Left Ventricle in Systole 3.2 cm  LV post wall in Diastole 1.1 cm  LV post wall in Systole 1.4 cm  IVS/LVPW 0.98  FS 27.1 %  EDV(Teich) 85.6 ml  ESV(Teich) 40.3 ml  EF(Teich) 53 %  EDV(cubed) 82.6 ml  ESV(cubed) 32.1 ml  EF(cubed) 61.2 %  % IVS Thick 26.2 %  % LVPW Thick 19.9 %  LV mass(C)d 170.5 grams  LV mass(C)dI 87.3 grams/m  LV mass(C)s 147.6 grams  LV mass(C)sI 75.6 grams/m  SV(Teich) 45.4  ml  SI(Teich) 23.2 ml/m  SV(cubed) 50.6 ml  SI(cubed) 25.9 ml/m  LA Dimension 3.9 cm  LVOT Diameter 2.1 cm  LVOT area 3.3 cm  LVOT area (traced) 3.5 cm  Time Measurements Time Measurements  Aortic R-R 0.85 sec  Aortic HR 71 BPM  Doppler measurements and calculations: Doppler Measurements & Calculations  MV e max velocity 75.3 cm/sec  MV a velocity 100.7 cm/sec  MV e/a ratio 0.75  MV Dec slope 239.8 cm/sec  MV dec time 0.31 sec  Ao V2 Max 167.8 cm/sec  Ao max PG 11.3 mmHg  Ao max PG (full) 4.8 mmHg  Ao V2 mean 121.4 cm/sec  Ao mean PG 6.4 mmHg  Ao mean PG (full) 3.5 mmHg  Ao V2 VTI 33.7 cm  REYES(I,A) 2.6 cm  REYES(I,D) 2.6 cm  REYES(V,A) 2.5 cm  REYES(V,D) 2.5 cm  LV V1 max PG 6.5 mmHg  LV V1 mean PG 2.9 mmHg  LV V1 max 127.2 cm/sec  LV V1 mean 77.9 cm/sec  LV V1 VTI 26.3 cm  CO(LVOT) 6.2 l/min  CI(LVOT) 3.2 l/min/m  SV(LVOT) 88 ml  SI(LVOT) 45 ml/m  EF MIN = 60 %  EF MAX = 65 %  Narrative:      Adult Echocardiogram  Name: ELLY HUBBARD Study Date: 2023  MRN: [id number] Age: 83 yrs  Patient Location: [facility]^[id number] Height: 72 in  : 1940 Weight: 163 lb  Gender: Male BSA: 2.0 m2  Reason For Study: elevated BNP    Ordering Physician: TEDDY GURROLA  Performed By: Tahir Felix, SHASHI, RVT, ACS    Interpretation Summary  Ejection Fraction = 60-65%.  There is trace tricuspid regurgitation.  There is mild mitral regurgitation.  Grade 1 Diastolic Dysfunction    Measurements  IVSd: 1.1 cm LVIDd: 4.4 cm  LVPWd: 1.1 cm  IVSs: 1.4 cm LVIDs: 3.2 cm  LVPWs: 1.4 cm  LVOT diam: 2.1 cm LA dimension: 3.9 cm    MMode/2D Measurements & Calculations  FS: 27.1 % % IVS thick: 26.2 %  EDV(Teich): 85.6 ml  ESV(Teich): 40.3 ml  EF(Teich): 53.0 %  LVOT area: 3.3 cm2 Ao Sinus Diam_phl: 3.2 cm  ______________________________________________________________________________  Ao STJ Diam_phl: 2.9 cm Aortic R-R: 0.85 sec  ______________________________________________________________________________  IVC Diam_phl: 1.2 cm TAPSE_phl: 2.0  cm    Time Measurements  Aortic HR: 71.0 BPM MV dec time: 0.31 sec    Doppler Measurements & Calculations  MV E max gibran: 75.3 cm/sec MV dec slope: 239.8 cm/sec2  MV A max gibran: 100.7 cm/sec  MV E/A: 0.75  Ao V2 max: 167.8 cm/sec LV V1 max P.5 mmHg  Ao max P.3 mmHg LV V1 mean P.9 mmHg  Ao V2 mean: 121.4 cm/sec LV V1 max: 127.2 cm/sec  Ao mean P.4 mmHg LV V1 mean: 77.9 cm/sec  Ao V2 VTI: 33.7 cm LV V1 VTI: 26.3 cm  REYES(I,D): 2.6 cm2  REYES(V,D): 2.5 cm2  CO(LVOT): 6.2 l/min AV VR_phl: 0.76  CI(LVOT): 3.2 l/min/m2  SV(LVOT): 88.0 ml    ______________________________________________________________________________  REYES(VTI)/BSA_phl: 1.4 MV P1/2t-pr_phl: 92.0 msec    LEFT VENTRICLE    o The left ventricle is normal in size.  o Ejection Fraction = 60-65%.  o Left ventricular systolic function is normal.  o There is normal left ventricular wall thickness.  o The left ventricular wall motion is normal.    DIASTOLOGY    o LAESV index = '30' ml/m2.  o Lateral e'= '7' cm/s.  o Septal e'= '6' cm/s.  o E/e' ='11'.  o Normal Left Atrial Pressure.  o Grade 1 Diastolic Dysfunction.    RIGHT VENTRICLE    o The right ventricle is normal size.  o The right ventricular systolic function is normal.    ATRIA    o The left atrial size is normal.  o Right atrial size is normal.  o IVC diameter < 2.1cm with > 50% collapse.    MITRAL VALVE    o The mitral valve is grossly normal.  o There is mild mitral regurgitation.  o There is no mitral valve stenosis.    TRICUSPID VALVE    o The tricuspid valve is not well visualized, but is grossly normal.  o There is trace tricuspid regurgitation.  o There is no tricuspid stenosis.    AORTIC VALVE    o Aortic sclerosis is mild.  o The aortic valve is trileaflet.  o No aortic regurgitation is present.  o There is no aortic valvular stenosis.    PULMONIC VALVE    o The pulmonic valve is not well visualized.  o There is no pulmonic valvular regurgitation.  o There is no pulmonic valvular  stenosis.    GREAT VESSELS    o The aortic root is normal size.  o Normal size aorta.  o The pulmonary artery is normal size.    PERICARDIUM/PLEURAL EFFUSION    o There is no pericardial effusion.  o There is no pleural effusion.    ______________________________________________________________________________  Electronically signed by: OSMAN Madrid 12/17/2023 02:21 PM  InterpretingPhysician: OSMAN Madrid, electronically signed on 2023-12-17 14:21:51.093  Urine Culture [[id number]] (Abnormal) (Susceptibility) Collected: 12/15/2023 1135  Specimen: N/A from Urine CC Updated: 12/17/2023 1222  Urine Culture Result Enterococcus faecalis  Micro Culture Result >100,000 CFU/mL  Susceptibility    Enterococcus faecalis (1)    Antibiotic Interpretation Method Status  Ampicillin Sensitive MINIMAL INHIBITORY CONCENTRATION  Levofloxacin Sensitive MINIMAL INHIBITORY CONCENTRATION  Vancomycin Sensitive MINIMAL INHIBITORY CONCENTRATION  Nitrofurantoin Sensitive MINIMAL INHIBITORY CONCENTRATION                CT Head WO Contrast [[id number]] Collected: 12/15/2023 1117  Updated: 12/15/2023 1131  Narrative:  REASON FOR EXAM: Head trauma, minor (Age >= 65y)    TECHNICAL FACTORS: 5 mm contiguous axial, sagittal and coronal CT images were obtained from the foramen magnum to the skull vertex. ASIR was utilized for radiation reduction.    COMPARISON: 9/7/2023 CT head.    FINDINGS: Moderate global parenchymal atrophy. Periventricular white matter hypodensity, likely mild chronic microvascular ischemia. Small left basal ganglia chronic lacunar infarction. There is no evidence of acute intracranial hemorrhage or infarct.  There is no evidence of mass, mass effect, or midline shift. Right tita bullosa. Absent native ocular lenses. Mild ethmoid and maxillary sinus mucosal thickening. Osseous structures are unremarkable.    Impression:  1. No acute intracranial abnormality.  2. Chronic findings as above.        Electronically  signed by Jaiden Can MD on 12/15/2023 11:19 AM          Surgical Procedures during this visit:    Patient's Condition On Discharge:  Stable    Physical Exam  Constitutional:  General: He is not in acute distress.  Appearance: Normal appearance. He is normal weight. He is not ill-appearing or toxic-appearing.  HENT:  Nose: Nose normal.  Mouth/Throat:  Mouth: Mucous membranes are moist.  Pharynx: Oropharynx is clear.  Eyes:  Pupils: Pupils are equal, round, and reactive to light.  Cardiovascular:  Rate and Rhythm: Normal rate and regular rhythm.  Pulses: Normal pulses.  Heart sounds: Normal heart sounds.  Pulmonary:  Effort: Pulmonary effort is normal. No respiratory distress.  Breath sounds: Normal breath sounds. No wheezing.  Abdominal:  General: Abdomen is flat. Bowel sounds are normal. There is no distension.  Palpations: Abdomen is soft.  Tenderness: There is no abdominal tenderness.  Musculoskeletal:  General: Normal range of motion.  Right lower leg: No edema.  Left lower leg: No edema.  Comments: Generalized weakness  Skin:  General: Skin is warm and dry.  Capillary Refill: Capillary refill takes less than 2 seconds.  Neurological:  General: No focal deficit present.  Mental Status: He is alert and oriented to person, place, and time. Mental status is at baseline.  Psychiatric:  Mood and Affect: Mood normal.  Behavior: Behavior normal.  Thought Content: Thought content normal.  Judgment: Judgment normal.    Discharge Disposition:  Order for Discharge (From admission, onward)    Start Ordered  12/18/23 1537 Discharge Disposition to: Home Health Once  Expected Discharge Date: 12/18/23    Question: Discharge Disposition to Answer: Home Health  12/18/23 1540  12/18/23 0000 Follow-up with PCP Schedule for: 1; Weeks  Question Answer Comment  Schedule for 1  when Weeks    12/18/23 1540  12/18/23 0000 Follow-up with: ORESTES MONROY; Schedule for: 1; Weeks  Question Answer Comment  with ORESTES MONROY  Schedule for  1  when Weeks    12/18/23 1540                Discharge Orders:  Contact information for after-discharge care    Home Medical Care    [facility] .  Service: Home Health Services  Contact information:  [address]  [address]  [place] [address] [phone]                          Future Appointments:  Future Appointments    Provider Department Dept Phone Center  12/20/2023 2:00 PM Miguel Lomax MD [facility] [phone] [place]        Immunizations Administered for This Admission    No immunizations given this admission.          Medication List      START taking these medications    amoxicillin-clavulanate 875-125 mg Tab per tablet  Commonly known as: Augmentin  Take 1 tablet by mouth 2 (two) times daily for 7 days          CONTINUE taking these medications    amLODIPine 10 MG Tab tablet  Commonly known as: NORVASC  Take 1 tablet (10 mg total) by mouth daily    aspirin 325 MG Tbec EC tablet  Commonly known as: ECOTRIN  Take 1 tablet (325 mg total) by mouth daily    cholecalciferol (vitamin D3) 125 mcg (5,000 unit) Tab tablet  Take 1 tablet (5,000 Units total) by mouth daily    citalopram 10 MG Tab tablet  Commonly known as: CeleXA  Take 1 tablet (10 mg total) by mouth daily    clopidogreL 75 mg Tab tablet  Commonly known as: PLAVIX  Take 1 tablet (75 mg total) by mouth daily    codeine-guaifenesin  mg/5 mL Liqd liquid  Commonly known as: GUAIATUSSIN AC  Take 5 mLs by mouth 3 (three) times daily as needed for Cough    finasteride 5 mg Tab tablet  Commonly known as: PROSCAR  Take 1 tablet (5 mg total) by mouth daily    gabapentin 300 MG Cap capsule  Commonly known as: NEURONTIN    insulin glargine 100 unit/mL (3 mL) Inpn insulin pen  Commonly known as: LANTUS SOLOSTAR  Inject 38 Units into the skin every morning    insulin lispro 100 unit/mL Soln injection  Commonly known as: HumaLOG  Inject 5 Units into the skin in the morning and 5  Units at noon and 5 Units in the evening. Inject with meals.    isosorbide mononitrate 30 MG Tb24 24 hr tablet  Commonly known as: IMDUR  Take 1 tablet (30 mg total) by mouth nightly    lactobacillus rhamnosus (GG) 10 billion cell Cap capsule  Commonly known as: CULTURELLE  Take 1 capsule by mouth 2 (two) times daily    levothyroxine 100 MCG Tab tablet  Commonly known as: SYNTHROID  Take 1 tablet (100 mcg total) by mouth every morning at 6AM    melatonin 3 mg Tab tablet  Take 1 tablet (3 mg total) by mouth at bedtime nightly as needed (sleep)    metoprolol succinate 25 MG Tb24 24 hr tablet  Commonly known as: TOPROL-XL  Take 0.5 tablets (12.5 mg total) by mouth daily    montelukast 10 mg Tab tablet  Commonly known as: SINGULAIR  Take 1 tablet (10 mg total) by mouth nightly    multivitamin Tab per tablet  Commonly known as: THERAGRAN    olmesartan 20 MG Tab tablet  Commonly known as: BENICAR    pantoprazole 40 MG Tbec tablet  Commonly known as: PROTONIX  Take 1 tablet (40 mg total) by mouth every morning before breakfast    polyethylene glycol 17 gram Pwpk packet  Commonly known as: MIRALAX  Take 17 g by mouth 2 (two) times daily    pravastatin 40 MG Tab tablet  Commonly known as: PRAVACHOL  Take 1 tablet (40 mg total) by mouth every evening    senna-docusate 8.6-50 mg Tab per tablet  Commonly known as: SENOKOT-S  Take 2 tablets by mouth 2 (two) times daily            Where to Get Your Medications      These medications were sent to [facility]/pharmacy #[id number] - [place] - [address] [place] [address] [place] [address]    Phone: [phone]  amoxicillin-clavulanate 875-125 mg Tab per tablet      Discharge Orders    Future Labs/Procedures Expected by Expires  Activity as tolerated As directed  Diet (Select type) Cardiac/Low Chol/GRETCHEN, Consistent Carb 3 (45g/meal, 1200cal) As directed  Questions:  Diet Type: Cardiac/Low Chol/GRETCHEN  Consistent Carb 3 (45g/meal,  1200cal)  Restriction(s):  Solid Consistency:  Liquid Consistency:  Sodium Restriction:  Fluid restriction:  Follow-up with PCP Schedule for: 1; Weeks As directed  Questions:  Schedule for: 1  when: Weeks  Follow-up with: ORESTES MONROY; Schedule for: 1; Weeks As directed  Questions:  with: ORESTES MONROY  Schedule for: 1  when: Weeks        JONA Pollard, [facility]      Electronically signed by Terri Kasper NP at 12/18/2023 3:49 PM CST   Discharge Instructions  - documented in this encounter  Attachments  The following attachments cannot be sent through Care Everywhere.   Amoxicillin; Clavulanic Acid Tablets (English)  Phlebitis Easy-to-Read (English)  Heart-Healthy Eating Plan Easy-to-Read (English)  Weakness Easy-to-Read (English)  Medications at Time of Discharge  - documented as of this encounter  Medications at Time of Discharge  Medication Sig Dispensed Refills Start Date End Date   amLODIPine (NORVASC) 10 MG Tab tablet  Take 1 tablet (10 mg total) by mouth daily 30 tablet  3 09/27/2023     aspirin (ECOTRIN) 325 MG TbEC EC tablet  Take 1 tablet (325 mg total) by mouth daily 30 tablet  3 09/27/2023     cholecalciferol, vitamin D3, 125 mcg (5,000 unit) Tab tablet  Take 1 tablet (5,000 Units total) by mouth daily 30 tablet  0 09/27/2023     citalopram (CeleXA) 10 MG Tab tablet  Take 1 tablet (10 mg total) by mouth daily 30 tablet  3 09/26/2023     clopidogreL (PLAVIX) 75 mg Tab tablet  Take 1 tablet (75 mg total) by mouth daily 30 tablet  3 09/26/2023     codeine-guaifenesin (GUAIATUSSIN AC)  mg/5 mL Liqd liquid  Take 5 mLs by mouth 3 (three) times daily as needed for Cough 118 mL  0 09/26/2023     finasteride (PROSCAR) 5 mg Tab tablet   Indications: Benign localized prostatic hyperplasia with lower urinary tract symptoms (LUTS) Take 1 tablet (5 mg total) by mouth daily 30 tablet  11 10/19/2023     FreeStyle Landen 2 Sensor Kit  USE ONE SENSOR EVERY 14 DAYS FOR CONTINUOUS  MONITORING OF GLUCOSE. DX:E11.22, N18.30, Z79.44   0 12/02/2023     gabapentin (NEURONTIN) 300 MG Cap capsule  Take 1 capsule (300 mg total) by mouth 3 (three) times daily   0 12/04/2023     insulin glargine (LANTUS SOLOSTAR) 100 unit/mL (3 mL) InPn insulin pen  Inject 38 Units into the skin every morning 3 mL  3 09/26/2023     insulin lispro (HumaLOG) 100 unit/mL Soln injection  Inject 5 Units into the skin in the morning and 5 Units at noon and 5 Units in the evening. Inject with meals. 33 mL  0 09/26/2023     isosorbide mononitrate (IMDUR) 30 MG Tb24 24 hr tablet  Take 1 tablet (30 mg total) by mouth nightly 30 tablet  3 09/26/2023     lactobacillus rhamnosus, GG, (CULTURELLE) 10 billion cell Cap capsule  Take 1 capsule by mouth 2 (two) times daily 60 capsule  0 09/26/2023     levothyroxine (SYNTHROID) 100 MCG Tab tablet  Take 1 tablet (100 mcg total) by mouth every morning at 6AM 30 tablet  3 09/27/2023     melatonin 3 mg Tab tablet  Take 1 tablet (3 mg total) by mouth at bedtime nightly as needed (sleep) 30 tablet  3 09/26/2023     meloxicam (MOBIC) 15 MG Tab tablet  Take 1 tablet (15 mg total) by mouth daily with food   0 12/02/2023     metoprolol succinate (TOPROL-XL) 25 MG Tb24 24 hr tablet  Take 0.5 tablets (12.5 mg total) by mouth daily 15 tablet  3 09/27/2023     montelukast (SINGULAIR) 10 mg Tab tablet  Take 1 tablet (10 mg total) by mouth nightly 30 tablet  3 09/26/2023     multivitamin (THERAGRAN) Tab per tablet  Take 1 tablet by mouth daily   0       NovoLOG Flexpen U-100 Insulin 100 unit/mL (3 mL) InPn  USE AS DIRECTED PER SLIDING SCALE. MAX OF 14 UNITS DAILY)   0 12/01/2023     olmesartan (BENICAR) 20 MG Tab tablet  Take 0.5 tablets (10 mg total) by mouth daily   0       pantoprazole (PROTONIX) 40 MG TbEC tablet  Take 1 tablet (40 mg total) by mouth every morning before breakfast 30 tablet  3 09/27/2023     polyethylene glycol (MIRALAX) 17 gram PwPk packet  Take 17 g by mouth 2 (two) times daily 60  packet  3 09/26/2023     pravastatin (PRAVACHOL) 40 MG Tab tablet  Take 1 tablet (40 mg total) by mouth every evening 30 tablet  3 09/26/2023     senna-docusate (SENOKOT-S) 8.6-50 mg Tab per tablet  Take 2 tablets by mouth 2 (two) times daily 120 tablet  3 09/26/2023     amoxicillin-clavulanate (Augmentin) 875-125 mg Tab per tablet  Take 1 tablet by mouth 2 (two) times daily for 7 days 14 tablet  0 12/18/2023 12/25/2023     Ordered Prescriptions  - documented in this encounter  Ordered Prescriptions  Prescription Sig Dispensed Refills Start Date End Date   amoxicillin-clavulanate (Augmentin) 875-125 mg Tab per tablet  Take 1 tablet by mouth 2 (two) times daily for 7 days 14 tablet  0 12/18/2023 12/25/2023     Discharge Disposition  - documented in this encounter  Discharge Disposition  Disposition Code Departure Means Destination Comments   Home Health   Wheelchair Home      Problem List Items Addressed This Visit       Acute on chronic diastolic (congestive) heart failure    Chronic obstructive pulmonary disease, unspecified COPD type    CKD stage 3 secondary to diabetes    Overview     He has stage 3 CKD.   Bebeto Santiago has an Estimated Glumerular Filtration Rate (EGFR) between 30 and 59 consistent with the definition of chronic kidney disease stage 3.    eGFR if non    Date Value Ref Range Status   03/01/2019 >60.0 >60 mL/min/1.73 m^2 Final     Comment:     Calculation used to obtain the estimated glomerular filtration  rate (eGFR) is the CKD-EPI equation.            Lab Results   Component Value Date    CREATININE 1.0 03/01/2019    BUN 20 03/01/2019     03/01/2019    K 4.1 03/01/2019     03/01/2019    CO2 29 03/01/2019       The patient's chronic kidney disease stage 3 was monitored, evaluated, addressed and/or treated.             Current moderate episode of major depressive disorder without prior episode    Hypertension associated with diabetes    Overview     The patient  presents with essential hypertension.  The patient is tolerating the medication well and is in excellent compliance except he is urinating a lot with the chlorthalidone and he is interested in stopping it.  We discussed and agreed to try this.  The patient is experiencing no side effects.  Counseling was offered regarding low salt diets.  The patient has a reduced salt intake.  The patient denies chest pain, palpitations, shortness of breath, dyspnea on exertion, left or murmur neck pain, nausea, vomiting, diaphoresis, paroxysmal nocturnal dyspnea, and orthopnea.           Hypertensive heart disease with heart failure    Malignant neoplasm of overlapping sites of bladder    PAD (peripheral artery disease)    Overview     He has PAD.  He has an ability to be able to walk 1/2 a mile before he hast to stop and rest.  He does take an aspirin.         Stage 3b chronic kidney disease     Other Visit Diagnoses       Malignant neoplasm of urinary bladder, unspecified site    -  Primary    History of sepsis                Past Medical History:  Past Medical History:   Diagnosis Date    Allergy     BPH (benign prostatic hypertrophy)     Chronic obstructive pulmonary disease, unspecified COPD type 1/3/2024    Controlled type 2 diabetes mellitus with stage 3 chronic kidney disease, with long-term current use of insulin     The patient presents with diabetes.  The patient denies polyuria, polydipsia, polyphagia, hypoglycemia and paresthesias.  The patient's glucose control has been good.  Home glucose averages are routinely checked.  The patient is without retinopathy currently.  The patient has no history of neuropathy.  The patient currently complains of no podiatric problems.  The patient has excellent compliance.    Dehydration     Diabetes mellitus type II, uncontrolled     GERD (gastroesophageal reflux disease) 2013    grade IV/IV    Hypertension     Hypothyroidism     Male hypogonadism     Non-proliferative diabetic  "retinopathy     Urinary retention      Past Surgical History:   Procedure Laterality Date    COLONOSCOPY N/A 1/16/2020    Procedure: COLONOSCOPY;  Surgeon: Darin Krueger MD;  Location: [facility];  Service: Endoscopy;  Laterality: N/A;    EYE SURGERY      HIP SURGERY Left 2022    SPINE SURGERY       Review of patient's allergies indicates:   Allergen Reactions    Ace inhibitors      Other reaction(s): cough    Metformin      Abdominal pain     Current Outpatient Medications on File Prior to Visit   Medication Sig Dispense Refill    amLODIPine (NORVASC) 10 MG tablet Take 10 mg by mouth once daily.      aspirin 325 MG tablet Take 325 mg by mouth once daily.      BD LUER-MANUEL SYRINGE 3 mL 22 x 1 1/2" Syrg   3    blood sugar diagnostic (FREESTYLE LITE STRIPS) Strp TEST 3 TIMES DAILY 300 strip 5    blood-glucose meter Misc Use as directed four times daily before meals and at bedtime. 1 each 0    cholecalciferol, vitamin D3, 125 mcg (5,000 unit) Tab Take 5,000 Units by mouth once daily.      citalopram (CELEXA) 20 MG tablet Take 1 tablet (20 mg total) by mouth once daily. 90 tablet 3    clopidogreL (PLAVIX) 75 mg tablet Take 1 tablet (75 mg total) by mouth once daily. 90 tablet 3    docusate sodium (COLACE) 100 MG capsule Take 100 mg by mouth 2 (two) times daily.      finasteride (PROSCAR) 5 mg tablet Take 1 tablet (5 mg total) by mouth once daily. 90 tablet 3    flash glucose scanning reader (FREESTYLE PUMA 2 READER) Misc Use in conjunction with glucose sensor every 14 days for continuous monitoring of Glucose. DX:E11.22, N18.30, Z79.44 6 each 3    FREESTYLE PUMA 2 SENSOR Kit USE ONE SENSOR EVERY 14 DAYS FOR CONTINUOUS MONITORING OF GLUCOSE. DX:E11.22, N18.30, Z79.44 6 kit 3    gabapentin (NEURONTIN) 300 MG capsule Take 300 mg by mouth 3 (three) times daily.      guaiFENesin-codeine 100-10 mg/5 ml (TUSSI-ORGANIDIN NR)  mg/5 mL syrup Take 5 mLs by mouth 3 times daily as needed.      insulin (LANTUS SOLOSTAR U-100 " "INSULIN) glargine 100 units/mL SubQ pen INJECT 36 UNITS UNDER THE SKIN ONCE A DAY 30 each 1    insulin aspart U-100 (NOVOLOG FLEXPEN U-100 INSULIN) 100 unit/mL (3 mL) InPn pen USE AS DIRECTED PER SLIDING SCALE. MAX OF 14 UNITS DAILY) 15 each 3    isosorbide mononitrate (IMDUR) 30 MG 24 hr tablet Take 30 mg by mouth once daily.      Lactobacillus rhamnosus GG (CULTURELLE) 10 billion cell capsule Take 1 capsule by mouth 2 (two) times daily.      lancets (ACCU-CHEK SOFTCLIX LANCETS) Misc Check glucose three times daily. 200 each 11    levothyroxine (SYNTHROID) 100 MCG tablet Take 1 tablet (100 mcg total) by mouth once daily. 90 tablet 3    melatonin (MELATIN) 3 mg tablet Take 3 mg by mouth nightly.      meloxicam (MOBIC) 15 MG tablet Take 15 mg by mouth once daily.      montelukast (SINGULAIR) 10 mg tablet Take 1 tablet (10 mg total) by mouth every evening. 90 tablet 3    olmesartan (BENICAR) 20 MG tablet Take 1 tablet (20 mg total) by mouth once daily. (Patient taking differently: Take 10 mg by mouth once daily.) 90 tablet 3    pen needle, diabetic (BD ULTRA-FINE MINI PEN NEEDLE) 31 gauge x 3/16" Ndle USE DAILY WITH LANTUS AND NOVOLOG 300 each 3    polyethylene glycol (GLYCOLAX) 17 gram PwPk Take 17 g by mouth 2 (two) times a day.      pravastatin (PRAVACHOL) 40 MG tablet Take 1 tablet (40 mg total) by mouth every evening. 90 tablet 3    senna-docusate 8.6-50 mg (PERICOLACE) 8.6-50 mg per tablet Take 2 tablets by mouth 2 (two) times a day.      cefUROXime (CEFTIN) 250 MG tablet Take 250 mg by mouth once daily.      insulin lispro 100 unit/mL injection Inject 5 Units into the skin 3 (three) times daily.      metoprolol succinate (TOPROL-XL) 25 MG 24 hr tablet Take 1 tablet (25 mg total) by mouth once daily. (Patient not taking: Reported on 1/3/2024) 90 tablet 3    pantoprazole (PROTONIX) 40 MG tablet Take 40 mg by mouth once daily.       No current facility-administered medications on file prior to visit.     Social " History     Socioeconomic History    Marital status:      Spouse name: Babs    Number of children: 2   Occupational History    Occupation: Retired   Tobacco Use    Smoking status: Former    Smokeless tobacco: Current     Types: Chew   Substance and Sexual Activity    Alcohol use: Yes     Comment: rare    Drug use: No    Sexual activity: Not Currently     Partners: Female     Social Determinants of Health     Financial Resource Strain: Low Risk  (2/9/2023)    Overall Financial Resource Strain (CARDIA)     Difficulty of Paying Living Expenses: Not hard at all   Food Insecurity: No Food Insecurity (2/9/2023)    Hunger Vital Sign     Worried About Running Out of Food in the Last Year: Never true     Ran Out of Food in the Last Year: Never true   Transportation Needs: No Transportation Needs (2/9/2023)    PRAPARE - Transportation     Lack of Transportation (Medical): No     Lack of Transportation (Non-Medical): No   Physical Activity: Inactive (2/9/2023)    Exercise Vital Sign     Days of Exercise per Week: 0 days     Minutes of Exercise per Session: 0 min   Stress: No Stress Concern Present (2/9/2023)    [place] - Occupational Stress Questionnaire     Feeling of Stress : Not at all   Social Connections: Moderately Integrated (2/9/2023)    Social Connection and Isolation Panel [NHANES]     Frequency of Communication with Friends and Family: More than three times a week     Frequency of Social Gatherings with Friends and Family: More than three times a week     Attends Anabaptist Services: More than 4 times per year     Active Member of Clubs or Organizations: No     Attends Club or Organization Meetings: Never     Marital Status:    Housing Stability: Low Risk  (2/9/2023)    Housing Stability Vital Sign     Unable to Pay for Housing in the Last Year: No     Number of Places Lived in the Last Year: 1     Unstable Housing in the Last Year: No     Family History   Problem  Relation Age of Onset    Heart disease Mother     Stroke Maternal Grandfather     Diabetes Neg Hx     Cancer Neg Hx     Hypertension Neg Hx        Review of Systems   Constitutional: Negative.  Negative for chills, diaphoresis and fever.   HENT:  Negative for congestion, hearing loss, mouth sores, postnasal drip and sore throat.    Eyes:  Negative for pain and visual disturbance.   Respiratory:  Negative for cough, chest tightness, shortness of breath and wheezing.    Cardiovascular:  Negative for chest pain.   Gastrointestinal:  Negative for abdominal pain, anal bleeding, blood in stool, constipation, diarrhea, nausea and vomiting.   Genitourinary:  Negative for dysuria and hematuria.   Musculoskeletal:  Negative for back pain, neck pain and neck stiffness.   Skin:  Negative for rash.   Neurological:  Negative for dizziness and weakness.       Objective:     /84   Pulse 62   Temp 97.9 °F (36.6 °C)   Ht 6' (1.829 m)   Wt 73.9 kg (163 lb)   BMI 22.11 kg/m²     Physical Exam  Constitutional:       General: He is not in acute distress.     Appearance: He is well-developed. He is not diaphoretic.   Pulmonary:      Effort: Pulmonary effort is normal. No respiratory distress.   Neurological:      Mental Status: He is alert and oriented to person, place, and time.   Psychiatric:         Behavior: Behavior normal.         Thought Content: Thought content normal.         Judgment: Judgment normal.       Assessment:     1. Malignant neoplasm of urinary bladder, unspecified site    2. Chronic obstructive pulmonary disease, unspecified COPD type    3. Malignant neoplasm of overlapping sites of bladder    4. Current moderate episode of major depressive disorder without prior episode    5. Acute on chronic diastolic (congestive) heart failure    6. Stage 3b chronic kidney disease    7. Hypertensive heart disease with heart failure    8. Hypertension associated with diabetes    9. PAD (peripheral artery disease)    10.  CKD stage 3 secondary to diabetes    11. History of sepsis        Plan:     Problem List Items Addressed This Visit       Acute on chronic diastolic (congestive) heart failure-stable. No change.    Chronic obstructive pulmonary disease, unspecified COPD type-Stable no changes    CKD stage 3 secondary to diabetes-track over time.    Current moderate episode of major depressive disorder without prior episode-stable    Hypertension associated with diabetes-stable    Hypertensive heart disease with heart failure-stable    Malignant neoplasm of overlapping sites of bladder-cont tx w/Dr. Lomax.    PAD (peripheral artery disease)-no changes    Stage 3b chronic kidney disease=track over time.     Other Visit Diagnoses       Malignant neoplasm of urinary bladder, unspecified site    -  Primary    History of sepsis    -premedicate with abx prior to next BCG tx.          No follow-ups on file.  Agree with the plan he has with Dr. Lomax to premedicate with abx prior to the next BCG treatment for his bladder cancer.   We discussed symptoms of sepsis and to go to the ER if he has any symptoms.

## 2024-01-09 ENCOUNTER — PATIENT MESSAGE (OUTPATIENT)
Dept: FAMILY MEDICINE | Facility: CLINIC | Age: 84
End: 2024-01-09
Payer: MEDICARE

## 2024-01-09 ENCOUNTER — PATIENT MESSAGE (OUTPATIENT)
Dept: CARDIOLOGY | Facility: CLINIC | Age: 84
End: 2024-01-09
Payer: MEDICARE

## 2024-01-15 NOTE — PROGRESS NOTES
[facility] called regarding elevated BP    Restart Olmesartan; was stopped due to recent sepsis episode at [place] and BP has been elevated  DC BB  Rec close follow up with DR. Tyshawn Lowe MD, [facility]  Cardiovascular Disease  [facility], [place]  [phone] (P)

## 2024-01-17 ENCOUNTER — PATIENT OUTREACH (OUTPATIENT)
Dept: ADMINISTRATIVE | Facility: CLINIC | Age: 84
End: 2024-01-17
Payer: MEDICARE

## 2024-01-30 ENCOUNTER — PATIENT MESSAGE (OUTPATIENT)
Dept: FAMILY MEDICINE | Facility: CLINIC | Age: 84
End: 2024-01-30
Payer: MEDICARE

## 2024-01-31 ENCOUNTER — EXTERNAL CHRONIC CARE MANAGEMENT (OUTPATIENT)
Dept: PRIMARY CARE CLINIC | Facility: CLINIC | Age: 84
End: 2024-01-31
Payer: MEDICARE

## 2024-01-31 PROCEDURE — 99490 CHRNC CARE MGMT STAFF 1ST 20: CPT | Mod: S$PBB,,, | Performed by: FAMILY MEDICINE

## 2024-01-31 PROCEDURE — 99490 CHRNC CARE MGMT STAFF 1ST 20: CPT | Mod: PBBFAC,PO | Performed by: FAMILY MEDICINE

## 2024-02-01 ENCOUNTER — OFFICE VISIT (OUTPATIENT)
Dept: CARDIOLOGY | Facility: CLINIC | Age: 84
End: 2024-02-01
Payer: MEDICARE

## 2024-02-01 VITALS
SYSTOLIC BLOOD PRESSURE: 132 MMHG | HEIGHT: 72 IN | HEART RATE: 64 BPM | OXYGEN SATURATION: 99 % | BODY MASS INDEX: 20.5 KG/M2 | DIASTOLIC BLOOD PRESSURE: 78 MMHG | WEIGHT: 151.38 LBS

## 2024-02-01 DIAGNOSIS — E78.2 COMBINED HYPERLIPIDEMIA ASSOCIATED WITH TYPE 2 DIABETES MELLITUS: ICD-10-CM

## 2024-02-01 DIAGNOSIS — Z98.890 HISTORY OF CAROTID ENDARTERECTOMY: ICD-10-CM

## 2024-02-01 DIAGNOSIS — N18.30 CKD STAGE 3 SECONDARY TO DIABETES: ICD-10-CM

## 2024-02-01 DIAGNOSIS — G47.33 OSA (OBSTRUCTIVE SLEEP APNEA): ICD-10-CM

## 2024-02-01 DIAGNOSIS — I73.9 PAD (PERIPHERAL ARTERY DISEASE): ICD-10-CM

## 2024-02-01 DIAGNOSIS — J44.9 CHRONIC OBSTRUCTIVE PULMONARY DISEASE, UNSPECIFIED COPD TYPE: Primary | ICD-10-CM

## 2024-02-01 DIAGNOSIS — E11.59 HYPERTENSION ASSOCIATED WITH DIABETES: ICD-10-CM

## 2024-02-01 DIAGNOSIS — E11.69 COMBINED HYPERLIPIDEMIA ASSOCIATED WITH TYPE 2 DIABETES MELLITUS: ICD-10-CM

## 2024-02-01 DIAGNOSIS — I15.2 HYPERTENSION ASSOCIATED WITH DIABETES: ICD-10-CM

## 2024-02-01 DIAGNOSIS — I49.3 PVC (PREMATURE VENTRICULAR CONTRACTION): ICD-10-CM

## 2024-02-01 DIAGNOSIS — R00.2 PALPITATIONS: ICD-10-CM

## 2024-02-01 DIAGNOSIS — I65.23 BILATERAL CAROTID ARTERY STENOSIS: ICD-10-CM

## 2024-02-01 DIAGNOSIS — I11.0 HYPERTENSIVE HEART DISEASE WITH HEART FAILURE: ICD-10-CM

## 2024-02-01 DIAGNOSIS — R09.89 BILATERAL CAROTID BRUITS: ICD-10-CM

## 2024-02-01 DIAGNOSIS — E11.22 CKD STAGE 3 SECONDARY TO DIABETES: ICD-10-CM

## 2024-02-01 DIAGNOSIS — I50.33 ACUTE ON CHRONIC DIASTOLIC (CONGESTIVE) HEART FAILURE: ICD-10-CM

## 2024-02-01 PROCEDURE — 99999 PR PBB SHADOW E&M-EST. PATIENT-LVL III: CPT | Mod: PBBFAC,,, | Performed by: INTERNAL MEDICINE

## 2024-02-01 PROCEDURE — 99213 OFFICE O/P EST LOW 20 MIN: CPT | Mod: PBBFAC,PO | Performed by: INTERNAL MEDICINE

## 2024-02-01 PROCEDURE — 99214 OFFICE O/P EST MOD 30 MIN: CPT | Mod: S$PBB,,, | Performed by: INTERNAL MEDICINE

## 2024-02-01 RX ORDER — OLMESARTAN MEDOXOMIL 20 MG/1
20 TABLET ORAL DAILY
Qty: 90 TABLET | Refills: 3 | Status: SHIPPED | OUTPATIENT
Start: 2024-02-01 | End: 2024-02-20

## 2024-02-01 RX ORDER — AMLODIPINE BESYLATE 5 MG/1
5 TABLET ORAL
Qty: 90 TABLET | Refills: 3 | OUTPATIENT
Start: 2024-02-01

## 2024-02-01 RX ORDER — AMLODIPINE BESYLATE 10 MG/1
10 TABLET ORAL DAILY
Qty: 90 TABLET | Refills: 3 | Status: SHIPPED | OUTPATIENT
Start: 2024-02-01

## 2024-02-01 RX ORDER — ISOSORBIDE MONONITRATE 30 MG/1
30 TABLET, EXTENDED RELEASE ORAL DAILY
Qty: 90 TABLET | Refills: 3 | Status: SHIPPED | OUTPATIENT
Start: 2024-02-01 | End: 2024-05-16

## 2024-02-01 NOTE — TELEPHONE ENCOUNTER
Provider Staff:  Action required for this patient    Requires labs      Please see care gap opportunities below in Care Due Message.    Thanks!  [facility]     Appointments      Date Provider   Last Visit   1/3/2024 Darin Krueger MD   Next Visit   2/7/2024 Darin Krueger MD     Refill Decision Note   Bebeto Santiago  is requesting a refill authorization.  Brief Assessment and Rationale for Refill:  Quick Discontinue     Medication Therapy Plan:  5mg dose was d/c and changed to 10mg by Dr. Villagran      Comments:     Note composed:12:56 PM 02/01/2024

## 2024-02-01 NOTE — PROGRESS NOTES
Subjective:   Patient ID:  Bebeto Santiago is a 83 y.o. male who presents for follow-up of Follow-up  LEIGH 1-24 nml  BP high at home SBP up to 170s-180s  Pt with hospitilization - urosepsis?  Hypertension  This is a chronic problem. The current episode started more than 1 year ago. The problem has been gradually improving since onset. The problem is controlled. Pertinent negatives include no chest pain, palpitations or shortness of breath. Past treatments include beta blockers and calcium channel blockers. The current treatment provides moderate improvement. There are no compliance problems.    Hyperlipidemia  This is a chronic problem. The current episode started more than 1 year ago. The problem is controlled. Recent lipid tests were reviewed and are variable. Pertinent negatives include no chest pain or shortness of breath. Current antihyperlipidemic treatment includes statins. The current treatment provides moderate improvement of lipids. There are no compliance problems.    Palpitations   This is a chronic problem. The current episode started more than 1 year ago. The problem occurs intermittently. The problem has been waxing and waning. Nothing aggravates the symptoms. Pertinent negatives include no chest pain, dizziness or shortness of breath. He has tried beta blockers for the symptoms. The treatment provided moderate relief.       Review of Systems   Constitutional: Negative. Negative for weight gain.   HENT: Negative.     Eyes: Negative.    Cardiovascular: Negative.  Negative for chest pain, leg swelling and palpitations.   Respiratory: Negative.  Negative for shortness of breath.    Endocrine: Negative.    Hematologic/Lymphatic: Negative.    Skin: Negative.    Musculoskeletal:  Negative for muscle weakness.   Gastrointestinal: Negative.    Genitourinary: Negative.    Neurological: Negative.  Negative for dizziness.   Psychiatric/Behavioral: Negative.     Allergic/Immunologic: Negative.    All other systems  reviewed and are negative.    Family History   Problem Relation Age of Onset    Heart disease Mother     Stroke Maternal Grandfather     Diabetes Neg Hx     Cancer Neg Hx     Hypertension Neg Hx      Past Medical History:   Diagnosis Date    Allergy     BPH (benign prostatic hypertrophy)     Chronic obstructive pulmonary disease, unspecified COPD type 1/3/2024    Controlled type 2 diabetes mellitus with stage 3 chronic kidney disease, with long-term current use of insulin     The patient presents with diabetes.  The patient denies polyuria, polydipsia, polyphagia, hypoglycemia and paresthesias.  The patient's glucose control has been good.  Home glucose averages are routinely checked.  The patient is without retinopathy currently.  The patient has no history of neuropathy.  The patient currently complains of no podiatric problems.  The patient has excellent compliance.    Dehydration     Diabetes mellitus type II, uncontrolled     GERD (gastroesophageal reflux disease) 2013    grade IV/IV    Hypertension     Hypothyroidism     Male hypogonadism     Non-proliferative diabetic retinopathy     Urinary retention      Social History     Socioeconomic History    Marital status:      Spouse name: Babs    Number of children: 2   Occupational History    Occupation: Retired   Tobacco Use    Smoking status: Former    Smokeless tobacco: Current     Types: Chew   Substance and Sexual Activity    Alcohol use: Yes     Comment: rare    Drug use: No    Sexual activity: Not Currently     Partners: Female     Social Determinants of Health     Financial Resource Strain: Low Risk  (2/9/2023)    Overall Financial Resource Strain (CARDIA)     Difficulty of Paying Living Expenses: Not hard at all   Food Insecurity: No Food Insecurity (2/9/2023)    Hunger Vital Sign     Worried About Running Out of Food in the Last Year: Never true     Ran Out of Food in the Last Year: Never true   Transportation Needs: No Transportation Needs  "(2/9/2023)    PRAPARE - Transportation     Lack of Transportation (Medical): No     Lack of Transportation (Non-Medical): No   Physical Activity: Inactive (2/9/2023)    Exercise Vital Sign     Days of Exercise per Week: 0 days     Minutes of Exercise per Session: 0 min   Stress: No Stress Concern Present (2/9/2023)    [place] [employer]ational [employer] - Occupational Stress Questionnaire     Feeling of Stress : Not at all   Social Connections: Moderately Integrated (2/9/2023)    Social Connection and Isolation Panel [NHANES]     Frequency of Communication with Friends and Family: More than three times a week     Frequency of Social Gatherings with Friends and Family: More than three times a week     Attends Restorationism Services: More than 4 times per year     Active Member of Clubs or Organizations: No     Attends Club or Organization Meetings: Never     Marital Status:    Housing Stability: Low Risk  (2/9/2023)    Housing Stability Vital Sign     Unable to Pay for Housing in the Last Year: No     Number of Places Lived in the Last Year: 1     Unstable Housing in the Last Year: No     Current Outpatient Medications on File Prior to Visit   Medication Sig Dispense Refill    amLODIPine (NORVASC) 10 MG tablet Take 10 mg by mouth once daily.      aspirin 325 MG tablet Take 325 mg by mouth once daily.      BD LUER-MANUEL SYRINGE 3 mL 22 x 1 1/2" Syrg   3    blood sugar diagnostic (FREESTYLE LITE STRIPS) Strp TEST 3 TIMES DAILY 300 strip 5    blood-glucose meter Misc Use as directed four times daily before meals and at bedtime. 1 each 0    cefUROXime (CEFTIN) 250 MG tablet Take 250 mg by mouth once daily.      cholecalciferol, vitamin D3, 125 mcg (5,000 unit) Tab Take 5,000 Units by mouth once daily.      citalopram (CELEXA) 20 MG tablet Take 1 tablet (20 mg total) by mouth once daily. 90 tablet 3    clopidogreL (PLAVIX) 75 mg tablet Take 1 tablet (75 mg total) by mouth once daily. 90 tablet 3    docusate sodium " (COLACE) 100 MG capsule Take 100 mg by mouth 2 (two) times daily.      finasteride (PROSCAR) 5 mg tablet Take 1 tablet (5 mg total) by mouth once daily. 90 tablet 3    flash glucose scanning reader (FREESTYLE PUMA 2 READER) [facility] Use in conjunction with glucose sensor every 14 days for continuous monitoring of Glucose. DX:E11.22, N18.30, Z79.44 6 each 3    FREESTYLE PUMA 2 SENSOR Kit USE ONE SENSOR EVERY 14 DAYS FOR CONTINUOUS MONITORING OF GLUCOSE. DX:E11.22, N18.30, Z79.44 6 kit 3    gabapentin (NEURONTIN) 300 MG capsule Take 300 mg by mouth 3 (three) times daily.      guaiFENesin-codeine 100-10 mg/5 ml (TUSSI-ORGANIDIN NR)  mg/5 mL syrup Take 5 mLs by mouth 3 times daily as needed.      insulin (LANTUS SOLOSTAR U-100 INSULIN) glargine 100 units/mL SubQ pen INJECT 36 UNITS UNDER THE SKIN ONCE A DAY 30 each 1    insulin aspart U-100 (NOVOLOG FLEXPEN U-100 INSULIN) 100 unit/mL (3 mL) InPn pen USE AS DIRECTED PER SLIDING SCALE. MAX OF 14 UNITS DAILY) 15 each 3    insulin lispro 100 unit/mL injection Inject 5 Units into the skin 3 (three) times daily.      isosorbide mononitrate (IMDUR) 30 MG 24 hr tablet Take 30 mg by mouth once daily.      Lactobacillus rhamnosus GG (CULTURELLE) 10 billion cell capsule Take 1 capsule by mouth 2 (two) times daily.      lancets (ACCU-CHEK SOFTCLIX LANCETS) Misc Check glucose three times daily. 200 each 11    levothyroxine (SYNTHROID) 100 MCG tablet Take 1 tablet (100 mcg total) by mouth once daily. 90 tablet 3    melatonin (MELATIN) 3 mg tablet Take 3 mg by mouth nightly.      meloxicam (MOBIC) 15 MG tablet Take 15 mg by mouth once daily.      montelukast (SINGULAIR) 10 mg tablet Take 1 tablet (10 mg total) by mouth every evening. 90 tablet 3    olmesartan (BENICAR) 20 MG tablet Take 1 tablet (20 mg total) by mouth once daily. (Patient taking differently: Take 10 mg by mouth once daily.) 90 tablet 3    pantoprazole (PROTONIX) 40 MG tablet Take 40 mg by mouth once daily.       "pen needle, diabetic (BD ULTRA-FINE MINI PEN NEEDLE) 31 gauge x 3/16" Ndle USE DAILY WITH LANTUS AND NOVOLOG 300 each 3    polyethylene glycol (GLYCOLAX) 17 gram PwPk Take 17 g by mouth 2 (two) times a day.      pravastatin (PRAVACHOL) 40 MG tablet Take 1 tablet (40 mg total) by mouth every evening. 90 tablet 3    senna-docusate 8.6-50 mg (PERICOLACE) 8.6-50 mg per tablet Take 2 tablets by mouth 2 (two) times a day.       Current Facility-Administered Medications on File Prior to Visit   Medication Dose Route Frequency Provider Last Rate Last Admin    [DISCONTINUED] GENERIC EXTERNAL MEDICATION     Provider, Generic External Data         Review of patient's allergies indicates:   Allergen Reactions    Ace inhibitors      Other reaction(s): cough    Metformin      Abdominal pain       Objective:     Physical Exam  Vitals and nursing note reviewed.   Constitutional:       Appearance: He is well-developed.   HENT:      Head: Normocephalic and atraumatic.   Eyes:      Conjunctiva/sclera: Conjunctivae normal.      Pupils: Pupils are equal, round, and reactive to light.   Cardiovascular:      Rate and Rhythm: Normal rate and regular rhythm.      Pulses: Intact distal pulses.      Heart sounds: Normal heart sounds.   Pulmonary:      Effort: Pulmonary effort is normal.      Breath sounds: Normal breath sounds.   Abdominal:      General: Bowel sounds are normal.      Palpations: Abdomen is soft.   Musculoskeletal:      Cervical back: Normal range of motion and neck supple.   Skin:     General: Skin is warm and dry.   Neurological:      Mental Status: He is alert and oriented to person, place, and time.         Assessment:     1. Chronic obstructive pulmonary disease, unspecified COPD type    2. Acute on chronic diastolic (congestive) heart failure    3. Bilateral carotid artery stenosis    4. Bilateral carotid bruits    5. Combined hyperlipidemia associated with type 2 diabetes mellitus    6. History of carotid endarterectomy "    7. Hypertension associated with diabetes    8. Hypertensive heart disease with heart failure    9. PAD (peripheral artery disease)    10. Palpitations    11. PVC (premature ventricular contraction)    12. CKD stage 3 secondary to diabetes    13. ERNESTINE (obstructive sleep apnea)        Plan:     Chronic obstructive pulmonary disease, unspecified COPD type    Acute on chronic diastolic (congestive) heart failure    Bilateral carotid artery stenosis    Bilateral carotid bruits    Combined hyperlipidemia associated with type 2 diabetes mellitus    History of carotid endarterectomy    Hypertension associated with diabetes    Hypertensive heart disease with heart failure    PAD (peripheral artery disease)    Palpitations    PVC (premature ventricular contraction)    CKD stage 3 secondary to diabetes    ERNESTINE (obstructive sleep apnea)    Continue norvasc,  toprol,-htn/SVT  Continue statin-hlp  Continue asa, plavix - primary prevention     Increase olmesartan to 20 mg

## 2024-02-01 NOTE — TELEPHONE ENCOUNTER
Care Due:                  Date            Visit Type   Department     Provider  --------------------------------------------------------------------------------                                [facility]  Last Visit: 01-      FOLLOW UP    MEDICINE       [facility]  Next Visit: 02-      FOLLOW UP    MEDICINE       [facility]                                                            Last  Test          Frequency    Reason                     Performed    Due Date  --------------------------------------------------------------------------------    HBA1C.......  6 months...  insulin..................  08-   02-    Health [facility] Embedded Care Due Messages. Reference number: [id number].   2/01/2024 9:57:36 AM CST

## 2024-02-20 ENCOUNTER — TELEPHONE (OUTPATIENT)
Dept: CARDIOLOGY | Facility: HOSPITAL | Age: 84
End: 2024-02-20
Payer: MEDICARE

## 2024-02-20 DIAGNOSIS — E11.59 HYPERTENSION ASSOCIATED WITH DIABETES: ICD-10-CM

## 2024-02-20 DIAGNOSIS — I15.2 HYPERTENSION ASSOCIATED WITH DIABETES: ICD-10-CM

## 2024-02-20 RX ORDER — OLMESARTAN MEDOXOMIL 40 MG/1
40 TABLET ORAL DAILY
Qty: 30 TABLET | Refills: 11 | Status: SHIPPED | OUTPATIENT
Start: 2024-02-20 | End: 2024-05-21

## 2024-02-20 NOTE — TELEPHONE ENCOUNTER
Spoke to daughter, Aracelis. Let her know Dr. Villagran reviewed patients medications and ordered patient to increase Olmesartan to 40 mg. Daughter verbalized an understanding.

## 2024-02-27 DIAGNOSIS — Z00.00 ENCOUNTER FOR MEDICARE ANNUAL WELLNESS EXAM: ICD-10-CM

## 2024-02-29 ENCOUNTER — EXTERNAL CHRONIC CARE MANAGEMENT (OUTPATIENT)
Dept: PRIMARY CARE CLINIC | Facility: CLINIC | Age: 84
End: 2024-02-29
Payer: MEDICARE

## 2024-02-29 PROCEDURE — 99490 CHRNC CARE MGMT STAFF 1ST 20: CPT | Mod: S$PBB,,, | Performed by: FAMILY MEDICINE

## 2024-02-29 PROCEDURE — 99490 CHRNC CARE MGMT STAFF 1ST 20: CPT | Mod: PBBFAC,PO | Performed by: FAMILY MEDICINE

## 2024-03-31 ENCOUNTER — EXTERNAL CHRONIC CARE MANAGEMENT (OUTPATIENT)
Dept: PRIMARY CARE CLINIC | Facility: CLINIC | Age: 84
End: 2024-03-31
Payer: MEDICARE

## 2024-03-31 PROCEDURE — 99490 CHRNC CARE MGMT STAFF 1ST 20: CPT | Mod: PBBFAC,PO | Performed by: FAMILY MEDICINE

## 2024-03-31 PROCEDURE — 99490 CHRNC CARE MGMT STAFF 1ST 20: CPT | Mod: S$PBB,,, | Performed by: FAMILY MEDICINE

## 2024-04-09 ENCOUNTER — OFFICE VISIT (OUTPATIENT)
Dept: FAMILY MEDICINE | Facility: CLINIC | Age: 84
End: 2024-04-09
Payer: MEDICARE

## 2024-04-09 VITALS
DIASTOLIC BLOOD PRESSURE: 62 MMHG | SYSTOLIC BLOOD PRESSURE: 136 MMHG | HEART RATE: 60 BPM | TEMPERATURE: 98 F | HEIGHT: 72 IN | OXYGEN SATURATION: 100 % | BODY MASS INDEX: 20.72 KG/M2 | WEIGHT: 153 LBS

## 2024-04-09 DIAGNOSIS — E11.3299 NON-PROLIFERATIVE DIABETIC RETINOPATHY: ICD-10-CM

## 2024-04-09 DIAGNOSIS — J44.9 CHRONIC OBSTRUCTIVE PULMONARY DISEASE, UNSPECIFIED COPD TYPE: ICD-10-CM

## 2024-04-09 DIAGNOSIS — Z00.00 ENCOUNTER FOR MEDICARE ANNUAL WELLNESS EXAM: Primary | ICD-10-CM

## 2024-04-09 DIAGNOSIS — E11.22 CONTROLLED TYPE 2 DIABETES MELLITUS WITH STAGE 3 CHRONIC KIDNEY DISEASE, WITH LONG-TERM CURRENT USE OF INSULIN: ICD-10-CM

## 2024-04-09 DIAGNOSIS — G47.33 OSA (OBSTRUCTIVE SLEEP APNEA): ICD-10-CM

## 2024-04-09 DIAGNOSIS — K21.00 GASTROESOPHAGEAL REFLUX DISEASE WITH ESOPHAGITIS WITHOUT HEMORRHAGE: ICD-10-CM

## 2024-04-09 DIAGNOSIS — I50.33 ACUTE ON CHRONIC DIASTOLIC (CONGESTIVE) HEART FAILURE: ICD-10-CM

## 2024-04-09 DIAGNOSIS — E29.1 MALE HYPOGONADISM: ICD-10-CM

## 2024-04-09 DIAGNOSIS — E03.9 HYPOTHYROIDISM, UNSPECIFIED TYPE: ICD-10-CM

## 2024-04-09 DIAGNOSIS — Z98.890 HISTORY OF CAROTID ENDARTERECTOMY: ICD-10-CM

## 2024-04-09 DIAGNOSIS — N40.0 BENIGN PROSTATIC HYPERPLASIA WITHOUT LOWER URINARY TRACT SYMPTOMS: ICD-10-CM

## 2024-04-09 DIAGNOSIS — G20.C PARKINSONISM, UNSPECIFIED PARKINSONISM TYPE: ICD-10-CM

## 2024-04-09 DIAGNOSIS — E11.59 HYPERTENSION ASSOCIATED WITH DIABETES: ICD-10-CM

## 2024-04-09 DIAGNOSIS — E11.69 COMBINED HYPERLIPIDEMIA ASSOCIATED WITH TYPE 2 DIABETES MELLITUS: ICD-10-CM

## 2024-04-09 DIAGNOSIS — E78.2 COMBINED HYPERLIPIDEMIA ASSOCIATED WITH TYPE 2 DIABETES MELLITUS: ICD-10-CM

## 2024-04-09 DIAGNOSIS — Z86.010 HISTORY OF COLON POLYPS: ICD-10-CM

## 2024-04-09 DIAGNOSIS — E11.22 CKD STAGE 3 SECONDARY TO DIABETES: ICD-10-CM

## 2024-04-09 DIAGNOSIS — I15.2 HYPERTENSION ASSOCIATED WITH DIABETES: ICD-10-CM

## 2024-04-09 DIAGNOSIS — Z79.4 CONTROLLED TYPE 2 DIABETES MELLITUS WITH STAGE 3 CHRONIC KIDNEY DISEASE, WITH LONG-TERM CURRENT USE OF INSULIN: ICD-10-CM

## 2024-04-09 DIAGNOSIS — I11.0 HYPERTENSIVE HEART DISEASE WITH HEART FAILURE: ICD-10-CM

## 2024-04-09 DIAGNOSIS — F32.1 CURRENT MODERATE EPISODE OF MAJOR DEPRESSIVE DISORDER WITHOUT PRIOR EPISODE: ICD-10-CM

## 2024-04-09 DIAGNOSIS — I73.9 PAD (PERIPHERAL ARTERY DISEASE): ICD-10-CM

## 2024-04-09 DIAGNOSIS — C67.8 MALIGNANT NEOPLASM OF OVERLAPPING SITES OF BLADDER: ICD-10-CM

## 2024-04-09 DIAGNOSIS — N18.30 CKD STAGE 3 SECONDARY TO DIABETES: ICD-10-CM

## 2024-04-09 DIAGNOSIS — N18.30 CONTROLLED TYPE 2 DIABETES MELLITUS WITH STAGE 3 CHRONIC KIDNEY DISEASE, WITH LONG-TERM CURRENT USE OF INSULIN: ICD-10-CM

## 2024-04-09 LAB
ALBUMIN/CREAT UR: 103.2 UG/MG (ref 0–30)
CREAT UR-MCNC: 95 MG/DL (ref 23–375)
MICROALBUMIN UR DL<=1MG/L-MCNC: 98 UG/ML

## 2024-04-09 PROCEDURE — G0439 PPPS, SUBSEQ VISIT: HCPCS | Mod: ,,, | Performed by: NURSE PRACTITIONER

## 2024-04-09 PROCEDURE — 99999 PR PBB SHADOW E&M-EST. PATIENT-LVL V: CPT | Mod: PBBFAC,,, | Performed by: NURSE PRACTITIONER

## 2024-04-09 PROCEDURE — 99215 OFFICE O/P EST HI 40 MIN: CPT | Mod: PBBFAC,PO | Performed by: NURSE PRACTITIONER

## 2024-04-09 PROCEDURE — 82043 UR ALBUMIN QUANTITATIVE: CPT | Performed by: NURSE PRACTITIONER

## 2024-04-09 RX ORDER — NITROFURANTOIN 25; 75 MG/1; MG/1
100 CAPSULE ORAL
COMMUNITY
Start: 2024-04-08 | End: 2024-05-16

## 2024-04-09 NOTE — PATIENT INSTRUCTIONS
Continue current plan of care  Sammie eye exam  F/U with PCP, specialists as advised  RTC as needed  Report to ER immediately if symptoms worsen or persist    Hi Bebeto,     If you are due for any health screening(s) below please notify me so we can arrange them to be ordered and scheduled. Most healthy patients at your age complete them, but you are free to accept or refuse.     If you can't do it, I'll definitely understand. If you can, I'd certainly appreciate it!    Tests to Keep You Healthy    Eye Exam: ORDERED BUT NOT SCHEDULED  Last Blood Pressure <= 139/89 (2/1/2024): Yes      Your diabetic retinal eye exam is due     Diabetes is the #1 cause of blindness in the [place] - early detection before signs or symptoms develop can prevent debilitating blindness.     Our records indicate that you may be overdue for your annual diabetic eye exam. Eye screening can help identify patients at risk for developing vision loss which is common in diabetes. This simple screening is an important step to keeping you healthy and preventing complications from diabetes.     This recommended diabetic eye exam should take place once per year and can prevent and treat diabetes complications in the eye before developing symptoms. This can be done with a special camera is used to take photographs of the back of your eye without having to dilate them, or you can see an eye doctor for a full dilated exam.     If you recently had your yearly diabetic eye exam performed outside of [facility], please let your Health care team know so that they can update your health record.

## 2024-04-10 NOTE — PROGRESS NOTES
Bebeto Santiago presented for a follow-up Medicare AWV today. The following components were reviewed and updated:    Medical history  Family History  Social history  Allergies and Current Medications  Health Risk Assessment  Health Maintenance  Care Team    **See Completed Assessments for Annual Wellness visit with in the encounter summary  The following assessments were completed:  Depression Screening  Cognitive function Screening  Timed Get Up Test  Whisper Test  PHQ-2  Nutrition screen  ADL  PAQ  Osteoporosis risk  CAGE  Living situation      Opioid documentation:      Patient does not have a current opioid prescription.          Vitals:    04/09/24 1109 04/09/24 1144   BP: (!) 150/69 136/62   Pulse: 60    Temp: 97.6 °F (36.4 °C)    SpO2: 100%    Weight: 69.4 kg (153 lb)    Height: 6' (1.829 m)      Body mass index is 20.75 kg/m².             Diagnoses and health risks identified today and associated recommendations/orders:  1. Encounter for Medicare annual wellness exam  Stable  Up to date  - Ambulatory Referral/Consult to Enhanced Annual Wellness Visit (eAWV)    2. Malignant neoplasm of overlapping sites of bladder  Stable  Managed by urology  Continue current medications, plan of care  F/U with specialist as advised    3. PAD (peripheral artery disease)  Stable  Managed by cardiology  Continue current medications, plan of care  F/U with specialist as advised    4. Hypertensive heart disease with heart failure  Stable  Managed by cardiology  Continue current medications, plan of care  F/U with specialist as advised    5. Acute on chronic diastolic (congestive) heart failure  Stable  Managed by cardiology  Continue current medications, plan of care  F/U with specialist as advised    6. Controlled type 2 diabetes mellitus with stage 3 chronic kidney disease, with long-term current use of insulin  Stable  Managed by PCP  Continue current medications, plan of care  F/U with PCP as advised  - MICROALBUMIN /  CREATININE RATIO URINE    7. CKD stage 3 secondary to diabetes  Stable  Managed by urology  Continue current medications, plan of care  F/U with specialist as advised    8. Chronic obstructive pulmonary disease, unspecified COPD type  Stable  Managed by PCP  Consider pulmonology if worsening  Continue current medications, plan of care  F/U with PCP as advised    9. Parkinsonism, unspecified Parkinsonism type  Stable  Managed by neurology  Continue current medications, plan of care  F/U with specialist as advised    10. Non-proliferative diabetic retinopathy  Stable  Managed by ophthalmology/optometry  Continue current medications, plan of care  F/U with specialist as advised    11. Current moderate episode of major depressive disorder without prior episode  Stable  Managed by PCP  Consider psychiatry if worsening  Continue current medications, plan of care  F/U with PCP as advised    12. Hypertension associated with diabetes  Stable  Managed by cardiology, PCP  Low sodium diet recommended  Continue current medications, plan of care  F/U with specialist, PCP as advised    13. Combined hyperlipidemia associated with type 2 diabetes mellitus  Stable  Managed by cardiology, PCP  Low cholesterol diet recommended  Continue current medications, plan of care  F/U with specialist, PCP as advised    14. Hypothyroidism, unspecified type  Stable  Managed by PCP  Consider endocrinology if worsening  Continue current medications, plan of care  F/U with PCP as advised    15. Gastroesophageal reflux disease with esophagitis without hemorrhage  Stable  Managed by PCP  Avoid GERD inducing foods/beverages  Continue current medications, plan of care  F/U with PCP as advised    16. Benign prostatic hyperplasia without lower urinary tract symptoms  Stable  Managed by urology  Continue current medications, plan of care  F/U with specialist as advised    17. Male hypogonadism  Stable  Managed by urology  Continue current medications, plan  of care  F/U with specialist as advised    18. ERNESTINE (obstructive sleep apnea)  Stable  Continue current plan of care    19. History of colon polyps  Stable  Colonoscopy per colon cancer screening guidelines recommended    20. History of carotid endarterectomy  Stable  Continue current plan of care    I offered to discuss end of life issues, including information on how to make advance directives that the patient could use to name someone who would make medical decisions on their behalf if they became too ill to make themselves.    ___Patient declined - already done.    _x__Patient is interested, I provided paperwork and offered to discuss    Provided Bebeto with a 5-10 year written screening schedule and personal prevention plan. Recommendations were developed using the [place]PSTF age appropriate recommendations. Education, counseling, and referrals were provided as needed.  After Visit Summary printed and given to patient which includes a list of additional screenings\tests needed.    No follow-ups on file.      Camila Ortiz, DNP     Valerie Rosenthal(Resident)

## 2024-04-11 ENCOUNTER — OFFICE VISIT (OUTPATIENT)
Dept: CARDIOLOGY | Facility: CLINIC | Age: 84
End: 2024-04-11
Payer: MEDICARE

## 2024-04-11 VITALS
SYSTOLIC BLOOD PRESSURE: 138 MMHG | WEIGHT: 153 LBS | HEIGHT: 72 IN | DIASTOLIC BLOOD PRESSURE: 78 MMHG | HEART RATE: 64 BPM | OXYGEN SATURATION: 97 % | BODY MASS INDEX: 20.72 KG/M2

## 2024-04-11 DIAGNOSIS — E78.2 COMBINED HYPERLIPIDEMIA ASSOCIATED WITH TYPE 2 DIABETES MELLITUS: ICD-10-CM

## 2024-04-11 DIAGNOSIS — I50.33 ACUTE ON CHRONIC DIASTOLIC (CONGESTIVE) HEART FAILURE: ICD-10-CM

## 2024-04-11 DIAGNOSIS — I49.3 PVC (PREMATURE VENTRICULAR CONTRACTION): ICD-10-CM

## 2024-04-11 DIAGNOSIS — E11.59 HYPERTENSION ASSOCIATED WITH DIABETES: ICD-10-CM

## 2024-04-11 DIAGNOSIS — R00.2 PALPITATIONS: ICD-10-CM

## 2024-04-11 DIAGNOSIS — E11.69 COMBINED HYPERLIPIDEMIA ASSOCIATED WITH TYPE 2 DIABETES MELLITUS: ICD-10-CM

## 2024-04-11 DIAGNOSIS — I73.9 PAD (PERIPHERAL ARTERY DISEASE): ICD-10-CM

## 2024-04-11 DIAGNOSIS — Z98.890 HISTORY OF CAROTID ENDARTERECTOMY: ICD-10-CM

## 2024-04-11 DIAGNOSIS — J44.9 CHRONIC OBSTRUCTIVE PULMONARY DISEASE, UNSPECIFIED COPD TYPE: Primary | ICD-10-CM

## 2024-04-11 DIAGNOSIS — G47.33 OSA (OBSTRUCTIVE SLEEP APNEA): ICD-10-CM

## 2024-04-11 DIAGNOSIS — I65.23 BILATERAL CAROTID ARTERY STENOSIS: ICD-10-CM

## 2024-04-11 DIAGNOSIS — I11.0 HYPERTENSIVE HEART DISEASE WITH HEART FAILURE: ICD-10-CM

## 2024-04-11 DIAGNOSIS — I15.2 HYPERTENSION ASSOCIATED WITH DIABETES: ICD-10-CM

## 2024-04-11 PROCEDURE — 99214 OFFICE O/P EST MOD 30 MIN: CPT | Mod: S$PBB,,, | Performed by: INTERNAL MEDICINE

## 2024-04-11 PROCEDURE — 99215 OFFICE O/P EST HI 40 MIN: CPT | Mod: PBBFAC,PO | Performed by: INTERNAL MEDICINE

## 2024-04-11 PROCEDURE — 99999 PR PBB SHADOW E&M-EST. PATIENT-LVL V: CPT | Mod: PBBFAC,,, | Performed by: INTERNAL MEDICINE

## 2024-04-11 NOTE — PROGRESS NOTES
Subjective:   Patient ID:  Bebeto Santiago is a 84 y.o. male who presents for follow-up of Follow-up (2 months)  BP improved with increased olmesartan  Patient denies CP, angina or anginal equivalent.  Pt seeing neuromedical center for LE weakness  Hypertension  This is a chronic problem. The current episode started more than 1 year ago. The problem has been gradually improving since onset. The problem is controlled. Pertinent negatives include no chest pain, palpitations or shortness of breath. Past treatments include beta blockers and calcium channel blockers. The current treatment provides moderate improvement. There are no compliance problems.    Hyperlipidemia  This is a chronic problem. The current episode started more than 1 year ago. The problem is controlled. Recent lipid tests were reviewed and are variable. Pertinent negatives include no chest pain or shortness of breath. Current antihyperlipidemic treatment includes statins. The current treatment provides moderate improvement of lipids. There are no compliance problems.    Palpitations   This is a chronic problem. The current episode started more than 1 year ago. The problem occurs intermittently. The problem has been waxing and waning. Nothing aggravates the symptoms. Pertinent negatives include no chest pain, dizziness or shortness of breath. He has tried beta blockers for the symptoms. The treatment provided moderate relief.       Review of Systems   Constitutional: Negative. Negative for weight gain.   HENT: Negative.     Eyes: Negative.    Cardiovascular: Negative.  Negative for chest pain, leg swelling and palpitations.   Respiratory: Negative.  Negative for shortness of breath.    Endocrine: Negative.    Hematologic/Lymphatic: Negative.    Skin: Negative.    Musculoskeletal:  Negative for muscle weakness.   Gastrointestinal: Negative.    Genitourinary: Negative.    Neurological: Negative.  Negative for dizziness.   Psychiatric/Behavioral:  Negative.     Allergic/Immunologic: Negative.    All other systems reviewed and are negative.    Family History   Problem Relation Age of Onset    Heart disease Mother     Stroke Maternal Grandfather     Diabetes Neg Hx     Cancer Neg Hx     Hypertension Neg Hx      Past Medical History:   Diagnosis Date    Allergy     BPH (benign prostatic hypertrophy)     Chronic obstructive pulmonary disease, unspecified COPD type 1/3/2024    Controlled type 2 diabetes mellitus with stage 3 chronic kidney disease, with long-term current use of insulin     The patient presents with diabetes.  The patient denies polyuria, polydipsia, polyphagia, hypoglycemia and paresthesias.  The patient's glucose control has been good.  Home glucose averages are routinely checked.  The patient is without retinopathy currently.  The patient has no history of neuropathy.  The patient currently complains of no podiatric problems.  The patient has excellent compliance.    Dehydration     Diabetes mellitus type II, uncontrolled     GERD (gastroesophageal reflux disease) 2013    grade IV/IV    Hypertension     Hypothyroidism     Male hypogonadism     Non-proliferative diabetic retinopathy     Urinary retention      Social History     Socioeconomic History    Marital status:      Spouse name: Babs    Number of children: 2   Occupational History    Occupation: Retired   Tobacco Use    Smoking status: Former    Smokeless tobacco: Current     Types: Chew   Substance and Sexual Activity    Alcohol use: Yes     Comment: rare    Drug use: No    Sexual activity: Not Currently     Partners: Female     Social Determinants of Health     Financial Resource Strain: Low Risk  (2/9/2023)    Overall Financial Resource Strain (CARDIA)     Difficulty of Paying Living Expenses: Not hard at all   Food Insecurity: No Food Insecurity (2/9/2023)    Hunger Vital Sign     Worried About Running Out of Food in the Last Year: Never true     Ran Out of Food in the Last  "Year: Never true   Transportation Needs: No Transportation Needs (2/9/2023)    PRAPARE - Transportation     Lack of Transportation (Medical): No     Lack of Transportation (Non-Medical): No   Physical Activity: Inactive (2/9/2023)    Exercise Vital Sign     Days of Exercise per Week: 0 days     Minutes of Exercise per Session: 0 min   Stress: No Stress Concern Present (2/9/2023)    [employer] [place] [employer] - Occupational Stress Questionnaire     Feeling of Stress : Not at all   Social Connections: Moderately Integrated (2/9/2023)    Social Connection and Isolation Panel [NHANES]     Frequency of Communication with Friends and Family: More than three times a week     Frequency of Social Gatherings with Friends and Family: More than three times a week     Attends Congregation Services: More than 4 times per year     Active Member of Clubs or Organizations: No     Attends Club or Organization Meetings: Never     Marital Status:    Housing Stability: Low Risk  (2/9/2023)    Housing Stability Vital Sign     Unable to Pay for Housing in the Last Year: No     Number of Places Lived in the Last Year: 1     Unstable Housing in the Last Year: No     Current Outpatient Medications on File Prior to Visit   Medication Sig Dispense Refill    amLODIPine (NORVASC) 10 MG tablet Take 1 tablet (10 mg total) by mouth once daily. 90 tablet 3    aspirin 325 MG tablet Take 325 mg by mouth once daily.      BD LUER-MANUEL SYRINGE 3 mL 22 x 1 1/2" Syrg   3    blood sugar diagnostic (FREESTYLE LITE STRIPS) Strp TEST 3 TIMES DAILY 300 strip 5    blood-glucose meter Misc Use as directed four times daily before meals and at bedtime. 1 each 0    cefUROXime (CEFTIN) 250 MG tablet Take 250 mg by mouth once daily.      cholecalciferol, vitamin D3, 125 mcg (5,000 unit) Tab Take 5,000 Units by mouth once daily.      citalopram (CELEXA) 20 MG tablet Take 1 tablet (20 mg total) by mouth once daily. 90 tablet 3    clopidogreL (PLAVIX) 75 " mg tablet Take 1 tablet (75 mg total) by mouth once daily. 90 tablet 3    docusate sodium (COLACE) 100 MG capsule Take 100 mg by mouth 2 (two) times daily.      finasteride (PROSCAR) 5 mg tablet Take 1 tablet (5 mg total) by mouth once daily. 90 tablet 3    flash glucose scanning reader (FREESTYLE PUMA 2 READER) [facility] Use in conjunction with glucose sensor every 14 days for continuous monitoring of Glucose. DX:E11.22, N18.30, Z79.44 6 each 3    FREESTYLE PUMA 2 SENSOR Kit USE ONE SENSOR EVERY 14 DAYS FOR CONTINUOUS MONITORING OF GLUCOSE. DX:E11.22, N18.30, Z79.44 6 kit 3    gabapentin (NEURONTIN) 300 MG capsule Take 300 mg by mouth 3 (three) times daily.      guaiFENesin-codeine 100-10 mg/5 ml (TUSSI-ORGANIDIN NR)  mg/5 mL syrup Take 5 mLs by mouth 3 times daily as needed.      insulin (LANTUS SOLOSTAR U-100 INSULIN) glargine 100 units/mL SubQ pen INJECT 36 UNITS UNDER THE SKIN ONCE A DAY 30 each 1    insulin aspart U-100 (NOVOLOG FLEXPEN U-100 INSULIN) 100 unit/mL (3 mL) InPn pen USE AS DIRECTED PER SLIDING SCALE. MAX OF 14 UNITS DAILY) 15 each 3    insulin lispro 100 unit/mL injection Inject 5 Units into the skin 3 (three) times daily.      isosorbide mononitrate (IMDUR) 30 MG 24 hr tablet Take 1 tablet (30 mg total) by mouth once daily. 90 tablet 3    Lactobacillus rhamnosus GG (CULTURELLE) 10 billion cell capsule Take 1 capsule by mouth 2 (two) times daily.      lancets (ACCU-CHEK SOFTCLIX LANCETS) Misc Check glucose three times daily. 200 each 11    levothyroxine (SYNTHROID) 100 MCG tablet Take 1 tablet (100 mcg total) by mouth once daily. 90 tablet 3    melatonin (MELATIN) 3 mg tablet Take 3 mg by mouth nightly.      meloxicam (MOBIC) 15 MG tablet Take 15 mg by mouth once daily.      montelukast (SINGULAIR) 10 mg tablet Take 1 tablet (10 mg total) by mouth every evening. 90 tablet 3    nitrofurantoin, macrocrystal-monohydrate, (MACROBID) 100 MG capsule Take 100 mg by mouth.      olmesartan (BENICAR) 40  "MG tablet Take 1 tablet (40 mg total) by mouth once daily. 30 tablet 11    pantoprazole (PROTONIX) 40 MG tablet Take 40 mg by mouth once daily.      pen needle, diabetic (BD ULTRA-FINE MINI PEN NEEDLE) 31 gauge x 3/16" Ndle USE DAILY WITH LANTUS AND NOVOLOG 300 each 3    polyethylene glycol (GLYCOLAX) 17 gram PwPk Take 17 g by mouth 2 (two) times a day.      pravastatin (PRAVACHOL) 40 MG tablet Take 1 tablet (40 mg total) by mouth every evening. 90 tablet 3    senna-docusate 8.6-50 mg (PERICOLACE) 8.6-50 mg per tablet Take 2 tablets by mouth 2 (two) times a day.       No current facility-administered medications on file prior to visit.     Review of patient's allergies indicates:   Allergen Reactions    Ace inhibitors      Other reaction(s): cough    Metformin      Abdominal pain       Objective:     Physical Exam  Vitals and nursing note reviewed.   Constitutional:       Appearance: He is well-developed.   HENT:      Head: Normocephalic and atraumatic.   Eyes:      Conjunctiva/sclera: Conjunctivae normal.      Pupils: Pupils are equal, round, and reactive to light.   Cardiovascular:      Rate and Rhythm: Normal rate and regular rhythm.      Pulses: Intact distal pulses.      Heart sounds: Normal heart sounds.   Pulmonary:      Effort: Pulmonary effort is normal.      Breath sounds: Normal breath sounds.   Abdominal:      General: Bowel sounds are normal.      Palpations: Abdomen is soft.   Musculoskeletal:      Cervical back: Normal range of motion and neck supple.   Skin:     General: Skin is warm and dry.   Neurological:      Mental Status: He is alert and oriented to person, place, and time.         Assessment:     1. Chronic obstructive pulmonary disease, unspecified COPD type    2. Acute on chronic diastolic (congestive) heart failure    3. Bilateral carotid artery stenosis    4. Combined hyperlipidemia associated with type 2 diabetes mellitus    5. History of carotid endarterectomy    6. Hypertension " associated with diabetes    7. Hypertensive heart disease with heart failure    8. PAD (peripheral artery disease)    9. Palpitations    10. PVC (premature ventricular contraction)    11. ERNESTINE (obstructive sleep apnea)        Plan:     Chronic obstructive pulmonary disease, unspecified COPD type    Acute on chronic diastolic (congestive) heart failure    Bilateral carotid artery stenosis    Combined hyperlipidemia associated with type 2 diabetes mellitus    History of carotid endarterectomy    Hypertension associated with diabetes    Hypertensive heart disease with heart failure    PAD (peripheral artery disease)    Palpitations    PVC (premature ventricular contraction)    ERNESTINE (obstructive sleep apnea)    Continue norvasc,  toprol, olmesartan -htn/SVT  Continue statin-hlp  Continue asa, plavix - primary prevention

## 2024-04-11 NOTE — PROGRESS NOTES
Bebeto, this is a mild increase in the protein in the urine.  BP Readings from Last 3 Encounters:  04/11/24 : 138/78  04/09/24 : 136/62  02/01/24 : 132/78    Your blood pressure is well controlled.   Lab Results       Component                Value               Date                       HGBA1C                   6.5 (H)             01/03/2024            Your A1c is also controlled.    Based on this, I would not change anything but would recheck in 1 year.

## 2024-04-12 ENCOUNTER — PATIENT MESSAGE (OUTPATIENT)
Dept: FAMILY MEDICINE | Facility: CLINIC | Age: 84
End: 2024-04-12
Payer: MEDICARE

## 2024-04-22 ENCOUNTER — PATIENT MESSAGE (OUTPATIENT)
Dept: CARDIOLOGY | Facility: HOSPITAL | Age: 84
End: 2024-04-22
Payer: MEDICARE

## 2024-04-30 ENCOUNTER — EXTERNAL CHRONIC CARE MANAGEMENT (OUTPATIENT)
Dept: PRIMARY CARE CLINIC | Facility: CLINIC | Age: 84
End: 2024-04-30
Payer: MEDICARE

## 2024-04-30 PROCEDURE — 99490 CHRNC CARE MGMT STAFF 1ST 20: CPT | Mod: S$PBB,,, | Performed by: FAMILY MEDICINE

## 2024-04-30 PROCEDURE — 99490 CHRNC CARE MGMT STAFF 1ST 20: CPT | Mod: PBBFAC,PO | Performed by: FAMILY MEDICINE

## 2024-05-14 ENCOUNTER — PATIENT OUTREACH (OUTPATIENT)
Dept: ADMINISTRATIVE | Facility: CLINIC | Age: 84
End: 2024-05-14
Payer: MEDICARE

## 2024-05-14 NOTE — PROGRESS NOTES
C3 nurse attempted to contact Bebeto Santiago's wife for a TCC post hospital discharge follow up call. The patient is unable to conduct the call @ this time. The patient requested a callback.    The patient has a scheduled HOSFU appointment with Americo Ramsey on 05/21/ @ 0820.

## 2024-05-15 NOTE — PROGRESS NOTES
C3 nurse spoke with Bebeto Santiago's daughter Chiquis for a TCC post hospital discharge follow up call.  Chiquis is not able to go over the pts. Medications at this time.  Return call information provided by C3 nurse.  Chiquis will call back to complete the call. The patient has a scheduled HOSFU appointment with Americo Ramsey on 05/21/24 @ 0645.

## 2024-05-16 RX ORDER — CITALOPRAM 10 MG/1
10 TABLET ORAL DAILY
COMMUNITY
Start: 2024-05-09

## 2024-05-16 RX ORDER — OXCARBAZEPINE 150 MG/1
150 TABLET, FILM COATED ORAL 2 TIMES DAILY
COMMUNITY
Start: 2024-05-09

## 2024-05-16 RX ORDER — LEVOTHYROXINE SODIUM 125 UG/1
125 TABLET ORAL EVERY MORNING
COMMUNITY
Start: 2024-05-09

## 2024-05-16 RX ORDER — NITROFURANTOIN 25; 75 MG/1; MG/1
1 CAPSULE ORAL DAILY
COMMUNITY
End: 2024-05-22

## 2024-05-16 RX ORDER — INSULIN GLARGINE 100 [IU]/ML
33 INJECTION, SOLUTION SUBCUTANEOUS DAILY
COMMUNITY
Start: 2024-04-30 | End: 2024-05-21 | Stop reason: SDUPTHER

## 2024-05-16 RX ORDER — TRAZODONE HYDROCHLORIDE 50 MG/1
50 TABLET ORAL NIGHTLY
COMMUNITY
Start: 2024-05-09

## 2024-05-16 NOTE — PROGRESS NOTES
C3 nurse spoke with Bebeto Santiago's daughter Koki for a TCC post hospital discharge follow up call. The patient has a scheduled HOSFU appointment with Americo Ramsey on 05/21/24 @ 5580.

## 2024-05-16 NOTE — PROGRESS NOTES
C3 nurse attempted to contact Bebeto Santiago's daughter for a TCC post hospital discharge follow up call. The patient is unable to conduct the call @ this time. The patient requested a callback. Return call information left on Koki's voicemail with callback information per her request.

## 2024-05-21 ENCOUNTER — PATIENT MESSAGE (OUTPATIENT)
Dept: CARDIOLOGY | Facility: CLINIC | Age: 84
End: 2024-05-21
Payer: MEDICARE

## 2024-05-21 ENCOUNTER — PATIENT MESSAGE (OUTPATIENT)
Dept: FAMILY MEDICINE | Facility: CLINIC | Age: 84
End: 2024-05-21
Payer: MEDICARE

## 2024-05-21 ENCOUNTER — OFFICE VISIT (OUTPATIENT)
Dept: FAMILY MEDICINE | Facility: CLINIC | Age: 84
End: 2024-05-21
Payer: MEDICARE

## 2024-05-21 ENCOUNTER — LAB VISIT (OUTPATIENT)
Dept: LAB | Facility: HOSPITAL | Age: 84
End: 2024-05-21
Attending: NURSE PRACTITIONER
Payer: MEDICARE

## 2024-05-21 VITALS
OXYGEN SATURATION: 99 % | HEART RATE: 60 BPM | RESPIRATION RATE: 16 BRPM | DIASTOLIC BLOOD PRESSURE: 66 MMHG | SYSTOLIC BLOOD PRESSURE: 167 MMHG

## 2024-05-21 DIAGNOSIS — G47.09 OTHER INSOMNIA: ICD-10-CM

## 2024-05-21 DIAGNOSIS — N18.30 CONTROLLED TYPE 2 DIABETES MELLITUS WITH STAGE 3 CHRONIC KIDNEY DISEASE, WITH LONG-TERM CURRENT USE OF INSULIN: ICD-10-CM

## 2024-05-21 DIAGNOSIS — S72.001D CLOSED FRACTURE OF NECK OF RIGHT FEMUR WITH ROUTINE HEALING, SUBSEQUENT ENCOUNTER: ICD-10-CM

## 2024-05-21 DIAGNOSIS — N30.00 ACUTE CYSTITIS WITHOUT HEMATURIA: ICD-10-CM

## 2024-05-21 DIAGNOSIS — Z09 HOSPITAL DISCHARGE FOLLOW-UP: ICD-10-CM

## 2024-05-21 DIAGNOSIS — F32.1 CURRENT MODERATE EPISODE OF MAJOR DEPRESSIVE DISORDER WITHOUT PRIOR EPISODE: ICD-10-CM

## 2024-05-21 DIAGNOSIS — E11.22 CONTROLLED TYPE 2 DIABETES MELLITUS WITH STAGE 3 CHRONIC KIDNEY DISEASE, WITH LONG-TERM CURRENT USE OF INSULIN: ICD-10-CM

## 2024-05-21 DIAGNOSIS — Z09 HOSPITAL DISCHARGE FOLLOW-UP: Primary | ICD-10-CM

## 2024-05-21 DIAGNOSIS — Z79.4 CONTROLLED TYPE 2 DIABETES MELLITUS WITH STAGE 3 CHRONIC KIDNEY DISEASE, WITH LONG-TERM CURRENT USE OF INSULIN: ICD-10-CM

## 2024-05-21 LAB
BACTERIA #/AREA URNS HPF: ABNORMAL /HPF
BILIRUB UR QL STRIP: NEGATIVE
CLARITY UR: CLEAR
COLOR UR: YELLOW
GLUCOSE UR QL STRIP: ABNORMAL
HGB UR QL STRIP: ABNORMAL
KETONES UR QL STRIP: NEGATIVE
LEUKOCYTE ESTERASE UR QL STRIP: ABNORMAL
MICROSCOPIC COMMENT: ABNORMAL
NITRITE UR QL STRIP: NEGATIVE
PH UR STRIP: 6 [PH] (ref 5–8)
PROT UR QL STRIP: NEGATIVE
RBC #/AREA URNS HPF: 2 /HPF (ref 0–4)
SP GR UR STRIP: 1.01 (ref 1–1.03)
SQUAMOUS #/AREA URNS HPF: ABNORMAL /HPF
URN SPEC COLLECT METH UR: ABNORMAL
WBC #/AREA URNS HPF: 50 /HPF (ref 0–5)
WBC CLUMPS URNS QL MICRO: ABNORMAL

## 2024-05-21 PROCEDURE — 99214 OFFICE O/P EST MOD 30 MIN: CPT | Mod: PBBFAC,PO | Performed by: NURSE PRACTITIONER

## 2024-05-21 PROCEDURE — 99999 PR PBB SHADOW E&M-EST. PATIENT-LVL IV: CPT | Mod: PBBFAC,,, | Performed by: NURSE PRACTITIONER

## 2024-05-21 PROCEDURE — 99214 OFFICE O/P EST MOD 30 MIN: CPT | Mod: S$PBB,,, | Performed by: NURSE PRACTITIONER

## 2024-05-21 PROCEDURE — 81000 URINALYSIS NONAUTO W/SCOPE: CPT | Mod: PO | Performed by: NURSE PRACTITIONER

## 2024-05-21 PROCEDURE — 87086 URINE CULTURE/COLONY COUNT: CPT | Performed by: NURSE PRACTITIONER

## 2024-05-21 RX ORDER — FLASH GLUCOSE SENSOR
KIT MISCELLANEOUS
Qty: 6 KIT | Refills: 3 | Status: SHIPPED | OUTPATIENT
Start: 2024-05-21

## 2024-05-21 RX ORDER — INSULIN LISPRO 100 [IU]/ML
5 INJECTION, SOLUTION INTRAVENOUS; SUBCUTANEOUS 3 TIMES DAILY
Qty: 3 ML | Refills: 11 | Status: SHIPPED | OUTPATIENT
Start: 2024-05-21

## 2024-05-21 RX ORDER — INSULIN ASPART 100 [IU]/ML
INJECTION, SOLUTION INTRAVENOUS; SUBCUTANEOUS
Qty: 15 EACH | Refills: 3 | OUTPATIENT
Start: 2024-05-21

## 2024-05-21 RX ORDER — METOPROLOL SUCCINATE 25 MG/1
25 TABLET, EXTENDED RELEASE ORAL DAILY
COMMUNITY
Start: 2024-05-10

## 2024-05-21 RX ORDER — INSULIN GLARGINE 100 [IU]/ML
33 INJECTION, SOLUTION SUBCUTANEOUS DAILY
Qty: 3 ML | Refills: 11 | Status: SHIPPED | OUTPATIENT
Start: 2024-05-21

## 2024-05-21 NOTE — PROGRESS NOTES
Subjective:       Patient ID: Bebeto Santiago is a 84 y.o. male.    Chief Complaint: Fall (Fall 4/21/24- broken right hip)    HPI    He comes in for hospital follow up. He was admitted to [facility] on 4/21/24.  He presented to the ER with complaints of a fall with leg injury.  He was diagnosed with femur fracture and UTI. Completed test: xray, UA. Treatment included surgical intervention, antibiotics for UTI. Medical history includes DM2, hypothyroid, HTN, HFpEF, PAD with previous carotid endarterectomy and is on Plavix and aspirin, HLD, COPD, ERNESTINE without CPAP, bladder cancer, BPH with prior procedure to open up his prostate, CKD 3, GERD, lower back surgery. . Discharged on 4/30/24 to rehab facility. He stayed at the rehab until 5/9/24.      He is ambulating with the use of a walker, his daughter reports that he gets easily frustrated and does not want to use the walker. Gait continues to be unsteady. Denies UTI symptoms. He is eating and drinking regular meals.     XR FEMUR 2 VIEW RIGHT  Order: [id number]  Impression    Acute right femoral neck fracture with impaction.    Electronically signed by Duke Arreola MD on 4/22/2024 6:53 AM  Narrative    REASON FOR EXAM: pain; injury    TECHNICAL FACTORS: Two views    COMPARISON: None    FINDINGS: Acute right femoral neck fracture with impaction. Mild degenerative changes of the right knee and right hip joints. Partially visualized left hip arthroplasty. Diffuse iliofemoral atherosclerotic disease.  Exam End: 04/22/24 00:56    Specimen Collected: 04/22/24 06:52 Last Resulted: 04/22/24 06:53   Received From: [facility]  Result Received: 05/21/24 07:55      ntains abnormal data URINALYSIS WITH REFLEX TO MICROSCOPIC  Order: [id number]   Ref Range & Units 2 mo ago   Urine type  CATH   Color, UA  YELLOW   Appearance  CLEAR   Glucose, Urine NEGATIVE mg/dL NEGATIVE   Bilirubin, Urine NEGATIVE mg/dL NEGATIVE   Ketones, Urine NEGATIVE mg/dL NEGATIVE    Specific Gravity, UA 1.005 - 1.030 1.015   Blood, Urine NEGATIVE NEGATIVE   pH, Urine 4.5 - 8.0 6.5   Protein, Urine NEGATIVE mg/dL TRACE   Urobilinogen 0.2 - 1.0 [Florentino'U]/dL 0.2   Nitrite, UA NEGATIVE NEGATIVE   Leuk. Esterase, Urine NEGATIVE MODERATE Abnormal    RBC, Urine 0 - 4 [#]/[HPF] 4   WBC, Urine 0 - 5 [#]/[HPF] 111 High    Epithelial Cells, Urine 0 - 5 [#]/[HPF] 0   Casts, Urine 0 - 5 [#]/[LPF] 4   Bacteria, UA NONE SEEN [#]/[HPF] NONE   Resulting Agency  [facility]     Specimen Collected: 04/22/24 11:00    Performed by: [facility]         Problem List Items Addressed This Visit          Psychiatric    Current moderate episode of major depressive disorder without prior episode    Overview     -history of depression  -lamictal started inpatient at [facility] 5/24  -reports feeling well  -denies AE         Relevant Medications    OXcarbazepine (TRILEPTAL) 150 MG Tab                  Other    Other insomnia    Overview     -having insomnia  -started on Trazodone inpatient  -having improvement with sleep  -request refills         Relevant Medications    traZODone (DESYREL) 50 MG tablet           Review of Systems   Constitutional:  Negative for fatigue and unexpected weight change.   HENT:  Negative for ear pain and sore throat.    Eyes: Negative.  Negative for pain and visual disturbance.   Respiratory:  Negative for cough and shortness of breath.    Cardiovascular:  Negative for chest pain and palpitations.   Gastrointestinal:  Negative for diarrhea.   Genitourinary:  Negative for dysuria and frequency.   Musculoskeletal:  Negative for arthralgias and myalgias.   Skin:  Negative for color change and rash.   Neurological:  Negative for dizziness.   Psychiatric/Behavioral:  Negative for sleep disturbance. The patient is not nervous/anxious.        Vitals:    05/21/24 0828   BP: (!) 167/66   Pulse: 60   Resp: 16       Objective:     Current Outpatient Medications   Medication Sig Dispense Refill     "amLODIPine (NORVASC) 10 MG tablet Take 1 tablet (10 mg total) by mouth once daily. 90 tablet 3    aspirin 325 MG tablet Take 325 mg by mouth once daily.      BD LUER-MANUEL SYRINGE 3 mL 22 x 1 1/2" Syrg   3    blood sugar diagnostic (FREESTYLE LITE STRIPS) Strp TEST 3 TIMES DAILY 300 strip 5    blood-glucose meter Misc Use as directed four times daily before meals and at bedtime. 1 each 0    citalopram (CELEXA) 10 MG tablet Take 10 mg by mouth once daily.      clopidogreL (PLAVIX) 75 mg tablet Take 1 tablet (75 mg total) by mouth once daily. 90 tablet 3    docusate sodium (COLACE) 100 MG capsule Take 100 mg by mouth 2 (two) times daily.      finasteride (PROSCAR) 5 mg tablet Take 1 tablet (5 mg total) by mouth once daily. 90 tablet 3    flash glucose scanning reader (FREESTYLE PUMA 2 READER) [facility] Use in conjunction with glucose sensor every 14 days for continuous monitoring of Glucose. DX:E11.22, N18.30, Z79.44 6 each 3    insulin aspart U-100 (NOVOLOG FLEXPEN U-100 INSULIN) 100 unit/mL (3 mL) InPn pen USE AS DIRECTED PER SLIDING SCALE. MAX OF 14 UNITS DAILY) 15 each 3    Lactobacillus rhamnosus GG (CULTURELLE) 10 billion cell capsule Take 1 capsule by mouth 2 (two) times daily.      lancets (ACCU-CHEK SOFTCLIX LANCETS) Misc Check glucose three times daily. 200 each 11    levothyroxine (SYNTHROID) 125 MCG tablet Take 125 mcg by mouth every morning.      metoprolol succinate (TOPROL-XL) 25 MG 24 hr tablet Take 25 mg by mouth once daily.      montelukast (SINGULAIR) 10 mg tablet Take 1 tablet (10 mg total) by mouth every evening. 90 tablet 3    pantoprazole (PROTONIX) 40 MG tablet Take 40 mg by mouth once daily.      pen needle, diabetic (BD ULTRA-FINE MINI PEN NEEDLE) 31 gauge x 3/16" Ndle USE DAILY WITH LANTUS AND NOVOLOG 300 each 3    senna-docusate 8.6-50 mg (PERICOLACE) 8.6-50 mg per tablet Take 2 tablets by mouth 2 (two) times a day.      flash glucose sensor (FREESTYLE PUMA 2 SENSOR) Kit Use one sensor Every 14 " days for continuous monitoring of Glucose. DX:E11.22, N18.30, Z79.44 6 kit 3    guaiFENesin-codeine 100-10 mg/5 ml (TUSSI-ORGANIDIN NR)  mg/5 mL syrup Take 5 mLs by mouth 3 times daily as needed. (Patient not taking: Reported on 5/21/2024)      insulin glargine U-100, Lantus, 100 unit/mL (3 mL) SubQ InPn pen Inject 33 Units into the skin once daily. 3 mL 11    insulin lispro 100 unit/mL injection Inject 5 Units into the skin 3 (three) times daily. 3 mL 11    meloxicam (MOBIC) 15 MG tablet Take 15 mg by mouth once daily. (Patient not taking: Reported on 5/21/2024)      OXcarbazepine (TRILEPTAL) 150 MG Tab Take 1 tablet (150 mg total) by mouth 2 (two) times daily. 60 tablet 5    pravastatin (PRAVACHOL) 40 MG tablet Take 1 tablet (40 mg total) by mouth every evening. 90 tablet 3    traZODone (DESYREL) 50 MG tablet Take 1 tablet (50 mg total) by mouth nightly as needed for Insomnia. 30 tablet 5     No current facility-administered medications for this visit.       Physical Exam  Constitutional:       Appearance: He is well-developed.   HENT:      Head: Normocephalic.   Pulmonary:      Effort: Pulmonary effort is normal. No respiratory distress.   Musculoskeletal:      Cervical back: Normal range of motion.   Neurological:      Mental Status: He is alert and oriented to person, place, and time.   Psychiatric:         Thought Content: Thought content normal.         Judgment: Judgment normal.         Assessment:       1. Hospital discharge follow-up    2. Closed fracture of neck of right femur with routine healing, subsequent encounter    3. Acute cystitis without hematuria    4. Current moderate episode of major depressive disorder without prior episode    5. Other insomnia        Plan:   Hospital discharge follow-up  -     Urinalysis, Reflex to Urine Culture Urine, Clean Catch; Future; Expected date: 05/21/2024    Closed fracture of neck of right femur with routine healing, subsequent encounter  -follow up with  ortho    Acute cystitis without hematuria  -     Urinalysis, Reflex to Urine Culture Urine, Clean Catch; Future; Expected date: 05/21/2024    Current moderate episode of major depressive disorder without prior episode  -     OXcarbazepine (TRILEPTAL) 150 MG Tab; Take 1 tablet (150 mg total) by mouth 2 (two) times daily.  Dispense: 60 tablet; Refill: 5    Other insomnia  -     traZODone (DESYREL) 50 MG tablet; Take 1 tablet (50 mg total) by mouth nightly as needed for Insomnia.  Dispense: 30 tablet; Refill: 5        No follow-ups on file.    There are no Patient Instructions on file for this visit.

## 2024-05-22 LAB — BACTERIA UR CULT: NO GROWTH

## 2024-05-22 RX ORDER — CEPHALEXIN 500 MG/1
500 CAPSULE ORAL 3 TIMES DAILY
Qty: 30 CAPSULE | Refills: 0 | Status: SHIPPED | OUTPATIENT
Start: 2024-05-22 | End: 2024-06-01

## 2024-05-31 ENCOUNTER — EXTERNAL CHRONIC CARE MANAGEMENT (OUTPATIENT)
Dept: PRIMARY CARE CLINIC | Facility: CLINIC | Age: 84
End: 2024-05-31
Payer: MEDICARE

## 2024-05-31 PROCEDURE — 99490 CHRNC CARE MGMT STAFF 1ST 20: CPT | Mod: PBBFAC,PO | Performed by: FAMILY MEDICINE

## 2024-05-31 PROCEDURE — 99490 CHRNC CARE MGMT STAFF 1ST 20: CPT | Mod: S$PBB,,, | Performed by: FAMILY MEDICINE

## 2024-06-11 DIAGNOSIS — E11.69 COMBINED HYPERLIPIDEMIA ASSOCIATED WITH TYPE 2 DIABETES MELLITUS: ICD-10-CM

## 2024-06-11 DIAGNOSIS — E78.2 COMBINED HYPERLIPIDEMIA ASSOCIATED WITH TYPE 2 DIABETES MELLITUS: ICD-10-CM

## 2024-06-12 RX ORDER — PRAVASTATIN SODIUM 40 MG/1
40 TABLET ORAL NIGHTLY
Qty: 90 TABLET | Refills: 3 | Status: SHIPPED | OUTPATIENT
Start: 2024-06-12

## 2024-06-12 NOTE — TELEPHONE ENCOUNTER
Refill Routing Note   Medication(s) are not appropriate for processing by [facility] for the following reason(s):        Non-participating provider  Responsible provider unclear    [facility] action(s):  Route             Appointments  past 12m or future 3m with PCP    Date Provider   Last Visit   5/21/2024 Americo Ramsey, NP   Next Visit   Visit date not found Americo Ramsey, NP   ED visits in past 90 days: 0        Note composed:7:15 AM 06/12/2024

## 2024-06-30 PROBLEM — S72.001A CLOSED FRACTURE OF NECK OF RIGHT FEMUR: Status: ACTIVE | Noted: 2024-04-22

## 2024-06-30 PROBLEM — G47.09 OTHER INSOMNIA: Status: ACTIVE | Noted: 2024-06-30

## 2024-06-30 RX ORDER — TRAZODONE HYDROCHLORIDE 50 MG/1
50 TABLET ORAL NIGHTLY PRN
Qty: 30 TABLET | Refills: 5 | Status: SHIPPED | OUTPATIENT
Start: 2024-06-30

## 2024-06-30 RX ORDER — OXCARBAZEPINE 150 MG/1
150 TABLET, FILM COATED ORAL 2 TIMES DAILY
Qty: 60 TABLET | Refills: 5 | Status: SHIPPED | OUTPATIENT
Start: 2024-06-30 | End: 2024-12-27

## 2024-07-02 ENCOUNTER — PATIENT MESSAGE (OUTPATIENT)
Dept: CARDIOLOGY | Facility: CLINIC | Age: 84
End: 2024-07-02
Payer: MEDICARE

## 2024-07-04 RX ORDER — HYDROCHLOROTHIAZIDE 12.5 MG/1
12.5 TABLET ORAL DAILY
Qty: 30 TABLET | Refills: 11 | Status: SHIPPED | OUTPATIENT
Start: 2024-07-04 | End: 2025-07-04

## 2024-07-05 NOTE — TELEPHONE ENCOUNTER
Followed up with Bebeto, patient has been notified of this information and all questions answered. Per patient's provider:  Add HCTZ 12.5 mg q day. Patient verbalized understanding.

## 2024-08-12 DIAGNOSIS — N18.30 CONTROLLED TYPE 2 DIABETES MELLITUS WITH STAGE 3 CHRONIC KIDNEY DISEASE, WITH LONG-TERM CURRENT USE OF INSULIN: ICD-10-CM

## 2024-08-12 DIAGNOSIS — E11.22 CONTROLLED TYPE 2 DIABETES MELLITUS WITH STAGE 3 CHRONIC KIDNEY DISEASE, WITH LONG-TERM CURRENT USE OF INSULIN: ICD-10-CM

## 2024-08-12 DIAGNOSIS — Z79.4 CONTROLLED TYPE 2 DIABETES MELLITUS WITH STAGE 3 CHRONIC KIDNEY DISEASE, WITH LONG-TERM CURRENT USE OF INSULIN: ICD-10-CM

## 2024-08-12 NOTE — TELEPHONE ENCOUNTER
Care Due:                  Date            Visit Type   Department     Provider  --------------------------------------------------------------------------------                                HOSPITAL     [facility] FAMILY  Last Visit: 01-      FOLLOW UP    MEDICINE       Darin Krueger  Next Visit: None Scheduled  None         None Found                                                            Last  Test          Frequency    Reason                     Performed    Due Date  --------------------------------------------------------------------------------    Cr..........  12 months..  insulin..................  08-   08-    HBA1C.......  6 months...  insulin..................  08-   02-    Health [facility] Embedded Care Due Messages. Reference number: [id number].   8/12/2024 4:32:21 PM CDT

## 2024-08-12 NOTE — TELEPHONE ENCOUNTER
The pt daughter is calling alicia get him a new dexcom . He has missed placed and is needing it replaced     He needs the reader

## 2024-08-13 ENCOUNTER — PATIENT MESSAGE (OUTPATIENT)
Dept: FAMILY MEDICINE | Facility: CLINIC | Age: 84
End: 2024-08-13
Payer: MEDICARE

## 2024-08-13 DIAGNOSIS — N18.30 CONTROLLED TYPE 2 DIABETES MELLITUS WITH STAGE 3 CHRONIC KIDNEY DISEASE, WITH LONG-TERM CURRENT USE OF INSULIN: Primary | ICD-10-CM

## 2024-08-13 DIAGNOSIS — Z79.4 CONTROLLED TYPE 2 DIABETES MELLITUS WITH STAGE 3 CHRONIC KIDNEY DISEASE, WITH LONG-TERM CURRENT USE OF INSULIN: Primary | ICD-10-CM

## 2024-08-13 DIAGNOSIS — E11.22 CONTROLLED TYPE 2 DIABETES MELLITUS WITH STAGE 3 CHRONIC KIDNEY DISEASE, WITH LONG-TERM CURRENT USE OF INSULIN: Primary | ICD-10-CM

## 2024-08-13 RX ORDER — LANCETS
EACH MISCELLANEOUS
Qty: 100 EACH | Refills: 0 | Status: SHIPPED | OUTPATIENT
Start: 2024-08-13

## 2024-08-13 RX ORDER — INSULIN PUMP SYRINGE, 3 ML
EACH MISCELLANEOUS
Qty: 1 EACH | Refills: 0 | Status: SHIPPED | OUTPATIENT
Start: 2024-08-13 | End: 2025-08-13

## 2024-08-13 RX ORDER — FLASH GLUCOSE SENSOR
KIT MISCELLANEOUS
Qty: 6 KIT | Refills: 3 | Status: SHIPPED | OUTPATIENT
Start: 2024-08-13

## 2024-08-14 RX ORDER — FLASH GLUCOSE SCANNING READER
EACH MISCELLANEOUS
Qty: 6 EACH | Refills: 3 | Status: SHIPPED | OUTPATIENT
Start: 2024-08-14

## 2024-08-26 ENCOUNTER — PATIENT MESSAGE (OUTPATIENT)
Dept: FAMILY MEDICINE | Facility: CLINIC | Age: 84
End: 2024-08-26
Payer: MEDICARE

## 2024-08-26 RX ORDER — PANTOPRAZOLE SODIUM 40 MG/1
TABLET, DELAYED RELEASE ORAL
Qty: 90 TABLET | Refills: 11 | Status: SHIPPED | OUTPATIENT
Start: 2024-08-26

## 2024-08-26 NOTE — TELEPHONE ENCOUNTER
Refill Routing Note   Medication(s) are not appropriate for processing by [facility] for the following reason(s):        No active prescription written by provider    [facility] action(s):  Defer             Appointments  past 12m or future 3m with PCP    Date Provider   Last Visit   1/3/2024 Darin Krueger MD   Next Visit   Visit date not found Darin Krueger MD   ED visits in past 90 days: 0        Note composed:10:17 AM 08/26/2024

## 2024-08-26 NOTE — TELEPHONE ENCOUNTER
The patient requested medicine refills and I did refill it x 1 year.  Message the patient to notify of any health maintenance care gaps that need to be arranged.   Health Maintenance Due   Topic Date Due    RSV Vaccine (Age 60+ and Pregnant patients) (1 - 1-dose 60+ series) Never done    Eye Exam  12/10/2022    COVID-19 Vaccine (5 - 2023-24 season) 09/01/2023    Hemoglobin A1c  07/03/2024    Lipid Panel  08/14/2024     BP Readings from Last 3 Encounters:   05/21/24 (!) 167/66   04/11/24 138/78   04/09/24 136/62     Lab Results   Component Value Date    HGBA1C 6.5 (H) 01/03/2024

## 2024-08-26 NOTE — TELEPHONE ENCOUNTER
No care due was identified.  Health [facility] Embedded Care Due Messages. Reference number: [id number].   8/26/2024 9:39:54 AM CDT

## 2024-08-31 ENCOUNTER — PATIENT MESSAGE (OUTPATIENT)
Dept: FAMILY MEDICINE | Facility: CLINIC | Age: 84
End: 2024-08-31
Payer: MEDICARE

## 2024-08-31 DIAGNOSIS — E78.2 COMBINED HYPERLIPIDEMIA ASSOCIATED WITH TYPE 2 DIABETES MELLITUS: ICD-10-CM

## 2024-08-31 DIAGNOSIS — J30.1 SEASONAL ALLERGIC RHINITIS DUE TO POLLEN: ICD-10-CM

## 2024-08-31 DIAGNOSIS — I73.9 PAD (PERIPHERAL ARTERY DISEASE): ICD-10-CM

## 2024-08-31 DIAGNOSIS — E11.59 HYPERTENSION ASSOCIATED WITH DIABETES: Primary | ICD-10-CM

## 2024-08-31 DIAGNOSIS — E11.69 COMBINED HYPERLIPIDEMIA ASSOCIATED WITH TYPE 2 DIABETES MELLITUS: ICD-10-CM

## 2024-08-31 DIAGNOSIS — I15.2 HYPERTENSION ASSOCIATED WITH DIABETES: Primary | ICD-10-CM

## 2024-08-31 DIAGNOSIS — Z98.890 HISTORY OF CAROTID ENDARTERECTOMY: ICD-10-CM

## 2024-09-03 RX ORDER — MONTELUKAST SODIUM 10 MG/1
10 TABLET ORAL NIGHTLY
Qty: 90 TABLET | Refills: 1 | Status: SHIPPED | OUTPATIENT
Start: 2024-09-03

## 2024-09-03 RX ORDER — CITALOPRAM 10 MG/1
10 TABLET ORAL DAILY
Qty: 90 TABLET | Refills: 0 | Status: SHIPPED | OUTPATIENT
Start: 2024-09-03

## 2024-09-03 RX ORDER — CLOPIDOGREL BISULFATE 75 MG/1
75 TABLET ORAL DAILY
Qty: 90 TABLET | Refills: 3 | Status: SHIPPED | OUTPATIENT
Start: 2024-09-03

## 2024-09-03 RX ORDER — ASPIRIN 325 MG
325 TABLET ORAL DAILY
Qty: 90 TABLET | Refills: 0 | Status: SHIPPED | OUTPATIENT
Start: 2024-09-03

## 2024-09-03 RX ORDER — METOPROLOL SUCCINATE 25 MG/1
25 TABLET, EXTENDED RELEASE ORAL DAILY
Qty: 90 TABLET | Refills: 0 | Status: SHIPPED | OUTPATIENT
Start: 2024-09-03 | End: 2024-09-04

## 2024-09-03 RX ORDER — PRAVASTATIN SODIUM 40 MG/1
40 TABLET ORAL NIGHTLY
Qty: 90 TABLET | Refills: 3 | Status: SHIPPED | OUTPATIENT
Start: 2024-09-03

## 2024-09-03 NOTE — TELEPHONE ENCOUNTER
Refill Routing Note   Medication(s) are not appropriate for processing by [facility] for the following reason(s):        Required vitals abnormal: BP  No active prescription written by provider  Outside of protocol: Aspirin    [facility] action(s):  Defer  Route        Medication Therapy Plan: Toprol-XL & Celexa on med list as patient reported medications      Appointments  past 12m or future 3m with PCP    Date Provider   Last Visit   1/3/2024 Darin Krueger MD   Next Visit   Visit date not found Darin Krueger MD   ED visits in past 90 days: 0        Note composed:10:03 AM 09/03/2024

## 2024-09-03 NOTE — TELEPHONE ENCOUNTER
No care due was identified.  Health [facility] Embedded Care Due Messages. Reference number: [id number].   9/03/2024 9:07:00 AM CDT

## 2024-09-03 NOTE — TELEPHONE ENCOUNTER
Refill Routing Note   Medication(s) are not appropriate for processing by [facility] for the following reason(s):        Required labs outdated    [facility] action(s):  Defer  Approve             Pharmacist review requested: Yes     Appointments  past 12m or future 3m with PCP    Date Provider   Last Visit   1/3/2024 Darin Krueger MD   Next Visit   Visit date not found Darin Krueger MD   ED visits in past 90 days: 0        Note composed:9:56 AM 09/03/2024

## 2024-09-03 NOTE — TELEPHONE ENCOUNTER
Please call the patient and try to document a good blood pressure in the chart to meet his blood pressure requirements of control and if they remain high, let me know so that I can address this. The last 3 blood pressures are below to inform the patient.   BP Readings from Last 3 Encounters:   05/21/24 (!) 167/66   04/11/24 138/78   04/09/24 136/62

## 2024-09-03 NOTE — TELEPHONE ENCOUNTER
No care due was identified.  [facility] Embedded Care Due Messages. Reference number: [id number].   9/03/2024 9:38:48 AM CDT

## 2024-09-03 NOTE — TELEPHONE ENCOUNTER
Refill Routing Note   Medication(s) are not appropriate for processing by [facility] for the following reason(s):        Drug-disease interaction: No prior override by participating responsible provider     Required labs outdated    [facility] action(s):  Defer             Pharmacist review requested: Yes     Appointments  past 12m or future 3m with PCP    Date Provider   Last Visit   1/3/2024 Darin Krueger MD   Next Visit   Visit date not found Darin Krueger MD   ED visits in past 90 days: 0        Note composed:9:51 AM 09/03/2024

## 2024-09-04 RX ORDER — METOPROLOL SUCCINATE 50 MG/1
50 TABLET, EXTENDED RELEASE ORAL DAILY
Qty: 90 TABLET | Refills: 3 | Status: SHIPPED | OUTPATIENT
Start: 2024-09-04

## 2024-09-04 NOTE — TELEPHONE ENCOUNTER
I am changing the toprol xl to 50 mg a day.  Let him know this and ask him to complete an evisit questionnaire for hypertension in 2 weeks for follow up or schedule roger haney.     I have signed for the following orders AND/OR meds.  Please call the patient and ask the patient to schedule the testing AND/OR inform about any medications that were sent.      No orders of the defined types were placed in this encounter.      Medications Ordered This Encounter   Medications    aspirin 325 MG tablet     Sig: Take 1 tablet (325 mg total) by mouth once daily.     Dispense:  90 tablet     Refill:  0    citalopram (CELEXA) 10 MG tablet     Sig: Take 1 tablet (10 mg total) by mouth once daily.     Dispense:  90 tablet     Refill:  0    metoprolol succinate (TOPROL-XL) 50 MG 24 hr tablet     Sig: Take 1 tablet (50 mg total) by mouth once daily.     Dispense:  90 tablet     Refill:  3     .       [unfilled]

## 2024-09-11 ENCOUNTER — PATIENT MESSAGE (OUTPATIENT)
Dept: FAMILY MEDICINE | Facility: CLINIC | Age: 84
End: 2024-09-11
Payer: MEDICARE

## 2024-09-30 ENCOUNTER — EXTERNAL CHRONIC CARE MANAGEMENT (OUTPATIENT)
Dept: PRIMARY CARE CLINIC | Facility: CLINIC | Age: 84
End: 2024-09-30
Payer: MEDICARE

## 2024-09-30 PROCEDURE — 99490 CHRNC CARE MGMT STAFF 1ST 20: CPT | Mod: PBBFAC,PO | Performed by: FAMILY MEDICINE

## 2024-09-30 PROCEDURE — 99490 CHRNC CARE MGMT STAFF 1ST 20: CPT | Mod: S$PBB,,, | Performed by: FAMILY MEDICINE

## 2024-10-01 ENCOUNTER — PATIENT MESSAGE (OUTPATIENT)
Dept: ADMINISTRATIVE | Facility: HOSPITAL | Age: 84
End: 2024-10-01
Payer: COMMERCIAL

## 2024-10-07 ENCOUNTER — TELEPHONE (OUTPATIENT)
Dept: FAMILY MEDICINE | Facility: CLINIC | Age: 84
End: 2024-10-07
Payer: COMMERCIAL

## 2024-10-07 DIAGNOSIS — N18.30 CONTROLLED TYPE 2 DIABETES MELLITUS WITH STAGE 3 CHRONIC KIDNEY DISEASE, WITH LONG-TERM CURRENT USE OF INSULIN: ICD-10-CM

## 2024-10-07 DIAGNOSIS — Z79.4 CONTROLLED TYPE 2 DIABETES MELLITUS WITH STAGE 3 CHRONIC KIDNEY DISEASE, WITH LONG-TERM CURRENT USE OF INSULIN: ICD-10-CM

## 2024-10-07 DIAGNOSIS — E11.9 TYPE 2 DIABETES MELLITUS WITHOUT COMPLICATION, UNSPECIFIED WHETHER LONG TERM INSULIN USE: ICD-10-CM

## 2024-10-07 DIAGNOSIS — E11.22 CONTROLLED TYPE 2 DIABETES MELLITUS WITH STAGE 3 CHRONIC KIDNEY DISEASE, WITH LONG-TERM CURRENT USE OF INSULIN: ICD-10-CM

## 2024-10-07 NOTE — TELEPHONE ENCOUNTER
10/07/2024 1:40 PM   Pt called stating his medication is on hold at the pharmacy. I called the pharmacy, and they stated it is no longer covered. PA initiated to see if we can obtain coverage.

## 2024-10-22 ENCOUNTER — PATIENT MESSAGE (OUTPATIENT)
Dept: FAMILY MEDICINE | Facility: CLINIC | Age: 84
End: 2024-10-22
Payer: COMMERCIAL

## 2024-10-22 DIAGNOSIS — G47.09 OTHER INSOMNIA: ICD-10-CM

## 2024-10-23 RX ORDER — TRAZODONE HYDROCHLORIDE 50 MG/1
50 TABLET ORAL NIGHTLY PRN
Qty: 30 TABLET | Refills: 5 | Status: SHIPPED | OUTPATIENT
Start: 2024-10-23

## 2024-10-23 NOTE — TELEPHONE ENCOUNTER
Refill Routing Note   Medication(s) are not appropriate for processing by [facility] for the following reason(s):        No active prescription written by provider    [facility] action(s):  Defer               Appointments  past 12m or future 3m with PCP    Date Provider   Last Visit   1/3/2024 Darin Krueger MD   Next Visit   Visit date not found Darin Krueger MD   ED visits in past 90 days: 0        Note composed:8:33 AM 10/23/2024

## 2024-10-24 ENCOUNTER — LAB VISIT (OUTPATIENT)
Dept: LAB | Facility: HOSPITAL | Age: 84
End: 2024-10-24
Attending: FAMILY MEDICINE
Payer: MEDICARE

## 2024-10-24 DIAGNOSIS — E11.69 COMBINED HYPERLIPIDEMIA ASSOCIATED WITH TYPE 2 DIABETES MELLITUS: ICD-10-CM

## 2024-10-24 DIAGNOSIS — N18.30 CONTROLLED TYPE 2 DIABETES MELLITUS WITH STAGE 3 CHRONIC KIDNEY DISEASE, WITH LONG-TERM CURRENT USE OF INSULIN: ICD-10-CM

## 2024-10-24 DIAGNOSIS — Z79.4 CONTROLLED TYPE 2 DIABETES MELLITUS WITH STAGE 3 CHRONIC KIDNEY DISEASE, WITH LONG-TERM CURRENT USE OF INSULIN: ICD-10-CM

## 2024-10-24 DIAGNOSIS — E78.2 COMBINED HYPERLIPIDEMIA ASSOCIATED WITH TYPE 2 DIABETES MELLITUS: ICD-10-CM

## 2024-10-24 DIAGNOSIS — E11.22 CONTROLLED TYPE 2 DIABETES MELLITUS WITH STAGE 3 CHRONIC KIDNEY DISEASE, WITH LONG-TERM CURRENT USE OF INSULIN: ICD-10-CM

## 2024-10-24 LAB
CHOLEST SERPL-MCNC: 146 MG/DL (ref 120–199)
CHOLEST/HDLC SERPL: 3 {RATIO} (ref 2–5)
ESTIMATED AVG GLUCOSE: 126 MG/DL (ref 68–131)
HBA1C MFR BLD: 6 % (ref 4–5.6)
HDLC SERPL-MCNC: 48 MG/DL (ref 40–75)
HDLC SERPL: 32.9 % (ref 20–50)
LDLC SERPL CALC-MCNC: 80.8 MG/DL (ref 63–159)
NONHDLC SERPL-MCNC: 98 MG/DL
TRIGL SERPL-MCNC: 86 MG/DL (ref 30–150)

## 2024-10-24 PROCEDURE — 80061 LIPID PANEL: CPT | Performed by: FAMILY MEDICINE

## 2024-10-24 PROCEDURE — 83036 HEMOGLOBIN GLYCOSYLATED A1C: CPT | Performed by: FAMILY MEDICINE

## 2024-10-25 LAB
LEFT EYE DM RETINOPATHY: NEGATIVE
RIGHT EYE DM RETINOPATHY: NEGATIVE

## 2024-10-31 ENCOUNTER — EXTERNAL CHRONIC CARE MANAGEMENT (OUTPATIENT)
Dept: PRIMARY CARE CLINIC | Facility: CLINIC | Age: 84
End: 2024-10-31
Payer: COMMERCIAL

## 2024-10-31 PROCEDURE — 99490 CHRNC CARE MGMT STAFF 1ST 20: CPT | Mod: S$GLB,,, | Performed by: FAMILY MEDICINE

## 2024-11-20 ENCOUNTER — PATIENT OUTREACH (OUTPATIENT)
Dept: ADMINISTRATIVE | Facility: HOSPITAL | Age: 84
End: 2024-11-20
Payer: COMMERCIAL

## 2024-11-30 ENCOUNTER — EXTERNAL CHRONIC CARE MANAGEMENT (OUTPATIENT)
Dept: PRIMARY CARE CLINIC | Facility: CLINIC | Age: 84
End: 2024-11-30
Payer: COMMERCIAL

## 2024-11-30 PROCEDURE — 99490 CHRNC CARE MGMT STAFF 1ST 20: CPT | Mod: S$GLB,,, | Performed by: FAMILY MEDICINE

## 2024-12-31 ENCOUNTER — EXTERNAL CHRONIC CARE MANAGEMENT (OUTPATIENT)
Dept: PRIMARY CARE CLINIC | Facility: CLINIC | Age: 84
End: 2024-12-31
Payer: MEDICARE

## 2024-12-31 PROCEDURE — 99490 CHRNC CARE MGMT STAFF 1ST 20: CPT | Mod: PBBFAC,PO | Performed by: FAMILY MEDICINE

## 2025-01-19 DIAGNOSIS — F32.1 CURRENT MODERATE EPISODE OF MAJOR DEPRESSIVE DISORDER WITHOUT PRIOR EPISODE: ICD-10-CM

## 2025-01-22 NOTE — TELEPHONE ENCOUNTER
Refill Routing Note   Medication(s) are not appropriate for processing by [facility] for the following reason(s):        Non-participating provider    [facility] action(s):  Route             Appointments  past 12m or future 3m with PCP    Date Provider   Last Visit   5/21/2024 Americo Ramsey, NP   Next Visit   Visit date not found Americo Ramsey, NP   ED visits in past 90 days: 0        Note composed:4:17 PM 01/22/2025

## 2025-01-23 RX ORDER — OXCARBAZEPINE 150 MG/1
150 TABLET, FILM COATED ORAL 2 TIMES DAILY
Qty: 180 TABLET | Refills: 1 | Status: SHIPPED | OUTPATIENT
Start: 2025-01-23

## 2025-01-23 NOTE — TELEPHONE ENCOUNTER
Refill Routing Note   Medication(s) are not appropriate for processing by [facility] for the following reason(s):        Outside of protocol    [facility] action(s):  Route               Appointments  past 12m or future 3m with PCP    Date Provider   Last Visit   1/3/2024 Darin Krueger MD   Next Visit   Visit date not found Darin Krueger MD   ED visits in past 90 days: 0        Note composed:9:30 PM 01/22/2025

## 2025-01-23 NOTE — TELEPHONE ENCOUNTER
Refill Routing Note   Medication(s) are not appropriate for processing by [facility] for the following reason(s):        Outside of protocol    [facility] action(s):  Route             Appointments  past 12m or future 3m with PCP    Date Provider   Last Visit   1/3/2024 Darin Krueger MD   Next Visit   Visit date not found Darin Krueger MD   ED visits in past 90 days: 0        Note composed:9:15 PM 01/22/2025

## 2025-01-31 ENCOUNTER — EXTERNAL CHRONIC CARE MANAGEMENT (OUTPATIENT)
Dept: PRIMARY CARE CLINIC | Facility: CLINIC | Age: 85
End: 2025-01-31
Payer: MEDICARE

## 2025-01-31 PROCEDURE — 99490 CHRNC CARE MGMT STAFF 1ST 20: CPT | Mod: S$PBB,,, | Performed by: FAMILY MEDICINE

## 2025-01-31 PROCEDURE — 99490 CHRNC CARE MGMT STAFF 1ST 20: CPT | Mod: PBBFAC,PO | Performed by: FAMILY MEDICINE

## 2025-02-03 DIAGNOSIS — Z79.4 CONTROLLED TYPE 2 DIABETES MELLITUS WITH STAGE 3 CHRONIC KIDNEY DISEASE, WITH LONG-TERM CURRENT USE OF INSULIN: ICD-10-CM

## 2025-02-03 DIAGNOSIS — N18.30 CONTROLLED TYPE 2 DIABETES MELLITUS WITH STAGE 3 CHRONIC KIDNEY DISEASE, WITH LONG-TERM CURRENT USE OF INSULIN: ICD-10-CM

## 2025-02-03 DIAGNOSIS — E11.22 CONTROLLED TYPE 2 DIABETES MELLITUS WITH STAGE 3 CHRONIC KIDNEY DISEASE, WITH LONG-TERM CURRENT USE OF INSULIN: ICD-10-CM

## 2025-02-03 RX ORDER — FLASH GLUCOSE SCANNING READER
EACH MISCELLANEOUS
Qty: 6 EACH | Refills: 3 | Status: CANCELLED | OUTPATIENT
Start: 2025-02-03

## 2025-02-03 NOTE — TELEPHONE ENCOUNTER
Care Due:                  Date            Visit Type   Department     Provider  --------------------------------------------------------------------------------                                [facility] FAMILY  Last Visit: 01-      FOLLOW UP    MEDICINE       Darin Krueger  Next Visit: None Scheduled  None         None Found                                                            Last  Test          Frequency    Reason                     Performed    Due Date  --------------------------------------------------------------------------------    Office Visit  12 months..  OXcarbazepine,             01- 12-                             citalopram, clopidogreL,                             insulin, metoprolol,                             montelukast, pravastatin,                             traZODone................    CBC.........  12 months..  OXcarbazepine,             Not Found    Overdue                             clopidogreL..............    CMP.........  12 months..  OXcarbazepine, insulin,    08- 08-                             pravastatin..............    HBA1C.......  6 months...  insulin..................  10-   04-    Health [facility] Embedded Care Due Messages. Reference number: [id number].   2/03/2025 2:31:05 PM CST

## 2025-02-04 DIAGNOSIS — E11.22 CONTROLLED TYPE 2 DIABETES MELLITUS WITH STAGE 3 CHRONIC KIDNEY DISEASE, WITH LONG-TERM CURRENT USE OF INSULIN: ICD-10-CM

## 2025-02-04 DIAGNOSIS — N18.30 CONTROLLED TYPE 2 DIABETES MELLITUS WITH STAGE 3 CHRONIC KIDNEY DISEASE, WITH LONG-TERM CURRENT USE OF INSULIN: ICD-10-CM

## 2025-02-04 DIAGNOSIS — Z79.4 CONTROLLED TYPE 2 DIABETES MELLITUS WITH STAGE 3 CHRONIC KIDNEY DISEASE, WITH LONG-TERM CURRENT USE OF INSULIN: ICD-10-CM

## 2025-02-04 RX ORDER — FLASH GLUCOSE SENSOR
KIT MISCELLANEOUS
Qty: 6 KIT | Refills: 3 | Status: SHIPPED | OUTPATIENT
Start: 2025-02-04

## 2025-02-04 NOTE — TELEPHONE ENCOUNTER
Pt is scheduled for f/u Friday for sleeping medication, but needs Freestyle Landen sent to pharmacy today if possible.

## 2025-02-04 NOTE — TELEPHONE ENCOUNTER
No care due was identified.  [facility] Embedded Care Due Messages. Reference number: [id number].   2/04/2025 3:01:18 PM CST

## 2025-02-07 ENCOUNTER — OFFICE VISIT (OUTPATIENT)
Dept: FAMILY MEDICINE | Facility: CLINIC | Age: 85
End: 2025-02-07
Payer: MEDICARE

## 2025-02-07 VITALS
HEIGHT: 72 IN | TEMPERATURE: 98 F | OXYGEN SATURATION: 96 % | SYSTOLIC BLOOD PRESSURE: 139 MMHG | HEART RATE: 61 BPM | DIASTOLIC BLOOD PRESSURE: 66 MMHG | WEIGHT: 145.81 LBS | BODY MASS INDEX: 19.75 KG/M2

## 2025-02-07 DIAGNOSIS — G47.09 OTHER INSOMNIA: Primary | ICD-10-CM

## 2025-02-07 PROCEDURE — 99213 OFFICE O/P EST LOW 20 MIN: CPT | Mod: S$PBB,,, | Performed by: NURSE PRACTITIONER

## 2025-02-07 PROCEDURE — 99999 PR PBB SHADOW E&M-EST. PATIENT-LVL V: CPT | Mod: PBBFAC,,, | Performed by: NURSE PRACTITIONER

## 2025-02-07 PROCEDURE — 99215 OFFICE O/P EST HI 40 MIN: CPT | Mod: PBBFAC,PO | Performed by: NURSE PRACTITIONER

## 2025-02-07 RX ORDER — HYDROXYZINE PAMOATE 50 MG/1
50 CAPSULE ORAL NIGHTLY PRN
Qty: 30 CAPSULE | Refills: 0 | Status: SHIPPED | OUTPATIENT
Start: 2025-02-07

## 2025-02-07 NOTE — PATIENT INSTRUCTIONS
"Hydrate well  Rest  Report to ER immediately if symptoms worsen or persist    Hydroxyzine: Patient drug information  2025© UpTo[employer], Inc. and its affiliates and/or licensors. All Rights Reserved.  Access my[employer] for additional drug information, tools, and databases.  Contributor Disclosures  For additional information see "???r??yzi??: Drug information" and "???r??yzi??: Pediatric drug information"    You must carefully read the "Consumer Information Use and Disclaimer" below in order to understand and correctly use this information.  Brand Names: US  Vi?t?ril [DSC]    Brand Names: Ramiro  ?tar?x;     ??V?-???r?x?zi?;     PMS-???r?X?zi?? HCl [DSC]    What is this drug used for?  It is used to treat itching.  It is used to treat anxiety.  It is used to put you to sleep for surgery.  It is used to treat mood problems.  It is used to treat upset stomach and throwing up.  It may be given to you for other reasons. Talk with the doctor.    What do I need to tell my doctor BEFORE I take this drug?  If you are allergic to this drug; any part of this drug; or any other drugs, foods, or substances. Tell your doctor about the allergy and what signs you had.  If you have ever had a long QT on ECG.  If you are in early pregnancy. Do not take this drug during early pregnancy.  If you are breast-feeding. Do not breast-feed while you take this drug.  This is not a list of all drugs or health problems that interact with this drug.  Tell your doctor and pharmacist about all of your drugs (prescription or OTC, natural products, vitamins) and health problems. You must check to make sure that it is safe for you to take this drug with all of your drugs and health problems. Do not start, stop, or change the dose of any drug without checking with your doctor.    What are some things I need to know or do while I take this drug?  All products:  Tell all of your health care providers that you take this drug. This includes your " doctors, nurses, pharmacists, and dentists.  Avoid driving and doing other tasks or actions that call for you to be alert until you see how this drug affects you.  Talk with your doctor before you use alcohol, marijuana or other forms of cannabis, or prescription or OTC drugs that may slow your actions.  An unsafe heartbeat that is not normal (long QT on ECG) has happened with this drug. This may raise the chance of sudden death. Talk with the doctor.  If you are 65 or older, use this drug with care. You could have more side effects.  Tell your doctor if you are pregnant or plan on getting pregnant. You will need to talk about the benefits and risks of using this drug while you are pregnant.  Injection:  Unsafe heart problems and sometimes death have rarely happened when this drug was given with alcohol or certain other drugs that may slow your actions. Talk with your doctor.    What are some side effects that I need to call my doctor about right away?  WARNING/CAUTION: Even though it may be rare, some people may have very bad and sometimes deadly side effects when taking a drug. Tell your doctor or get medical help right away if you have any of the following signs or symptoms that may be related to a very bad side effect:  All products:  Signs of an allergic reaction, like rash; hives; itching; red, swollen, blistered, or peeling skin with or without fever; wheezing; tightness in the chest or throat; trouble breathing, swallowing, or talking; unusual hoarseness; or swelling of the mouth, face, lips, tongue, or throat.  Fast or abnormal heartbeat.  Very bad dizziness or passing out.  Trouble controlling body movements.  Feeling confused.  Rarely, a very bad skin reaction has happened with this drug. Signs include fever and many small skin spots within large areas of redness and swelling. Call your doctor right away if you have a rash or any of these signs.  Injection:  Tissue damage has happened with this drug.  Sometimes, this has led to surgery. Tell your nurse if you have any burning, color changes, pain, skin breakdown, or swelling where the shot was given.    What are some other side effects of this drug?  All drugs may cause side effects. However, many people have no side effects or only have minor side effects. Call your doctor or get medical help if any of these side effects or any other side effects bother you or do not go away:  Dry mouth.  Feeling sleepy.  These are not all of the side effects that may occur. If you have questions about side effects, call your doctor. Call your doctor for medical advice about side effects.  You may report side effects to your national health agency.    How is this drug best taken?  Use this drug as ordered by your doctor. Read all information given to you. Follow all instructions closely.  All oral products:  Take with or without food. Take with food if it causes an upset stomach.  Liquid:  Measure liquid doses carefully. Use the measuring device that comes with this drug. If there is none, ask the pharmacist for a device to measure this drug.  Injection:  It is given as a shot into a muscle.    What do I do if I miss a dose?  All oral products:  If you take this drug on a regular basis, take a missed dose as soon as you think about it.  If it is close to the time for your next dose, skip the missed dose and go back to your normal time.  Do not take 2 doses at the same time or extra doses.  Many times this drug is taken on an as needed basis. Do not take more often than told by the doctor.  Injection:  Call your doctor to find out what to do.    How do I store and/or throw out this drug?  All oral products:  Store at room temperature protected from light. Store in a dry place. Do not store in a bathroom.  Injection:  If you need to store this drug at home, talk with your doctor, nurse, or pharmacist about how to store it.  All products:  Keep all drugs in a safe place. Keep all  drugs out of the reach of children and pets.  Throw away unused or  drugs. Do not flush down a toilet or pour down a drain unless you are told to do so. Check with your pharmacist if you have questions about the best way to throw out drugs. There may be drug take-back programs in your area.    General drug facts  If your symptoms or health problems do not get better or if they become worse, call your doctor.  Do not share your drugs with others and do not take anyone else's drugs.  Some drugs may have another patient information leaflet. If you have any questions about this drug, please talk with your doctor, nurse, pharmacist, or other health care provider.  If you think there has been an overdose, call your poison control center or get medical care right away. Be ready to tell or show what was taken, how much, and when it happened.      Marck Tate,     If you are due for any health screening(s) below please notify me so we can arrange them to be ordered and scheduled. Most healthy patients at your age complete them, but you are free to accept or refuse.     If you can't do it, I'll definitely understand. If you can, I'd certainly appreciate it!    All of your core healthy metrics are met.

## 2025-02-07 NOTE — PROGRESS NOTES
Subjective     Patient ID: Bebeto Santiago is a 84 y.o. male.    Chief Complaint: Insomnia    Medication Refill  This is a chronic (Pt in today for medication change for insomnia; states trazodone not as effective over the past month) problem. The current episode started more than 1 year ago. The problem occurs daily. The problem has been waxing and waning. Pertinent negatives include no abdominal pain, anorexia, arthralgias, change in bowel habit, chest pain, chills, congestion, coughing, diaphoresis, fatigue, fever, headaches, joint swelling, myalgias, nausea, neck pain, numbness, rash, sore throat, swollen glands, urinary symptoms, vertigo, visual change, vomiting or weakness. Associated symptoms comments: Pt states has had sleep study in the past of ERNESTINE; states told does not have ERNESTINE. . Nothing aggravates the symptoms. Treatments tried: trazodone. The treatment provided mild relief.     Past Medical History:   Diagnosis Date    Allergy     BPH (benign prostatic hypertrophy)     Chronic obstructive pulmonary disease, unspecified COPD type 1/3/2024    Controlled type 2 diabetes mellitus with stage 3 chronic kidney disease, with long-term current use of insulin     The patient presents with diabetes.  The patient denies polyuria, polydipsia, polyphagia, hypoglycemia and paresthesias.  The patient's glucose control has been good.  Home glucose averages are routinely checked.  The patient is without retinopathy currently.  The patient has no history of neuropathy.  The patient currently complains of no podiatric problems.  The patient has excellent compliance.    Dehydration     Diabetes mellitus type II, uncontrolled     GERD (gastroesophageal reflux disease) 2013    grade IV/IV    Hypertension     Hypothyroidism     Male hypogonadism     Non-proliferative diabetic retinopathy     Urinary retention      Social History     Socioeconomic History    Marital status:      Spouse name: Babs    Number of children:  2   Occupational History    Occupation: Retired   Tobacco Use    Smoking status: Former    Smokeless tobacco: Current     Types: Chew   Substance and Sexual Activity    Alcohol use: Yes     Comment: rare    Drug use: No    Sexual activity: Not Currently     Partners: Female     Social Drivers of Health     Financial Resource Strain: Low Risk  (2/9/2023)    Overall Financial Resource Strain (CARDIA)     Difficulty of Paying Living Expenses: Not hard at all   Food Insecurity: Unknown (5/1/2024)    Received from [facility]    Hunger Vital Sign     Ran Out of Food in the Last Year: Never true   Transportation Needs: No Transportation Needs (5/1/2024)    Received from [facility]    PRAPARE - Transportation     Lack of Transportation (Medical): No     Lack of Transportation (Non-Medical): No   Physical Activity: Inactive (2/9/2023)    Exercise Vital Sign     Days of Exercise per Week: 0 days     Minutes of Exercise per Session: 0 min   Stress: No Stress Concern Present (2/9/2023)    [employer] - Occupational Stress Questionnaire     Feeling of Stress : Not at all   Housing Stability: Unknown (5/1/2024)    Received from [facility]    Housing Stability Vital Sign     Homeless in the Last Year: No     Past Surgical History:   Procedure Laterality Date    COLONOSCOPY N/A 1/16/2020    Procedure: COLONOSCOPY;  Surgeon: Darin Krueger MD;  Location: [facility];  Service: Endoscopy;  Laterality: N/A;    EYE SURGERY      HIP SURGERY Left 2022    SPINE SURGERY         Review of Systems   Constitutional: Negative.  Negative for chills, diaphoresis, fatigue and fever.   HENT: Negative.  Negative for nasal congestion and sore throat.    Eyes: Negative.    Respiratory: Negative.  Negative for cough.    Cardiovascular: Negative.  Negative for chest pain.   Gastrointestinal: Negative.  Negative for abdominal pain, anorexia, change in bowel habit, nausea and  vomiting.   Endocrine: Negative.    Genitourinary: Negative.    Musculoskeletal: Negative.  Negative for arthralgias, joint swelling, myalgias and neck pain.   Integumentary:  Negative for rash. Negative.   Allergic/Immunologic: Negative.    Neurological: Negative.  Negative for vertigo, weakness, numbness and headaches.   Psychiatric/Behavioral: Negative.            Objective     Physical Exam  Vitals and nursing note reviewed.   Constitutional:       Appearance: Normal appearance.   HENT:      Head: Normocephalic.      Right Ear: Tympanic membrane, ear canal and external ear normal.      Left Ear: Tympanic membrane, ear canal and external ear normal.      Nose: Nose normal.      Mouth/Throat:      Mouth: Mucous membranes are moist.      Pharynx: Oropharynx is clear.   Eyes:      Conjunctiva/sclera: Conjunctivae normal.      Pupils: Pupils are equal, round, and reactive to light.   Cardiovascular:      Rate and Rhythm: Normal rate and regular rhythm.      Pulses: Normal pulses.      Heart sounds: Normal heart sounds.   Pulmonary:      Effort: Pulmonary effort is normal.      Breath sounds: Normal breath sounds.   Abdominal:      General: Bowel sounds are normal.      Palpations: Abdomen is soft.   Musculoskeletal:         General: Normal range of motion.      Cervical back: Normal range of motion and neck supple.   Skin:     General: Skin is warm and dry.      Capillary Refill: Capillary refill takes 2 to 3 seconds.   Neurological:      Mental Status: He is alert and oriented to person, place, and time.   Psychiatric:         Mood and Affect: Mood normal.         Behavior: Behavior normal.         Thought Content: Thought content normal.         Judgment: Judgment normal.            Assessment and Plan     1. Other insomnia  Overview:  -having insomnia  -started on Trazodone inpatient  -having improvement with sleep  -request refills  -     hydrOXYzine pamoate (VISTARIL) 50 MG Cap; Take 1 capsule (50 mg total) by  mouth nightly as needed.  Dispense: 30 capsule; Refill: 0  Hydrate well  Rest  Report to ER immediately if symptoms worsen or persist               No follow-ups on file.

## 2025-02-28 ENCOUNTER — EXTERNAL CHRONIC CARE MANAGEMENT (OUTPATIENT)
Dept: PRIMARY CARE CLINIC | Facility: CLINIC | Age: 85
End: 2025-02-28
Payer: MEDICARE

## 2025-02-28 PROCEDURE — 99490 CHRNC CARE MGMT STAFF 1ST 20: CPT | Mod: PBBFAC,PO | Performed by: FAMILY MEDICINE

## 2025-03-24 DIAGNOSIS — Z00.00 ENCOUNTER FOR MEDICARE ANNUAL WELLNESS EXAM: ICD-10-CM

## 2025-03-31 ENCOUNTER — EXTERNAL CHRONIC CARE MANAGEMENT (OUTPATIENT)
Dept: PRIMARY CARE CLINIC | Facility: CLINIC | Age: 85
End: 2025-03-31
Payer: MEDICARE

## 2025-03-31 PROCEDURE — 99490 CHRNC CARE MGMT STAFF 1ST 20: CPT | Mod: PBBFAC,PO | Performed by: FAMILY MEDICINE

## 2025-04-19 DIAGNOSIS — G47.09 OTHER INSOMNIA: ICD-10-CM

## 2025-04-19 NOTE — TELEPHONE ENCOUNTER
Care Due:                  Date            Visit Type   Department     Provider  --------------------------------------------------------------------------------                                [facility] [place] [facility]  Last Visit: 01-      FOLLOW UP    MEDICINE       Darin Krueger  Next Visit: None Scheduled  None         None Found                                                            Last  Test          Frequency    Reason                     Performed    Due Date  --------------------------------------------------------------------------------    Office Visit  12 months..  OXcarbazepine,             01- 12-                             citalopram, clopidogreL,                             insulin, metoprolol,                             montelukast, pravastatin,                             traZODone................    CBC.........  12 months..  OXcarbazepine,             Not Found    Overdue                             clopidogreL..............    CMP.........  12 months..  OXcarbazepine, insulin,    08- 08-                             pravastatin..............    HBA1C.......  6 months...  insulin..................  10-   04-    [facility] Embedded Care Due Messages. Reference number: [id number].   4/19/2025 6:52:53 AM CDT

## 2025-04-21 RX ORDER — TRAZODONE HYDROCHLORIDE 50 MG/1
50 TABLET ORAL NIGHTLY
Qty: 90 TABLET | Refills: 1 | Status: SHIPPED | OUTPATIENT
Start: 2025-04-21

## 2025-04-30 ENCOUNTER — EXTERNAL CHRONIC CARE MANAGEMENT (OUTPATIENT)
Dept: PRIMARY CARE CLINIC | Facility: CLINIC | Age: 85
End: 2025-04-30
Payer: MEDICARE

## 2025-04-30 PROCEDURE — 99490 CHRNC CARE MGMT STAFF 1ST 20: CPT | Mod: PBBFAC,PO | Performed by: FAMILY MEDICINE

## 2025-05-09 ENCOUNTER — LAB REQUISITION (OUTPATIENT)
Dept: LAB | Facility: HOSPITAL | Age: 85
End: 2025-05-09
Payer: MEDICARE

## 2025-05-09 DIAGNOSIS — A41.9 SEPSIS, UNSPECIFIED ORGANISM: ICD-10-CM

## 2025-05-09 DIAGNOSIS — J18.9 PNEUMONIA, UNSPECIFIED ORGANISM: ICD-10-CM

## 2025-05-09 LAB
ABSOLUTE EOSINOPHIL (OHS): 0.09 K/UL
ABSOLUTE MONOCYTE (OHS): 0.82 K/UL (ref 0.3–1)
ABSOLUTE NEUTROPHIL COUNT (OHS): 6.59 K/UL (ref 1.8–7.7)
ANION GAP (OHS): 9 MMOL/L (ref 8–16)
BASOPHILS # BLD AUTO: 0.08 K/UL
BASOPHILS NFR BLD AUTO: 0.9 %
BUN SERPL-MCNC: 30 MG/DL (ref 8–23)
CALCIUM SERPL-MCNC: 9.2 MG/DL (ref 8.7–10.5)
CHLORIDE SERPL-SCNC: 110 MMOL/L (ref 95–110)
CO2 SERPL-SCNC: 22 MMOL/L (ref 23–29)
CREAT SERPL-MCNC: 1.4 MG/DL (ref 0.5–1.4)
ERYTHROCYTE [DISTWIDTH] IN BLOOD BY AUTOMATED COUNT: 16.1 % (ref 11.5–14.5)
GFR SERPLBLD CREATININE-BSD FMLA CKD-EPI: 49 ML/MIN/1.73/M2
GLUCOSE SERPL-MCNC: 174 MG/DL (ref 70–110)
HCT VFR BLD AUTO: 43.1 % (ref 40–54)
HGB BLD-MCNC: 13.6 GM/DL (ref 14–18)
IMM GRANULOCYTES # BLD AUTO: 0.05 K/UL (ref 0–0.04)
IMM GRANULOCYTES NFR BLD AUTO: 0.5 % (ref 0–0.5)
LYMPHOCYTES # BLD AUTO: 1.77 K/UL (ref 1–4.8)
MCH RBC QN AUTO: 30.9 PG (ref 27–31)
MCHC RBC AUTO-ENTMCNC: 31.6 G/DL (ref 32–36)
MCV RBC AUTO: 98 FL (ref 82–98)
NUCLEATED RBC (/100WBC) (OHS): 0 /100 WBC
PLATELET # BLD AUTO: 355 K/UL (ref 150–450)
PMV BLD AUTO: 10.9 FL (ref 9.2–12.9)
POTASSIUM SERPL-SCNC: 4.6 MMOL/L (ref 3.5–5.1)
RBC # BLD AUTO: 4.4 M/UL (ref 4.6–6.2)
RELATIVE EOSINOPHIL (OHS): 1 %
RELATIVE LYMPHOCYTE (OHS): 18.8 % (ref 18–48)
RELATIVE MONOCYTE (OHS): 8.7 % (ref 4–15)
RELATIVE NEUTROPHIL (OHS): 70.1 % (ref 38–73)
SODIUM SERPL-SCNC: 141 MMOL/L (ref 136–145)
WBC # BLD AUTO: 9.4 K/UL (ref 3.9–12.7)

## 2025-05-09 PROCEDURE — 80048 BASIC METABOLIC PNL TOTAL CA: CPT | Performed by: FAMILY MEDICINE

## 2025-05-09 PROCEDURE — 85025 COMPLETE CBC W/AUTO DIFF WBC: CPT | Performed by: FAMILY MEDICINE

## 2025-05-13 ENCOUNTER — TELEPHONE (OUTPATIENT)
Dept: FAMILY MEDICINE | Facility: CLINIC | Age: 85
End: 2025-05-13
Payer: MEDICARE

## 2025-05-13 ENCOUNTER — RESULTS FOLLOW-UP (OUTPATIENT)
Dept: FAMILY MEDICINE | Facility: CLINIC | Age: 85
End: 2025-05-13

## 2025-05-13 NOTE — TELEPHONE ENCOUNTER
----- Message from Markie sent at 5/13/2025 12:38 PM CDT -----  Contact: home health  Type:  Needs Medical AdviceWho Called: [place] the patient rather a call back or a response via [facility]? Call Maddy Call Back Number: [phone] Information: ruby stating pt blood sugar is high. Also states she is needing to clarify insulin dosage

## 2025-05-13 NOTE — PROGRESS NOTES
The electrolytes and the blood count is stable compared to the past.  No changes are recommended.   Lab Results   Component Value Date    HGBA1C 6.0 (H) 10/24/2024   I do need an A1c. Confirm that one was done and if not, we do need to get one.

## 2025-05-15 PROBLEM — I25.10 CORONARY ARTERY DISEASE INVOLVING NATIVE CORONARY ARTERY OF NATIVE HEART WITHOUT ANGINA PECTORIS: Status: ACTIVE | Noted: 2025-05-15

## 2025-05-21 ENCOUNTER — OFFICE VISIT (OUTPATIENT)
Dept: FAMILY MEDICINE | Facility: CLINIC | Age: 85
End: 2025-05-21
Payer: MEDICARE

## 2025-05-21 VITALS
HEIGHT: 72 IN | DIASTOLIC BLOOD PRESSURE: 78 MMHG | BODY MASS INDEX: 19.41 KG/M2 | RESPIRATION RATE: 14 BRPM | WEIGHT: 143.31 LBS | HEART RATE: 56 BPM | OXYGEN SATURATION: 100 % | SYSTOLIC BLOOD PRESSURE: 138 MMHG

## 2025-05-21 DIAGNOSIS — J18.9 PNEUMONIA OF LEFT LOWER LOBE DUE TO INFECTIOUS ORGANISM: ICD-10-CM

## 2025-05-21 DIAGNOSIS — I15.2 HYPERTENSION ASSOCIATED WITH DIABETES: ICD-10-CM

## 2025-05-21 DIAGNOSIS — J90 CHRONIC BILATERAL PLEURAL EFFUSIONS: Primary | ICD-10-CM

## 2025-05-21 DIAGNOSIS — G20.C PARKINSONISM, UNSPECIFIED PARKINSONISM TYPE: ICD-10-CM

## 2025-05-21 DIAGNOSIS — F32.1 CURRENT MODERATE EPISODE OF MAJOR DEPRESSIVE DISORDER WITHOUT PRIOR EPISODE: ICD-10-CM

## 2025-05-21 DIAGNOSIS — J44.9 CHRONIC OBSTRUCTIVE PULMONARY DISEASE, UNSPECIFIED COPD TYPE: ICD-10-CM

## 2025-05-21 DIAGNOSIS — I50.32 CHRONIC DIASTOLIC HEART FAILURE: ICD-10-CM

## 2025-05-21 DIAGNOSIS — E11.59 HYPERTENSION ASSOCIATED WITH DIABETES: ICD-10-CM

## 2025-05-21 DIAGNOSIS — E21.3 HYPERPARATHYROIDISM: ICD-10-CM

## 2025-05-21 DIAGNOSIS — Z79.4 CONTROLLED TYPE 2 DIABETES MELLITUS WITH STAGE 3 CHRONIC KIDNEY DISEASE, WITH LONG-TERM CURRENT USE OF INSULIN: ICD-10-CM

## 2025-05-21 DIAGNOSIS — E11.22 CONTROLLED TYPE 2 DIABETES MELLITUS WITH STAGE 3 CHRONIC KIDNEY DISEASE, WITH LONG-TERM CURRENT USE OF INSULIN: ICD-10-CM

## 2025-05-21 DIAGNOSIS — N18.30 CONTROLLED TYPE 2 DIABETES MELLITUS WITH STAGE 3 CHRONIC KIDNEY DISEASE, WITH LONG-TERM CURRENT USE OF INSULIN: ICD-10-CM

## 2025-05-21 DIAGNOSIS — N18.32 STAGE 3B CHRONIC KIDNEY DISEASE: ICD-10-CM

## 2025-05-21 DIAGNOSIS — D64.9 NORMOCYTIC ANEMIA: ICD-10-CM

## 2025-05-21 PROCEDURE — G2211 COMPLEX E/M VISIT ADD ON: HCPCS | Mod: S$PBB,,, | Performed by: FAMILY MEDICINE

## 2025-05-21 PROCEDURE — 99214 OFFICE O/P EST MOD 30 MIN: CPT | Mod: S$PBB,,, | Performed by: FAMILY MEDICINE

## 2025-05-21 PROCEDURE — 99215 OFFICE O/P EST HI 40 MIN: CPT | Mod: PBBFAC,PO | Performed by: FAMILY MEDICINE

## 2025-05-21 PROCEDURE — 99999 PR PBB SHADOW E&M-EST. PATIENT-LVL V: CPT | Mod: PBBFAC,,, | Performed by: FAMILY MEDICINE

## 2025-05-21 RX ORDER — CEFUROXIME AXETIL 250 MG/1
1 TABLET ORAL DAILY
COMMUNITY
End: 2025-05-21

## 2025-05-21 RX ORDER — SODIUM BICARBONATE 650 MG/1
650 TABLET ORAL 2 TIMES DAILY
COMMUNITY
Start: 2025-05-07

## 2025-05-21 RX ORDER — TRAZODONE HYDROCHLORIDE 100 MG/1
100 TABLET ORAL NIGHTLY
COMMUNITY
Start: 2025-05-07 | End: 2025-05-21

## 2025-05-21 RX ORDER — FLUCONAZOLE 150 MG/1
150 TABLET ORAL
COMMUNITY
Start: 2025-03-10

## 2025-05-21 RX ORDER — METHENAMINE HIPPURATE 1000 MG/1
1 TABLET ORAL 2 TIMES DAILY
COMMUNITY
Start: 2025-05-07

## 2025-05-21 RX ORDER — OLMESARTAN MEDOXOMIL 20 MG/1
20 TABLET ORAL
COMMUNITY
Start: 2025-04-03

## 2025-05-21 RX ORDER — CHOLECALCIFEROL (VITAMIN D3) 25 MCG
2000 TABLET ORAL
COMMUNITY
Start: 2025-05-07

## 2025-05-21 RX ORDER — MEGESTROL ACETATE 40 MG/ML
200 SUSPENSION ORAL
COMMUNITY
Start: 2025-05-07

## 2025-05-21 RX ORDER — ACETAMINOPHEN 500 MG
1000 TABLET ORAL
COMMUNITY
Start: 2025-05-07

## 2025-05-21 RX ORDER — NITROFURANTOIN 25; 75 MG/1; MG/1
100 CAPSULE ORAL
COMMUNITY
Start: 2025-02-21

## 2025-05-21 RX ORDER — BLOOD-GLUCOSE,RECEIVER,CONT
EACH MISCELLANEOUS
COMMUNITY
Start: 2025-05-19

## 2025-05-21 RX ORDER — INSULIN ASPART 100 [IU]/ML
4 INJECTION, SOLUTION INTRAVENOUS; SUBCUTANEOUS
COMMUNITY
Start: 2025-05-19 | End: 2025-05-21

## 2025-05-21 RX ORDER — POLYETHYLENE GLYCOL 3350 17 G/17G
17 POWDER, FOR SOLUTION ORAL
COMMUNITY
Start: 2025-04-22

## 2025-05-21 RX ORDER — MIRABEGRON 25 MG/1
25 TABLET, FILM COATED, EXTENDED RELEASE ORAL
COMMUNITY

## 2025-05-21 RX ORDER — ISOSORBIDE MONONITRATE 30 MG/1
1 TABLET, EXTENDED RELEASE ORAL DAILY
COMMUNITY

## 2025-05-21 RX ORDER — INSULIN GLARGINE 100 [IU]/ML
33 INJECTION, SOLUTION SUBCUTANEOUS
COMMUNITY
Start: 2025-05-08 | End: 2025-05-21

## 2025-05-21 RX ORDER — SITAGLIPTIN 50 MG/1
50 TABLET, FILM COATED ORAL
COMMUNITY
Start: 2025-05-19

## 2025-05-21 RX ORDER — RAMELTEON 8 MG/1
8 TABLET ORAL
COMMUNITY
Start: 2025-04-21

## 2025-05-21 RX ORDER — OLMESARTAN MEDOXOMIL 40 MG/1
1 TABLET ORAL DAILY
COMMUNITY
End: 2025-05-21

## 2025-05-21 RX ORDER — AMLODIPINE BESYLATE 5 MG/1
5 TABLET ORAL
COMMUNITY
Start: 2025-05-04

## 2025-05-21 RX ORDER — CEFDINIR 300 MG/1
300 CAPSULE ORAL 2 TIMES DAILY
COMMUNITY
Start: 2025-03-10

## 2025-05-21 RX ORDER — NAPROXEN SODIUM 220 MG/1
81 TABLET, FILM COATED ORAL DAILY
COMMUNITY

## 2025-05-21 NOTE — PROGRESS NOTES
Subjective:      Patient ID: Bebeto Santiago is a 85 y.o. male.    Chief Complaint: Hospital Follow Up  History of Present Illness    CHIEF COMPLAINT:  Patient presents today for follow up after recent hospitalization for sepsis.    RECENT HOSPITALIZATION:  He was recently hospitalized for severe sepsis, likely due to a urinary tract infection. He presented with fever, chills, and weakness. Pseudomonas was identified in his urine culture. He experienced weight loss during hospitalization but is now eating well and reports stable weight. He is currently receiving services from [facility] with approximately 3-4 weeks remaining in care.    CURRENT SYMPTOMS:  He currently experiences mild cough but denies shortness of breath. He denies leg swelling, abdominal pain, constipation, diarrhea, nausea, and vomiting.    MEDICAL HISTORY:  He has history of left lower lobe pneumonia with associated pleural effusion. He had kidney dilation that resolved on follow-up ultrasound.      ROS:  General: -fever, -chills, -fatigue, -weight gain, -weight loss  Eyes: -vision changes, -redness, -discharge  ENT: -ear pain, -nasal congestion, -sore throat  Cardiovascular: -chest pain, -palpitations, -lower extremity edema  Respiratory: +cough, -shortness of breath  Gastrointestinal: -abdominal pain, -nausea, -vomiting, -diarrhea, -constipation, -blood in stool  Genitourinary: -dysuria, -hematuria, -frequency  Musculoskeletal: -joint pain, -muscle pain  Skin: -rash, -lesion  Neurological: -headache, -dizziness, -numbness, -tingling  Psychiatric: -anxiety, -depression, -sleep difficulty           The patient was recently hospitalized at [facility]  for LLL pneumonia and sepsis.  The discharge summary from the hospitalization is copied below for reference and completeness.      Transitional Care Note    Family and/or Caretaker present at visit?  Yes.  Diagnostic tests reviewed/disposition: No diagnosic tests pending after  this hospitalization.  Disease/illness education: he understands what he had.  Home health/community services discussion/referrals: Patient has home health established at a local .   Establishment or re-establishment of referral orders for community resources: No other necessary community resources.   Discussion with other health care providers: No discussion with other health care providers necessary.   Patient Care Team:  Darin Krueger MD as PCP - General (Family Medicine)  Jeff Kim MD as Consulting Physician (Ophthalmology)  DISCHARGE SUMMARY:  Discharge Summaries  - documented in this encounter   Matty Decker MD - 04/22/2025 3:44 PM CDT  Formatting of this note is different from the original.  Images from the original note were not included.  [facility] MEDICINE  DISCHARGE SUMMARY    Patient ID:  Bebeto Santiago  [id number]  85 y.o.  1940    Admit Date:  4/16/2025 1:58 PM    Discharge Date:  04/22/25    Admitting Physician:  Ashish Hui MD    Discharge Physician:  MATTY DECKER MD    Consultants/Treatment Team:  Consultants    Provider Service Role Specialty  Provider,   -- Consulting Physician Gen Prov Case Mgmt  Provider, Cmr Intake  -- Consulting Physician Gen Prov CMR Intake  Provider, Physical Therapist  -- Consulting Physician Gen Prov PT  Provider, Occupational Therapist  -- Consulting Physician Gen Prov OT  Jasmina Lei MD  Cardiology Consulting Physician Interventional Cardiology  Aydin Oreilly MD  Pulmonology Consulting Physician Pulmonary Disease  Jaiden Akhtar MD  -- Consulting Physician Urology  Miguel Lomax MD  Urology Consulting Physician Urology        Reason for Admission/Admission Diagnoses:  Present on Admission:  Severe sepsis (HCC)  Chronic diastolic CHF (congestive heart failure) (HCC)  CAD, multiple vessel  Primary hypertension  Orthostatic hypotension  Chronic kidney disease, stage 3a (GFR 45-59)  Frequent  UTI  Bladder cancer (HCC)  Hypothyroidism  Normocytic anemia  Hyperlipidemia  Frail elderly  Leukopenia  Hypercalcemia  LLL pneumonia  Benign localized prostatic hyperplasia with lower urinary tract symptoms (LUTS)    Discharge Diagnoses:  Active Hospital Problems  Diagnosis Date Noted  Severe sepsis (HCC) 01/06/2024  Leukopenia 04/16/2025  Hypercalcemia 04/16/2025  LLL pneumonia 04/16/2025  CAD, multiple vessel 10/16/2024  Long term (current) use of antithrombotics/antiplatelets 09/05/2024  Chronic kidney disease, stage 3a (GFR 45-59) 05/02/2024  Frail elderly 04/22/2024  Chronic  Chronic diastolic CHF (congestive heart failure) ([facility]) 01/06/2024  Chronic  Normocytic anemia 01/06/2024  Frequent UTI 12/28/2023  Bladder cancer ([facility]) 08/25/2023  Orthostatic hypotension 02/05/2023  Benign localized prostatic hyperplasia with lower urinary tract symptoms (LUTS) 02/21/2022  Chronic  Primary hypertension 10/19/2020  Chronic  Type 2 diabetes mellitus without complication, with long-term current use of insulin ([facility]) 10/19/2020  Chronic  Hypothyroidism 10/19/2020  Chronic  Hyperlipidemia 10/19/2020  Chronic    Resolved Hospital Problems    Hospital Course and Treatment:  Admission Information    Date & Time  4/16/2025 Provider  Thomas Decker MD Department  [facility] Telemetry [facility]. Phone  [phone]            LAST ASSESSMENT AND PLAN PRIOR TO DISCHARGE:    Patient stable for discharge to [facility]. Patient discharged with PICC line and Lewis catheter in place. Voiding trial at [facility].    IN BRIEF:    Bebeto Santiago is an 85-year-old male with a history of hypertension, hypothyroidism, hyperlipidemia, diabetes, bladder cancer, and chronic kidney disease stage 3, who was admitted to [facility] on 4/16/2025 for severe sepsis. He presented with fever, chills, and generalized weakness.    Severe Sepsis and Infection:  The patient was admitted with severe sepsis, likely secondary to a urinary  tract infection, and was found to have Pseudomonas bacteremia. Initial blood cultures showed gram-negative rods, and subsequent cultures confirmed Pseudomonas aeruginosa. He was treated with broad-spectrum antibiotics, including cefepime and azithromycin, which were later changed to meropenem based on sensitivities. The patient showed clinical improvement with normalization of white blood cell count and resolution of fever. Repeat blood cultures were negative for 72 hours.    Pleural Effusion:  A chest x-ray revealed a moderate left pleural effusion and bibasilar pneumonia. The pleural effusion was considered chronic, as it was present in previous imaging from May and September 2024. The patient was asymptomatic from a respiratory standpoint and declined thoracentesis. Plavix was initially held for potential thoracentesis but was resumed after the patient declined the procedure.    Urinary Tract Issues and Hydronephrosis:  The patient has a history of recurrent UTIs and bladder cancer, with incomplete bladder emptying. A CT scan showed mild left hydronephrosis, which resolved with bladder drainage via Lewis catheter. Urology recommended continuing the Lewis catheter to ensure adequate drainage and follow-up with renal ultrasound to assess hydronephrosis.    Chronic Conditions:  The patient has chronic diastolic heart failure with preserved ejection fraction, chronic kidney disease stage 3, and normocytic anemia. His heart failure was managed with goal-directed therapy, and he was monitored for volume overload. His renal function remained stable throughout the hospitalization.    Disposition:  The patient was stable for transfer to a rehabilitation facility, as recommended by PT/OT. He was agreeable to placement for rehabilitation. Urology planned no immediate procedures, and the patient was to follow up in the urology clinic.    THINGS TO FOLLOW UP IN THE POST HOSPITAL DISCHARGE VISIT    Follow-up with cardiology,  urology, pulmonology.'s discharge plans discussed with patient and his daughter at bedside.    I discussed with the patient about the disease process and treatment prior to discharge.    This discharge summary took >30 minutes to complete.    Patient's Condition On Discharge:  Stable    General: NAD, AAOx3, Non-toxic  HEENT: AT/NC, MMM, EOMI  Lungs: CTAB, No respiratory distress  Cardiac: RRR, No M/G/R, No edema  Abdominal: S/NT/ND/+BS  MSK: MAEW  Neuro: CN grossly intact; No focal deficits  Skin: Warm, dry, no rash    Significant Diagnostic Studies:  Recent Imaging and Procedure Results    Procedure Component Value Ref Range Date/Time  US Renals [[id number]] Collected: 04/21/2025 1201  Updated: 04/21/2025 1215  Narrative:  REASON FOR EXAM: eval for hydronephrosis    TECHNICAL FACTORS: B-mode and Doppler sonographic images were obtained of the kidneys and urinary bladder.    COMPARISON: 12/27/2023, renal stone CT 04/17/2025    FINDINGS:  The kidneys are normal in echogenicity. The right kidney measures 11.2 cm, and the left kidney measures 10.2 cm . 2.2 cm anechoic cyst in the upper pole of the right kidney. No hydronephrosis, nephrolith, or solid mass. Urinary bladder is decompressed  around a Lewis catheter.    IMPRESSION:  1. No evidence of hydronephrosis. Apparent resolution of previous left-sided hydronephrosis.  2. Simple appearing right renal cyst.        Electronically signed by Tyrell Qiu MD on 4/21/2025 12:03 PM    Blood Culture #1 [[id number]] (Abnormal) Collected: 04/16/2025 1455  Specimen: N/A from Blood Updated: 04/19/2025 1043  Micro Culture Result preliminary positive culture; see gram stain results  Blood Culture #1 Result Pseudomonas aeruginosa  Micro Culture Result --  PresumptiveRefer to prior positive Blood culture collected on 04/16/2025 forcomplete ID and susceptibility testing.  Blood Culture #2 [[id number]] (Abnormal) (Susceptibility) Collected: 04/16/2025 1453  Specimen: N/A from Blood  Updated: 04/19/2025 1040  Micro Culture Result preliminary positive culture; see gram stain results  Blood Culture #2 Result Pseudomonas aeruginosa  Susceptibility    Pseudomonas aeruginosa (1)    Antibiotic Interpretation Method Status  Piperacillin/Tazo Intermediate MINIMAL INHIBITORY CONCENTRATION  Gentamicin Resistant MINIMAL INHIBITORY CONCENTRATION  Tobramycin Sensitive MINIMAL INHIBITORY CONCENTRATION  Levofloxacin Resistant MINIMAL INHIBITORY CONCENTRATION  Cefepime Intermediate MINIMAL INHIBITORY CONCENTRATION  Ciprofloxacin Resistant MINIMAL INHIBITORY CONCENTRATION  Meropenem Sensitive MINIMAL INHIBITORY CONCENTRATION                Urine Culture [[id number]] Collected: 04/16/2025 1410  Specimen: N/A from Urine CC Updated: 04/19/2025 0741  Micro Culture Result Clinically insignificant growth  CT Abdomen Pelvis WO Contrast [[id number]] Collected: 04/17/2025 2321  Updated: 04/18/2025 0052  Narrative:  REASON FOR EXAM: recurrent UTI    TECHNICAL FACTORS: Multiple contiguous axial CT images were obtained of the abdomen and pelvis without administration of intravenous contrast. 2D reformatted images were performed. Automated exposure control was utilized for radiation dose reduction.    COMPARISON: CT stone protocol September 7, 2023    ABDOMEN FINDINGS: Large left pleural effusion and trace right pleural effusion are present. There is airspace consolidation of the left lower lobe with atelectasis or scarring within the right lower lobe. Fluid density cysts are present within the  kidneys. Mild left hydronephrosis and hydroureter is visualized. There is no evidence of nephrolithiasis or proximal ureteral calculus. The unenhanced liver, spleen, pancreas and adrenal glands are normal in appearance. Atheromatous changes are present  in the abdominal aorta, renal arteries and iliac arteries. Diverticula are visualized throughout the colon without evidence of diverticulitis. Hiatal hernia is present. Bowel is  unopacified limiting detail. There is no evidence of free fluid or  lymphadenopathy. Degenerative disc disease and facet osteoarthritis are present in the lower lumbar spine. Mild chronic T11 compression fracture is present.    PELVIS FINDINGS: Extensive beam hardening artifact is present within the mid to lower pelvis secondary to bilateral hip arthroplasty. The majority of the urinary bladder and prostate are obscured. The appendix is normal in caliber without evidence of  adjacent inflammatory stranding. Diverticula are visualized within the sigmoid colon without evidence of diverticulitis. There is no evidence of free fluid or lymphadenopathy.    IMPRESSION:  1. Mild left hydronephrosis and hydroureter. The distal ureters and urinary bladder are mostly obscured by artifact precluding evaluation for distal ureteral calculus.  2. Bilateral renal cysts.  3. Large left pleural effusion with trace right pleural effusion.  4. Airspace disease within the left lower lobe favored represent compressive atelectasis. Pneumonia could have a similar appearance.  5. Colonic diverticulosis.  6. Other findings as described.        Electronically signed by Basim Gomez MD on 4/17/2025 11:30 PM    CT Chest WO Contrast [[id number]] Collected: 04/17/2025 1707  Updated: 04/17/2025 1714  Narrative:  EXAM: CT CHEST WO CONTRAST    CLINICAL HISTORY: Left pleural effusion.    COMPARISON: Chest x-ray on 04/16/2025    TECHNIQUE: CT scan was obtained of the chest without contrast. Coronal and sagittal reconstructions were performed these images.    FINDINGS: Study is limited due to motion. Left lower lobe consolidation with moderate left pleural effusion. Mild lobulation of the fluid may represent loculation. Trace right effusion. Predominantly paraseptal emphysema in the lung apices. Right  upper and middle lobe granulomas. No pneumothorax.    The heart is normal in size without pericardial effusion. Mildly calcified coronary arteries.  Enlarged right prevascular lymph node measures 2.6 x 1.6 cm on image 53 series 2. There are multiple subcentimeter lymph nodes within the mediastinum.  Left lower lobe pneumonia with moderate left effusion possibly loculated.    Trace right effusion.    All CT scans at [this location] are performed using dose modulation techniques as appropriate to a performed exam including the following: Automated exposure control; adjustment of the mA and/or kV according to patient size (this includes techniques or  standardized protocols for targeted exams where dose is matched to indication / reason for exam; i.e. extremities or head); use of iterative reconstruction technique.    Finalized on: 4/17/2025 5:11 PM By: Guille Keene MD  [facility]# [id number] 2025-04-17 17:13:56.898 [facility]  Gram stain [[id number]] Collected: 04/16/2025 1459  Specimen: N/A from Blood Updated: 04/17/2025 1343  Gram Stain Result gram negative rods.  Gram Stain Result CRITICAL VALUE CALLED TO AND READ BACK BY:Marilee Ramsay LPN 04/17/2025  Gram Stain Result 1213, Baa  Gram stain [[id number]] Collected: 04/16/2025 1455  Specimen: N/A from Blood Updated: 04/17/2025 1215  Gram Stain Result gram negative rods.  Gram Stain Result CRITICAL VALUE CALLED TO AND READ BACK BY: Marilee Ramsay LPN 04/17/2025  Gram Stain Result 12:14 BAA  Blood Culture ID by PCR [[id number]] (Abnormal) Collected: 04/16/2025 1459  Specimen: N/A Updated: 04/17/2025 1212  GRAM-POSITIVE BACTERIA SEE BELOW  Enterococcus faecalis Not Detected Not Detected  Enterococcus faecium Not Detected Not Detected  Daniela/B (VRE) N/A Not Detected  Staphylococcus spp. Not Detected Not Detected  Staph aureus Not Detected Not Detected  Staph epidermidis Not Detected Not Detected  Staph lugdunensis Not Detected Not Detected  mecA/C N/A Not Detected  mecA/C and MREJ (MRSA) N/A Not Detected  Streptococcus spp. Not Detected Not Detected  Strep agalactiae Not Detected Not Detected  Strep pneumoniae Not Detected Not  Detected  Strep pyogenes Not Detected Not Detected  Listeria monocytogenes Not Detected Not Detected  GRAM-NEGATIVE BACTERIA SEE BELOW  Haemophilus influenzae Not Detected Not Detected  Neisseria meningitidis Not Detected Not Detected  Pseudomonas aeruginosa Detected Not Detected  Stenotrophomonas maltophilia Not Detected Not Detected  Acinetobacter calcoac-baumanni cplx Not Detected Not Detected  Bacteroides fragilis Not Detected Not Detected  Enterobacterales spp. Not Detected Not Detected  Enterobacter cloacae cplx Not Detected Not Detected  Serratia marcescens Not Detected Not Detected  Escherichia coli Not Detected Not Detected  CTX-M Not Detected Not Detected  Klebsiella aerogenes Not Detected Not Detected  Klebsiella oxytoca Not Detected Not Detected  Klebsiella pneumoniae group Not Detected Not Detected  KPC Not Detected Not Detected  Proteus spp. Not Detected Not Detected  Salmonella spp. Not Detected Not Detected  IMP Not Detected Not Detected  OXA 48 N/A Not Detected  NDM Not Detected Not Detected  VIM Not Detected Not Detected  mcr-1 N/A Not Detected  YEAST SEE BELOW  Candida albicans Not Detected Not Detected  Candida auris Not Detected Not Detected  Candida glabrata Not Detected Not Detected  Candida krusei Not Detected Not Detected  Candida parapsilosis Not Detected Not Detected  Candida tropicalis Not Detected Not Detected  Cryptococcus neoformans/katia Not Detected Not Detected  Blood Culture ID Comment SEE BELOW        Surgical Procedures during this visit:    Discharge Disposition:  Order for Discharge (From admission, onward)    Start Ordered  04/22/25 1536 Discharge to [facility]. Admit to [facility]. Consult Dr. Loaiza/[facility] for medical management. Repeat daily CBC CMP magnesium phosphorus PTT PT INR. Continue PICC line. Continue daily routine PICC line care. Flush with heparin as scheduled. Zoya... Once  Comments: Discharge to [facility]. Admit to [facility]. Consult Dr. Loaiza/[facility] for medical  management.  Repeat daily CBC CMP magnesium phosphorus PTT PT INR.  Continue PICC line. Continue daily routine PICC line care. Flush with heparin as scheduled.  Continue meropenem via PICC line. Meropenem 1 g IV every 12 hours. End of treatment 5/3/2025.  Remove PICC line once end of treatment and last dose reached.  Continue Lewis catheter upon discharge. Voiding trial at [facility].  Continue Accu-Cheks.    Daily PT OT.  Expected Discharge Date: 04/22/25  Expected Discharge Time: Afternoon    Question: Discharge Disposition to Answer: IP Rehab Facility ([facility] Other)  04/22/25 1542                Discharge Orders:  Follow-up Information    Darin Krueger MD Follow up in 4 week(s).  Specialty: Family Medicine  Contact information:  [address]  [phone]          Miguel Lomax MD Follow up in 2 week(s).  Specialty: Urology  Contact information:  [address]  [address] [place] [address]  [phone]          Justin Meyer MD. Schedule an appointment as soon as possible for a visit in 1 month(s).  Specialties: Interventional Cardiology, Cardiology  Contact information:  [address] [place] [address]  [phone]                    Future Appointments:    Immunizations Administered for This Admission    No immunizations given this admission.          Medication List      START taking these medications    dextromethorphan-guaifenesin  mg/5 mL Syrp liquid  Commonly known as: ROBITUSSIN-DM  Take 5 mLs by mouth every 6 (six) hours as needed    heparin (porcine) 10,000 unit/mL Soln injection  Inject 0.5 mLs (5,000 Units total) into the skin every 8 (eight) hours    * heparin, porcine (PF) 100 unit/mL Syrg  Inject 2 mLs (200 Units total) into the vein as needed    * heparin, porcine (PF) 100 unit/mL Syrg  Inject 2 mLs (200 Units total) into the vein every 12 (twelve) hours    hydrALAZINE 20 mg/mL Soln injection  Commonly known as: APRESOLINE  Inject 0.5 mLs (10 mg total)  into the vein every 6 (six) hours as needed (SBP greater than 180, DBP greater than 110, hold if HR greater than 90)    insulin glargine 100 unit/mL Soln injection  Commonly known as: LANTUS  Inject 25 Units into the skin every morning  Start taking on: April 23, 2025  Replaces: insulin glargine 100 unit/mL (3 mL) Inpn insulin pen    * ramelteon 8 mg Tab tablet  Commonly known as: Rozerem  Take 1 tablet (8 mg total) by mouth at bedtime nightly as needed for Sleep    * ramelteon 8 mg Tab tablet  Commonly known as: ROZEREM  Take 1 tablet (8 mg total) by mouth nightly    * sodium chloride 0.9 % flush Syrg injection  Inject 20 mLs into the vein as needed    * sodium chloride 0.9 % flush Syrg injection  Inject 10 mLs into the vein every 12 (twelve) hours    sodium chloride 0.9 % MBP PgBk 100 mL with meropenem 1 gram SolR 1 g  Inject 1 g into the vein every 12 (twelve) hours        * This list has 6 medication(s) that are the same as other medications prescribed for you. Read the directions carefully, and ask your doctor or other care provider to review them with you.            CHANGE how you take these medications    * traZODone 50 MG Tab tablet  Commonly known as: DESYREL  Take 1 tablet (50 mg total) by mouth nightly  What changed: Another medication with the same name was added. Make sure you understand how and when to take each.    * traZODone 50 MG Tab tablet  Commonly known as: DESYREL  Take 1 tablet (50 mg total) by mouth nightly  What changed: You were already taking a medication with the same name, and this prescription was added. Make sure you understand how and when to take each.        * This list has 2 medication(s) that are the same as other medications prescribed for you. Read the directions carefully, and ask your doctor or other care provider to review them with you.            CONTINUE taking these medications    acetaminophen 500 MG Tab tablet  Commonly known as: TYLENOL  Take 2 tablets (1,000 mg  total) by mouth every 8 (eight) hours as needed for Pain    amLODIPine 10 MG Tab tablet  Commonly known as: NORVASC  Take 1 tablet (10 mg total) by mouth daily    aspirin 81 MG Tbec EC tablet  Commonly known as: ECOTRIN  Take 1 tablet (81 mg total) by mouth daily    citalopram 10 MG Tab tablet  Commonly known as: CeleXA  Take 1 tablet (10 mg total) by mouth daily    clopidogreL 75 mg Tab tablet  Commonly known as: PLAVIX    finasteride 5 mg Tab tablet  Commonly known as: PROSCAR  Take 1 tablet (5 mg total) by mouth daily  Start taking on: April 23, 2025    lactobacillus rhamnosus (GG) 10 billion cell Cap capsule  Commonly known as: CULTURELLE  Take 1 capsule by mouth daily    levothyroxine 125 MCG Tab tablet  Commonly known as: SYNTHROID  Take 1 tablet (125 mcg total) by mouth every morning at 6AM    montelukast 10 mg Tab tablet  Commonly known as: SINGULAIR  Take 1 tablet (10 mg total) by mouth nightly    multivitamin Tab per tablet  Commonly known as: THERAGRAN    olmesartan 20 MG Tab tablet  Commonly known as: BENICAR    OXcarbazepine 150 MG Tab tablet  Commonly known as: TRILEPTAL  Take 1 tablet (150 mg total) by mouth 2 (two) times daily    pantoprazole 40 MG Tbec tablet  Commonly known as: PROTONIX  Take 1 tablet (40 mg total) by mouth every morning before breakfast    polyethylene glycol 17 gram Pwpk packet  Commonly known as: MIRALAX  Take 17 g by mouth 2 (two) times daily    pravastatin 40 MG Tab tablet  Commonly known as: PRAVACHOL  Take 1 tablet (40 mg total) by mouth every evening    senna-docusate 8.6-50 mg Tab per tablet  Commonly known as: SENOKOT-S  Take 2 tablets by mouth 2 (two) times daily          STOP taking these medications    FreeStyle Landen 2 Sensor Kit  Generic drug: flash glucose sensor    insulin glargine 100 unit/mL (3 mL) Inpn insulin pen  Commonly known as: LANTUS SOLOSTAR  Replaced by: insulin glargine 100 unit/mL Soln injection    mirabegron 25 mg Tb24 24 hr ER tablet  Commonly known  as: MYRBETRIQ            Where to Get Your Medications      These medications were sent to [id number] - [place], [place] - [address] AT [facility] [address] [employer] [facility] [address]    Phone: [phone]  OXcarbazepine 150 MG Tab tablet  polyethylene glycol 17 gram Pwpk packet  ramelteon 8 mg Tab tablet  traZODone 50 MG Tab tablet      Information about where to get these medications is not yet available  Ask your nurse or doctor about these medications  dextromethorphan-guaifenesin  mg/5 mL Syrp liquid  finasteride 5 mg Tab tablet  heparin (porcine) 10,000 unit/mL Soln injection  heparin, porcine (PF) 100 unit/mL Syrg  heparin, porcine (PF) 100 unit/mL Syrg  hydrALAZINE 20 mg/mL Soln injection  insulin glargine 100 unit/mL Soln injection  ramelteon 8 mg Tab tablet  sodium chloride 0.9 % flush Syrg injection  sodium chloride 0.9 % flush Syrg injection  sodium chloride 0.9 % MBP PgBk 100 mL with meropenem 1 gram SolR 1 g      Discharge Orders    Future Labs/Procedures Expected by Expires  Activity: Per Rehab Recommendations As directed  Diet (Select type) Regular As directed  Questions:  Diet Type: Regular  Restriction(s):  Solid Consistency:  Liquid Consistency:  Sodium Restriction:  Fluid restriction:  Weight bearing status Left Upper Extremity: FWB; Right Upper Extremity: FWB; Left Lower Extremity: FWB; Right Lower Extremity: FWB As directed  Questions:  Left Upper Extremity: FWB  Right Upper Extremity: FWB  Left Lower Extremity: FWB  Right Lower Extremity: FWB        J Eliel Decker MD  [facility]  04/22/25  3:44 PM      Electronically signed by Thomas Decker MD at 04/22/2025 3:45 PM CDT   Medications at Time of Discharge  - documented as of this encounter   Medications at Time of Discharge  Medication Sig Dispense Quantity Refills Last Filled Start Date End Date   acetaminophen (TYLENOL) 500 MG Tab tablet  Take 2 tablets (1,000 mg total) by mouth every 8  (eight) hours as needed for Pain       05/09/2024     amLODIPine (NORVASC) 10 MG Tab tablet  Take 1 tablet (10 mg total) by mouth daily 30 tablet  3   05/09/2024     aspirin EC (ECOTRIN) 81 MG TbEC EC tablet  Take 1 tablet (81 mg total) by mouth daily 90 tablet  3   10/16/2024     citalopram (CeleXA) 10 MG Tab tablet  Take 1 tablet (10 mg total) by mouth daily 30 tablet  3   05/09/2024     clopidogreL (PLAVIX) 75 mg Tab tablet  Take 1 tablet (75 mg total) by mouth daily             dextromethorphan-guaifenesin (ROBITUSSIN-DM)  mg/5 mL Syrp liquid  Take 5 mLs by mouth every 6 (six) hours as needed       04/22/2025     finasteride (PROSCAR) 5 mg Tab tablet  Take 1 tablet (5 mg total) by mouth daily       04/23/2025     heparin sodium,porcine (heparin, porcine,) 10,000 unit/mL Soln injection  Inject 0.5 mLs (5,000 Units total) into the skin every 8 (eight) hours       04/22/2025     heparin, porcine, PF, 100 unit/mL Syrg  Inject 2 mLs (200 Units total) into the vein as needed       04/22/2025     heparin, porcine, PF, 100 unit/mL Syrg  Inject 2 mLs (200 Units total) into the vein every 12 (twelve) hours       04/22/2025     hydrALAZINE (APRESOLINE) 20 mg/mL Soln injection  Inject 0.5 mLs (10 mg total) into the vein every 6 (six) hours as needed (SBP greater than 180, DBP greater than 110, hold if HR greater than 90)       04/22/2025     insulin glargine (LANTUS) 100 unit/mL Soln injection  Inject 25 Units into the skin every morning       04/23/2025     lactobacillus rhamnosus, GG, (CULTURELLE) 10 billion cell Cap capsule  Take 1 capsule by mouth daily 30 capsule  3   05/09/2024     levothyroxine (SYNTHROID) 125 MCG Tab tablet  Take 1 tablet (125 mcg total) by mouth every morning at 6AM 30 tablet  3   05/09/2024     montelukast (SINGULAIR) 10 mg Tab tablet  Take 1 tablet (10 mg total) by mouth nightly 30 tablet  3   09/26/2023     multivitamin (THERAGRAN) Tab per tablet  Take 1 tablet by mouth daily              olmesartan (BENICAR) 20 MG Tab tablet  Take 1 tablet (20 mg total) by mouth daily             OXcarbazepine (TRILEPTAL) 150 MG Tab tablet  Take 1 tablet (150 mg total) by mouth 2 (two) times daily 60 tablet  3   04/22/2025     pantoprazole (PROTONIX) 40 MG TbEC tablet  Take 1 tablet (40 mg total) by mouth every morning before breakfast 30 tablet  3   05/09/2024     polyethylene glycol (MIRALAX) 17 gram PwPk packet  Take 17 g by mouth 2 (two) times daily 60 packet  3   04/22/2025     pravastatin (PRAVACHOL) 40 MG Tab tablet  Take 1 tablet (40 mg total) by mouth every evening 30 tablet  3   05/09/2024     ramelteon (Rozerem) 8 mg Tab tablet  Take 1 tablet (8 mg total) by mouth at bedtime nightly as needed for Sleep 90 tablet  1   04/21/2025     ramelteon (ROZEREM) 8 mg Tab tablet  Take 1 tablet (8 mg total) by mouth nightly 90 tablet  3   04/22/2025     senna-docusate (SENOKOT-S) 8.6-50 mg Tab per tablet  Take 2 tablets by mouth 2 (two) times daily 120 tablet  3   05/09/2024     sodium chloride 0.9 % flush Syrg injection  Inject 20 mLs into the vein as needed       04/22/2025     sodium chloride 0.9 % flush Syrg injection  Inject 10 mLs into the vein every 12 (twelve) hours       04/22/2025     sodium chloride 0.9 % MBP PgBk 100 mL with meropenem 1 gram SolR 1 g  Inject 1 g into the vein every 12 (twelve) hours       04/22/2025     traZODone (DESYREL) 50 MG Tab tablet  Take 1 tablet (50 mg total) by mouth nightly 30 tablet  3   05/09/2024     traZODone (DESYREL) 50 MG Tab tablet  Take 1 tablet (50 mg total) by mouth nightly 90 tablet  1   04/22/2025       Ordered Prescriptions  - documented in this encounter   Ordered Prescriptions  Prescription Sig Dispense Quantity Refills Last Filled Start Date End Date   traZODone (DESYREL) 50 MG Tab tablet  Take 1 tablet (50 mg total) by mouth nightly 90 tablet  1   04/22/2025     ramelteon (ROZEREM) 8 mg Tab tablet  Take 1 tablet (8 mg total) by mouth nightly 90 tablet  3    04/22/2025     heparin, porcine, PF, 100 unit/mL Syrg  Inject 2 mLs (200 Units total) into the vein every 12 (twelve) hours       04/22/2025     sodium chloride 0.9 % flush Syrg injection  Inject 10 mLs into the vein every 12 (twelve) hours       04/22/2025     heparin, porcine, PF, 100 unit/mL Syrg  Inject 2 mLs (200 Units total) into the vein as needed       04/22/2025     sodium chloride 0.9 % flush Syrg injection  Inject 20 mLs into the vein as needed       04/22/2025     finasteride (PROSCAR) 5 mg Tab tablet  Take 1 tablet (5 mg total) by mouth daily       04/23/2025     sodium chloride 0.9 % MBP PgBk 100 mL with meropenem 1 gram SolR 1 g  Inject 1 g into the vein every 12 (twelve) hours       04/22/2025     hydrALAZINE (APRESOLINE) 20 mg/mL Soln injection  Inject 0.5 mLs (10 mg total) into the vein every 6 (six) hours as needed (SBP greater than 180, DBP greater than 110, hold if HR greater than 90)       04/22/2025     heparin sodium,porcine (heparin, porcine,) 10,000 unit/mL Soln injection  Inject 0.5 mLs (5,000 Units total) into the skin every 8 (eight) hours       04/22/2025     dextromethorphan-guaifenesin (ROBITUSSIN-DM)  mg/5 mL Syrp liquid  Take 5 mLs by mouth every 6 (six) hours as needed       04/22/2025     polyethylene glycol (MIRALAX) 17 gram PwPk packet  Take 17 g by mouth 2 (two) times daily 60 packet  3   04/22/2025     OXcarbazepine (TRILEPTAL) 150 MG Tab tablet  Take 1 tablet (150 mg total) by mouth 2 (two) times daily 60 tablet  3   04/22/2025     insulin glargine (LANTUS) 100 unit/mL Soln injection  Inject 25 Units into the skin every morning       04/23/2025     ramelteon (Rozerem) 8 mg Tab tablet  Take 1 tablet (8 mg total) by mouth at bedtime nightly as needed for Sleep 90 tablet  1         Problem List Items Addressed This Visit       Chronic obstructive pulmonary disease, unspecified COPD type    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  The patient denies polyuria, polydipsia, polyphagia, hypoglycemia and paresthesias.  The patient's glucose control has been good.  Home glucose averages are routinely checked.  The patient is without retinopathy currently.  The patient has no history of neuropathy.  The patient currently complains of no podiatric problems.  The patient has excellent compliance.  Because of him being on lantus and novolog,  the patient checks his blood sugar 4 times daily and administers insulin 3 or more times daily.  His last a1c at [facility] in 2/23 was 6.4.  Hemoglobin A1C   Date Value Ref Range Status   01/27/2022 6.4 (H) 4.0 - 5.6 % Final     Comment:     ADA Screening Guidelines:  5.7-6.4%  Consistent with prediabetes  >or=6.5%  Consistent with diabetes    High levels of fetal hemoglobin interfere with the HbA1C  assay. Heterozygous hemoglobin variants (HbS, HgC, etc)do  not significantly interfere with this assay.   However, presence of multiple variants may affect accuracy.     07/26/2021 6.6 (H) 4.0 - 5.6 % Final     Comment:     ADA Screening Guidelines:  5.7-6.4%  Consistent with prediabetes  >or=6.5%  Consistent with diabetes    High levels of fetal hemoglobin interfere with the HbA1C  assay. Heterozygous hemoglobin variants (HbS, HgC, etc)do  not significantly interfere with this assay.   However, presence of multiple variants may affect accuracy.     04/23/2021 7.2 (H) 4.0 - 5.6 % Final     Comment:     ADA Screening Guidelines:  5.7-6.4%  Consistent with prediabetes  >or=6.5%  Consistent with diabetes    High levels of fetal hemoglobin interfere with the HbA1C  assay. Heterozygous hemoglobin variants (HbS, HgC, etc)do  not significantly interfere with this assay.   However, presence of multiple variants may affect accuracy.       No results found for: MICROALBUR, [id number]  Diabetes Management Status    Statin: Taking  ACE/ARB: Not  taking  He has changed to 33 u of the lantus from 28 u since he had a high a1c below.  Screening or Prevention Patient's value Goal Complete/Controlled?   HgA1C Testing and Control   Lab Results   Component Value Date    HGBA1C 6.4 (H) 01/27/2022      Annually/Less than 8% Yes   Lipid profile : 11/04/2020 Annually Yes   LDL control Lab Results   Component Value Date    LDLCALC 74.4 11/04/2020    Annually/Less than 100 mg/dl  Yes   Nephropathy screening Lab Results   Component Value Date    LABMICR 38.0 12/16/2021     Lab Results   Component Value Date    PROTEINUA Negative 09/20/2017    Annually Yes   Blood pressure BP Readings from Last 1 Encounters:   08/03/22 110/60    Less than 140/90 Yes   Dilated retinal exam : 12/10/2021 Annually No   Foot exam   : 12/16/2021 Annually Yes              Relevant Medications    JANUVIA 50 mg Tab    insulin aspart U-100 (NOVOLOG FLEXPEN U-100 INSULIN) 100 unit/mL (3 mL) InPn pen    insulin glargine U-100, Lantus, 100 unit/mL injection    Other Relevant Orders    Diabetes Digital Medicine (DDMP) Enrollment Order (Completed)    Current moderate episode of major depressive disorder without prior episode    Overview   -history of depression  -lamictal started inpatient at [place] 5/24  -reports feeling well  -denies AE         Hyperparathyroidism    Hypertension associated with diabetes    Overview   The patient presents with essential hypertension.  The patient is tolerating the medication well and is in excellent compliance except he is urinating a lot with the chlorthalidone and he is interested in stopping it.  We discussed and agreed to try this.  The patient is experiencing no side effects.  Counseling was offered regarding low salt diets.  The patient has a reduced salt intake.  The patient denies chest pain, palpitations, shortness of breath, dyspnea on exertion, left or murmur neck pain, nausea, vomiting, diaphoresis, paroxysmal nocturnal dyspnea, and orthopnea.            Relevant Medications    JANUVIA 50 mg Tab    insulin aspart U-100 (NOVOLOG FLEXPEN U-100 INSULIN) 100 unit/mL (3 mL) InPn pen    insulin glargine U-100, Lantus, 100 unit/mL injection    Other Relevant Orders    Hypertension Digital Medicine (HDMP) Enrollment Order (Completed)    Parkinsonism, unspecified Parkinsonism type    Stage 3b chronic kidney disease     Other Visit Diagnoses         Chronic bilateral pleural effusions    -  Primary    Relevant Orders    Ambulatory referral/consult to Pulmonology      Chronic diastolic heart failure          Normocytic anemia          Pneumonia of left lower lobe due to infectious organism        Relevant Orders    Ambulatory referral/consult to Pulmonology            A f/u U/S was done of the kidneys and the hydronephrosis resolved.  Past Medical History:  Past Medical History:   Diagnosis Date    Allergy     BPH (benign prostatic hypertrophy)     Chronic obstructive pulmonary disease, unspecified COPD type 1/3/2024    Controlled type 2 diabetes mellitus with stage 3 chronic kidney disease, with long-term current use of insulin     The patient presents with diabetes.  The patient denies polyuria, polydipsia, polyphagia, hypoglycemia and paresthesias.  The patient's glucose control has been good.  Home glucose averages are routinely checked.  The patient is without retinopathy currently.  The patient has no history of neuropathy.  The patient currently complains of no podiatric problems.  The patient has excellent compliance.    Dehydration     Diabetes mellitus type II, uncontrolled     GERD (gastroesophageal reflux disease) 2013    grade IV/IV    Hypertension     Hypothyroidism     Male hypogonadism     Non-proliferative diabetic retinopathy     Urinary retention      Past Surgical History:   Procedure Laterality Date    COLONOSCOPY N/A 1/16/2020    Procedure: COLONOSCOPY;  Surgeon: Darin Krueger MD;  Location: [facility];  Service: Endoscopy;  Laterality: N/A;    EYE  SURGERY      HIP SURGERY Left 2022    SPINE SURGERY       Review of patient's allergies indicates:   Allergen Reactions    Ace inhibitors Other (See Comments)     Other reaction(s): cough    Metformin Other (See Comments)     Abdominal pain    Abdominal pain unknown      Abdominal pain      unknown     Medications Ordered Prior to Encounter[1]  Social History[2]  Family History   Problem Relation Name Age of Onset    Heart disease Mother      Stroke Maternal Grandfather      Diabetes Neg Hx      Cancer Neg Hx      Hypertension Neg Hx         Review of Systems   Constitutional:  Negative for fever and unexpected weight change.   Respiratory:  Negative for cough and shortness of breath.    Cardiovascular:  Negative for leg swelling.   Gastrointestinal:  Negative for abdominal pain, constipation, diarrhea, nausea and vomiting.   Neurological:  Positive for weakness (uses a walker.).       Objective:     /78 (BP Location: Left arm, Patient Position: Sitting)   Pulse (!) 56   Resp 14   Ht 6' (1.829 m)   Wt 65 kg (143 lb 4.8 oz)   SpO2 100%   BMI 19.43 kg/m²     Physical Exam  Vitals reviewed.   Constitutional:       General: He is not in acute distress.     Appearance: Normal appearance. He is well-developed. He is not ill-appearing or diaphoretic.   HENT:      Head: Normocephalic and atraumatic.      Right Ear: Tympanic membrane, ear canal and external ear normal.      Left Ear: Tympanic membrane, ear canal and external ear normal.      Nose: Nose normal.      Mouth/Throat:      Pharynx: No oropharyngeal exudate.   Eyes:      General: No scleral icterus.        Right eye: No discharge.         Left eye: No discharge.      Conjunctiva/sclera: Conjunctivae normal.      Pupils: Pupils are equal, round, and reactive to light.   Neck:      Thyroid: No thyromegaly.      Vascular: No JVD.   Cardiovascular:      Rate and Rhythm: Normal rate and regular rhythm.      Heart sounds: Normal heart sounds. No murmur  heard.     No friction rub. No gallop.   Pulmonary:      Effort: Pulmonary effort is normal. No respiratory distress.      Breath sounds: Normal breath sounds. Transmitted upper airway sounds (this is on the lower left side.) present. No wheezing or rales.   Chest:      Chest wall: No tenderness.   Abdominal:      General: Bowel sounds are normal. There is no distension.      Palpations: Abdomen is soft. There is no mass.      Tenderness: There is no abdominal tenderness. There is no guarding or rebound.   Musculoskeletal:         General: No tenderness. Normal range of motion.      Cervical back: Normal range of motion and neck supple.   Lymphadenopathy:      Cervical: No cervical adenopathy.   Skin:     General: Skin is warm and dry.   Neurological:      Mental Status: He is alert and oriented to person, place, and time.      Cranial Nerves: No cranial nerve deficit.      Coordination: Coordination normal.       Assessment:     1. Chronic bilateral pleural effusions    2. Chronic diastolic heart failure    3. Stage 3b chronic kidney disease    4. Normocytic anemia    5. Pneumonia of left lower lobe due to infectious organism    6. Parkinsonism, unspecified Parkinsonism type    7. Hyperparathyroidism    8. Chronic obstructive pulmonary disease, unspecified COPD type    9. Hypertension associated with diabetes    10. Current moderate episode of major depressive disorder without prior episode    11. Controlled type 2 diabetes mellitus with stage 3 chronic kidney disease, with long-term current use of insulin        Plan:   Assessment & Plan    IMPRESSION:  - Reviewed recent hospitalization for significant sepsis, initially due to UTI with Pseudomonas in urine.  - Left lower lobe pneumonia with pleural effusion was primary infection leading to sepsis.  - Completed course of broad-spectrum antibiotics.  - Follow-up US showed resolution of kidney dilation.  - No significant coughing, no shortness of breath, no leg  swelling.  - Weight stability and appetite improving.  - Discussed potential need for thoracentesis to determine cause of chronic pleural effusions.    SEPSIS DUE TO PSEUDOMONAS:  - Patient presented with fever, chills, and weakness and was admitted with severe sepsis, likely initially due to urinary tract infection with Pseudomonas found in urine.  - Administered broad spectrum antibiotics for treatment of sepsis and the underlying Pseudomonas infection.    PSEUDOMONAS INFECTION:  - Identified Pseudomonas in the patient's urine and discussed its implications as the causative organism for the patient's urinary tract infection and subsequent sepsis.    URINARY TRACT INFECTION:  - Patient has a urinary tract infection with Pseudomonas, which was determined to be the source of sepsis.  - Broad spectrum antibiotics administered to treat the infection.    PLEURAL EFFUSION:  - Patient has pleural effusion (fluid on the outside of the lung covering), which is chronic in nature and associated with pneumonia.  - Explained the difference between pneumonia (inside lung) and pleural effusion (outside lung).  - Considering thoracentesis as a treatment option.  - Referred patient to pulmonologist Dr. Stallworth for follow-up on pneumonia and chronic pleural effusions.  - Ordered follow-up chest XR in 4-6 weeks to investigate cause of chronic pleural effusions and monitor post-pneumonia recovery.    HISTORY OF URINARY TRACT INFECTION:  - Patient has a history of urinary tract infections with Pseudomonas, which contributed to the current sepsis presentation.         Problem List Items Addressed This Visit       Chronic obstructive pulmonary disease, unspecified COPD type    Controlled type 2 diabetes mellitus with stage 3 chronic kidney disease, with long-term current use of insulin    Relevant Medications    JANUVIA 50 mg Tab    insulin aspart U-100 (NOVOLOG FLEXPEN U-100 INSULIN) 100 unit/mL (3 mL) InPn pen    insulin glargine U-100,  "Lantus, 100 unit/mL injection    Other Relevant Orders    Diabetes Digital Medicine (DDMP) Enrollment Order (Completed)    Current moderate episode of major depressive disorder without prior episode-cont celexa.    Hyperparathyroidism-no change at this time.    Hypertension associated with diabetes-no change at this time.    Relevant Medications    JANUVIA 50 mg Tab    insulin aspart U-100 (NOVOLOG FLEXPEN U-100 INSULIN) 100 unit/mL (3 mL) InPn pen    insulin glargine U-100, Lantus, 100 unit/mL injection    Other Relevant Orders    Hypertension Digital Medicine (HDMP) Enrollment Order (Completed)    Parkinsonism, unspecified Parkinsonism type-no change at this time.    Stage 3b chronic kidney disease-no change at this time.     Other Visit Diagnoses         Chronic bilateral pleural effusions    -  Primary    Relevant Orders    Ambulatory referral/consult to Pulmonology      Chronic diastolic heart failure          Normocytic anemia          Pneumonia of left lower lobe due to infectious organism        Relevant Orders    Ambulatory referral/consult to Pulmonology          No follow-ups on file.               [1]   Current Outpatient Medications on File Prior to Visit   Medication Sig Dispense Refill    acetaminophen (TYLENOL) 500 MG tablet Take 1,000 mg by mouth.      amLODIPine (NORVASC) 10 MG tablet Take 1 tablet (10 mg total) by mouth once daily. 90 tablet 3    amLODIPine (NORVASC) 5 MG tablet Take 5 mg by mouth.      aspirin 325 MG tablet Take 1 tablet (325 mg total) by mouth once daily. 90 tablet 0    aspirin 81 mg Cap Take 81 mg by mouth.      aspirin 81 MG Chew Take 81 mg by mouth once daily.      BD LUER-MANUEL SYRINGE 3 mL 22 x 1 1/2" Syrg   3    blood sugar diagnostic (FREESTYLE LITE STRIPS) Strp TEST 3 TIMES DAILY 300 strip 5    blood sugar diagnostic Strp To check BG three times daily, to use with insurance preferred meter 100 strip 0    blood-glucose meter kit To check BG three times daily, to use with " insurance preferred meter 1 each 0    blood-glucose meter Misc Use as directed four times daily before meals and at bedtime. 1 each 0    blood-glucose,,cont (FREESTYLE PUMA 3 READER) Misc use as directed      cefdinir (OMNICEF) 300 MG capsule Take 300 mg by mouth 2 (two) times daily.      citalopram (CELEXA) 10 MG tablet Take 1 tablet (10 mg total) by mouth once daily. 90 tablet 0    clopidogreL (PLAVIX) 75 mg tablet Take 1 tablet (75 mg total) by mouth once daily. 90 tablet 3    docusate sodium (COLACE) 100 MG capsule Take 100 mg by mouth 2 (two) times daily.      finasteride (PROSCAR) 5 mg tablet Take 1 tablet (5 mg total) by mouth once daily. 90 tablet 3    flash glucose scanning reader (FREESTYLE PUMA 2 READER) Misc Use in conjunction with glucose sensor every 14 days for continuous monitoring of Glucose. DX:E11.22, N18.30, Z79.44 6 each 3    flash glucose sensor (FREESTYLE PUMA 2 SENSOR) Kit Use one sensor Every 14 days for continuous monitoring of Glucose. DX:E11.22, N18.30, Z79.44 6 kit 3    fluconazole (DIFLUCAN) 150 MG Tab Take 150 mg by mouth.      hydroCHLOROthiazide (HYDRODIURIL) 12.5 MG Tab Take 1 tablet (12.5 mg total) by mouth once daily. 30 tablet 11    hydrOXYzine pamoate (VISTARIL) 50 MG Cap Take 1 capsule (50 mg total) by mouth nightly as needed. 30 capsule 0    insulin aspart U-100 (NOVOLOG FLEXPEN U-100 INSULIN) 100 unit/mL (3 mL) InPn pen USE AS DIRECTED PER SLIDING SCALE. MAX OF 14 UNITS DAILY) 15 each 0    insulin aspart U-100 (NOVOLOG FLEXPEN U-100 INSULIN) 100 unit/mL (3 mL) InPn pen Inject 4 Units into the skin.      insulin glargine U-100, Lantus, 100 unit/mL (3 mL) SubQ InPn pen Inject 33 Units into the skin once daily. 3 mL 11    insulin glargine U-100, Lantus, 100 unit/mL injection Inject 33 Units into the skin.      insulin lispro 100 unit/mL injection Inject 5 Units into the skin 3 (three) times daily. 3 mL 11    isosorbide mononitrate (IMDUR) 30 MG 24 hr tablet Take 1  "tablet by mouth once daily.      JANUVIA 50 mg Tab Take 50 mg by mouth.      Lactobacillus rhamnosus GG (CULTURELLE) 10 billion cell capsule Take 1 capsule by mouth 2 (two) times daily.      lancets (ACCU-CHEK SOFTCLIX LANCETS) Misc Check glucose three times daily. 200 each 11    lancets Misc To check BG three times daily, to use with insurance preferred meter 100 each 0    levothyroxine (SYNTHROID) 125 MCG tablet Take 125 mcg by mouth every morning.      megestroL (MEGACE) 400 mg/10 mL (10 mL) Susp Take 200 mg by mouth.      methenamine (HIPREX) 1 gram Tab Take 1 g by mouth 2 (two) times daily.      metoprolol succinate (TOPROL-XL) 50 MG 24 hr tablet Take 1 tablet (50 mg total) by mouth once daily. 90 tablet 3    montelukast (SINGULAIR) 10 mg tablet TAKE 1 TABLET BY MOUTH EVERY DAY IN THE EVENING 90 tablet 2    nitrofurantoin, macrocrystal-monohydrate, (MACROBID) 100 MG capsule Take 100 mg by mouth.      olmesartan (BENICAR) 20 MG tablet Take 20 mg by mouth.      OXcarbazepine (TRILEPTAL) 150 MG Tab TAKE 1 TABLET BY MOUTH TWICE A  tablet 1    pantoprazole (PROTONIX) 40 MG tablet TAKE 1 TABLET BY MOUTH EVERY DAY IN THE MORNING BEFORE BREAKFAST 90 tablet 11    pen needle, diabetic (BD ULTRA-FINE MINI PEN NEEDLE) 31 gauge x 3/16" Ndle USE DAILY WITH LANTUS AND NOVOLOG 300 each 3    polyethylene glycol (GLYCOLAX) 17 gram PwPk Take 17 g by mouth.      pravastatin (PRAVACHOL) 40 MG tablet Take 1 tablet (40 mg total) by mouth every evening. 90 tablet 3    ramelteon (ROZEREM) 8 mg tablet Take 8 mg by mouth.      senna-docusate 8.6-50 mg (PERICOLACE) 8.6-50 mg per tablet Take 2 tablets by mouth 2 (two) times a day.      sodium bicarbonate 650 MG tablet Take 650 mg by mouth 2 (two) times daily.      traZODone (DESYREL) 100 MG tablet Take 100 mg by mouth every evening.      traZODone (DESYREL) 50 MG tablet TAKE 1 TABLET BY MOUTH NIGHTLY AS NEEDED FOR INSOMNIA. 90 tablet 1    vitamin D (VITAMIN D3) 1000 units Tab Take " 2,000 Units by mouth.      cefUROXime (CEFTIN) 250 MG tablet Take 1 tablet by mouth once daily.      mirabegron (MYRBETRIQ) 25 mg Tb24 ER tablet Take 25 mg by mouth.      olmesartan (BENICAR) 40 MG tablet Take 1 tablet by mouth once daily.       Current Facility-Administered Medications on File Prior to Visit   Medication Dose Route Frequency Provider Last Rate Last Admin    [DISCONTINUED] GENERIC EXTERNAL MEDICATION     Provider, Generic External Data        [DISCONTINUED] GENERIC EXTERNAL MEDICATION     Provider, Generic External Data        [DISCONTINUED] GENERIC EXTERNAL MEDICATION     Provider, Generic External Data       [2]   Social History  Socioeconomic History    Marital status:      Spouse name: Babs    Number of children: 2   Occupational History    Occupation: Retired   Tobacco Use    Smoking status: Former    Smokeless tobacco: Current     Types: Chew   Substance and Sexual Activity    Alcohol use: Yes     Comment: rare    Drug use: No    Sexual activity: Not Currently     Partners: Female     Social Drivers of Health     Financial Resource Strain: Low Risk  (2/9/2023)    Overall Financial Resource Strain (CARDIA)     Difficulty of Paying Living Expenses: Not hard at all   Food Insecurity: Unknown (4/23/2025)    Received from [facility]    Hunger Vital Sign     Worried About Running Out of Food in the Last Year: Never true   Transportation Needs: No Transportation Needs (4/23/2025)    Received from [facility]    PRAPARE - Transportation     Lack of Transportation (Medical): No     Lack of Transportation (Non-Medical): No   Physical Activity: Inactive (2/9/2023)    Exercise Vital Sign     Days of Exercise per Week: 0 days     Minutes of Exercise per Session: 0 min   Stress: No Stress Concern Present (2/9/2023)    [employer] - Occupational Stress Questionnaire     Feeling of Stress : Not at all   Housing Stability: Unknown (4/23/2025)     Received from [facility]    Housing Stability Vital Sign     Unable to Pay for Housing in the Last Year: No

## 2025-05-22 ENCOUNTER — LAB REQUISITION (OUTPATIENT)
Dept: LAB | Facility: HOSPITAL | Age: 85
End: 2025-05-22
Payer: MEDICARE

## 2025-05-22 DIAGNOSIS — E11.618 TYPE 2 DIABETES MELLITUS WITH OTHER DIABETIC ARTHROPATHY: ICD-10-CM

## 2025-05-22 LAB
EAG (OHS): 151 MG/DL (ref 68–131)
HBA1C MFR BLD: 6.9 % (ref 4–5.6)

## 2025-05-22 PROCEDURE — 83036 HEMOGLOBIN GLYCOSYLATED A1C: CPT | Mod: PO | Performed by: FAMILY MEDICINE

## 2025-05-23 ENCOUNTER — RESULTS FOLLOW-UP (OUTPATIENT)
Dept: FAMILY MEDICINE | Facility: CLINIC | Age: 85
End: 2025-05-23

## 2025-05-29 ENCOUNTER — TELEPHONE (OUTPATIENT)
Dept: FAMILY MEDICINE | Facility: CLINIC | Age: 85
End: 2025-05-29
Payer: MEDICARE

## 2025-05-29 NOTE — TELEPHONE ENCOUNTER
Hello pt hh nurse called and said that pt was supposed to get enrolled in digital medicine and that he needed the bp machine being sent to him . please

## 2025-05-31 ENCOUNTER — EXTERNAL CHRONIC CARE MANAGEMENT (OUTPATIENT)
Dept: PRIMARY CARE CLINIC | Facility: CLINIC | Age: 85
End: 2025-05-31
Payer: MEDICARE

## 2025-05-31 PROCEDURE — 99490 CHRNC CARE MGMT STAFF 1ST 20: CPT | Mod: S$PBB,,, | Performed by: FAMILY MEDICINE

## 2025-05-31 PROCEDURE — 99490 CHRNC CARE MGMT STAFF 1ST 20: CPT | Mod: PBBFAC,PO | Performed by: FAMILY MEDICINE

## 2025-06-30 ENCOUNTER — EXTERNAL CHRONIC CARE MANAGEMENT (OUTPATIENT)
Dept: PRIMARY CARE CLINIC | Facility: CLINIC | Age: 85
End: 2025-06-30
Payer: MEDICARE

## 2025-06-30 PROCEDURE — 99490 CHRNC CARE MGMT STAFF 1ST 20: CPT | Mod: S$PBB,,, | Performed by: FAMILY MEDICINE

## 2025-06-30 PROCEDURE — 99490 CHRNC CARE MGMT STAFF 1ST 20: CPT | Mod: PBBFAC,PO | Performed by: FAMILY MEDICINE

## 2025-07-01 ENCOUNTER — TELEPHONE (OUTPATIENT)
Dept: FAMILY MEDICINE | Facility: CLINIC | Age: 85
End: 2025-07-01
Payer: MEDICARE

## 2025-07-01 DIAGNOSIS — D64.9 NORMOCYTIC ANEMIA: ICD-10-CM

## 2025-07-01 DIAGNOSIS — I50.32 CHRONIC DIASTOLIC HEART FAILURE: ICD-10-CM

## 2025-07-01 DIAGNOSIS — N18.32 STAGE 3B CHRONIC KIDNEY DISEASE: ICD-10-CM

## 2025-07-01 DIAGNOSIS — J90 CHRONIC BILATERAL PLEURAL EFFUSIONS: Primary | ICD-10-CM

## 2025-07-01 DIAGNOSIS — R53.1 WEAKNESS: ICD-10-CM

## 2025-07-01 NOTE — TELEPHONE ENCOUNTER
Pt's daughter, Koki, called to see if Dr. Krueger would start home health for pt. She stated it is becoming very hard to get him out of the house for lab draws and things, and heard that HH could help with this. LOV with PCP 5/21/2025 for hospital f/u. Preferred  company is [facility] [facility].

## 2025-07-01 NOTE — TELEPHONE ENCOUNTER
I have signed for the following orders AND/OR meds.  Please call the patient and ask the patient to schedule the testing AND/OR inform about any medications that were sent.      Orders Placed This Encounter   Procedures    Ambulatory referral/consult to Home Health     Standing Status:   Future     Expected Date:   7/2/2025     Expiration Date:   8/1/2026     Referral Priority:   Routine     Referral Type:   Home Health     Referral Reason:   Specialty Services Required     Requested Specialty:   Home Health Services     Number of Visits Requested:   1

## 2025-07-07 ENCOUNTER — LAB REQUISITION (OUTPATIENT)
Dept: LAB | Facility: HOSPITAL | Age: 85
End: 2025-07-07
Payer: MEDICARE

## 2025-07-07 DIAGNOSIS — G62.9 POLYNEUROPATHY, UNSPECIFIED: ICD-10-CM

## 2025-07-07 DIAGNOSIS — E86.0 DEHYDRATION: ICD-10-CM

## 2025-07-07 DIAGNOSIS — Z79.899 OTHER LONG TERM (CURRENT) DRUG THERAPY: ICD-10-CM

## 2025-07-07 DIAGNOSIS — N18.9 CHRONIC KIDNEY DISEASE, UNSPECIFIED: ICD-10-CM

## 2025-07-07 LAB
ABSOLUTE EOSINOPHIL (OHS): 0.17 K/UL
ABSOLUTE MONOCYTE (OHS): 0.78 K/UL (ref 0.3–1)
ABSOLUTE NEUTROPHIL COUNT (OHS): 7.25 K/UL (ref 1.8–7.7)
ALBUMIN SERPL BCP-MCNC: 2.9 G/DL (ref 3.5–5.2)
ALP SERPL-CCNC: 81 UNIT/L (ref 40–150)
ALT SERPL W/O P-5'-P-CCNC: 11 UNIT/L (ref 10–44)
ANION GAP (OHS): 10 MMOL/L (ref 8–16)
AST SERPL-CCNC: 11 UNIT/L (ref 11–45)
BASOPHILS # BLD AUTO: 0.07 K/UL
BASOPHILS NFR BLD AUTO: 0.7 %
BILIRUB SERPL-MCNC: 0.3 MG/DL (ref 0.1–1)
BUN SERPL-MCNC: 38 MG/DL (ref 8–23)
CALCIUM SERPL-MCNC: 9.2 MG/DL (ref 8.7–10.5)
CHLORIDE SERPL-SCNC: 110 MMOL/L (ref 95–110)
CO2 SERPL-SCNC: 20 MMOL/L (ref 23–29)
CREAT SERPL-MCNC: 1.4 MG/DL (ref 0.5–1.4)
ERYTHROCYTE [DISTWIDTH] IN BLOOD BY AUTOMATED COUNT: 15.5 % (ref 11.5–14.5)
GFR SERPLBLD CREATININE-BSD FMLA CKD-EPI: 49 ML/MIN/1.73/M2
GLUCOSE SERPL-MCNC: 170 MG/DL (ref 70–110)
HCT VFR BLD AUTO: 36.8 % (ref 40–54)
HGB BLD-MCNC: 11.8 GM/DL (ref 14–18)
IMM GRANULOCYTES # BLD AUTO: 0.05 K/UL (ref 0–0.04)
IMM GRANULOCYTES NFR BLD AUTO: 0.5 % (ref 0–0.5)
LYMPHOCYTES # BLD AUTO: 2.15 K/UL (ref 1–4.8)
MCH RBC QN AUTO: 32.4 PG (ref 27–31)
MCHC RBC AUTO-ENTMCNC: 32.1 G/DL (ref 32–36)
MCV RBC AUTO: 101 FL (ref 82–98)
NUCLEATED RBC (/100WBC) (OHS): 0 /100 WBC
PLATELET # BLD AUTO: 298 K/UL (ref 150–450)
PMV BLD AUTO: 10.5 FL (ref 9.2–12.9)
POTASSIUM SERPL-SCNC: 4.6 MMOL/L (ref 3.5–5.1)
PROT SERPL-MCNC: 5.9 GM/DL (ref 6–8.4)
RBC # BLD AUTO: 3.64 M/UL (ref 4.6–6.2)
RELATIVE EOSINOPHIL (OHS): 1.6 %
RELATIVE LYMPHOCYTE (OHS): 20.5 % (ref 18–48)
RELATIVE MONOCYTE (OHS): 7.4 % (ref 4–15)
RELATIVE NEUTROPHIL (OHS): 69.3 % (ref 38–73)
SODIUM SERPL-SCNC: 140 MMOL/L (ref 136–145)
VIT B12 SERPL-MCNC: 388 PG/ML (ref 210–950)
WBC # BLD AUTO: 10.47 K/UL (ref 3.9–12.7)

## 2025-07-07 PROCEDURE — 85025 COMPLETE CBC W/AUTO DIFF WBC: CPT | Mod: PO | Performed by: PHYSICAL MEDICINE & REHABILITATION

## 2025-07-07 PROCEDURE — 82040 ASSAY OF SERUM ALBUMIN: CPT | Mod: PO | Performed by: PHYSICAL MEDICINE & REHABILITATION

## 2025-07-07 PROCEDURE — 83921 ORGANIC ACID SINGLE QUANT: CPT | Mod: PO | Performed by: PHYSICAL MEDICINE & REHABILITATION

## 2025-07-07 PROCEDURE — 82607 VITAMIN B-12: CPT | Mod: PO | Performed by: PHYSICAL MEDICINE & REHABILITATION

## 2025-07-12 LAB — W METHYLMALONIC ACID: 0.44 UMOL/L

## 2025-07-15 ENCOUNTER — TELEPHONE (OUTPATIENT)
Dept: FAMILY MEDICINE | Facility: CLINIC | Age: 85
End: 2025-07-15
Payer: MEDICARE

## 2025-07-15 NOTE — TELEPHONE ENCOUNTER
Coral Suggs Darin FLORESRudy Staff  Phone Number: [phone]     MRN:  [id number]                             Pt: ELLY HUBBARD  Phone: [phone]       During a monthly health call today, the patient reported a fall a few weeks ago while sitting on the edge of her bed. The patient states he slid off the edge of his bed onto the floor and onto his glutes. Patient denied any injures. The patient completed a fall risk assessment during our call and I have attacked his results below.    Thank you,  Coral Suggs LPN, [facility] Care Coordinator  [phone] ext. [phone]    I may be contacted per Epic messages or at the number listed if any questions/ concerns.        ELLY HUBBARD [id number]  EMPI: [id number] Type: Health Assessments  ID: [email or web address]  Version: 1 Category: Medical  Name: [email or web address] Status: Completed  Topic: Fall Risk    [facility] Fall Risk Description:    Fall Risk Assessments help to identify patients who are at risk for falling. By conducting this assessment, we can help mitigate potential falls by implementing safe transfer/ambulation techniques, get the patient necessary DME and educate on home safety checklist.    Section : Fall Risk Assessment    1. I have fallen in the past year.    People who have fallen once are likely to fall again. (Fall Risk Assessment)  Yes      2. I use or have been advised to use a cane or walker to get around safely.    People who have been advised to use a cane or walker may already be more likely to fall. (Fall Risk Assessment)  Yes    3. Sometimes I feel unsteady when I am walking.    Unsteadines or needing support while walking are signs people may be more likely to fall. (Fall Risk Assessment)  Yes      4. I steady myself by holding onto furniture when walking at home.    This is also a sign of poor balance. (Fall Risk Assessment)    No    5. I am worried about falling.    People who are worried about falling are  more likely to fall. (Fall Risk Assessment)    No    6. I need to push with my hands to stand up from a chair.    This is a sign of weak leg muscles, a major reason for falling. (Fall Risk Assessment)  Yes      7. I have some trouble stepping up onto a curb.    This is also a sign of weak leg muscles. (Fall Risk Assessment)    No    8. I often have to rush to the toilet.    Rushing to the bathroom, especially at night, increases your chance for falling. (Fall Risk Assessment)    No    9. I have lost some feeling in my feet.    Numbness in your feet can cause stumbles and lead to falls. (Fall Risk Assessment)  Yes      10. I take medicine to help me sleep or improve my mood.    Side effects from medicines can sometimes increase your chance of falling. (Fall Risk Assessment)    No    11. I often feel sad or depressed.    Symptoms of depression, such as not feeling well or feeling slowed down, are linked to falls. (Fall Risk Assessment)    No    Score Summary  Total Questions : 11 Questions Answered : 11  Score Level : High Overall Score : 7  Range : 0 to 13 Sections : 1  Date Started : 7/15/2025 1:03:49 PM Date Completed : 7/15/2025 1:05:15 PM  Section/Levels Low High  Range [0 to 4] [4 to 13]  Overall Score -- 7    Interpretation  Score 4 or greater means the patient is at risk for falling Min (4) Max (13)  7/15/2025 1:05:15 PM

## 2025-07-18 ENCOUNTER — PATIENT MESSAGE (OUTPATIENT)
Dept: FAMILY MEDICINE | Facility: CLINIC | Age: 85
End: 2025-07-18
Payer: MEDICARE

## 2025-07-22 ENCOUNTER — DOCUMENT SCAN (OUTPATIENT)
Dept: HOME HEALTH SERVICES | Facility: HOSPITAL | Age: 85
End: 2025-07-22
Payer: MEDICARE

## 2025-07-31 ENCOUNTER — EXTERNAL CHRONIC CARE MANAGEMENT (OUTPATIENT)
Dept: PRIMARY CARE CLINIC | Facility: CLINIC | Age: 85
End: 2025-07-31
Payer: MEDICARE

## 2025-07-31 PROCEDURE — 99490 CHRNC CARE MGMT STAFF 1ST 20: CPT | Mod: PBBFAC,PO | Performed by: FAMILY MEDICINE

## 2025-07-31 PROCEDURE — 99490 CHRNC CARE MGMT STAFF 1ST 20: CPT | Mod: S$PBB,,, | Performed by: FAMILY MEDICINE

## 2025-08-20 RX ORDER — CITALOPRAM 10 MG/1
10 TABLET ORAL DAILY
Qty: 90 TABLET | Refills: 2 | Status: SHIPPED | OUTPATIENT
Start: 2025-08-20

## 2025-09-04 DIAGNOSIS — E11.59 HYPERTENSION ASSOCIATED WITH DIABETES: ICD-10-CM

## 2025-09-04 DIAGNOSIS — I15.2 HYPERTENSION ASSOCIATED WITH DIABETES: ICD-10-CM

## 2025-09-04 RX ORDER — METOPROLOL SUCCINATE 50 MG/1
50 TABLET, EXTENDED RELEASE ORAL DAILY
Qty: 90 TABLET | Refills: 2 | Status: SHIPPED | OUTPATIENT
Start: 2025-09-04